# Patient Record
Sex: MALE | Race: WHITE | NOT HISPANIC OR LATINO | Employment: OTHER | URBAN - NONMETROPOLITAN AREA
[De-identification: names, ages, dates, MRNs, and addresses within clinical notes are randomized per-mention and may not be internally consistent; named-entity substitution may affect disease eponyms.]

---

## 2017-01-04 ENCOUNTER — RESULTS ENCOUNTER (OUTPATIENT)
Dept: INTERNAL MEDICINE | Facility: CLINIC | Age: 62
End: 2017-01-04

## 2017-01-04 DIAGNOSIS — C61 MALIGNANT NEOPLASM OF PROSTATE (HCC): ICD-10-CM

## 2017-01-13 DIAGNOSIS — M25.50 CHRONIC JOINT PAIN: ICD-10-CM

## 2017-01-13 DIAGNOSIS — G89.29 CHRONIC JOINT PAIN: ICD-10-CM

## 2017-01-13 DIAGNOSIS — F51.01 PRIMARY INSOMNIA: ICD-10-CM

## 2017-01-13 RX ORDER — MELOXICAM 15 MG/1
TABLET ORAL
Qty: 30 TABLET | Refills: 1 | Status: SHIPPED | OUTPATIENT
Start: 2017-01-13 | End: 2017-01-17 | Stop reason: SDUPTHER

## 2017-01-13 RX ORDER — ASPIRIN 81 MG
TABLET, DELAYED RELEASE (ENTERIC COATED) ORAL
Qty: 30 TABLET | Refills: 5 | Status: SHIPPED | OUTPATIENT
Start: 2017-01-13 | End: 2017-01-17 | Stop reason: SDUPTHER

## 2017-01-13 RX ORDER — AMITRIPTYLINE HYDROCHLORIDE 25 MG/1
TABLET, FILM COATED ORAL
Qty: 60 TABLET | Refills: 1 | Status: SHIPPED | OUTPATIENT
Start: 2017-01-13 | End: 2017-02-22 | Stop reason: SDUPTHER

## 2017-01-16 ENCOUNTER — TELEPHONE (OUTPATIENT)
Dept: INTERNAL MEDICINE | Facility: CLINIC | Age: 62
End: 2017-01-16

## 2017-01-16 NOTE — TELEPHONE ENCOUNTER
----- Message from Ladonna Means MD sent at 1/13/2017  8:33 PM EST -----  Contact: Wife  I need the reason for the consult and I can put in. Is it for his ear or something else?  ----- Message -----     From: Samy Gomez MA     Sent: 1/13/2017   5:13 PM       To: Ladonna Means MD        ----- Message -----     From: Sarah Chappell     Sent: 1/13/2017  11:54 AM       To: Samy Gomez MA    Wife states patient lost insurance but has gained it back.  Is requesting a referral to ENT & requesting Dr. Tamara Garcia p) [phone].  This office does require a referral & booking into February so they're hoping to get this referral soon.  Thanks!

## 2017-01-17 ENCOUNTER — CONSULT (OUTPATIENT)
Dept: CARDIOLOGY | Facility: CLINIC | Age: 62
End: 2017-01-17

## 2017-01-17 ENCOUNTER — LAB (OUTPATIENT)
Dept: INTERNAL MEDICINE | Facility: CLINIC | Age: 62
End: 2017-01-17

## 2017-01-17 ENCOUNTER — TELEPHONE (OUTPATIENT)
Dept: INTERNAL MEDICINE | Facility: CLINIC | Age: 62
End: 2017-01-17

## 2017-01-17 VITALS
SYSTOLIC BLOOD PRESSURE: 126 MMHG | HEIGHT: 71 IN | WEIGHT: 147.4 LBS | BODY MASS INDEX: 20.64 KG/M2 | HEART RATE: 85 BPM | RESPIRATION RATE: 16 BRPM | OXYGEN SATURATION: 98 % | DIASTOLIC BLOOD PRESSURE: 76 MMHG

## 2017-01-17 DIAGNOSIS — Z11.59 SCREENING EXAMINATION FOR POLIOMYELITIS: Primary | ICD-10-CM

## 2017-01-17 DIAGNOSIS — I20.0 UNSTABLE ANGINA (HCC): ICD-10-CM

## 2017-01-17 DIAGNOSIS — R42 DIZZINESS: ICD-10-CM

## 2017-01-17 DIAGNOSIS — R06.02 SHORTNESS OF BREATH: ICD-10-CM

## 2017-01-17 DIAGNOSIS — R07.9 CHEST PAIN IN ADULT: Primary | ICD-10-CM

## 2017-01-17 DIAGNOSIS — Z12.5 SCREENING FOR PROSTATE CANCER: Primary | ICD-10-CM

## 2017-01-17 DIAGNOSIS — M25.50 CHRONIC JOINT PAIN: ICD-10-CM

## 2017-01-17 DIAGNOSIS — G89.29 CHRONIC JOINT PAIN: ICD-10-CM

## 2017-01-17 DIAGNOSIS — R00.2 PALPITATIONS: ICD-10-CM

## 2017-01-17 DIAGNOSIS — R20.2 PARESTHESIA OF LOWER EXTREMITY: ICD-10-CM

## 2017-01-17 DIAGNOSIS — R20.0 NUMBNESS OF UPPER EXTREMITY: ICD-10-CM

## 2017-01-17 DIAGNOSIS — G89.29 CHRONIC JOINT PAIN: Primary | ICD-10-CM

## 2017-01-17 DIAGNOSIS — I10 ESSENTIAL HYPERTENSION: ICD-10-CM

## 2017-01-17 DIAGNOSIS — J43.9 PULMONARY EMPHYSEMA, UNSPECIFIED EMPHYSEMA TYPE (HCC): ICD-10-CM

## 2017-01-17 DIAGNOSIS — M25.50 CHRONIC JOINT PAIN: Primary | ICD-10-CM

## 2017-01-17 LAB
ALBUMIN SERPL-MCNC: 4 G/DL (ref 3.2–4.8)
ALBUMIN/GLOB SERPL: 1.2 G/DL (ref 1.5–2.5)
ALP SERPL-CCNC: 80 U/L (ref 25–100)
ALT SERPL W P-5'-P-CCNC: 33 U/L (ref 7–40)
ANION GAP SERPL CALCULATED.3IONS-SCNC: 17 MMOL/L (ref 3–11)
AST SERPL-CCNC: 34 U/L (ref 0–33)
BASOPHILS # BLD AUTO: 0.03 10*3/MM3 (ref 0–0.2)
BASOPHILS NFR BLD AUTO: 0.3 % (ref 0–1)
BILIRUB SERPL-MCNC: 0.3 MG/DL (ref 0.3–1.2)
BUN BLD-MCNC: 33 MG/DL (ref 9–23)
BUN/CREAT SERPL: 33 (ref 7–25)
CALCIUM SPEC-SCNC: 9.7 MG/DL (ref 8.7–10.4)
CHLORIDE SERPL-SCNC: 107 MMOL/L (ref 99–109)
CO2 SERPL-SCNC: 22 MMOL/L (ref 20–31)
CREAT BLD-MCNC: 1 MG/DL (ref 0.6–1.3)
DEPRECATED RDW RBC AUTO: 49.2 FL (ref 37–54)
EOSINOPHIL # BLD AUTO: 0.18 10*3/MM3 (ref 0.1–0.3)
EOSINOPHIL NFR BLD AUTO: 2 % (ref 0–3)
ERYTHROCYTE [DISTWIDTH] IN BLOOD BY AUTOMATED COUNT: 13.9 % (ref 11.3–14.5)
GFR SERPL CREATININE-BSD FRML MDRD: 76 ML/MIN/1.73
GLOBULIN UR ELPH-MCNC: 3.3 GM/DL
GLUCOSE BLD-MCNC: 85 MG/DL (ref 70–100)
HCT VFR BLD AUTO: 47.9 % (ref 38.9–50.9)
HCV AB SER DONR QL: NORMAL
HGB BLD-MCNC: 16.1 G/DL (ref 13.1–17.5)
IMM GRANULOCYTES # BLD: 0.01 10*3/MM3 (ref 0–0.03)
IMM GRANULOCYTES NFR BLD: 0.1 % (ref 0–0.6)
LYMPHOCYTES # BLD AUTO: 2.69 10*3/MM3 (ref 0.6–4.8)
LYMPHOCYTES NFR BLD AUTO: 29.5 % (ref 24–44)
MCH RBC QN AUTO: 32.4 PG (ref 27–31)
MCHC RBC AUTO-ENTMCNC: 33.6 G/DL (ref 32–36)
MCV RBC AUTO: 96.4 FL (ref 80–99)
MONOCYTES # BLD AUTO: 0.81 10*3/MM3 (ref 0–1)
MONOCYTES NFR BLD AUTO: 8.9 % (ref 0–12)
NEUTROPHILS # BLD AUTO: 5.39 10*3/MM3 (ref 1.5–8.3)
NEUTROPHILS NFR BLD AUTO: 59.2 % (ref 41–71)
PLATELET # BLD AUTO: 252 10*3/MM3 (ref 150–450)
PMV BLD AUTO: 11.2 FL (ref 6–12)
POTASSIUM BLD-SCNC: 4.4 MMOL/L (ref 3.5–5.5)
PROT SERPL-MCNC: 7.3 G/DL (ref 5.7–8.2)
PSA SERPL-MCNC: 0.2 NG/ML (ref 0–4)
RBC # BLD AUTO: 4.97 10*6/MM3 (ref 4.2–5.76)
SODIUM BLD-SCNC: 146 MMOL/L (ref 132–146)
WBC NRBC COR # BLD: 9.11 10*3/MM3 (ref 3.5–10.8)

## 2017-01-17 PROCEDURE — 36415 COLL VENOUS BLD VENIPUNCTURE: CPT | Performed by: FAMILY MEDICINE

## 2017-01-17 PROCEDURE — 99205 OFFICE O/P NEW HI 60 MIN: CPT | Performed by: INTERNAL MEDICINE

## 2017-01-17 PROCEDURE — 93000 ELECTROCARDIOGRAM COMPLETE: CPT | Performed by: INTERNAL MEDICINE

## 2017-01-17 PROCEDURE — 84153 ASSAY OF PSA TOTAL: CPT | Performed by: FAMILY MEDICINE

## 2017-01-17 PROCEDURE — 86803 HEPATITIS C AB TEST: CPT | Performed by: FAMILY MEDICINE

## 2017-01-17 PROCEDURE — 80053 COMPREHEN METABOLIC PANEL: CPT | Performed by: FAMILY MEDICINE

## 2017-01-17 PROCEDURE — 85025 COMPLETE CBC W/AUTO DIFF WBC: CPT | Performed by: FAMILY MEDICINE

## 2017-01-17 PROCEDURE — 84305 ASSAY OF SOMATOMEDIN: CPT | Performed by: FAMILY MEDICINE

## 2017-01-17 RX ORDER — ATORVASTATIN CALCIUM 10 MG/1
10 TABLET, FILM COATED ORAL DAILY
Qty: 90 TABLET | Refills: 2 | Status: SHIPPED | OUTPATIENT
Start: 2017-01-17 | End: 2017-03-22 | Stop reason: SDUPTHER

## 2017-01-17 RX ORDER — ASPIRIN 81 MG/1
81 TABLET ORAL DAILY
Qty: 30 TABLET | Refills: 5 | Status: SHIPPED | OUTPATIENT
Start: 2017-01-17 | End: 2018-08-31 | Stop reason: SDUPTHER

## 2017-01-17 RX ORDER — BISOPROLOL FUMARATE 5 MG/1
5 TABLET, FILM COATED ORAL DAILY
Qty: 30 TABLET | Refills: 11 | Status: SHIPPED | OUTPATIENT
Start: 2017-01-17 | End: 2017-08-18 | Stop reason: SDUPTHER

## 2017-01-17 RX ORDER — GABAPENTIN 100 MG/1
100 CAPSULE ORAL 3 TIMES DAILY
Qty: 30 CAPSULE | Refills: 2 | Status: SHIPPED | OUTPATIENT
Start: 2017-01-17 | End: 2017-05-09

## 2017-01-17 RX ORDER — MELOXICAM 15 MG/1
15 TABLET ORAL DAILY
Qty: 30 TABLET | Refills: 1 | Status: SHIPPED | OUTPATIENT
Start: 2017-01-17 | End: 2017-02-22 | Stop reason: SDUPTHER

## 2017-01-17 RX ORDER — ISOSORBIDE MONONITRATE 30 MG/1
30 TABLET, EXTENDED RELEASE ORAL DAILY
Qty: 30 TABLET | Refills: 11 | Status: SHIPPED | OUTPATIENT
Start: 2017-01-17 | End: 2017-04-06

## 2017-01-17 RX ORDER — NITROGLYCERIN 0.4 MG/1
TABLET SUBLINGUAL
Qty: 100 TABLET | Refills: 11 | Status: SHIPPED | OUTPATIENT
Start: 2017-01-17 | End: 2022-02-01

## 2017-01-17 NOTE — TELEPHONE ENCOUNTER
----- Message from Rossi Jessica sent at 1/17/2017  9:36 AM EST -----  Contact: Pt Wife  Patient requesting refill of:    atorvastatin (LIPITOR) 10 MG tablet  ASPIRIN LOW DOSE 81 MG EC tablet  gabapentin (NEURONTIN) 100 MG capsule  meloxicam (MOBIC) 15 MG tablet    ##Krbetzaidar/Bryan [phone]

## 2017-01-17 NOTE — LETTER
January 17, 2017     Ladonna Means MD  [address] [facility] [address] [address]  [facility] [address]    Patient: Elliott Dunn   YOB: 1955   Date of Visit: 1/17/2017       Dear Dr. Miguelangel MD:    Thank you for referring Elliott Dunn to me for evaluation. Below are the relevant portions of my assessment and plan of care.  I had the opportunity to see your patient in cardiology clinic today.  This is a 61-year-old male patient with chest pain features typical and atypical for coronary insufficiency.  He had a normal stress test in May 2016.  He has multiple risk factors for atherosclerosis.  Fortunately he does not meet appropriate use criteria for elective cardiac catheterization.  For this reason I have recommended a coronary CT angiogram.  I have also empirically started him on bisoprolol 5 mg once per day and Imdur 30 mg per day.  He is to continue using his hydrochlorothiazide diuretic for the time being.  If his blood pressure becomes too low I would discontinue the diuretic.  I have also given him a prescription for sublingual nitroglycerin to use on an as-needed basis.  The patient's clinical features and extremely large hands suggest that he may have unrecognized acromegaly.  I would recommend this be further evaluated as well.  I will defer this to your discretion.  Will also recommended a 24-hour Holter monitor to evaluate his palpitations.  He is been counseled regarding the essential need to discontinue cigarette smoking.  Further recommendations will be predicated on the outcome of his outpatient testing.  If you have questions, please do not hesitate to call me. I look forward to following Elliott along with you.         Sincerely,        Raymond Arreola MD        CC: No Recipients  Raymond Arreola MD  1/17/2017  9:28 AM  Signed      Subjective:     Encounter Date:01/17/2017      Patient ID: Elliott Dunn is a 61 y.o. male.    Chief Complaint:  Chest pain  HPI  This is a 61-year-old male  "patient with no prior history of documented coronary disease who presents to cardiology clinic today having experienced chest discomfort since the spring of 2016.  The patient began having chest discomfort in April 2016 primarily with activity.  He describes it as a sense that his chest is going to \"explode\".  The discomfort is diffusely across his central chest and tends to radiate down his left arm and has radiated to his left shoulder and back.  The discomfort is associated with shortness of breath and diaphoresis with malaise but no nausea or vomiting.  The discomfort occurs intermittently and with variable frequency.  In the spring he was having chest discomfort approximately 3-5 days a week.  He is currently having chest discomfort approximately every 2 weeks.  He indicates that initially his chest discomfort was relieved with about 10-15 minutes of rest.  Now he indicates the discomfort can be present for several hours during the day.  The discomfort still occurs with activity and is relieved with rest but has also occurred during periods of rest as well.  He has no orthopnea PND or lower extremity edema.  He also has shortness of breath with activity.  He has dizziness palpitations but no syncope.  The patient has episodes of severe dizziness when his blood pressure is elevated.  He was recently diagnosed as having Ménière's disease in a local emergency room.  He does not appear to have the classic symptom complex of vertigo, tinnitus and hearing loss.  He has a history of hypertension and hypercholesterolemia.  He has a history of COPD and continues to smoke one pack of cigarettes per day.  He has no documented history of myocardial infarction or coronary revascularization.  His family history is strongly positive for premature coronary disease.  He is never had a cardiac catheterization.  He underwent a vasodilator nuclear stress test in May 2016 which was interpreted as showing no evidence of ischemia or " infarction.  His ejection fraction was calculated at 52% by that study.  He has no history of stroke or TIA.  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  He also has pain in his hands bilaterally.  He also has developed tendon and ligament weakening in both feet and ankles which has required the use of orthopedic support devices applied to both ankles.  He is unable to exercise for treadmill stress test purposes based on these abnormalities.  The patient was previously experiencing severe hypertension but his blood pressure has been come under excellent control with hydrochlorothiazide diuretic.  The following portions of the patient's history were reviewed and updated as appropriate: allergies, current medications, past family history, past medical history, past social history, past surgical history and problem  Review of Systems   Constitution: Positive for night sweats. Negative for chills, diaphoresis, fever, weakness, malaise/fatigue, weight gain and weight loss.   HENT: Negative for ear discharge, hearing loss, hoarse voice and nosebleeds.    Eyes: Negative for discharge, double vision, pain and photophobia.   Cardiovascular: Positive for chest pain, dyspnea on exertion, irregular heartbeat and palpitations. Negative for claudication, cyanosis, leg swelling, near-syncope, orthopnea, paroxysmal nocturnal dyspnea and syncope.   Respiratory: Positive for shortness of breath. Negative for cough, hemoptysis, sputum production and wheezing.    Endocrine: Negative for cold intolerance, heat intolerance, polydipsia, polyphagia and polyuria.   Hematologic/Lymphatic: Negative for adenopathy and bleeding problem. Does not bruise/bleed easily.   Skin: Negative for color change, flushing, itching and rash.   Musculoskeletal: Negative for muscle cramps, muscle weakness, myalgias and stiffness.   Gastrointestinal: Negative for abdominal pain, diarrhea, hematemesis, hematochezia, nausea and vomiting.    Genitourinary: Negative for dysuria, frequency and nocturia.   Neurological: Negative for focal weakness, loss of balance, numbness, paresthesias and seizures.   Psychiatric/Behavioral: Negative for altered mental status, hallucinations and suicidal ideas.   Allergic/Immunologic: Negative for HIV exposure, hives and persistent infections.       Current Outpatient Prescriptions:   •  albuterol (PROVENTIL HFA;VENTOLIN HFA) 108 (90 BASE) MCG/ACT inhaler, Inhale.  INHALE 1 TO 2 PUFFS BY MOUTH EVERY 4 TO 6 HOURS AS NEEDED FOR SHORTNESS OF BREATH AND WHEEZING, Disp: , Rfl:   •  amitriptyline (ELAVIL) 25 MG tablet, TAKE ONE TO TWO TABLETS BY MOUTH EVERY NIGHT AT BEDTIME AS NEEDED FOR SLEEP, Disp: 60 tablet, Rfl: 1  •  ASPIRIN LOW DOSE 81 MG EC tablet, TAKE ONE TABLET BY MOUTH DAILY, Disp: 30 tablet, Rfl: 5  •  atorvastatin (LIPITOR) 10 MG tablet, TAKE ONE TABLET BY MOUTH DAILY, Disp: 90 tablet, Rfl: 2  •  gabapentin (NEURONTIN) 100 MG capsule, Take 1 capsule by mouth 3 (three) times a day., Disp: 30 capsule, Rfl: 2  •  hydrochlorothiazide (HYDRODIURIL) 25 MG tablet, Take 0.5 tablets by mouth daily, Disp: , Rfl:   •  meloxicam (MOBIC) 15 MG tablet, TAKE ONE TABLET BY MOUTH DAILY, Disp: 30 tablet, Rfl: 1  •  methocarbamol (ROBAXIN) 750 MG tablet, Take 1 tablet by mouth 3 (three) times a day., Disp: 90 tablet, Rfl: 2  •  ranitidine (ZANTAC) 150 MG tablet, Take 1 tablet by mouth every night., Disp: , Rfl:   •  Triamcinolone Acetonide (NASACORT) 55 MCG/ACT nasal inhaler, 2 sprays into each nostril daily., Disp: , Rfl:   •  vitamin B-12 (CYANOCOBALAMIN) 1000 MCG tablet, TAKE ONE TABLET BY MOUTH DAILY AS DIRECTED, Disp: 30 tablet, Rfl: 10  •  bisoprolol (ZEBeta) 5 MG tablet, Take 1 tablet by mouth Daily., Disp: 30 tablet, Rfl: 11  •  isosorbide mononitrate (IMDUR) 30 MG 24 hr tablet, Take 1 tablet by mouth Daily., Disp: 30 tablet, Rfl: 11  •  nitroglycerin (NITROSTAT) 0.4 MG SL tablet, 1 under the tongue as needed for angina,  "may repeat q5mins for up three doses, Disp: 100 tablet, Rfl: 11     Objective:     Physical Exam   Constitutional: He is oriented to person, place, and time. He appears well-developed and well-nourished.   HENT:   Head: Normocephalic and atraumatic.   Mouth/Throat: Oropharynx is clear and moist.   Eyes: Conjunctivae and EOM are normal. Pupils are equal, round, and reactive to light. No scleral icterus.   Neck: Normal range of motion. Neck supple. No JVD present. No tracheal deviation present. No thyromegaly present.   Cardiovascular: Normal rate, regular rhythm, S1 normal, S2 normal, normal heart sounds, intact distal pulses and normal pulses.  PMI is not displaced.  Exam reveals no gallop and no friction rub.    No murmur heard.  Pulmonary/Chest: Effort normal and breath sounds normal. No respiratory distress. He has no wheezes. He has no rales.   Abdominal: Soft. Bowel sounds are normal. He exhibits no distension and no mass. There is no tenderness. There is no rebound and no guarding.   Musculoskeletal: Normal range of motion. He exhibits no edema or deformity.   Neurological: He is alert and oriented to person, place, and time. He displays normal reflexes. No cranial nerve deficit. Coordination normal.   Skin: Skin is warm and dry. No rash noted. No erythema.   Psychiatric: He has a normal mood and affect. His behavior is normal. Thought content normal.     Blood pressure 126/76, pulse 85, resp. rate 16, height 71\" (180.3 cm), weight 147 lb 6.4 oz (66.9 kg), SpO2 98 %.   Lab Review:       Assessment:         1. Chest pain in adult  His chest discomfort has features both typical and atypical for coronary insufficiency.  He has multiple risk factors for coronary atherosclerosis.  He has previously had a vasodilator nuclear stress test in May 2016 which showed no inducible ischemia.  His symptoms have waxed and waned over the last several months.  - ECG 12 Lead  - CT Angiogram Coronary    2. Shortness of breath  I " suspect this is primarily due to his COPD.  There may be an element of angina equivalents.  This may be a symptom of hypertensive related cardiac disease.  - ECG 12 Lead  - CT Angiogram Coronary    3. Palpitations  Some of his symptoms could be explained by either arrhythmia and/or ectopy.  His baseline 12-lead electrocardiogram shows no QT prolongation or evidence of preexcitation.  The symptoms have gradually worsened.  He is never worn an outpatient heart monitor.  - ECG 12 Lead  - Holter Monitor - 24 Hour    4. Dizziness  The patient has previously been diagnosed with Ménière's disease.  I suspect that his dizziness may be more related to accelerated hypertension.  - ECG 12 Lead  - Holter Monitor - 24 Hour    5. Unstable angina  The patient's blood pressure may be contributing to his chest discomfort.  - CT Angiogram Coronary    6. Essential hypertension  His blood pressure control has improved with diuretic therapy.    7. Pulmonary emphysema, unspecified emphysema type  Unfortunately he continues to smoke    ECG 12 Lead  Date/Time: 1/17/2017 8:49 AM  Performed by: VANDANA LARA  Authorized by: VANDANA LARA                Plan:       The patient has been counseled regarding the need to discontinue cigarette smoking.  At this point coronary imaging is a necessity.  I have recommended a coronary CT angiogram.  He has no allergies to IV contrast.  4 empiric antianginal therapy I have started him on bisoprolol , Imdur and when necessary sublingual nitroglycerin.  I have also recommended an outpatient Holter monitor.  Further recommendations will be predicated on the outcome of his outpatient testing.  The patient has been noticed to have extremely large hands.  I wonder if he has unrecognized acromegaly.

## 2017-01-17 NOTE — PROGRESS NOTES
"    Subjective:     Encounter Date:01/17/2017      Patient ID: Elliott Dunn is a 61 y.o. male.    Chief Complaint:  Chest pain  HPI  This is a 61-year-old male patient with no prior history of documented coronary disease who presents to cardiology clinic today having experienced chest discomfort since the spring of 2016.  The patient began having chest discomfort in April 2016 primarily with activity.  He describes it as a sense that his chest is going to \"explode\".  The discomfort is diffusely across his central chest and tends to radiate down his left arm and has radiated to his left shoulder and back.  The discomfort is associated with shortness of breath and diaphoresis with malaise but no nausea or vomiting.  The discomfort occurs intermittently and with variable frequency.  In the spring he was having chest discomfort approximately 3-5 days a week.  He is currently having chest discomfort approximately every 2 weeks.  He indicates that initially his chest discomfort was relieved with about 10-15 minutes of rest.  Now he indicates the discomfort can be present for several hours during the day.  The discomfort still occurs with activity and is relieved with rest but has also occurred during periods of rest as well.  He has no orthopnea PND or lower extremity edema.  He also has shortness of breath with activity.  He has dizziness palpitations but no syncope.  The patient has episodes of severe dizziness when his blood pressure is elevated.  He was recently diagnosed as having Ménière's disease in a local emergency room.  He does not appear to have the classic symptom complex of vertigo, tinnitus and hearing loss.  He has a history of hypertension and hypercholesterolemia.  He has a history of COPD and continues to smoke one pack of cigarettes per day.  He has no documented history of myocardial infarction or coronary revascularization.  His family history is strongly positive for premature coronary disease.  " He is never had a cardiac catheterization.  He underwent a vasodilator nuclear stress test in May 2016 which was interpreted as showing no evidence of ischemia or infarction.  His ejection fraction was calculated at 52% by that study.  He has no history of stroke or TIA.  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  He also has pain in his hands bilaterally.  He also has developed tendon and ligament weakening in both feet and ankles which has required the use of orthopedic support devices applied to both ankles.  He is unable to exercise for treadmill stress test purposes based on these abnormalities.  The patient was previously experiencing severe hypertension but his blood pressure has been come under excellent control with hydrochlorothiazide diuretic.  The following portions of the patient's history were reviewed and updated as appropriate: allergies, current medications, past family history, past medical history, past social history, past surgical history and problem  Review of Systems   Constitution: Positive for night sweats. Negative for chills, diaphoresis, fever, weakness, malaise/fatigue, weight gain and weight loss.   HENT: Negative for ear discharge, hearing loss, hoarse voice and nosebleeds.    Eyes: Negative for discharge, double vision, pain and photophobia.   Cardiovascular: Positive for chest pain, dyspnea on exertion, irregular heartbeat and palpitations. Negative for claudication, cyanosis, leg swelling, near-syncope, orthopnea, paroxysmal nocturnal dyspnea and syncope.   Respiratory: Positive for shortness of breath. Negative for cough, hemoptysis, sputum production and wheezing.    Endocrine: Negative for cold intolerance, heat intolerance, polydipsia, polyphagia and polyuria.   Hematologic/Lymphatic: Negative for adenopathy and bleeding problem. Does not bruise/bleed easily.   Skin: Negative for color change, flushing, itching and rash.   Musculoskeletal: Negative for muscle  cramps, muscle weakness, myalgias and stiffness.   Gastrointestinal: Negative for abdominal pain, diarrhea, hematemesis, hematochezia, nausea and vomiting.   Genitourinary: Negative for dysuria, frequency and nocturia.   Neurological: Negative for focal weakness, loss of balance, numbness, paresthesias and seizures.   Psychiatric/Behavioral: Negative for altered mental status, hallucinations and suicidal ideas.   Allergic/Immunologic: Negative for HIV exposure, hives and persistent infections.       Current Outpatient Prescriptions:   •  albuterol (PROVENTIL HFA;VENTOLIN HFA) 108 (90 BASE) MCG/ACT inhaler, Inhale.  INHALE 1 TO 2 PUFFS BY MOUTH EVERY 4 TO 6 HOURS AS NEEDED FOR SHORTNESS OF BREATH AND WHEEZING, Disp: , Rfl:   •  amitriptyline (ELAVIL) 25 MG tablet, TAKE ONE TO TWO TABLETS BY MOUTH EVERY NIGHT AT BEDTIME AS NEEDED FOR SLEEP, Disp: 60 tablet, Rfl: 1  •  ASPIRIN LOW DOSE 81 MG EC tablet, TAKE ONE TABLET BY MOUTH DAILY, Disp: 30 tablet, Rfl: 5  •  atorvastatin (LIPITOR) 10 MG tablet, TAKE ONE TABLET BY MOUTH DAILY, Disp: 90 tablet, Rfl: 2  •  gabapentin (NEURONTIN) 100 MG capsule, Take 1 capsule by mouth 3 (three) times a day., Disp: 30 capsule, Rfl: 2  •  hydrochlorothiazide (HYDRODIURIL) 25 MG tablet, Take 0.5 tablets by mouth daily, Disp: , Rfl:   •  meloxicam (MOBIC) 15 MG tablet, TAKE ONE TABLET BY MOUTH DAILY, Disp: 30 tablet, Rfl: 1  •  methocarbamol (ROBAXIN) 750 MG tablet, Take 1 tablet by mouth 3 (three) times a day., Disp: 90 tablet, Rfl: 2  •  ranitidine (ZANTAC) 150 MG tablet, Take 1 tablet by mouth every night., Disp: , Rfl:   •  Triamcinolone Acetonide (NASACORT) 55 MCG/ACT nasal inhaler, 2 sprays into each nostril daily., Disp: , Rfl:   •  vitamin B-12 (CYANOCOBALAMIN) 1000 MCG tablet, TAKE ONE TABLET BY MOUTH DAILY AS DIRECTED, Disp: 30 tablet, Rfl: 10  •  bisoprolol (ZEBeta) 5 MG tablet, Take 1 tablet by mouth Daily., Disp: 30 tablet, Rfl: 11  •  isosorbide mononitrate (IMDUR) 30 MG 24  "hr tablet, Take 1 tablet by mouth Daily., Disp: 30 tablet, Rfl: 11  •  nitroglycerin (NITROSTAT) 0.4 MG SL tablet, 1 under the tongue as needed for angina, may repeat q5mins for up three doses, Disp: 100 tablet, Rfl: 11     Objective:     Physical Exam   Constitutional: He is oriented to person, place, and time. He appears well-developed and well-nourished.   HENT:   Head: Normocephalic and atraumatic.   Mouth/Throat: Oropharynx is clear and moist.   Eyes: Conjunctivae and EOM are normal. Pupils are equal, round, and reactive to light. No scleral icterus.   Neck: Normal range of motion. Neck supple. No JVD present. No tracheal deviation present. No thyromegaly present.   Cardiovascular: Normal rate, regular rhythm, S1 normal, S2 normal, normal heart sounds, intact distal pulses and normal pulses.  PMI is not displaced.  Exam reveals no gallop and no friction rub.    No murmur heard.  Pulmonary/Chest: Effort normal and breath sounds normal. No respiratory distress. He has no wheezes. He has no rales.   Abdominal: Soft. Bowel sounds are normal. He exhibits no distension and no mass. There is no tenderness. There is no rebound and no guarding.   Musculoskeletal: Normal range of motion. He exhibits no edema or deformity.   Neurological: He is alert and oriented to person, place, and time. He displays normal reflexes. No cranial nerve deficit. Coordination normal.   Skin: Skin is warm and dry. No rash noted. No erythema.   Psychiatric: He has a normal mood and affect. His behavior is normal. Thought content normal.     Blood pressure 126/76, pulse 85, resp. rate 16, height 71\" (180.3 cm), weight 147 lb 6.4 oz (66.9 kg), SpO2 98 %.   Lab Review:       Assessment:         1. Chest pain in adult  His chest discomfort has features both typical and atypical for coronary insufficiency.  He has multiple risk factors for coronary atherosclerosis.  He has previously had a vasodilator nuclear stress test in May 2016 which showed " no inducible ischemia.  His symptoms have waxed and waned over the last several months.  - ECG 12 Lead  - CT Angiogram Coronary    2. Shortness of breath  I suspect this is primarily due to his COPD.  There may be an element of angina equivalents.  This may be a symptom of hypertensive related cardiac disease.  - ECG 12 Lead  - CT Angiogram Coronary    3. Palpitations  Some of his symptoms could be explained by either arrhythmia and/or ectopy.  His baseline 12-lead electrocardiogram shows no QT prolongation or evidence of preexcitation.  The symptoms have gradually worsened.  He is never worn an outpatient heart monitor.  - ECG 12 Lead  - Holter Monitor - 24 Hour    4. Dizziness  The patient has previously been diagnosed with Ménière's disease.  I suspect that his dizziness may be more related to accelerated hypertension.  - ECG 12 Lead  - Holter Monitor - 24 Hour    5. Unstable angina  The patient's blood pressure may be contributing to his chest discomfort.  - CT Angiogram Coronary    6. Essential hypertension  His blood pressure control has improved with diuretic therapy.    7. Pulmonary emphysema, unspecified emphysema type  Unfortunately he continues to smoke    ECG 12 Lead  Date/Time: 1/17/2017 8:49 AM  Performed by: VANDANA LARA  Authorized by: VANDANA LARA                Plan:       The patient has been counseled regarding the need to discontinue cigarette smoking.  At this point coronary imaging is a necessity.  I have recommended a coronary CT angiogram.  He has no allergies to IV contrast.  4 empiric antianginal therapy I have started him on bisoprolol , Imdur and when necessary sublingual nitroglycerin.  I have also recommended an outpatient Holter monitor.  Further recommendations will be predicated on the outcome of his outpatient testing.  The patient has been noticed to have extremely large hands.  I wonder if he has unrecognized acromegaly.

## 2017-01-17 NOTE — MR AVS SNAPSHOT
Elliott Dunn   1/17/2017 8:30 AM   Consult    Dept Phone:  [phone]   Encounter #:  [id number]    Provider:  Raymond Arreola MD   Department:  [facility] CARDIOLOGY                Your Full Care Plan              Today's Medication Changes          These changes are accurate as of: 1/17/17  9:14 AM.  If you have any questions, ask your nurse or doctor.               New Medication(s)Ordered:     bisoprolol 5 MG tablet   Commonly known as:  ZEBeta   Take 1 tablet by mouth Daily.   Started by:  Raymond Arreola MD       isosorbide mononitrate 30 MG 24 hr tablet   Commonly known as:  IMDUR   Take 1 tablet by mouth Daily.   Started by:  Raymond Arreola MD       nitroglycerin 0.4 MG SL tablet   Commonly known as:  NITROSTAT   1 under the tongue as needed for angina, may repeat q5mins for up three doses   Started by:  Raymond Arreola MD         Medication(s)that have changed:     ASPIRIN LOW DOSE 81 MG EC tablet   Generic drug:  aspirin   TAKE ONE TABLET BY MOUTH DAILY   What changed:  Another medication with the same name was removed. Continue taking this medication, and follow the directions you see here.   Changed by:  Ladonna Means MD         Stop taking medication(s)listed here:     ibuprofen 800 MG tablet   Commonly known as:  ADVIL,MOTRIN   Stopped by:  Raymond Arreola MD                Where to Get Your Medications      These medications were sent to [facility] [address] [place] [address] - [address] [facility] - [phone]  - [phone] FX  [address] [facility] [address]     Phone:  [phone]     bisoprolol 5 MG tablet    isosorbide mononitrate 30 MG 24 hr tablet    nitroglycerin 0.4 MG SL tablet                  Your Updated Medication List          This list is accurate as of: 1/17/17  9:14 AM.  Always use your most recent med list.                albuterol 108 (90 BASE) MCG/ACT inhaler   Commonly known as:  PROVENTIL HFA;VENTOLIN HFA        amitriptyline 25 MG tablet   Commonly known as:  ELAVIL   TAKE ONE TO TWO TABLETS BY MOUTH EVERY NIGHT AT BEDTIME AS NEEDED FOR SLEEP       ASPIRIN LOW DOSE 81 MG EC tablet   Generic drug:  aspirin   TAKE ONE TABLET BY MOUTH DAILY       atorvastatin 10 MG tablet   Commonly known as:  LIPITOR   TAKE ONE TABLET BY MOUTH DAILY       bisoprolol 5 MG tablet   Commonly known as:  ZEBeta   Take 1 tablet by mouth Daily.       gabapentin 100 MG capsule   Commonly known as:  NEURONTIN   Take 1 capsule by mouth 3 (three) times a day.       hydrochlorothiazide 25 MG tablet   Commonly known as:  HYDRODIURIL       isosorbide mononitrate 30 MG 24 hr tablet   Commonly known as:  IMDUR   Take 1 tablet by mouth Daily.       meloxicam 15 MG tablet   Commonly known as:  MOBIC   TAKE ONE TABLET BY MOUTH DAILY       methocarbamol 750 MG tablet   Commonly known as:  ROBAXIN   Take 1 tablet by mouth 3 (three) times a day.       nitroglycerin 0.4 MG SL tablet   Commonly known as:  NITROSTAT   1 under the tongue as needed for angina, may repeat q5mins for up three doses       raNITIdine 150 MG tablet   Commonly known as:  ZANTAC       Triamcinolone Acetonide 55 MCG/ACT nasal inhaler   Commonly known as:  NASACORT       vitamin B-12 1000 MCG tablet   Commonly known as:  CYANOCOBALAMIN   TAKE ONE TABLET BY MOUTH DAILY AS DIRECTED               We Performed the Following     CT Angiogram Coronary     ECG 12 Lead     Holter Monitor - 24 Hour       You Were Diagnosed With        Codes Comments    Chest pain in adult    -  Primary ICD-10-CM: R07.9  ICD-9-CM: 786.50     Shortness of breath     ICD-10-CM: R06.02  ICD-9-CM: 786.05     Palpitations     ICD-10-CM: R00.2  ICD-9-CM: 785.1     Dizziness     ICD-10-CM: R42  ICD-9-CM: 780.4     Unstable angina     ICD-10-CM: I20.0  ICD-9-CM: 411.1     Essential hypertension     ICD-10-CM: I10  ICD-9-CM: 401.9     Pulmonary emphysema, unspecified emphysema type     ICD-10-CM: J43.9  ICD-9-CM: 492.8        Instructions     None    Patient Instructions History      Upcoming Appointments     Visit Type Date Time Department    CONSULT 2017  8:30 AM [facility] CARD [place]    FOLLOW UP 1 UNIT 2017  9:45 AM [facility] ONC [place]    FOLLOW UP 2017 10:00 AM [facility] [place]      MyChart Signup     [facility] [employer] allows you to send messages to your doctor, view your test results, renew your prescriptions, schedule appointments, and more. To sign up, go to [employer] and click on the Sign Up Now link in the New User? box. Enter your [employer] Activation Code exactly as it appears below along with the last four digits of your Social Security Number and your Date of Birth () to complete the sign-up process. If you do not sign up before the expiration date, you must request a new code.    [employer] Activation Code: [email or web address]  Expires: 2017  9:13 AM    If you have questions, you can email [email or web address] or call [phone] to talk to our [employer] staff. Remember, [employer] is NOT to be used for urgent needs. For medical emergencies, dial 911.               Other Info from Your Visit           Your Appointments     2017  9:45 AM EST   FOLLOW UP with Iban Abdi MD   [facility] HEMATOLOGY  AND ONCOLOGY ([place])    [address]  [facility] [address] [phone]            2017 10:00 AM EST   Follow Up with Raymond Arreola MD   [facility] CARDIOLOGY (--)    [address]  [facility] [address] [phone]           Arrive 15 minutes prior to appointment.              Allergies     Pravastatin  Other (See Comments)    Weakness / fatigue      Reason for Visit     Advice Only     Chest Pain     Shortness of Breath     Palpitations     Dizziness           Vital Signs     Blood Pressure Pulse Respirations Height Weight Oxygen Saturation    126/76 (BP Location: Left arm, Patient Position:  "Standing, Cuff Size: Adult) 85 16 71\" (180.3 cm) 147 lb 6.4 oz (66.9 kg) 98%    Body Mass Index Smoking Status                20.56 kg/m2 Current Every Day Smoker          Problems and Diagnoses Noted     Chest pain in adult    Dizziness    High blood pressure    Palpitations    Emphysema    Shortness of breath    Unstable angina        "

## 2017-01-18 DIAGNOSIS — H93.12 LEFT-SIDED TINNITUS: Primary | ICD-10-CM

## 2017-01-18 DIAGNOSIS — R42 DIZZINESS: ICD-10-CM

## 2017-01-18 LAB — IGF-I SERPL-MCNC: 62 NG/ML (ref 49–188)

## 2017-01-18 NOTE — TELEPHONE ENCOUNTER
LUIS CALLED BACK, IT WILL BE FOR HIS EAR. POSSIBLE MENIERE DISEASE. HE WOULD LIKE TO BE SEEN AT [facility] IN [place], WITH DR. HOPE. [phone]

## 2017-01-19 NOTE — TELEPHONE ENCOUNTER
Wife informed she will get a call with appointment information once arranged, verbalized understanding.

## 2017-01-26 ENCOUNTER — HOSPITAL ENCOUNTER (OUTPATIENT)
Dept: CT IMAGING | Facility: HOSPITAL | Age: 62
Discharge: HOME | End: 2017-01-26
Attending: INTERNAL MEDICINE | Admitting: INTERNAL MEDICINE

## 2017-01-26 VITALS
SYSTOLIC BLOOD PRESSURE: 107 MMHG | DIASTOLIC BLOOD PRESSURE: 69 MMHG | RESPIRATION RATE: 18 BRPM | TEMPERATURE: 97.3 F | WEIGHT: 146.4 LBS | BODY MASS INDEX: 20.5 KG/M2 | HEIGHT: 71 IN | HEART RATE: 60 BPM | OXYGEN SATURATION: 95 %

## 2017-01-26 PROCEDURE — 75572 CT HRT W/3D IMAGE: CPT

## 2017-01-26 PROCEDURE — 0 IOPAMIDOL PER 1 ML: Performed by: INTERNAL MEDICINE

## 2017-01-26 RX ORDER — NITROGLYCERIN 0.4 MG/1
0.4 TABLET SUBLINGUAL ONCE
Status: COMPLETED | OUTPATIENT
Start: 2017-01-26 | End: 2017-01-26

## 2017-01-26 RX ORDER — SODIUM CHLORIDE 9 MG/ML
10 INJECTION, SOLUTION INTRAVENOUS CONTINUOUS
Status: DISCONTINUED | OUTPATIENT
Start: 2017-01-26 | End: 2017-01-26 | Stop reason: HOSPADM

## 2017-01-26 RX ORDER — METOPROLOL TARTRATE 50 MG/1
50 TABLET, FILM COATED ORAL AS NEEDED
Status: DISCONTINUED | OUTPATIENT
Start: 2017-01-26 | End: 2017-01-26 | Stop reason: HOSPADM

## 2017-01-26 RX ORDER — ALPRAZOLAM 0.5 MG/1
0.5 TABLET ORAL
Status: COMPLETED | OUTPATIENT
Start: 2017-01-26 | End: 2017-01-26

## 2017-01-26 RX ADMIN — SODIUM CHLORIDE 10 ML/HR: 9 INJECTION, SOLUTION INTRAVENOUS at 08:36

## 2017-01-26 RX ADMIN — ALPRAZOLAM 0.5 MG: 0.5 TABLET ORAL at 08:35

## 2017-01-26 RX ADMIN — NITROGLYCERIN 0.4 MG: 0.4 TABLET SUBLINGUAL at 09:41

## 2017-01-26 RX ADMIN — METOPROLOL TARTRATE 50 MG: 50 TABLET ORAL at 08:35

## 2017-01-26 RX ADMIN — IOPAMIDOL 65 ML: 755 INJECTION, SOLUTION INTRAVENOUS at 09:45

## 2017-02-09 ENCOUNTER — LAB (OUTPATIENT)
Dept: LAB | Facility: HOSPITAL | Age: 62
End: 2017-02-09

## 2017-02-09 ENCOUNTER — OFFICE VISIT (OUTPATIENT)
Dept: ONCOLOGY | Facility: CLINIC | Age: 62
End: 2017-02-09

## 2017-02-09 VITALS
OXYGEN SATURATION: 97 % | TEMPERATURE: 97.9 F | HEART RATE: 56 BPM | WEIGHT: 147.6 LBS | SYSTOLIC BLOOD PRESSURE: 156 MMHG | RESPIRATION RATE: 18 BRPM | DIASTOLIC BLOOD PRESSURE: 73 MMHG | BODY MASS INDEX: 20.66 KG/M2 | HEIGHT: 71 IN

## 2017-02-09 DIAGNOSIS — C61 MALIGNANT NEOPLASM OF PROSTATE (HCC): Primary | ICD-10-CM

## 2017-02-09 DIAGNOSIS — D47.2 MGUS (MONOCLONAL GAMMOPATHY OF UNKNOWN SIGNIFICANCE): ICD-10-CM

## 2017-02-09 DIAGNOSIS — C43.30 MALIGNANT MELANOMA OF FACE (HCC): ICD-10-CM

## 2017-02-09 LAB
BASOPHILS # BLD AUTO: 0.07 10*3/MM3 (ref 0–0.2)
BASOPHILS NFR BLD AUTO: 0.4 % (ref 0–2.5)
DEPRECATED RDW RBC AUTO: 48 FL (ref 37–54)
EOSINOPHIL # BLD AUTO: 0.02 10*3/MM3 (ref 0–0.7)
EOSINOPHIL NFR BLD AUTO: 0.1 % (ref 0–7)
ERYTHROCYTE [DISTWIDTH] IN BLOOD BY AUTOMATED COUNT: 13.4 % (ref 11.5–14.5)
HCT VFR BLD AUTO: 49 % (ref 42–52)
HGB BLD-MCNC: 16.7 G/DL (ref 14–18)
IMM GRANULOCYTES # BLD: 0.16 10*3/MM3 (ref 0–0.06)
IMM GRANULOCYTES NFR BLD: 1 % (ref 0–0.6)
LYMPHOCYTES # BLD AUTO: 1.81 10*3/MM3 (ref 0.6–3.4)
LYMPHOCYTES NFR BLD AUTO: 11.2 % (ref 10–50)
MCH RBC QN AUTO: 32.6 PG (ref 27–31)
MCHC RBC AUTO-ENTMCNC: 34.1 G/DL (ref 30–37)
MCV RBC AUTO: 95.7 FL (ref 80–94)
MONOCYTES # BLD AUTO: 0.44 10*3/MM3 (ref 0–0.9)
MONOCYTES NFR BLD AUTO: 2.7 % (ref 0–12)
NEUTROPHILS # BLD AUTO: 13.69 10*3/MM3 (ref 2–6.9)
NEUTROPHILS NFR BLD AUTO: 84.6 % (ref 37–80)
NRBC BLD MANUAL-RTO: 0 /100 WBC (ref 0–0)
PLATELET # BLD AUTO: 327 10*3/MM3 (ref 130–400)
PMV BLD AUTO: 10.4 FL (ref 6–12)
RBC # BLD AUTO: 5.12 10*6/MM3 (ref 4.7–6.1)
WBC NRBC COR # BLD: 16.19 10*3/MM3 (ref 4.8–10.8)

## 2017-02-09 PROCEDURE — 99215 OFFICE O/P EST HI 40 MIN: CPT | Performed by: INTERNAL MEDICINE

## 2017-02-09 PROCEDURE — 36415 COLL VENOUS BLD VENIPUNCTURE: CPT

## 2017-02-09 PROCEDURE — 85025 COMPLETE CBC W/AUTO DIFF WBC: CPT | Performed by: INTERNAL MEDICINE

## 2017-02-09 RX ORDER — PREDNISONE 2.5 MG
2.5 TABLET ORAL DAILY
COMMUNITY
End: 2017-04-06

## 2017-02-09 NOTE — PROGRESS NOTES
CHIEF COMPLAINT: Recent melanoma    Problem List:  1.)MGUS:PET was negative. Bone survey showed degenerative joint disease and an old healed eighth rib fracture. CBC is normal. 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 His calcium was normal at 9.5 with a creatinine of 0.9 and hemoglobin of 16 and no lytic bone disease.  HISTORY OF PRESENT ILLNESS:  The patient is a 61 y.o. male, here for follow up on management of MGUS.  Recently had excision of reportedly a melanoma from his left temple.  I do not have reports from that.  Due for wide excision.  Has also been getting chest pains and having a cardiac workup as well which is underway.  Pains are not constant, sometimes radiate down the arm and upper neck, is more like a dull ache and does occur with exercise at times.  Is seeing cardiology.      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   • Back pain 6/17/2016   • Cancer      prostate   • Cervical spine disease 6/17/2016   • Derangement of anterior horn of medial meniscus    • Disorder of lumbar spine 6/17/2016   • Disorder of thoracic spine 6/17/2016   • Diverticulitis of colon    • Esophageal reflux    • Glaucoma    • Heart murmur    • High cholesterol    • Lateral meniscus derangement    • Left otitis media with spontaneous rupture of eardrum    • Locking of left knee    • MGUS (monoclonal gammopathy of unknown significance)      likely diagnosis   • Neuropathic arthritis    • Perforation of left tympanic membrane    • Skin cancer      skin cancer   • Spina bifida occulta 6/17/2016     Past Surgical History   Procedure Laterality Date   • Knee surgery Left    • Prostate surgery  2004   • Prostatectomy  06/30/2014   • Tympanoplasty w/ mastoidectomy     • Prostatectomy       Prostatectomy Radical   • Skin cancer excision  2017     both sides of  "body/squamous cell melanoma       Allergies   Allergen Reactions   • Pravastatin Other (See Comments)     Weakness / fatigue       Family History and Social History reviewed and changed as necessary      REVIEW OF SYSTEM:   Review of Systems   Constitutional: Negative for appetite change, chills, diaphoresis, fatigue, fever and unexpected weight change.   HENT:   Negative for mouth sores, sore throat and trouble swallowing.    Eyes: Negative for icterus.   Respiratory: Negative for cough, hemoptysis and shortness of breath.    Cardiovascular: Negative for chest pain, leg swelling and palpitations.   Gastrointestinal: Negative for abdominal distention, abdominal pain, blood in stool, constipation, diarrhea, nausea and vomiting.   Endocrine: Negative for hot flashes.   Genitourinary: Negative for bladder incontinence, difficulty urinating, dysuria, frequency and hematuria.    Musculoskeletal: Negative for gait problem, neck pain and neck stiffness.   Skin: Negative for rash.   Neurological: Negative for dizziness, gait problem, headaches, light-headedness and numbness.   Hematological: Negative for adenopathy. Does not bruise/bleed easily.   Psychiatric/Behavioral: Negative for depression. The patient is not nervous/anxious.    All other systems reviewed and are negative.       PHYSICAL EXAM    Vitals:    02/09/17 0848   BP: 156/73   Pulse: 56   Resp: 18   Temp: 97.9 °F (36.6 °C)   TempSrc: Temporal Artery    SpO2: 97%   Weight: 147 lb 9.6 oz (67 kg)   Height: 71\" (180.3 cm)     Constitutional: Appears well-developed and well-nourished. No distress.   ECOG: (0) Fully active, able to carry on all predisease performance without restriction  HENT:   Head: Normocephalic.   Mouth/Throat: Oropharynx is clear and moist.   Eyes: Conjunctivae are normal. Pupils are equal, round, and reactive to light. No scleral icterus.   Neck: Neck supple. No JVD present. No thyromegaly present.   Cardiovascular: Normal rate, regular rhythm " and normal heart sounds.    Pulmonary/Chest: Breath sounds normal. No respiratory distress.   Abdominal: Soft. Exhibits no distension and no mass. There is no hepatosplenomegaly. There is no tenderness. There is no rebound and no guarding.   Musculoskeletal:Exhibits no edema, tenderness or deformity.   Neurological: Alert and oriented to person, place, and time. Exhibits normal muscle tone.   Skin: No ecchymosis, no petechiae and no rash noted. Not diaphoretic. No cyanosis. Nails show no clubbing.  multiple keratoses.  Well-healed left temporal scar   Psychiatric: Normal mood and affect.   Vitals reviewed.      Billing Encounter on 01/17/2017   Component Date Value Ref Range Status   • Glucose 01/17/2017 85  70 - 100 mg/dL Final   • BUN 01/17/2017 33* 9 - 23 mg/dL Final   • Creatinine 01/17/2017 1.00  0.60 - 1.30 mg/dL Final   • Sodium 01/17/2017 146  132 - 146 mmol/L Final   • Potassium 01/17/2017 4.4  3.5 - 5.5 mmol/L Final   • Chloride 01/17/2017 107  99 - 109 mmol/L Final   • CO2 01/17/2017 22.0  20.0 - 31.0 mmol/L Final   • Calcium 01/17/2017 9.7  8.7 - 10.4 mg/dL Final   • Total Protein 01/17/2017 7.3  5.7 - 8.2 g/dL Final   • Albumin 01/17/2017 4.00  3.20 - 4.80 g/dL Final   • ALT (SGPT) 01/17/2017 33  7 - 40 U/L Final   • AST (SGOT) 01/17/2017 34* 0 - 33 U/L Final   • Alkaline Phosphatase 01/17/2017 80  25 - 100 U/L Final   • Total Bilirubin 01/17/2017 0.3  0.3 - 1.2 mg/dL Final   • eGFR Non African Amer 01/17/2017 76  >60 mL/min/1.73 Final   • Globulin 01/17/2017 3.3  gm/dL Final   • A/G Ratio 01/17/2017 1.2* 1.5 - 2.5 g/dL Final   • BUN/Creatinine Ratio 01/17/2017 33.0* 7.0 - 25.0 Final   • Anion Gap 01/17/2017 17.0* 3.0 - 11.0 mmol/L Final   • PSA 01/17/2017 0.200  0.000 - 4.000 ng/mL Final   • Hepatitis C Ab 01/17/2017 Non-Reactive  Non-Reactive Final   • WBC 01/17/2017 9.11  3.50 - 10.80 10*3/mm3 Final   • RBC 01/17/2017 4.97  4.20 - 5.76 10*6/mm3 Final   • Hemoglobin 01/17/2017 16.1  13.1 - 17.5 g/dL  Final   • Hematocrit 01/17/2017 47.9  38.9 - 50.9 % Final   • MCV 01/17/2017 96.4  80.0 - 99.0 fL Final   • MCH 01/17/2017 32.4* 27.0 - 31.0 pg Final   • MCHC 01/17/2017 33.6  32.0 - 36.0 g/dL Final   • RDW 01/17/2017 13.9  11.3 - 14.5 % Final   • RDW-SD 01/17/2017 49.2  37.0 - 54.0 fl Final   • MPV 01/17/2017 11.2  6.0 - 12.0 fL Final   • Platelets 01/17/2017 252  150 - 450 10*3/mm3 Final   • Neutrophil % 01/17/2017 59.2  41.0 - 71.0 % Final   • Lymphocyte % 01/17/2017 29.5  24.0 - 44.0 % Final   • Monocyte % 01/17/2017 8.9  0.0 - 12.0 % Final   • Eosinophil % 01/17/2017 2.0  0.0 - 3.0 % Final   • Basophil % 01/17/2017 0.3  0.0 - 1.0 % Final   • Immature Grans % 01/17/2017 0.1  0.0 - 0.6 % Final   • Neutrophils, Absolute 01/17/2017 5.39  1.50 - 8.30 10*3/mm3 Final   • Lymphocytes, Absolute 01/17/2017 2.69  0.60 - 4.80 10*3/mm3 Final   • Monocytes, Absolute 01/17/2017 0.81  0.00 - 1.00 10*3/mm3 Final   • Eosinophils, Absolute 01/17/2017 0.18  0.10 - 0.30 10*3/mm3 Final   • Basophils, Absolute 01/17/2017 0.03  0.00 - 0.20 10*3/mm3 Final   • Immature Grans, Absolute 01/17/2017 0.01  0.00 - 0.03 10*3/mm3 Final   Lab on 01/17/2017   Component Date Value Ref Range Status   • Insulin-Like Growth Factor-1 01/17/2017 62  49 - 188 ng/mL Final       Assessment/Plan     1.  Melanoma  2. History of prostate cancer  3. MGUS    Discussion: His MGUS is probably the least of his issues at this point.  He is due for repeat myeloma panel and we reordered that again the day as he did not get it done back in December as previously ordered due to all of his other medical care.  Both from the myeloma/MGUS standpoint as well as the melanoma staging standpoint we will get a PET scan.  I'll see him back in a few weeks to go over this as well as the results of his wide excision to see if any adjuvant therapy is indicated.  His PSA last month was 0.7.  I do not have any records from dermatology.  I asked the patient to bring his records from  dermatology including the pathology report as well as subsequent records from his surgeon who is going to do the wide excision.  Including the pathology from that excision.       Errors in dictation may reflect use of voice recognition software and not all errors in transcription may have been detected prior to signing.    Iban Abdi MD    02/09/2017

## 2017-02-17 ENCOUNTER — OFFICE VISIT (OUTPATIENT)
Dept: CARDIOLOGY | Facility: CLINIC | Age: 62
End: 2017-02-17

## 2017-02-17 VITALS
OXYGEN SATURATION: 99 % | RESPIRATION RATE: 16 BRPM | HEIGHT: 71 IN | DIASTOLIC BLOOD PRESSURE: 80 MMHG | BODY MASS INDEX: 20.3 KG/M2 | HEART RATE: 52 BPM | SYSTOLIC BLOOD PRESSURE: 120 MMHG | WEIGHT: 145 LBS

## 2017-02-17 DIAGNOSIS — Q24.5 ANOMALOUS CORONARY ARTERY ORIGIN: ICD-10-CM

## 2017-02-17 DIAGNOSIS — R00.2 PALPITATIONS: Primary | ICD-10-CM

## 2017-02-17 DIAGNOSIS — J44.9 CHRONIC OBSTRUCTIVE PULMONARY DISEASE, UNSPECIFIED COPD TYPE (HCC): ICD-10-CM

## 2017-02-17 PROCEDURE — 84166 PROTEIN E-PHORESIS/URINE/CSF: CPT | Performed by: INTERNAL MEDICINE

## 2017-02-17 PROCEDURE — 84156 ASSAY OF PROTEIN URINE: CPT | Performed by: INTERNAL MEDICINE

## 2017-02-17 PROCEDURE — 99213 OFFICE O/P EST LOW 20 MIN: CPT | Performed by: INTERNAL MEDICINE

## 2017-02-17 PROCEDURE — 81050 URINALYSIS VOLUME MEASURE: CPT | Performed by: INTERNAL MEDICINE

## 2017-02-17 PROCEDURE — 86335 IMMUNFIX E-PHORSIS/URINE/CSF: CPT | Performed by: INTERNAL MEDICINE

## 2017-02-17 NOTE — PROGRESS NOTES
Subjective:     Encounter Date:02/17/2017      Patient ID: Elliott Dunn is a 61 y.o. male.    Chief Complaint: Palpitations and shortness of breath  HPI  This is a 61-year-old male patient who follows up for shortness of breath and palpitations.  The patient had a 24-hour Holter monitor which showed no evidence of arrhythmia.  He feels as though his palpitations have improved significantly without specific intervention.  He reports having an occasional sense that his heart has skipped a beat.  The patient was demonstrated to have rare ectopy on the heart monitor.  The most likely explanation to his palpitations are PACs\PVCs.  There is no evidence of significant underlying arrhythmia.  The patient had previously undergone a vasodilator nuclear stress test which showed no evidence of ischemia or infarction.  His ejection fraction was normal.  The patient underwent an echocardiogram which showed normal ejection fraction with no regional wall motion abnormalities.  There were no significant valvular abnormalities.  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  However the left anterior descending artery was observed to not be compressed between the aorta and main pulmonary artery.  The patient's coronary calcium score was low at 32.  Coronary angiograms were of excellent quality and showed no evidence of significant atherosclerotic plaque accumulation or stenosis.  He continues to have shortness of breath with rest but mostly with exertion.  Unfortunately he continues to smoke.  His blood pressure control has improved and he reports compliance with his medications.  There is no orthopnea PND or lower extremity edema.  He has no dizziness or syncope.  The remainder of his past medical history, family history and social history remains unchanged compared to his last visit.  The  following portions of the patient's history were reviewed and updated as appropriate: allergies, current medications, past family history, past medical history, past social history, past surgical history and problem  Review of Systems   Constitution: Negative for chills, diaphoresis, fever, weakness, malaise/fatigue, night sweats, weight gain and weight loss.   HENT: Negative for ear discharge, hearing loss, hoarse voice and nosebleeds.    Eyes: Negative for discharge, double vision, pain and photophobia.   Cardiovascular: Positive for dyspnea on exertion and palpitations. Negative for chest pain, claudication, cyanosis, irregular heartbeat, leg swelling, near-syncope, orthopnea, paroxysmal nocturnal dyspnea and syncope.   Respiratory: Positive for shortness of breath. Negative for cough, hemoptysis, sputum production and wheezing.    Endocrine: Negative for cold intolerance, heat intolerance, polydipsia, polyphagia and polyuria.   Hematologic/Lymphatic: Negative for adenopathy and bleeding problem. Does not bruise/bleed easily.   Skin: Negative for color change, flushing, itching and rash.   Musculoskeletal: Negative for muscle cramps, muscle weakness, myalgias and stiffness.   Gastrointestinal: Negative for abdominal pain, diarrhea, hematemesis, hematochezia, nausea and vomiting.   Genitourinary: Negative for dysuria, frequency and nocturia.   Neurological: Negative for focal weakness, loss of balance, numbness, paresthesias and seizures.   Psychiatric/Behavioral: Negative for altered mental status, hallucinations and suicidal ideas.   Allergic/Immunologic: Negative for HIV exposure, hives and persistent infections.       Current Outpatient Prescriptions:   •  amitriptyline (ELAVIL) 25 MG tablet, TAKE ONE TO TWO TABLETS BY MOUTH EVERY NIGHT AT BEDTIME AS NEEDED FOR SLEEP, Disp: 60 tablet, Rfl: 1  •  aspirin (ASPIRIN LOW DOSE) 81 MG EC tablet, Take 1 tablet by mouth Daily., Disp: 30 tablet, Rfl: 5  •  atorvastatin  (LIPITOR) 10 MG tablet, Take 1 tablet by mouth Daily., Disp: 90 tablet, Rfl: 2  •  bisoprolol (ZEBeta) 5 MG tablet, Take 1 tablet by mouth Daily., Disp: 30 tablet, Rfl: 11  •  gabapentin (NEURONTIN) 100 MG capsule, Take 1 capsule by mouth 3 (Three) Times a Day., Disp: 30 capsule, Rfl: 2  •  hydrochlorothiazide (HYDRODIURIL) 25 MG tablet, Take 0.5 tablets by mouth daily, Disp: , Rfl:   •  isosorbide mononitrate (IMDUR) 30 MG 24 hr tablet, Take 1 tablet by mouth Daily., Disp: 30 tablet, Rfl: 11  •  meloxicam (MOBIC) 15 MG tablet, Take 1 tablet by mouth Daily., Disp: 30 tablet, Rfl: 1  •  methocarbamol (ROBAXIN) 750 MG tablet, Take 1 tablet by mouth 3 (three) times a day., Disp: 90 tablet, Rfl: 2  •  nitroglycerin (NITROSTAT) 0.4 MG SL tablet, 1 under the tongue as needed for angina, may repeat q5mins for up three doses, Disp: 100 tablet, Rfl: 11  •  predniSONE (DELTASONE) 2.5 MG tablet, Take 2.5 mg by mouth Daily., Disp: , Rfl:   •  ranitidine (ZANTAC) 150 MG tablet, Take 1 tablet by mouth every night., Disp: , Rfl:   •  Triamcinolone Acetonide (NASACORT) 55 MCG/ACT nasal inhaler, 2 sprays into each nostril daily., Disp: , Rfl:   •  vitamin B-12 (CYANOCOBALAMIN) 1000 MCG tablet, TAKE ONE TABLET BY MOUTH DAILY AS DIRECTED, Disp: 30 tablet, Rfl: 10     Objective:     Physical Exam   Constitutional: He is oriented to person, place, and time. He appears well-developed and well-nourished.   HENT:   Head: Normocephalic and atraumatic.   Eyes: Conjunctivae are normal. No scleral icterus.   Cardiovascular: Normal rate, regular rhythm, normal heart sounds and intact distal pulses.  Exam reveals no gallop and no friction rub.    No murmur heard.  Pulmonary/Chest: Effort normal and breath sounds normal. No respiratory distress.   Abdominal: Soft. Bowel sounds are normal. There is no tenderness.   Musculoskeletal: He exhibits no edema.   Neurological: He is alert and oriented to person, place, and time.   Skin: Skin is warm and  "dry.   Psychiatric: He has a normal mood and affect. His behavior is normal.     Blood pressure 120/80, pulse 52, resp. rate 16, height 71\" (180.3 cm), weight 145 lb (65.8 kg), SpO2 99 %.   Lab Review:       Assessment:         1. Palpitations  History palpitations are now shown to be due to PACs and PVCs.    2. Anomalous coronary artery origin  Coronary CT angiogram demonstrates a take off of the left anterior descending from the right coronary cusp.  The left anterior descending is not compressed between the aorta and main pulmonary artery.  In addition there is no obstructive plaque identified in any of the coronary arteries.  The quality of the study was excellent.  His coronary calcium score remains low at 32.  Previous vasodilator nuclear stress testing has shown no evidence of ischemia or infarction.  Based on the results of these 2 studies we would conclude that his shortness of breath is not an angina equivalent but is almost certainly related to his underlying COPD with ongoing tobacco abuse.    3. Chronic obstructive pulmonary disease, unspecified COPD type  His symptoms are stable overall.  Unfortunately he continues to smoke.  Procedures     Plan:       No changes in his medication profile have been made at today's visit.  He has been counseled again regarding the essential need to discontinue cigarette smoking.  He has been reassured regarding the benign nature of PACs and PVCs.  He has been counseled that nicotine and caffeine restriction would improve this dramatically.  At this point no additional pharmacology is necessary for ectopy suppression.  No additional cardiovascular testing is indicated.         "

## 2017-02-22 DIAGNOSIS — G89.29 CHRONIC JOINT PAIN: ICD-10-CM

## 2017-02-22 DIAGNOSIS — M25.50 CHRONIC JOINT PAIN: ICD-10-CM

## 2017-02-22 DIAGNOSIS — F51.01 PRIMARY INSOMNIA: ICD-10-CM

## 2017-02-22 RX ORDER — MELOXICAM 15 MG/1
15 TABLET ORAL DAILY
Qty: 30 TABLET | Refills: 1 | Status: SHIPPED | OUTPATIENT
Start: 2017-02-22 | End: 2017-04-19 | Stop reason: SDUPTHER

## 2017-02-22 RX ORDER — AMITRIPTYLINE HYDROCHLORIDE 25 MG/1
TABLET, FILM COATED ORAL
Qty: 60 TABLET | Refills: 1 | Status: SHIPPED | OUTPATIENT
Start: 2017-02-22 | End: 2017-05-09 | Stop reason: SDUPTHER

## 2017-03-06 ENCOUNTER — HOSPITAL ENCOUNTER (OUTPATIENT)
Dept: PET IMAGING | Facility: HOSPITAL | Age: 62
Discharge: HOME | End: 2017-03-06
Attending: INTERNAL MEDICINE

## 2017-03-06 ENCOUNTER — HOSPITAL ENCOUNTER (OUTPATIENT)
Dept: PET IMAGING | Facility: HOSPITAL | Age: 62
Discharge: HOME | End: 2017-03-06
Attending: INTERNAL MEDICINE | Admitting: INTERNAL MEDICINE

## 2017-03-06 DIAGNOSIS — D47.2 MGUS (MONOCLONAL GAMMOPATHY OF UNKNOWN SIGNIFICANCE): ICD-10-CM

## 2017-03-06 DIAGNOSIS — C61 MALIGNANT NEOPLASM OF PROSTATE (HCC): ICD-10-CM

## 2017-03-06 DIAGNOSIS — C43.30 MALIGNANT MELANOMA OF FACE (HCC): ICD-10-CM

## 2017-03-06 LAB — GLUCOSE BLDC GLUCOMTR-MCNC: 110 MG/DL (ref 70–130)

## 2017-03-06 PROCEDURE — 0 FLUDEOXYGLUCOSE F18 SOLUTION: Performed by: INTERNAL MEDICINE

## 2017-03-06 PROCEDURE — A9552 F18 FDG: HCPCS | Performed by: INTERNAL MEDICINE

## 2017-03-06 PROCEDURE — 82962 GLUCOSE BLOOD TEST: CPT

## 2017-03-06 PROCEDURE — 78816 PET IMAGE W/CT FULL BODY: CPT

## 2017-03-06 RX ADMIN — FLUDEOXYGLUCOSE F18 1 DOSE: 300 INJECTION INTRAVENOUS at 10:55

## 2017-03-09 ENCOUNTER — OFFICE VISIT (OUTPATIENT)
Dept: ONCOLOGY | Facility: CLINIC | Age: 62
End: 2017-03-09

## 2017-03-09 VITALS
WEIGHT: 147 LBS | DIASTOLIC BLOOD PRESSURE: 69 MMHG | SYSTOLIC BLOOD PRESSURE: 118 MMHG | BODY MASS INDEX: 20.5 KG/M2 | HEART RATE: 58 BPM | RESPIRATION RATE: 15 BRPM | TEMPERATURE: 97.7 F

## 2017-03-09 DIAGNOSIS — C43.30 MALIGNANT MELANOMA OF FACE (HCC): ICD-10-CM

## 2017-03-09 DIAGNOSIS — C61 MALIGNANT NEOPLASM OF PROSTATE (HCC): Primary | ICD-10-CM

## 2017-03-09 DIAGNOSIS — D47.2 IGG MONOCLONAL PROTEIN DISORDER: ICD-10-CM

## 2017-03-09 DIAGNOSIS — D47.2 MGUS (MONOCLONAL GAMMOPATHY OF UNKNOWN SIGNIFICANCE): ICD-10-CM

## 2017-03-09 PROCEDURE — 99214 OFFICE O/P EST MOD 30 MIN: CPT | Performed by: NURSE PRACTITIONER

## 2017-03-09 NOTE — PROGRESS NOTES
CHIEF COMPLAINT: MGUS    Problem List:  1.)MGUS:PET was negative. Bone survey showed degenerative joint disease and an old healed eighth rib fracture. CBC is normal. 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 His calcium was normal at 9.5 with a creatinine of 0.9 and hemoglobin of 16 and no lytic bone disease.      HISTORY OF PRESENT ILLNESS:  The patient is a 61 y.o. male, here for follow up on management of MGUS.  Recently had excision of reportedly a melanoma from his left temple.  However, after the patient tried to get notes from the dermatologist and amy found that he actually had squamous cell carcinoma .  I do not have reports from that.  Due for wide excision on March 29th.   Cardiac workup was found to be benign in nature of PACs and PVCs.  He will continue to follow up with cardiology.  He has no complaints today and has been doing well.    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   • Back pain 6/17/2016   • Cancer      prostate   • Cervical spine disease 6/17/2016   • Derangement of anterior horn of medial meniscus    • Disorder of lumbar spine 6/17/2016   • Disorder of thoracic spine 6/17/2016   • Diverticulitis of colon    • Esophageal reflux    • Glaucoma    • Heart murmur    • High cholesterol    • Lateral meniscus derangement    • Left otitis media with spontaneous rupture of eardrum    • Locking of left knee    • MGUS (monoclonal gammopathy of unknown significance)      likely diagnosis   • Neuropathic arthritis    • Perforation of left tympanic membrane    • Skin cancer      skin cancer   • Spina bifida occulta 6/17/2016     Past Surgical History   Procedure Laterality Date   • Knee surgery Left    • Prostate surgery  2004   • Prostatectomy  06/30/2014   • Tympanoplasty w/ mastoidectomy     • Prostatectomy       Prostatectomy  Radical   • Skin cancer excision  2017     both sides of body/squamous cell melanoma       Allergies   Allergen Reactions   • Pravastatin Other (See Comments)     Weakness / fatigue       Family History and Social History reviewed and changed as necessary      REVIEW OF SYSTEM:   Review of Systems   Constitutional: Negative for appetite change, chills, diaphoresis, fatigue, fever and unexpected weight change.   HENT:   Negative for mouth sores, sore throat and trouble swallowing.    Eyes: Negative for icterus.   Respiratory: Negative for cough, hemoptysis and shortness of breath.    Cardiovascular: Negative for chest pain, leg swelling and palpitations.   Gastrointestinal: Negative for abdominal distention, abdominal pain, blood in stool, constipation, diarrhea, nausea and vomiting.   Endocrine: Negative for hot flashes.   Genitourinary: Negative for bladder incontinence, difficulty urinating, dysuria, frequency and hematuria.    Musculoskeletal: Negative for gait problem, neck pain and neck stiffness.   Skin: Negative for rash.   Neurological: Negative for dizziness, gait problem, headaches, light-headedness and numbness.   Hematological: Negative for adenopathy. Does not bruise/bleed easily.   Psychiatric/Behavioral: Negative for depression. The patient is not nervous/anxious.    All other systems reviewed and are negative.       PHYSICAL EXAM    Vitals:    03/09/17 1009   BP: 118/69   Pulse: 58   Resp: 15   Temp: 97.7 °F (36.5 °C)   TempSrc: Temporal Artery    Weight: 147 lb (66.7 kg)     Constitutional: Appears well-developed and well-nourished. No distress.   ECOG: (0) Fully active, able to carry on all predisease performance without restriction  HENT:   Head: Normocephalic.   Mouth/Throat: Oropharynx is clear and moist.   Eyes: Conjunctivae are normal. Pupils are equal, round, and reactive to light. No scleral icterus.   Neck: Neck supple. No JVD present. No thyromegaly present.   Cardiovascular: Normal rate,  regular rhythm and normal heart sounds.    Pulmonary/Chest: Breath sounds normal. No respiratory distress.   Abdominal: Soft. Exhibits no distension and no mass. There is no hepatosplenomegaly. There is no tenderness. There is no rebound and no guarding.   Musculoskeletal:Exhibits no edema, tenderness or deformity.   Neurological: Alert and oriented to person, place, and time. Exhibits normal muscle tone.   Skin: No ecchymosis, no petechiae and no rash noted. Not diaphoretic. No cyanosis. Nails show no clubbing.  multiple keratoses.  Well-healed left temporal scar   Psychiatric: Normal mood and affect.   Vitals reviewed.      Hospital Outpatient Visit on 03/06/2017   Component Date Value Ref Range Status   • Glucose 03/06/2017 110  70 - 130 mg/dL Final   Lab on 02/09/2017   Component Date Value Ref Range Status   • WBC 02/09/2017 16.19* 4.80 - 10.80 10*3/mm3 Final   • RBC 02/09/2017 5.12  4.70 - 6.10 10*6/mm3 Final   • Hemoglobin 02/09/2017 16.7  14.0 - 18.0 g/dL Final   • Hematocrit 02/09/2017 49.0  42.0 - 52.0 % Final   • MCV 02/09/2017 95.7* 80.0 - 94.0 fL Final   • MCH 02/09/2017 32.6* 27.0 - 31.0 pg Final   • MCHC 02/09/2017 34.1  30.0 - 37.0 g/dL Final   • RDW 02/09/2017 13.4  11.5 - 14.5 % Final   • RDW-SD 02/09/2017 48.0  37.0 - 54.0 fl Final   • MPV 02/09/2017 10.4  6.0 - 12.0 fL Final   • Platelets 02/09/2017 327  130 - 400 10*3/mm3 Final   • Neutrophil % 02/09/2017 84.6* 37.0 - 80.0 % Final   • Lymphocyte % 02/09/2017 11.2  10.0 - 50.0 % Final   • Monocyte % 02/09/2017 2.7  0.0 - 12.0 % Final   • Eosinophil % 02/09/2017 0.1  0.0 - 7.0 % Final   • Basophil % 02/09/2017 0.4  0.0 - 2.5 % Final   • Immature Grans % 02/09/2017 1.0* 0.0 - 0.6 % Final   • Neutrophils, Absolute 02/09/2017 13.69* 2.00 - 6.90 10*3/mm3 Final   • Lymphocytes, Absolute 02/09/2017 1.81  0.60 - 3.40 10*3/mm3 Final   • Monocytes, Absolute 02/09/2017 0.44  0.00 - 0.90 10*3/mm3 Final   • Eosinophils, Absolute 02/09/2017 0.02  0.00 - 0.70  10*3/mm3 Final   • Basophils, Absolute 02/09/2017 0.07  0.00 - 0.20 10*3/mm3 Final   • Immature Grans, Absolute 02/09/2017 0.16* 0.00 - 0.06 10*3/mm3 Final   • nRBC 02/09/2017 0.0  0.0 - 0.0 /100 WBC Final       Assessment/Plan     1.  MGUS  2. History of prostate cancer  3. Squamous cell carcinoma of the face    Discussion:  1. Monoclonal gammopathy of undetermined significance (MGUS): He is doing well and he has no hypercalcemia, renal dysfunction, anemia, or lytic bone disease.  We reviewed his labs in detail and I reviewed his PET scan myself. We will watch him with repeat myeloma studies prior to return in 6 months.    2. Squamous cell carcinoma of face: He will follow up with Dr. Izaguirre on March 29th for a wide excision.   I do not have any records from dermatology.  I asked the patient to bring his records from dermatology including the pathology report as well as subsequent records from his surgeon who is going to do the wide excision.  Including the pathology from that excision.      3. His PSA in January was 0.2.  We will recheck this in 6 months along with his Myeloma panel.  I do not have any records from dermatology.  I asked the patient to bring his records from dermatology including the pathology report as well as subsequent records from his surgeon who is going to do the wide excision.  Including the pathology from that excision.       Errors in dictation may reflect use of voice recognition software and not all errors in transcription may have been detected prior to signing.    Adrianna Esteban, APRN    02/09/2017

## 2017-03-22 RX ORDER — ATORVASTATIN CALCIUM 10 MG/1
10 TABLET, FILM COATED ORAL DAILY
Qty: 90 TABLET | Refills: 2 | Status: SHIPPED | OUTPATIENT
Start: 2017-03-22 | End: 2017-05-01 | Stop reason: SDUPTHER

## 2017-03-28 ENCOUNTER — OFFICE VISIT (OUTPATIENT)
Dept: INTERNAL MEDICINE | Facility: CLINIC | Age: 62
End: 2017-03-28

## 2017-03-28 VITALS
DIASTOLIC BLOOD PRESSURE: 72 MMHG | TEMPERATURE: 98 F | SYSTOLIC BLOOD PRESSURE: 128 MMHG | WEIGHT: 148.2 LBS | HEIGHT: 71 IN | BODY MASS INDEX: 20.75 KG/M2 | HEART RATE: 58 BPM | OXYGEN SATURATION: 98 %

## 2017-03-28 DIAGNOSIS — R13.14 PHARYNGOESOPHAGEAL DYSPHAGIA: Primary | ICD-10-CM

## 2017-03-28 PROCEDURE — 99213 OFFICE O/P EST LOW 20 MIN: CPT | Performed by: FAMILY MEDICINE

## 2017-03-28 RX ORDER — OMEPRAZOLE 40 MG/1
40 CAPSULE, DELAYED RELEASE ORAL DAILY
Qty: 30 CAPSULE | Refills: 2 | Status: SHIPPED | OUTPATIENT
Start: 2017-03-28 | End: 2017-06-24 | Stop reason: SDUPTHER

## 2017-03-28 RX ORDER — PREDNISONE 10 MG/1
TABLET ORAL
COMMUNITY
Start: 2017-02-28 | End: 2017-10-24

## 2017-03-30 NOTE — PROGRESS NOTES
Anand Dunn is a 61 y.o. male.     History of Present Illness   Patient comes in due to a progressive dysphasia.  Patient reports that over about 6 months time he has had more difficulty swallowing.  This started initially with solids and now has progressed to solids and liquids.  He feels like intake is getting stuck in his chest.  Feels it is time for endoscopy.  Comforted that it likely isn't cancer due to recent PET scan by hematology. Has had more heartburn. On steroids from rheumatology.    The following portions of the patient's history were reviewed and updated as appropriate: allergies, current medications, past family history, past medical history, past social history, past surgical history and problem list.    Review of Systems   Constitutional: Negative for unexpected weight change.   Gastrointestinal: Negative for blood in stool.   Musculoskeletal: Positive for arthralgias and myalgias.       Objective   Physical Exam   Constitutional: He is oriented to person, place, and time. Vital signs are normal. He appears well-developed and well-nourished. He is active. He does not appear ill.   HENT:   Head: Normocephalic and atraumatic.   Right Ear: Hearing normal.   Left Ear: Hearing normal.   Nose: Nose normal.   Eyes: EOM and lids are normal. Pupils are equal, round, and reactive to light.   Cardiovascular: Normal rate, regular rhythm and normal heart sounds.    Pulmonary/Chest: Effort normal and breath sounds normal.   Neurological: He is alert and oriented to person, place, and time. No cranial nerve deficit. Gait normal.   Skin: Skin is warm. He is not diaphoretic. No cyanosis. Nails show no clubbing.   Psychiatric: He has a normal mood and affect. His speech is normal and behavior is normal. Judgment and thought content normal.   Nursing note and vitals reviewed.    FINDINGS: Normal variant activity identified within the oropharynx and  muscles of phonation. Normal variant activity  identified in the region  of the vocal cords. There is normal variant activity seen within the  chest. No evidence of hypermetabolic activity identified within the  chest to suggest evidence of metastatic disease. Calcification seen  within the hilar lymph nodes and subcarinal lymph nodes suggesting old  healed granulomatous disease. There is atelectatic changes identified  posteriorly within the lung fields with emphysematous changes present.  No evidence of hypermetabolic activity to suggest evidence of metastatic  disease within the chest. Within the abdomen and pelvis there is normal  variant activity identified within the GI and  tract. There is no  evidence of hypermetabolic activity identified to suggest evidence of  metastatic disease. The extremities are also unremarkable in appearance.      IMPRESSION:  Stable negative PET CT scan.      D: 03/06/2017  E: 03/06/2017    Assessment/Plan   Elliott was seen today for gi problem.    Diagnoses and all orders for this visit:    Pharyngoesophageal dysphagia  -     omeprazole (priLOSEC) 40 MG capsule; Take 1 capsule by mouth Daily.  -     Ambulatory referral for Screening EGD

## 2017-04-03 ENCOUNTER — OFFICE VISIT (OUTPATIENT)
Dept: SURGERY | Facility: CLINIC | Age: 62
End: 2017-04-03

## 2017-04-03 VITALS
RESPIRATION RATE: 18 BRPM | HEART RATE: 68 BPM | SYSTOLIC BLOOD PRESSURE: 140 MMHG | BODY MASS INDEX: 20.44 KG/M2 | WEIGHT: 146 LBS | HEIGHT: 71 IN | DIASTOLIC BLOOD PRESSURE: 82 MMHG | TEMPERATURE: 99.4 F

## 2017-04-03 DIAGNOSIS — R13.11 ORAL PHASE DYSPHAGIA: Primary | ICD-10-CM

## 2017-04-03 DIAGNOSIS — K21.00 GASTROESOPHAGEAL REFLUX DISEASE WITH ESOPHAGITIS: ICD-10-CM

## 2017-04-03 PROCEDURE — 99204 OFFICE O/P NEW MOD 45 MIN: CPT | Performed by: SURGERY

## 2017-04-03 NOTE — PROGRESS NOTES
"Reason for Consultation:Epigastric pain, GERD    Chief complaint:   Chief Complaint   Patient presents with   • Difficulty Swallowing       Subjective .     History of present illness:  I saw the patient in the office  today as a consultation for evaluation and treatment of dysphagia with bouts of \"food getting stuck in mid chest,\" it has been ongoing for @ one year, it has been getting much worse @ one month.  Patient has severe reflux symptoms, was on Zantac in the past but stopped taking it when it wasn't working.  He has since been placed on omeprazole starting 1 week ago.  No previous upper endoscopy, had a negative colonoscopy one year ago.  Patient denies weight loss or anemia.  Minimal lower abdominal pain.  Reflux symptoms made worse with caffeine, spicy foods and big meals.    Review of Systems    History:  Past Medical History:   Diagnosis Date   • Arrhythmia    • Arthritis    • Arthritis of lumbar spine 7/29/2016   • Back pain 6/17/2016   • Cancer     prostate   • Cervical spine disease 6/17/2016   • Derangement of anterior horn of medial meniscus    • Disorder of lumbar spine 6/17/2016   • Disorder of thoracic spine 6/17/2016   • Diverticulitis of colon    • Esophageal reflux    • Glaucoma    • Heart murmur    • High cholesterol    • Lateral meniscus derangement    • Left otitis media with spontaneous rupture of eardrum    • Locking of left knee    • MGUS (monoclonal gammopathy of unknown significance)     likely diagnosis   • Neuropathic arthritis    • Perforation of left tympanic membrane    • Skin cancer     skin cancer   • Skin cancer of face     squamous cell   • Spina bifida occulta 6/17/2016       Past Surgical History:   Procedure Laterality Date   • KNEE SURGERY Left    • PROSTATE SURGERY  2004   • PROSTATECTOMY  06/30/2014   • PROSTATECTOMY      Prostatectomy Radical   • SKIN CANCER EXCISION  2017    both sides of body/squamous cell melanoma   • TYMPANOPLASTY W/ MASTOIDECTOMY         Family " History   Problem Relation Age of Onset   • Diabetes Mother    • Hypertension Mother    • Obesity Mother    • Stroke Mother 65   • Cancer Father    • Cancer Sister    • Diabetes Brother    • Cancer Brother    • Heart attack Brother        Social History   Substance Use Topics   • Smoking status: Current Every Day Smoker     Packs/day: 1.00     Years: 51.00     Types: Cigarettes   • Smokeless tobacco: Never Used   • Alcohol use No       Allergies:  Allergies   Allergen Reactions   • Pravastatin Other (See Comments)     Weakness / fatigue  Weakness / fatigue       Medications:    Current Outpatient Prescriptions:   •  amitriptyline (ELAVIL) 25 MG tablet, TAKE 1-2 AT BEDTIME AS NEEDED FOR SLEEP, Disp: 60 tablet, Rfl: 1  •  aspirin (ASPIRIN LOW DOSE) 81 MG EC tablet, Take 1 tablet by mouth Daily., Disp: 30 tablet, Rfl: 5  •  atorvastatin (LIPITOR) 10 MG tablet, Take 1 tablet by mouth Daily., Disp: 90 tablet, Rfl: 2  •  bisoprolol (ZEBeta) 5 MG tablet, Take 1 tablet by mouth Daily., Disp: 30 tablet, Rfl: 11  •  gabapentin (NEURONTIN) 100 MG capsule, Take 1 capsule by mouth 3 (Three) Times a Day., Disp: 30 capsule, Rfl: 2  •  hydrochlorothiazide (HYDRODIURIL) 25 MG tablet, Take 0.5 tablets by mouth daily, Disp: , Rfl:   •  isosorbide mononitrate (IMDUR) 30 MG 24 hr tablet, Take 1 tablet by mouth Daily., Disp: 30 tablet, Rfl: 11  •  meloxicam (MOBIC) 15 MG tablet, Take 1 tablet by mouth Daily., Disp: 30 tablet, Rfl: 1  •  methocarbamol (ROBAXIN) 750 MG tablet, Take 1 tablet by mouth 3 (three) times a day., Disp: 90 tablet, Rfl: 2  •  nitroglycerin (NITROSTAT) 0.4 MG SL tablet, 1 under the tongue as needed for angina, may repeat q5mins for up three doses, Disp: 100 tablet, Rfl: 11  •  omeprazole (priLOSEC) 40 MG capsule, Take 1 capsule by mouth Daily., Disp: 30 capsule, Rfl: 2  •  predniSONE (DELTASONE) 10 MG tablet, , Disp: , Rfl:   •  predniSONE (DELTASONE) 2.5 MG tablet, Take 2.5 mg by mouth Daily., Disp: , Rfl:   •   Triamcinolone Acetonide (NASACORT) 55 MCG/ACT nasal inhaler, 2 sprays into each nostril daily., Disp: , Rfl:   •  vitamin B-12 (CYANOCOBALAMIN) 1000 MCG tablet, TAKE ONE TABLET BY MOUTH DAILY AS DIRECTED, Disp: 30 tablet, Rfl: 10    Objective     Vital Signs:   Temp:  [99.4 °F (37.4 °C)] 99.4 °F (37.4 °C)  Heart Rate:  [68] 68  Resp:  [18] 18  BP: (140)/(82) 140/82    Physical Exam:   General Appearance:    Alert, cooperative, in no acute distress   Head:    Normocephalic, without obvious abnormality, atraumatic   Eyes:            Lids and lashes normal, conjunctivae and sclerae normal, no   icterus, no pallor, corneas clear, PERRLA   Ears:    Ears appear intact with no abnormalities noted   Throat:   No oral lesions, no thrush, oral mucosa moist   Neck:   No adenopathy, supple, trachea midline, no thyromegaly, no   carotid bruit, no JVD   Lungs:     Clear to auscultation,respirations regular, even and                  unlabored    Heart:    Regular rhythm and normal rate, normal S1 and S2, no            murmur, no gallop, no rub, no click   Chest Wall:    No abnormalities observed   Abdomen:     Normal bowel sounds, no masses, no organomegaly, soft        non-tender, non-distended, no guarding, there is evidence of epigastric  tenderness   Extremities:   Moves all extremities well, no edema, no cyanosis, no             redness   Pulses:   Pulses palpable and equal bilaterally   Skin:   No bleeding, bruising or rash   Lymph nodes:   No palpable adenopathy   Neurologic:   Cranial nerves 2 - 12 grossly intact, sensation intact     Results Review:   I reviewed the patient's new clinical results.    Assessment/Plan     1. Oral phase dysphagia    2. Gastroesophageal reflux disease with esophagitis        I did have a detailed and extensive discussion with the patient in the office and they understand that they need to undergo upper endoscopy. Full risks and benefits of operative versus nonoperative intervention were  discussed with the patient and these include bleeding and esophageal injury. The patient understands, agrees, and wishes to proceed with the surgical treatment plan as mentioned above. The patient had no questions for me at the end of the discussion.       I discussed the patients findings and my recommendations with patient.     Alexander Ritchie MD  04/03/17

## 2017-04-10 ENCOUNTER — ANESTHESIA EVENT (OUTPATIENT)
Dept: GASTROENTEROLOGY | Facility: HOSPITAL | Age: 62
End: 2017-04-10

## 2017-04-10 ENCOUNTER — HOSPITAL ENCOUNTER (OUTPATIENT)
Facility: HOSPITAL | Age: 62
Setting detail: HOSPITAL OUTPATIENT SURGERY
Discharge: HOME | End: 2017-04-10
Attending: SURGERY | Admitting: SURGERY

## 2017-04-10 ENCOUNTER — ANESTHESIA (OUTPATIENT)
Dept: GASTROENTEROLOGY | Facility: HOSPITAL | Age: 62
End: 2017-04-10

## 2017-04-10 VITALS
HEART RATE: 51 BPM | BODY MASS INDEX: 20.58 KG/M2 | DIASTOLIC BLOOD PRESSURE: 61 MMHG | SYSTOLIC BLOOD PRESSURE: 113 MMHG | HEIGHT: 71 IN | RESPIRATION RATE: 16 BRPM | OXYGEN SATURATION: 97 % | WEIGHT: 147 LBS | TEMPERATURE: 98 F

## 2017-04-10 DIAGNOSIS — R13.11 ORAL PHASE DYSPHAGIA: ICD-10-CM

## 2017-04-10 DIAGNOSIS — K21.00 GASTROESOPHAGEAL REFLUX DISEASE WITH ESOPHAGITIS: ICD-10-CM

## 2017-04-10 PROCEDURE — 25010000002 PROPOFOL 10 MG/ML EMULSION

## 2017-04-10 RX ORDER — PROPOFOL 10 MG/ML
INJECTION, EMULSION INTRAVENOUS AS NEEDED
Status: DISCONTINUED | OUTPATIENT
Start: 2017-04-10 | End: 2017-04-10 | Stop reason: SURG

## 2017-04-10 RX ORDER — ONDANSETRON 2 MG/ML
4 INJECTION INTRAMUSCULAR; INTRAVENOUS ONCE AS NEEDED
Status: DISCONTINUED | OUTPATIENT
Start: 2017-04-10 | End: 2017-04-10 | Stop reason: HOSPADM

## 2017-04-10 RX ORDER — SODIUM CHLORIDE, SODIUM LACTATE, POTASSIUM CHLORIDE, CALCIUM CHLORIDE 600; 310; 30; 20 MG/100ML; MG/100ML; MG/100ML; MG/100ML
1000 INJECTION, SOLUTION INTRAVENOUS CONTINUOUS PRN
Status: DISCONTINUED | OUTPATIENT
Start: 2017-04-10 | End: 2017-04-10 | Stop reason: HOSPADM

## 2017-04-10 RX ADMIN — PROPOFOL 40 MG: 10 INJECTION, EMULSION INTRAVENOUS at 12:37

## 2017-04-10 RX ADMIN — PROPOFOL 100 MG: 10 INJECTION, EMULSION INTRAVENOUS at 12:34

## 2017-04-10 RX ADMIN — LIDOCAINE HYDROCHLORIDE 40 MG: 20 INJECTION, SOLUTION INTRAVENOUS at 12:31

## 2017-04-10 RX ADMIN — SODIUM CHLORIDE, POTASSIUM CHLORIDE, SODIUM LACTATE AND CALCIUM CHLORIDE 1000 ML: 600; 310; 30; 20 INJECTION, SOLUTION INTRAVENOUS at 10:00

## 2017-04-10 NOTE — ANESTHESIA PREPROCEDURE EVALUATION
Anesthesia Evaluation     Patient summary reviewed and Nursing notes reviewed   NPO Status: > 8 hours   Airway   Mallampati: I  TM distance: >3 FB  Neck ROM: full  no difficulty expected  Dental    (+) edentulous    Pulmonary - normal exam    breath sounds clear to auscultation  (+) a smoker (used tobacco today) Current, COPD, shortness of breath,   Cardiovascular - normal exam    Rhythm: regular  Rate: normal    (+) dysrhythmias Tachycardia,   (-) hypertension, CAD      Neuro/Psych  (+) dizziness/light headedness, numbness,    GI/Hepatic/Renal/Endo    (+)  GERD,     Musculoskeletal     (+) back pain,   Abdominal    Substance History      OB/GYN          Other   (+) arthritis                                 Anesthesia Plan    ASA 3     MAC     intravenous induction   Anesthetic plan and risks discussed with patient.

## 2017-04-10 NOTE — PLAN OF CARE
Problem: GI Endoscopy (Adult)  Goal: Signs and Symptoms of Listed Potential Problems Will be Absent or Manageable (GI Endoscopy)  Outcome: Ongoing (interventions implemented as appropriate)    04/10/17 1011   GI Endoscopy   Problems Assessed (GI Endoscopy) all   Problems Present (GI Endoscopy) none

## 2017-04-10 NOTE — DISCHARGE INSTRUCTIONS
Chlorhexidine Wipes and instruction given to patient. Patient verbalized understanding  to all teaching and instruction.

## 2017-04-10 NOTE — ANESTHESIA POSTPROCEDURE EVALUATION
Patient: Elliott Dunn    Procedure Summary     Date Anesthesia Start Anesthesia Stop Room / Location    04/10/17 1231 1239 [facility] ENDOSCOPY 3 / [facility] ENDOSCOPY       Procedure Diagnosis Surgeon Provider    ESOPHAGOGASTRODUODENOSCOPY WITH BIOPSY (N/A Esophagus) Oral phase dysphagia; Gastroesophageal reflux disease with esophagitis  (Oral phase dysphagia [R13.11]; Gastroesophageal reflux disease with esophagitis [K21.0]) MD Donn Gloria CRNA          Anesthesia Type: MAC  Last vitals  BP      Temp      Pulse     Resp      SpO2        Post Anesthesia Care and Evaluation    Patient location during evaluation: PACU  Patient participation: complete - patient participated  Level of consciousness: awake and alert  Pain score: 0  Pain management: satisfactory to patient  Airway patency: patent  Anesthetic complications: No anesthetic complications  PONV Status: none  Cardiovascular status: acceptable and hemodynamically stable  Respiratory status: acceptable  Hydration status: acceptable

## 2017-04-17 ENCOUNTER — OFFICE VISIT (OUTPATIENT)
Dept: SURGERY | Facility: CLINIC | Age: 62
End: 2017-04-17

## 2017-04-17 VITALS — HEART RATE: 72 BPM | TEMPERATURE: 97.8 F

## 2017-04-17 DIAGNOSIS — K21.00 GASTROESOPHAGEAL REFLUX DISEASE WITH ESOPHAGITIS: Primary | ICD-10-CM

## 2017-04-17 DIAGNOSIS — R13.11 ORAL PHASE DYSPHAGIA: ICD-10-CM

## 2017-04-17 PROCEDURE — 99213 OFFICE O/P EST LOW 20 MIN: CPT | Performed by: SURGERY

## 2017-04-17 RX ORDER — ISOSORBIDE MONONITRATE 30 MG/1
TABLET, EXTENDED RELEASE ORAL
COMMUNITY
Start: 2017-04-13 | End: 2017-08-18 | Stop reason: SDUPTHER

## 2017-04-17 NOTE — PROGRESS NOTES
Primary Care Provider: Ladonna Means MD    Reason for Consultation: Follow-up EGD    Chief Complaint:   Chief Complaint   Patient presents with   • Follow-up     egd with biopsy.       History of present illness:  I saw the patient in the office today as a followup from their recent EGD with biopsy, the pathology report did show moderate chronic gastritis and focal active gastritis, please see attached document for complete pathology report..  They state that they have done well and are now taken omeprazole daily..    Review of Systems   Constitutional: Negative for chills, fever and unexpected weight change.   HENT: Negative for trouble swallowing and voice change.    Eyes: Negative for visual disturbance.   Respiratory: Negative for apnea, cough, chest tightness, shortness of breath and wheezing.    Cardiovascular: Negative for chest pain, palpitations and leg swelling.   Gastrointestinal: Negative for abdominal distention, abdominal pain, anal bleeding, blood in stool, constipation, diarrhea, nausea, rectal pain and vomiting.   Endocrine: Negative for cold intolerance and heat intolerance.   Genitourinary: Negative for difficulty urinating, dysuria, flank pain, scrotal swelling and testicular pain.   Musculoskeletal: Negative for back pain, gait problem and joint swelling.   Skin: Negative for color change, rash and wound.   Neurological: Negative for dizziness, syncope, speech difficulty, weakness, numbness and headaches.   Hematological: Negative for adenopathy. Does not bruise/bleed easily.   Psychiatric/Behavioral: Negative for confusion. The patient is not nervous/anxious.        Vital Signs:   Temp:  [97.8 °F (36.6 °C)] 97.8 °F (36.6 °C)  Heart Rate:  [72] 72    Allergies:  Allergies   Allergen Reactions   • Pravastatin Other (See Comments)     Weakness / fatigue  Weakness / fatigue       Medications:    Current Outpatient Prescriptions:   •  amitriptyline (ELAVIL) 25 MG tablet, TAKE 1-2 AT BEDTIME  AS NEEDED FOR SLEEP, Disp: 60 tablet, Rfl: 1  •  aspirin (ASPIRIN LOW DOSE) 81 MG EC tablet, Take 1 tablet by mouth Daily., Disp: 30 tablet, Rfl: 5  •  atorvastatin (LIPITOR) 10 MG tablet, Take 1 tablet by mouth Daily., Disp: 90 tablet, Rfl: 2  •  bisoprolol (ZEBeta) 5 MG tablet, Take 1 tablet by mouth Daily., Disp: 30 tablet, Rfl: 11  •  gabapentin (NEURONTIN) 100 MG capsule, Take 1 capsule by mouth 3 (Three) Times a Day., Disp: 30 capsule, Rfl: 2  •  isosorbide mononitrate (IMDUR) 30 MG 24 hr tablet, , Disp: , Rfl:   •  meloxicam (MOBIC) 15 MG tablet, Take 1 tablet by mouth Daily., Disp: 30 tablet, Rfl: 1  •  methocarbamol (ROBAXIN) 750 MG tablet, Take 1 tablet by mouth 3 (three) times a day., Disp: 90 tablet, Rfl: 2  •  nitroglycerin (NITROSTAT) 0.4 MG SL tablet, 1 under the tongue as needed for angina, may repeat q5mins for up three doses, Disp: 100 tablet, Rfl: 11  •  omeprazole (priLOSEC) 40 MG capsule, Take 1 capsule by mouth Daily., Disp: 30 capsule, Rfl: 2  •  predniSONE (DELTASONE) 10 MG tablet, , Disp: , Rfl:   •  Triamcinolone Acetonide (NASACORT) 55 MCG/ACT nasal inhaler, 2 sprays into each nostril daily., Disp: , Rfl:   •  vitamin B-12 (CYANOCOBALAMIN) 1000 MCG tablet, TAKE ONE TABLET BY MOUTH DAILY AS DIRECTED, Disp: 30 tablet, Rfl: 10    Physical Exam:   General Appearance:    Alert, cooperative, in no acute distress   Abdomen:     no masses, no organomegaly, soft non-tender, non-distended, no guarding, wounds are well healed, no evidence of recurrent hernia   Chest:      Clear toausculation     Assessment / Plan:    1. Gastroesophageal reflux disease with esophagitis    2. Oral phase dysphagia        I did discuss the situation with the patient today in the office and they have done well from their recent EGD with biopsy. I have told the patient continue omeprazole daily.  He will need repeat EGD in 1 year since he has severe esophagitis.  Also told patient avoid caffeine, nicotine and  alcohol..    Alexander Ritchie MD  04/17/17

## 2017-04-18 LAB
LAB AP CASE REPORT: NORMAL
Lab: NORMAL
PATH REPORT.FINAL DX SPEC: NORMAL

## 2017-04-19 DIAGNOSIS — G89.29 CHRONIC JOINT PAIN: ICD-10-CM

## 2017-04-19 DIAGNOSIS — M25.50 CHRONIC JOINT PAIN: ICD-10-CM

## 2017-04-19 RX ORDER — MELOXICAM 15 MG/1
15 TABLET ORAL DAILY
Qty: 30 TABLET | Refills: 5 | Status: SHIPPED | OUTPATIENT
Start: 2017-04-19 | End: 2018-01-24 | Stop reason: SDUPTHER

## 2017-05-01 ENCOUNTER — TELEPHONE (OUTPATIENT)
Dept: INTERNAL MEDICINE | Facility: CLINIC | Age: 62
End: 2017-05-01

## 2017-05-01 RX ORDER — ATORVASTATIN CALCIUM 10 MG/1
10 TABLET, FILM COATED ORAL DAILY
Qty: 90 TABLET | Refills: 2 | Status: SHIPPED | OUTPATIENT
Start: 2017-05-01 | End: 2019-07-08 | Stop reason: SDUPTHER

## 2017-05-09 ENCOUNTER — OFFICE VISIT (OUTPATIENT)
Dept: INTERNAL MEDICINE | Facility: CLINIC | Age: 62
End: 2017-05-09

## 2017-05-09 VITALS
DIASTOLIC BLOOD PRESSURE: 72 MMHG | HEART RATE: 53 BPM | BODY MASS INDEX: 21 KG/M2 | TEMPERATURE: 98.3 F | HEIGHT: 71 IN | SYSTOLIC BLOOD PRESSURE: 118 MMHG | OXYGEN SATURATION: 95 % | WEIGHT: 150 LBS

## 2017-05-09 DIAGNOSIS — G89.29 CHRONIC JOINT PAIN: Primary | ICD-10-CM

## 2017-05-09 DIAGNOSIS — G89.4 CHRONIC PAIN SYNDROME: ICD-10-CM

## 2017-05-09 DIAGNOSIS — F51.01 PRIMARY INSOMNIA: ICD-10-CM

## 2017-05-09 DIAGNOSIS — M25.50 CHRONIC JOINT PAIN: Primary | ICD-10-CM

## 2017-05-09 PROCEDURE — 99214 OFFICE O/P EST MOD 30 MIN: CPT | Performed by: FAMILY MEDICINE

## 2017-05-09 RX ORDER — AMITRIPTYLINE HYDROCHLORIDE 25 MG/1
TABLET, FILM COATED ORAL
Qty: 60 TABLET | Refills: 5 | Status: SHIPPED | OUTPATIENT
Start: 2017-05-09 | End: 2019-02-11 | Stop reason: SDUPTHER

## 2017-05-09 RX ORDER — DULOXETIN HYDROCHLORIDE 20 MG/1
20 CAPSULE, DELAYED RELEASE ORAL DAILY
Qty: 30 CAPSULE | Refills: 1 | Status: SHIPPED | OUTPATIENT
Start: 2017-05-09 | End: 2017-06-02 | Stop reason: SDUPTHER

## 2017-05-09 RX ORDER — GABAPENTIN 300 MG/1
CAPSULE ORAL
COMMUNITY
Start: 2017-04-24 | End: 2017-07-24 | Stop reason: SDUPTHER

## 2017-05-23 ENCOUNTER — OFFICE VISIT (OUTPATIENT)
Dept: INTERNAL MEDICINE | Facility: CLINIC | Age: 62
End: 2017-05-23

## 2017-05-23 VITALS
OXYGEN SATURATION: 97 % | HEIGHT: 71 IN | HEART RATE: 58 BPM | SYSTOLIC BLOOD PRESSURE: 114 MMHG | BODY MASS INDEX: 20.19 KG/M2 | WEIGHT: 144.2 LBS | DIASTOLIC BLOOD PRESSURE: 64 MMHG | TEMPERATURE: 98.1 F

## 2017-05-23 DIAGNOSIS — G89.4 CHRONIC PAIN SYNDROME: Primary | ICD-10-CM

## 2017-05-23 PROCEDURE — 99213 OFFICE O/P EST LOW 20 MIN: CPT | Performed by: FAMILY MEDICINE

## 2017-06-02 DIAGNOSIS — G89.4 CHRONIC PAIN SYNDROME: ICD-10-CM

## 2017-06-02 RX ORDER — DULOXETIN HYDROCHLORIDE 30 MG/1
30 CAPSULE, DELAYED RELEASE ORAL DAILY
Qty: 30 CAPSULE | Refills: 3 | Status: SHIPPED | OUTPATIENT
Start: 2017-06-02 | End: 2017-07-24

## 2017-06-24 DIAGNOSIS — R13.14 PHARYNGOESOPHAGEAL DYSPHAGIA: ICD-10-CM

## 2017-06-27 RX ORDER — OMEPRAZOLE 40 MG/1
CAPSULE, DELAYED RELEASE ORAL
Qty: 30 CAPSULE | Refills: 1 | Status: SHIPPED | OUTPATIENT
Start: 2017-06-27 | End: 2017-08-11 | Stop reason: SDUPTHER

## 2017-07-24 ENCOUNTER — OFFICE VISIT (OUTPATIENT)
Dept: INTERNAL MEDICINE | Facility: CLINIC | Age: 62
End: 2017-07-24

## 2017-07-24 ENCOUNTER — HOSPITAL ENCOUNTER (OUTPATIENT)
Dept: GENERAL RADIOLOGY | Facility: HOSPITAL | Age: 62
Discharge: HOME | End: 2017-07-24
Attending: FAMILY MEDICINE | Admitting: FAMILY MEDICINE

## 2017-07-24 VITALS
OXYGEN SATURATION: 97 % | BODY MASS INDEX: 19.88 KG/M2 | DIASTOLIC BLOOD PRESSURE: 68 MMHG | WEIGHT: 142 LBS | TEMPERATURE: 98.1 F | SYSTOLIC BLOOD PRESSURE: 109 MMHG | HEART RATE: 52 BPM | HEIGHT: 71 IN

## 2017-07-24 DIAGNOSIS — M54.6 CHRONIC MIDLINE THORACIC BACK PAIN: ICD-10-CM

## 2017-07-24 DIAGNOSIS — G89.29 CHRONIC JOINT PAIN: ICD-10-CM

## 2017-07-24 DIAGNOSIS — G89.29 CHRONIC MIDLINE THORACIC BACK PAIN: Primary | ICD-10-CM

## 2017-07-24 DIAGNOSIS — M47.812 SPONDYLOSIS OF CERVICAL REGION WITHOUT MYELOPATHY OR RADICULOPATHY: ICD-10-CM

## 2017-07-24 DIAGNOSIS — M54.6 CHRONIC MIDLINE THORACIC BACK PAIN: Primary | ICD-10-CM

## 2017-07-24 DIAGNOSIS — M25.50 CHRONIC JOINT PAIN: ICD-10-CM

## 2017-07-24 DIAGNOSIS — G89.29 CHRONIC MIDLINE THORACIC BACK PAIN: ICD-10-CM

## 2017-07-24 PROCEDURE — 99213 OFFICE O/P EST LOW 20 MIN: CPT | Performed by: FAMILY MEDICINE

## 2017-07-24 PROCEDURE — 72072 X-RAY EXAM THORAC SPINE 3VWS: CPT

## 2017-07-24 RX ORDER — GABAPENTIN 300 MG/1
300 CAPSULE ORAL 3 TIMES DAILY PRN
Qty: 90 CAPSULE | Refills: 2 | Status: SHIPPED | OUTPATIENT
Start: 2017-07-24 | End: 2017-10-24 | Stop reason: SDUPTHER

## 2017-07-24 RX ORDER — DULOXETIN HYDROCHLORIDE 60 MG/1
60 CAPSULE, DELAYED RELEASE ORAL DAILY
Qty: 30 CAPSULE | Refills: 3 | Status: SHIPPED | OUTPATIENT
Start: 2017-07-24 | End: 2017-10-24 | Stop reason: SDUPTHER

## 2017-07-24 NOTE — PROGRESS NOTES
Subjective    Elliott Dunn is a 62 y.o. male here for:  Chief Complaint   Patient presents with   • Pain     2MO F/U; PATIENT STATES THAT HE IS HAVING SOME UPPER BACK PAIN BETWEEN HIS SHOULDER BLADES.     History of Present Illness   Had the pain last visit but did not mention it. Present 2-3 months, sharp pain. It's betwene shoulder blades. It's not worse with a big breath. He has it every day. He cannot identify worsening or relieving factors. He had back pain last year, this is different. No numbness or weakness to arms. He's taking Mobic for pain. He is taking gabapentin on occasion, he thinks he has about half a bottle left at this time.    Cymbalta 30 mg daily has helped with his pain. He rests better and wife notes he's been happier. He would like to try the higher dose to see if it helps any more with chronic pain.    The following portions of the patient's history were reviewed and updated as appropriate: allergies, current medications, past family history, past medical history, past social history, past surgical history and problem list.    Review of Systems   Constitutional: Negative for activity change.   Respiratory: Negative for shortness of breath.    Musculoskeletal: Positive for arthralgias.   Neurological: Negative for weakness and numbness.   Psychiatric/Behavioral: Negative for dysphoric mood.       Vitals:    07/24/17 1005   BP: 109/68   Pulse: 52   Temp: 98.1 °F (36.7 °C)   SpO2: 97%       Objective   Physical Exam   Constitutional: He is oriented to person, place, and time. Vital signs are normal. He appears well-developed and well-nourished. He is active. He does not appear ill.   HENT:   Head: Normocephalic and atraumatic.   Right Ear: Hearing normal.   Left Ear: Hearing normal.   Nose: Nose normal.   Mouth/Throat: Abnormal dentition.   Eyes: EOM and lids are normal. Pupils are equal, round, and reactive to light.   Cardiovascular: Normal rate, regular rhythm and normal heart sounds.     Pulmonary/Chest: Effort normal.   Musculoskeletal:        Cervical back: He exhibits tenderness (paraspinal muscle pain worse than bony pain per patient), bony tenderness, pain and spasm. He exhibits no deformity.   Neurological: He is alert and oriented to person, place, and time. No cranial nerve deficit. Gait normal.   Skin: Skin is warm. He is not diaphoretic. No cyanosis. Nails show no clubbing.   Psychiatric: He has a normal mood and affect. His speech is normal and behavior is normal. Judgment and thought content normal.   Baseline behavior, quiet.   Nursing note and vitals reviewed.      Results for orders placed during the hospital encounter of 04/13/16   XR spine thoracic 3 vw    Narrative EXAMINATION- AX-SPINE THORACIC 3 VIEWS - 04/13/2016     INDICATION- Mid back pain.     COMPARISON- None.     FINDINGS- AP, lateral, and swimmers views of the thoracic spine reveal  no evidence of acute fracture or dislocation. Slight curvature of the  upper thoracic spine on standing imaging with convexity to the right.  Pedicles are intact. Facets are well aligned. No fracture or  dislocation. Old healed granulomatous disease is seen within the chest.  Question of sclerosis identified within the pedicle on the right at the  T9 level. Continued followup can be performed if clinically indicated.  Findings may also suggest calcified nodes within the mediastinum.     IMPRESSION- No acute fracture or malalignment.     DICTATED-     04/13/2016  EDITED-          04/13/2016             Reading Radiologist- CARON GUZMAN       Releasing Radiologist- CARON GUZMAN       Released Date Time- 04/14/16 5596       - .l.   ------------------------------------------------------------------------------           Assessment/Plan   Elliott was seen today for pain.    Diagnoses and all orders for this visit:    Chronic midline thoracic back pain  -     Cancel: XR spine thoracic 2 vw; Future    Chronic joint  pain  -     DULoxetine (CYMBALTA) 60 MG capsule; Take 1 capsule by mouth Daily.    Spondylosis of cervical region without myelopathy or radiculopathy  -     gabapentin (NEURONTIN) 300 MG capsule; Take 1 capsule by mouth 3 (Three) Times a Day As Needed (pain).               Ladonna Means MD

## 2017-08-11 DIAGNOSIS — R13.14 PHARYNGOESOPHAGEAL DYSPHAGIA: ICD-10-CM

## 2017-08-11 RX ORDER — OMEPRAZOLE 40 MG/1
CAPSULE, DELAYED RELEASE ORAL
Qty: 9 CAPSULE | Refills: 0 | Status: SHIPPED | OUTPATIENT
Start: 2017-08-11 | End: 2017-10-24 | Stop reason: SDUPTHER

## 2017-08-18 ENCOUNTER — OFFICE VISIT (OUTPATIENT)
Dept: CARDIOLOGY | Facility: CLINIC | Age: 62
End: 2017-08-18

## 2017-08-18 VITALS
DIASTOLIC BLOOD PRESSURE: 60 MMHG | BODY MASS INDEX: 19.96 KG/M2 | OXYGEN SATURATION: 98 % | HEART RATE: 60 BPM | HEIGHT: 71 IN | SYSTOLIC BLOOD PRESSURE: 106 MMHG | RESPIRATION RATE: 16 BRPM | WEIGHT: 142.6 LBS

## 2017-08-18 DIAGNOSIS — Q24.5 ANOMALOUS CORONARY ARTERY ORIGIN: Primary | ICD-10-CM

## 2017-08-18 PROCEDURE — 99213 OFFICE O/P EST LOW 20 MIN: CPT | Performed by: INTERNAL MEDICINE

## 2017-08-18 RX ORDER — ISOSORBIDE MONONITRATE 30 MG/1
30 TABLET, EXTENDED RELEASE ORAL DAILY
Qty: 90 TABLET | Refills: 4 | Status: SHIPPED | OUTPATIENT
Start: 2017-08-18 | End: 2018-09-07 | Stop reason: SDUPTHER

## 2017-08-18 RX ORDER — BISOPROLOL FUMARATE 5 MG/1
5 TABLET, FILM COATED ORAL DAILY
Qty: 90 TABLET | Refills: 4 | Status: SHIPPED | OUTPATIENT
Start: 2017-08-18 | End: 2018-09-07 | Stop reason: SDUPTHER

## 2017-08-18 NOTE — PROGRESS NOTES
Subjective:     Encounter Date:08/18/2017      Patient ID: Elliott Dunn is a 62 y.o. male.    Chief Complaint:Coronary artery disease  HPI  This is a 62-year-old male patient who presents for routine follow-up.  The patient indicates he was recently started on Cymbalta and remote all of his symptoms have resolved.  Specifically he has no chest discomfort at rest or with activity.  He has no shortness of breath at rest or with activity.  There is no exertional chest arm neck jaw shoulder or back discomfort.  He has no orthopnea PND or lower extremity edema.  There is no dizziness palpitations or syncope.  He continues to smoke up to one pack of cigarettes per day.  The remainder of his past medical history, family history and social history remains unchanged compared to his last visit.  The following portions of the patient's history were reviewed and updated as appropriate: allergies, current medications, past family history, past medical history, past social history, past surgical history and problem  Review of Systems   Constitution: Negative for chills, decreased appetite, diaphoresis, fever, weakness, malaise/fatigue, night sweats, weight gain and weight loss.   HENT: Negative for ear discharge, hearing loss, hoarse voice and nosebleeds.    Eyes: Negative for discharge, double vision, pain and photophobia.   Cardiovascular: Negative for chest pain, claudication, cyanosis, dyspnea on exertion, irregular heartbeat, leg swelling, near-syncope, orthopnea, palpitations, paroxysmal nocturnal dyspnea and syncope.   Respiratory: Negative for cough, hemoptysis, shortness of breath, sputum production and wheezing.    Endocrine: Negative for cold intolerance, heat intolerance, polydipsia, polyphagia and polyuria.   Hematologic/Lymphatic: Negative for adenopathy and bleeding problem. Does not bruise/bleed easily.   Skin: Negative for color change, flushing, itching and rash.   Musculoskeletal: Negative for muscle  cramps, muscle weakness, myalgias and stiffness.   Gastrointestinal: Negative for abdominal pain, diarrhea, hematemesis, hematochezia, nausea and vomiting.   Genitourinary: Negative for dysuria, frequency and nocturia.   Neurological: Negative for focal weakness, loss of balance, numbness, paresthesias and seizures.   Psychiatric/Behavioral: Negative for altered mental status, hallucinations and suicidal ideas.   Allergic/Immunologic: Negative for HIV exposure, hives and persistent infections.       Current Outpatient Prescriptions:   •  amitriptyline (ELAVIL) 25 MG tablet, TAKE 1-2 AT BEDTIME AS NEEDED FOR SLEEP, Disp: 60 tablet, Rfl: 5  •  aspirin (ASPIRIN LOW DOSE) 81 MG EC tablet, Take 1 tablet by mouth Daily., Disp: 30 tablet, Rfl: 5  •  atorvastatin (LIPITOR) 10 MG tablet, Take 1 tablet by mouth Daily., Disp: 90 tablet, Rfl: 2  •  bisoprolol (ZEBeta) 5 MG tablet, Take 1 tablet by mouth Daily., Disp: 90 tablet, Rfl: 4  •  DULoxetine (CYMBALTA) 60 MG capsule, Take 1 capsule by mouth Daily., Disp: 30 capsule, Rfl: 3  •  gabapentin (NEURONTIN) 300 MG capsule, Take 1 capsule by mouth 3 (Three) Times a Day As Needed (pain)., Disp: 90 capsule, Rfl: 2  •  isosorbide mononitrate (IMDUR) 30 MG 24 hr tablet, Take 1 tablet by mouth Daily., Disp: 90 tablet, Rfl: 4  •  meloxicam (MOBIC) 15 MG tablet, Take 1 tablet by mouth Daily., Disp: 30 tablet, Rfl: 5  •  methocarbamol (ROBAXIN) 750 MG tablet, Take 1 tablet by mouth 3 (three) times a day., Disp: 90 tablet, Rfl: 2  •  nitroglycerin (NITROSTAT) 0.4 MG SL tablet, 1 under the tongue as needed for angina, may repeat q5mins for up three doses, Disp: 100 tablet, Rfl: 11  •  omeprazole (priLOSEC) 40 MG capsule, TAKE ONE CAPSULE BY MOUTH DAILY, Disp: 9 capsule, Rfl: 0  •  predniSONE (DELTASONE) 10 MG tablet, , Disp: , Rfl:   •  Triamcinolone Acetonide (NASACORT) 55 MCG/ACT nasal inhaler, 2 sprays into each nostril daily., Disp: , Rfl:   •  vitamin B-12 (CYANOCOBALAMIN) 1000 MCG  "tablet, TAKE ONE TABLET BY MOUTH DAILY AS DIRECTED, Disp: 30 tablet, Rfl: 10     Objective:     Physical Exam   Constitutional: He is oriented to person, place, and time. He appears well-developed and well-nourished.   HENT:   Head: Normocephalic and atraumatic.   Eyes: Conjunctivae are normal. No scleral icterus.   Cardiovascular: Normal rate, regular rhythm, normal heart sounds and intact distal pulses.  Exam reveals no gallop and no friction rub.    No murmur heard.  Pulmonary/Chest: Effort normal and breath sounds normal. No respiratory distress.   Abdominal: Soft. Bowel sounds are normal. There is no tenderness.   Musculoskeletal: He exhibits no edema.   Neurological: He is alert and oriented to person, place, and time.   Skin: Skin is warm and dry.   Psychiatric: He has a normal mood and affect. His behavior is normal.     Blood pressure 106/60, pulse 60, resp. rate 16, height 71\" (180.3 cm), weight 142 lb 9.6 oz (64.7 kg), SpO2 98 %.   Lab Review:       Assessment:         1. Anomalous coronary artery origin  Stable and angina free  Procedures     Plan:       No changes in his medication profile have been made at today's visit.  No additional cardiovascular testing is indicated.  The patient has been counseled regarding the essential need to discontinue cigarette smoking.         "

## 2017-08-21 ENCOUNTER — LAB (OUTPATIENT)
Dept: LAB | Facility: HOSPITAL | Age: 62
End: 2017-08-21

## 2017-08-21 DIAGNOSIS — D47.2 MGUS (MONOCLONAL GAMMOPATHY OF UNKNOWN SIGNIFICANCE): ICD-10-CM

## 2017-08-21 DIAGNOSIS — C61 MALIGNANT NEOPLASM OF PROSTATE (HCC): ICD-10-CM

## 2017-08-21 LAB
ALBUMIN SERPL-MCNC: 4.1 G/DL (ref 3.5–5)
ALBUMIN/GLOB SERPL: 1.2 G/DL (ref 1–2)
ALP SERPL-CCNC: 76 U/L (ref 38–126)
ALT SERPL W P-5'-P-CCNC: 52 U/L (ref 13–69)
ANION GAP SERPL CALCULATED.3IONS-SCNC: 12.1 MMOL/L
AST SERPL-CCNC: 35 U/L (ref 15–46)
BASOPHILS # BLD AUTO: 0.04 10*3/MM3 (ref 0–0.2)
BASOPHILS NFR BLD AUTO: 0.4 % (ref 0–2.5)
BILIRUB SERPL-MCNC: 0.5 MG/DL (ref 0.2–1.3)
BUN BLD-MCNC: 41 MG/DL (ref 7–20)
BUN/CREAT SERPL: 31.5 (ref 6.3–21.9)
CALCIUM SPEC-SCNC: 9.2 MG/DL (ref 8.4–10.2)
CHLORIDE SERPL-SCNC: 103 MMOL/L (ref 98–107)
CO2 SERPL-SCNC: 26 MMOL/L (ref 26–30)
CREAT BLD-MCNC: 1.3 MG/DL (ref 0.6–1.3)
CRP SERPL-MCNC: <0.5 MG/DL (ref 0–1)
DEPRECATED RDW RBC AUTO: 49.1 FL (ref 37–54)
EOSINOPHIL # BLD AUTO: 0.19 10*3/MM3 (ref 0–0.7)
EOSINOPHIL NFR BLD AUTO: 2 % (ref 0–7)
ERYTHROCYTE [DISTWIDTH] IN BLOOD BY AUTOMATED COUNT: 13.8 % (ref 11.5–14.5)
ERYTHROCYTE [SEDIMENTATION RATE] IN BLOOD: 2 MM/HR (ref 0–15)
GFR SERPL CREATININE-BSD FRML MDRD: 56 ML/MIN/1.73
GLOBULIN UR ELPH-MCNC: 3.5 GM/DL
GLUCOSE BLD-MCNC: 92 MG/DL (ref 74–98)
HCT VFR BLD AUTO: 47.1 % (ref 42–52)
HGB BLD-MCNC: 15.6 G/DL (ref 14–18)
IMM GRANULOCYTES # BLD: 0.02 10*3/MM3 (ref 0–0.06)
IMM GRANULOCYTES NFR BLD: 0.2 % (ref 0–0.6)
LYMPHOCYTES # BLD AUTO: 2.98 10*3/MM3 (ref 0.6–3.4)
LYMPHOCYTES NFR BLD AUTO: 30.8 % (ref 10–50)
MCH RBC QN AUTO: 31.7 PG (ref 27–31)
MCHC RBC AUTO-ENTMCNC: 33.1 G/DL (ref 30–37)
MCV RBC AUTO: 95.7 FL (ref 80–94)
MONOCYTES # BLD AUTO: 0.75 10*3/MM3 (ref 0–0.9)
MONOCYTES NFR BLD AUTO: 7.7 % (ref 0–12)
NEUTROPHILS # BLD AUTO: 5.7 10*3/MM3 (ref 2–6.9)
NEUTROPHILS NFR BLD AUTO: 58.9 % (ref 37–80)
NRBC BLD MANUAL-RTO: 0 /100 WBC (ref 0–0)
PLATELET # BLD AUTO: 209 10*3/MM3 (ref 130–400)
PMV BLD AUTO: 11.5 FL (ref 6–12)
POTASSIUM BLD-SCNC: 5.1 MMOL/L (ref 3.5–5.1)
PROT SERPL-MCNC: 7.6 G/DL (ref 6.3–8.2)
PSA SERPL-MCNC: 0.23 NG/ML (ref 0.06–4)
RBC # BLD AUTO: 4.92 10*6/MM3 (ref 4.7–6.1)
SODIUM BLD-SCNC: 136 MMOL/L (ref 137–145)
WBC NRBC COR # BLD: 9.68 10*3/MM3 (ref 4.8–10.8)

## 2017-08-21 PROCEDURE — 86334 IMMUNOFIX E-PHORESIS SERUM: CPT | Performed by: INTERNAL MEDICINE

## 2017-08-21 PROCEDURE — 84153 ASSAY OF PSA TOTAL: CPT | Performed by: FAMILY MEDICINE

## 2017-08-21 PROCEDURE — 82232 ASSAY OF BETA-2 PROTEIN: CPT | Performed by: INTERNAL MEDICINE

## 2017-08-21 PROCEDURE — 85025 COMPLETE CBC W/AUTO DIFF WBC: CPT | Performed by: NURSE PRACTITIONER

## 2017-08-21 PROCEDURE — 85651 RBC SED RATE NONAUTOMATED: CPT | Performed by: INTERNAL MEDICINE

## 2017-08-21 PROCEDURE — 84443 ASSAY THYROID STIM HORMONE: CPT | Performed by: FAMILY MEDICINE

## 2017-08-21 PROCEDURE — 84165 PROTEIN E-PHORESIS SERUM: CPT | Performed by: INTERNAL MEDICINE

## 2017-08-21 PROCEDURE — 36415 COLL VENOUS BLD VENIPUNCTURE: CPT

## 2017-08-21 PROCEDURE — 82330 ASSAY OF CALCIUM: CPT | Performed by: INTERNAL MEDICINE

## 2017-08-21 PROCEDURE — 80053 COMPREHEN METABOLIC PANEL: CPT | Performed by: INTERNAL MEDICINE

## 2017-08-21 PROCEDURE — 86140 C-REACTIVE PROTEIN: CPT | Performed by: INTERNAL MEDICINE

## 2017-08-21 PROCEDURE — 84155 ASSAY OF PROTEIN SERUM: CPT | Performed by: INTERNAL MEDICINE

## 2017-08-21 PROCEDURE — 83883 ASSAY NEPHELOMETRY NOT SPEC: CPT | Performed by: INTERNAL MEDICINE

## 2017-08-22 LAB
ALBUMIN SERPL-MCNC: 3.9 G/DL (ref 2.9–4.4)
ALBUMIN SERPL-MCNC: 4 G/DL (ref 2.9–4.4)
ALBUMIN/GLOB SERPL: 1.2 {RATIO} (ref 0.7–1.7)
ALBUMIN/GLOB SERPL: 1.3 {RATIO} (ref 0.7–1.7)
ALPHA1 GLOB FLD ELPH-MCNC: 0.1 G/DL (ref 0–0.4)
ALPHA1 GLOB FLD ELPH-MCNC: 0.1 G/DL (ref 0–0.4)
ALPHA2 GLOB SERPL ELPH-MCNC: 0.7 G/DL (ref 0.4–1)
ALPHA2 GLOB SERPL ELPH-MCNC: 0.7 G/DL (ref 0.4–1)
B-GLOBULIN SERPL ELPH-MCNC: 0.7 G/DL (ref 0.7–1.3)
B-GLOBULIN SERPL ELPH-MCNC: 0.7 G/DL (ref 0.7–1.3)
B2 MICROGLOB SERPL-MCNC: 2.2 MG/L (ref 0.6–2.4)
CA-I SERPL ISE-MCNC: 5.2 MG/DL (ref 4.5–5.6)
GAMMA GLOB SERPL ELPH-MCNC: 1.6 G/DL (ref 0.4–1.8)
GAMMA GLOB SERPL ELPH-MCNC: 1.7 G/DL (ref 0.4–1.8)
GLOBULIN SER CALC-MCNC: 3.1 G/DL (ref 2.2–3.9)
GLOBULIN SER CALC-MCNC: 3.3 G/DL (ref 2.2–3.9)
IGA SERPL-MCNC: 122 MG/DL (ref 61–437)
IGG SERPL-MCNC: 1720 MG/DL (ref 700–1600)
IGM SERPL-MCNC: 39 MG/DL (ref 20–172)
INTERPRETATION SERPL IEP-IMP: ABNORMAL
KAPPA LC SERPL-MCNC: 50.9 MG/L (ref 3.3–19.4)
KAPPA LC/LAMBDA SER: 4.1 {RATIO} (ref 0.26–1.65)
LAMBDA LC FREE SERPL-MCNC: 12.4 MG/L (ref 5.7–26.3)
Lab: ABNORMAL
Lab: ABNORMAL
M-SPIKE: 1.3 G/DL
M-SPIKE: 1.3 G/DL
PROT SERPL-MCNC: 7.1 G/DL (ref 6–8.5)
PROT SERPL-MCNC: 7.2 G/DL (ref 6–8.5)

## 2017-08-29 PROCEDURE — 84166 PROTEIN E-PHORESIS/URINE/CSF: CPT | Performed by: NURSE PRACTITIONER

## 2017-08-29 PROCEDURE — 84156 ASSAY OF PROTEIN URINE: CPT | Performed by: NURSE PRACTITIONER

## 2017-08-29 PROCEDURE — 81050 URINALYSIS VOLUME MEASURE: CPT | Performed by: NURSE PRACTITIONER

## 2017-08-29 PROCEDURE — 86335 IMMUNFIX E-PHORSIS/URINE/CSF: CPT | Performed by: NURSE PRACTITIONER

## 2017-09-07 ENCOUNTER — OFFICE VISIT (OUTPATIENT)
Dept: ONCOLOGY | Facility: CLINIC | Age: 62
End: 2017-09-07

## 2017-09-07 VITALS
HEART RATE: 64 BPM | BODY MASS INDEX: 19.8 KG/M2 | DIASTOLIC BLOOD PRESSURE: 75 MMHG | SYSTOLIC BLOOD PRESSURE: 129 MMHG | RESPIRATION RATE: 18 BRPM | WEIGHT: 142 LBS | TEMPERATURE: 97.6 F

## 2017-09-07 DIAGNOSIS — D47.2 MGUS (MONOCLONAL GAMMOPATHY OF UNKNOWN SIGNIFICANCE): Primary | ICD-10-CM

## 2017-09-07 PROCEDURE — 99213 OFFICE O/P EST LOW 20 MIN: CPT | Performed by: NURSE PRACTITIONER

## 2017-09-07 NOTE — PROGRESS NOTES
CHIEF COMPLAINT: Monoclonal gammopathy of unknown significance    Problem List:  Oncology/Hematology History    1.  MGUS: Had been having mid back pains and was seen by neurology, was found to have 1200 mg of immunoglobulin G monoclonal protein in the serum with a normal IgA and IgM level this was in July 2016.  Workup August 2016 included a bone survey that was negative other than for degenerative joint disease with left eighth rib healed fracture that was seen on prior bone scan.  Normal creatinine, ionized calcium of 1.34, normal total protein to albumin ratio.  Normal sedimentation rate and C-reactive protein, serum monoclonal protein present at 1100 mg/dL of immunoglobulin G kappa with normal IgA and M levels.  Urine monoclonal protein was too scant to quantify.  Kappa to lambda ratio of 4.58 with elevation of At 42.85.  CBC was unremarkable with normal white count and platelet count and hemoglobin of 17 baseline.  Baseline PET/CT 9/1/2016 was negative.   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  This genetic alteration is found in approximately 15-18 percent of patients with plasma cell myeloma by FISH analysis and is associated with a favorable prognosis in the absence of poor prognostic markers.   b.)  3/6/2017 follow-up PET/CT was negative.    2.  History of prostate cancer: Status post prostatectomy 2014, under the care of Dr. Varela who he sees annually at this point.    3.  Squamous cell cancer of the skin, followed by Dr. Izaguirre.            MGUS (monoclonal gammopathy of unknown significance)    7/1/2016 Initial Diagnosis     MGUS (monoclonal gammopathy of unknown significance)         8/21/2017 -  Other Event     Myeloma panel: Urine immunoelectrophoresis monoclonal protein too small to quantify, serum immunoelectrophoresis M-spike stable at 1.3g/dl, ionized calcium 5.2, kappa to lambda  ratio 4.10, beta-2 microglobulin 2.2, C-reactive protein less than 0.50, creatinine 1.3, sedimentation rate to.  CBC WBC 9600, hemoglobin 15.6, hematocrit 47.1%, platelet count 209,000.              HISTORY OF PRESENT ILLNESS:  The patient is a 62 y.o. male, here for follow up on management of Monoclonal gammopathy of unknown significance.  The patient states that he has been doing well since we saw him last with no new complaints.  Continues to follow with rheumatology for his osteoarthritis.  Has no new or concerning bony aches or pains.  Appetite is normal, his weight is maintaining.  Change in his bowel or bladder habits.  In 10 years to follow along closely with Dr. Izaguirre and has had several squamous cell skin lesions removed.  Also saw Dr. Varela recently for his annual visit in regards to his history of prostate cancer and has no evidence of recurrence.      Past Medical History:   Diagnosis Date   • Acquired absence of all teeth    • Allergic    • Anomalous coronary artery origin    • Arrhythmia    • Arthritis    • Arthritis of lumbar spine 7/29/2016   • Back pain 6/17/2016   • Cancer     prostate   • Cataract    • Cervical spine disease 6/17/2016   • CHF (congestive heart failure)    • Chronic obstructive pulmonary disease    • Colon polyps    • Deafness in left ear    • Derangement of anterior horn of medial meniscus    • Difficulty swallowing    • Disorder of lumbar spine 6/17/2016   • Disorder of thoracic spine 6/17/2016   • Diverticulitis of colon    • Erectile dysfunction    • Esophageal reflux    • Essential hypertension    • Glaucoma    • Heart murmur    • High cholesterol    • Inflammatory polyarthropathy     Per Dr. Haider. Negative NEO, normal ESR, RF, anti-ccp and did not improve with prednisone per notes.   • Inguinal hernia    • Lateral meniscus derangement    • Left otitis media with spontaneous rupture of eardrum    • Locking of left knee    • Low back pain    • MGUS (monoclonal gammopathy  of unknown significance)     likely diagnosis   • Neuromuscular disorder    • Neuropathic arthritis    • Perforation of left tympanic membrane    • Pulmonary emphysema    • Scoliosis    • Skin cancer     skin cancer   • Skin cancer of face     squamous cell   • Spina bifida occulta 6/17/2016   • Visual impairment    • Wears glasses      Past Surgical History:   Procedure Laterality Date   • COLONOSCOPY     • ENDOSCOPY N/A 4/10/2017    Procedure: ESOPHAGOGASTRODUODENOSCOPY WITH BIOPSY;  Surgeon: Alexander Ritchie MD;  Location: [facility] ENDOSCOPY;  Service:    • EYE SURGERY     • HERNIA REPAIR     • INGUINAL HERNIA REPAIR Right    • INGUINAL HERNIA REPAIR     • KNEE SURGERY Left    • LYMPH NODE BIOPSY     • PROSTATE SURGERY  2004   • PROSTATECTOMY  06/30/2014   • PROSTATECTOMY      Prostatectomy Radical   • SKIN CANCER EXCISION  2017    both sides of body/squamous cell melanoma   • TYMPANOPLASTY W/ MASTOIDECTOMY     • UMBILICAL HERNIA REPAIR         Allergies   Allergen Reactions   • Pravastatin Other (See Comments)     Weakness / fatigue  Weakness / fatigue       Family History and Social History reviewed and changed as necessary      REVIEW OF SYSTEM:   Review of Systems   Constitutional: Negative for appetite change, chills, diaphoresis, fatigue, fever and unexpected weight change.   HENT:   Negative for mouth sores, sore throat and trouble swallowing.    Eyes: Negative for icterus.   Respiratory: Negative for cough, hemoptysis and shortness of breath.    Cardiovascular: Negative for chest pain, leg swelling and palpitations.   Gastrointestinal: Negative for abdominal distention, abdominal pain, blood in stool, constipation, diarrhea, nausea and vomiting.   Endocrine: Negative for hot flashes.   Genitourinary: Negative for bladder incontinence, difficulty urinating, dysuria, frequency and hematuria.    Musculoskeletal: Negative for gait problem, neck pain and neck stiffness.   Skin: Negative for rash.   Neurological:  Negative for dizziness, gait problem, headaches, light-headedness and numbness.   Hematological: Negative for adenopathy. Does not bruise/bleed easily.   Psychiatric/Behavioral: Negative for depression. The patient is not nervous/anxious.    All other systems reviewed and are negative.       PHYSICAL EXAM    Vitals:    09/07/17 1050   BP: 129/75   Pulse: 64   Resp: 18   Temp: 97.6 °F (36.4 °C)   TempSrc: Temporal Artery    Weight: 142 lb (64.4 kg)     Constitutional: Appears well-developed and well-nourished. No distress.   ECOG: (0) Fully active, able to carry on all predisease performance without restriction  HENT:   Head: Normocephalic.   Mouth/Throat: Oropharynx is clear and moist.   Eyes: Conjunctivae are normal. Pupils are equal, round, and reactive to light. No scleral icterus.   Neck: Neck supple. No JVD present. No thyromegaly present.   Cardiovascular: Normal rate, regular rhythm and normal heart sounds.    Pulmonary/Chest: Breath sounds normal. No respiratory distress.   Abdominal: Soft. Exhibits no distension and no mass. There is no hepatosplenomegaly. There is no tenderness. There is no rebound and no guarding.   Musculoskeletal:Exhibits no edema, tenderness or deformity.   Neurological: Alert and oriented to person, place, and time. Exhibits normal muscle tone.   Skin: No ecchymosis, no petechiae and no rash noted. Not diaphoretic. No cyanosis. Nails show no clubbing.   Psychiatric: Normal mood and affect.   Vitals reviewed.  Diagnostic data: All previous office notes and current laboratory data and outside physician notes available in Epic were reviewed at time of visit.    Lab on 08/21/2017   Component Date Value Ref Range Status   • PSA 08/21/2017 0.229  0.060 - 4.000 ng/mL Final   • Glucose 08/21/2017 92  74 - 98 mg/dL Final   • BUN 08/21/2017 41* 7 - 20 mg/dL Final   • Creatinine 08/21/2017 1.30  0.60 - 1.30 mg/dL Final   • Sodium 08/21/2017 136* 137 - 145 mmol/L Final   • Potassium 08/21/2017  5.1  3.5 - 5.1 mmol/L Final   • Chloride 08/21/2017 103  98 - 107 mmol/L Final   • CO2 08/21/2017 26.0  26.0 - 30.0 mmol/L Final   • Calcium 08/21/2017 9.2  8.4 - 10.2 mg/dL Final   • Total Protein 08/21/2017 7.6  6.3 - 8.2 g/dL Final   • Albumin 08/21/2017 4.10  3.50 - 5.00 g/dL Final   • ALT (SGPT) 08/21/2017 52  13 - 69 U/L Final   • AST (SGOT) 08/21/2017 35  15 - 46 U/L Final   • Alkaline Phosphatase 08/21/2017 76  38 - 126 U/L Final   • Total Bilirubin 08/21/2017 0.5  0.2 - 1.3 mg/dL Final   • eGFR Non African Amer 08/21/2017 56* >60 mL/min/1.73 Final   • Globulin 08/21/2017 3.5  gm/dL Final   • A/G Ratio 08/21/2017 1.2  1.0 - 2.0 g/dL Final   • BUN/Creatinine Ratio 08/21/2017 31.5* 6.3 - 21.9 Final   • Anion Gap 08/21/2017 12.1  mmol/L Final   • Beta-2 08/21/2017 2.2  0.6 - 2.4 mg/L Final   • Ionized Calcium 08/21/2017 5.2  4.5 - 5.6 mg/dL Final   • C-Reactive Protein 08/21/2017 <0.50  0.00 - 1.00 mg/dL Final   • IgG 08/21/2017 1720* 700 - 1600 mg/dL Final   • IgA 08/21/2017 122  61 - 437 mg/dL Final   • IgM 08/21/2017 39  20 - 172 mg/dL Final   • Total Protein 08/21/2017 7.1  6.0 - 8.5 g/dL Final   • Albumin 08/21/2017 4.0  2.9 - 4.4 g/dL Final   • Alpha-1-Globulin 08/21/2017 0.1  0.0 - 0.4 g/dL Final   • Alpha-2-Globulin 08/21/2017 0.7  0.4 - 1.0 g/dL Final   • Beta Globulin 08/21/2017 0.7  0.7 - 1.3 g/dL Final   • Gamma Globulin 08/21/2017 1.6  0.4 - 1.8 g/dL Final   • M-Larry 08/21/2017 1.3* Not Observed g/dL Final   • Globulin 08/21/2017 3.1  2.2 - 3.9 g/dL Final   • A/G Ratio 08/21/2017 1.3  0.7 - 1.7 Final   • Immunofixation Reflex, Serum 08/21/2017 Comment   Final    Immunofixation shows IgG monoclonal protein with kappa light chain  specificity.   • Please note 08/21/2017 Comment   Final    Protein electrophoresis scan will follow via computer, mail, or   delivery.   • Free Light Chain, Kappa 08/21/2017 50.9* 3.3 - 19.4 mg/L Final   • Free Lambda Light Chains 08/21/2017 12.4  5.7 - 26.3 mg/L  Final   • Kappa/Lambda Ratio 08/21/2017 4.10* 0.26 - 1.65 Final   • Total Protein 08/21/2017 7.2  6.0 - 8.5 g/dL Final   • Albumin 08/21/2017 3.9  2.9 - 4.4 g/dL Final   • Alpha-1-Globulin 08/21/2017 0.1  0.0 - 0.4 g/dL Final   • Alpha-2-Globulin 08/21/2017 0.7  0.4 - 1.0 g/dL Final   • Beta Globulin 08/21/2017 0.7  0.7 - 1.3 g/dL Final   • Gamma Globulin 08/21/2017 1.7  0.4 - 1.8 g/dL Final   • M-Larry 08/21/2017 1.3* Not Observed g/dL Final   • Globulin 08/21/2017 3.3  2.2 - 3.9 g/dL Final   • A/G Ratio 08/21/2017 1.2  0.7 - 1.7 Final   • Please note 08/21/2017 Comment   Final    Protein electrophoresis scan will follow via computer, mail, or   delivery.   • Sed Rate 08/21/2017 2  0 - 15 mm/hr Final   • WBC 08/21/2017 9.68  4.80 - 10.80 10*3/mm3 Final   • RBC 08/21/2017 4.92  4.70 - 6.10 10*6/mm3 Final   • Hemoglobin 08/21/2017 15.6  14.0 - 18.0 g/dL Final   • Hematocrit 08/21/2017 47.1  42.0 - 52.0 % Final   • MCV 08/21/2017 95.7* 80.0 - 94.0 fL Final   • MCH 08/21/2017 31.7* 27.0 - 31.0 pg Final   • MCHC 08/21/2017 33.1  30.0 - 37.0 g/dL Final   • RDW 08/21/2017 13.8  11.5 - 14.5 % Final   • RDW-SD 08/21/2017 49.1  37.0 - 54.0 fl Final   • MPV 08/21/2017 11.5  6.0 - 12.0 fL Final   • Platelets 08/21/2017 209  130 - 400 10*3/mm3 Final   • Neutrophil % 08/21/2017 58.9  37.0 - 80.0 % Final   • Lymphocyte % 08/21/2017 30.8  10.0 - 50.0 % Final   • Monocyte % 08/21/2017 7.7  0.0 - 12.0 % Final   • Eosinophil % 08/21/2017 2.0  0.0 - 7.0 % Final   • Basophil % 08/21/2017 0.4  0.0 - 2.5 % Final   • Immature Grans % 08/21/2017 0.2  0.0 - 0.6 % Final   • Neutrophils, Absolute 08/21/2017 5.70  2.00 - 6.90 10*3/mm3 Final   • Lymphocytes, Absolute 08/21/2017 2.98  0.60 - 3.40 10*3/mm3 Final   • Monocytes, Absolute 08/21/2017 0.75  0.00 - 0.90 10*3/mm3 Final   • Eosinophils, Absolute 08/21/2017 0.19  0.00 - 0.70 10*3/mm3 Final   • Basophils, Absolute 08/21/2017 0.04  0.00 - 0.20 10*3/mm3 Final   • Immature Grans,  Absolute 08/21/2017 0.02  0.00 - 0.06 10*3/mm3 Final   • nRBC 08/21/2017 0.0  0.0 - 0.0 /100 WBC Final       Assessment/Plan     1.  Monoclonal gammopathy of unknown significance: The patient is doing well, all labs are stable no evidence of this progressing.  We will continue to follow along and see him back in 6 months with repeat urine and serum testing along with bone survey.    2.  History of prostate cancer: Continues to follow along with Dr. Varela who he saw recently and states that everything was fine he has no evidence of disease.  Last PSA on our records from 8/21/2017 was 0.2-9.  I will not plan on repeating this and will leave that to Dr. Varela.      Argenitna Kingston, APRN    09/07/2017

## 2017-09-09 DIAGNOSIS — R13.14 PHARYNGOESOPHAGEAL DYSPHAGIA: ICD-10-CM

## 2017-09-11 RX ORDER — LANOLIN ALCOHOL/MO/W.PET/CERES
CREAM (GRAM) TOPICAL
Qty: 30 TABLET | Refills: 9 | Status: SHIPPED | OUTPATIENT
Start: 2017-09-11 | End: 2018-07-16 | Stop reason: SDUPTHER

## 2017-09-11 RX ORDER — METHOCARBAMOL 750 MG/1
TABLET, FILM COATED ORAL
Qty: 90 TABLET | Refills: 1 | Status: SHIPPED | OUTPATIENT
Start: 2017-09-11 | End: 2017-11-11 | Stop reason: SDUPTHER

## 2017-09-11 RX ORDER — OMEPRAZOLE 40 MG/1
CAPSULE, DELAYED RELEASE ORAL
Qty: 30 CAPSULE | Refills: 0 | Status: SHIPPED | OUTPATIENT
Start: 2017-09-11 | End: 2017-10-09 | Stop reason: SDUPTHER

## 2017-10-09 DIAGNOSIS — R13.14 PHARYNGOESOPHAGEAL DYSPHAGIA: ICD-10-CM

## 2017-10-11 RX ORDER — OMEPRAZOLE 40 MG/1
CAPSULE, DELAYED RELEASE ORAL
Qty: 30 CAPSULE | Refills: 5 | Status: SHIPPED | OUTPATIENT
Start: 2017-10-11 | End: 2018-04-10 | Stop reason: SDUPTHER

## 2017-10-24 ENCOUNTER — OFFICE VISIT (OUTPATIENT)
Dept: INTERNAL MEDICINE | Facility: CLINIC | Age: 62
End: 2017-10-24

## 2017-10-24 VITALS
OXYGEN SATURATION: 96 % | HEIGHT: 71 IN | TEMPERATURE: 98.3 F | BODY MASS INDEX: 19.88 KG/M2 | DIASTOLIC BLOOD PRESSURE: 72 MMHG | WEIGHT: 142 LBS | SYSTOLIC BLOOD PRESSURE: 122 MMHG | RESPIRATION RATE: 12 BRPM | HEART RATE: 60 BPM

## 2017-10-24 DIAGNOSIS — Z72.0 TOBACCO ABUSE: ICD-10-CM

## 2017-10-24 DIAGNOSIS — E78.5 HYPERLIPIDEMIA, UNSPECIFIED HYPERLIPIDEMIA TYPE: ICD-10-CM

## 2017-10-24 DIAGNOSIS — G89.29 CHRONIC JOINT PAIN: Primary | ICD-10-CM

## 2017-10-24 DIAGNOSIS — Z51.81 ENCOUNTER FOR MONITORING LONG-TERM PROTON PUMP INHIBITOR THERAPY: ICD-10-CM

## 2017-10-24 DIAGNOSIS — Z23 NEED FOR VACCINATION: ICD-10-CM

## 2017-10-24 DIAGNOSIS — E53.8 COBALAMIN DEFICIENCY: ICD-10-CM

## 2017-10-24 DIAGNOSIS — M25.50 CHRONIC JOINT PAIN: Primary | ICD-10-CM

## 2017-10-24 DIAGNOSIS — Z12.2 ENCOUNTER FOR SCREENING FOR LUNG CANCER: ICD-10-CM

## 2017-10-24 DIAGNOSIS — G89.4 CHRONIC PAIN SYNDROME: ICD-10-CM

## 2017-10-24 DIAGNOSIS — I10 ESSENTIAL HYPERTENSION: Chronic | ICD-10-CM

## 2017-10-24 DIAGNOSIS — M47.812 SPONDYLOSIS OF CERVICAL REGION WITHOUT MYELOPATHY OR RADICULOPATHY: ICD-10-CM

## 2017-10-24 DIAGNOSIS — Z79.899 ENCOUNTER FOR MONITORING LONG-TERM PROTON PUMP INHIBITOR THERAPY: ICD-10-CM

## 2017-10-24 PROBLEM — R07.9 CHEST PAIN IN ADULT: Status: RESOLVED | Noted: 2017-01-17 | Resolved: 2017-10-24

## 2017-10-24 PROBLEM — R42 DIZZINESS: Status: RESOLVED | Noted: 2017-01-17 | Resolved: 2017-10-24

## 2017-10-24 PROBLEM — R00.2 PALPITATIONS: Status: RESOLVED | Noted: 2017-01-17 | Resolved: 2017-10-24

## 2017-10-24 LAB
ALBUMIN SERPL-MCNC: 4.1 G/DL (ref 3.5–5)
ALBUMIN/GLOB SERPL: 1.2 G/DL (ref 1–2)
ALP SERPL-CCNC: 70 U/L (ref 38–126)
ALT SERPL-CCNC: 52 U/L (ref 13–69)
AST SERPL-CCNC: 41 U/L (ref 15–46)
BILIRUB SERPL-MCNC: 0.4 MG/DL (ref 0.2–1.3)
BUN SERPL-MCNC: 38 MG/DL (ref 7–20)
BUN/CREAT SERPL: 38 (ref 6.3–21.9)
CALCIUM SERPL-MCNC: 9.7 MG/DL (ref 8.4–10.2)
CHLORIDE SERPL-SCNC: 105 MMOL/L (ref 98–107)
CHOLEST SERPL-MCNC: 136 MG/DL (ref 0–199)
CO2 SERPL-SCNC: 27 MMOL/L (ref 26–30)
CREAT SERPL-MCNC: 1 MG/DL (ref 0.6–1.3)
FOLATE SERPL-MCNC: 12.1 NG/ML
GFR SERPLBLD CREATININE-BSD FMLA CKD-EPI: 76 ML/MIN/1.73
GFR SERPLBLD CREATININE-BSD FMLA CKD-EPI: 92 ML/MIN/1.73
GLOBULIN SER CALC-MCNC: 3.3 GM/DL
GLUCOSE SERPL-MCNC: 88 MG/DL (ref 74–98)
HDLC SERPL-MCNC: 46 MG/DL (ref 40–60)
LDLC SERPL CALC-MCNC: 78 MG/DL (ref 0–99)
MAGNESIUM SERPL-MCNC: 2 MG/DL (ref 1.6–2.3)
POTASSIUM SERPL-SCNC: 4.6 MMOL/L (ref 3.5–5.1)
PROT SERPL-MCNC: 7.4 G/DL (ref 6.3–8.2)
SODIUM SERPL-SCNC: 141 MMOL/L (ref 137–145)
TRIGL SERPL-MCNC: 59 MG/DL
VIT B12 SERPL-MCNC: 910 PG/ML (ref 239–931)
VLDLC SERPL CALC-MCNC: 11.8 MG/DL

## 2017-10-24 PROCEDURE — 90686 IIV4 VACC NO PRSV 0.5 ML IM: CPT | Performed by: FAMILY MEDICINE

## 2017-10-24 PROCEDURE — 90471 IMMUNIZATION ADMIN: CPT | Performed by: FAMILY MEDICINE

## 2017-10-24 PROCEDURE — 99214 OFFICE O/P EST MOD 30 MIN: CPT | Performed by: FAMILY MEDICINE

## 2017-10-24 RX ORDER — DULOXETIN HYDROCHLORIDE 60 MG/1
60 CAPSULE, DELAYED RELEASE ORAL DAILY
Qty: 30 CAPSULE | Refills: 3 | Status: SHIPPED | OUTPATIENT
Start: 2017-10-24 | End: 2018-08-31 | Stop reason: SDUPTHER

## 2017-10-24 RX ORDER — GABAPENTIN 300 MG/1
300 CAPSULE ORAL 3 TIMES DAILY PRN
Qty: 90 CAPSULE | Refills: 2 | Status: SHIPPED | OUTPATIENT
Start: 2017-10-24 | End: 2018-01-24 | Stop reason: SDUPTHER

## 2017-10-24 NOTE — PROGRESS NOTES
Subjective    Elliott Dunn is a 62 y.o. male here for:  Chief Complaint   Patient presents with   • Hyperlipidemia   • Hypertension   • Pain     Hypertension   This is a chronic problem. The current episode started more than 1 year ago. The problem is unchanged. The problem is controlled. Associated symptoms include malaise/fatigue. Pertinent negatives include no chest pain. Agents associated with hypertension include NSAIDs. Risk factors for coronary artery disease include stress, smoking/tobacco exposure, male gender and family history. Past treatments include beta blockers and direct vasodilators. The current treatment provides significant improvement. There are no compliance problems.  There is no history of CAD/MI, CVA or a thyroid problem.   Hyperlipidemia   This is a chronic problem. The current episode started more than 1 year ago. The problem is controlled. He has no history of diabetes, hypothyroidism, liver disease or obesity. Factors aggravating his hyperlipidemia include smoking. Associated symptoms include myalgias. Pertinent negatives include no chest pain. Current antihyperlipidemic treatment includes statins. The current treatment provides significant improvement of lipids. There are no compliance problems.  Risk factors for coronary artery disease include dyslipidemia, family history, hypertension, male sex and stress.   Pain   This is a chronic problem. The current episode started more than 1 year ago. The problem occurs daily. The problem has been waxing and waning. Associated symptoms include arthralgias, coughing, fatigue, joint swelling and myalgias. Pertinent negatives include no chest pain. The symptoms are aggravated by exertion (movement, increased activity levels). Treatments tried: Cymbalta, prednisone. Gabapentin.NSAIDs. The treatment provided moderate relief.        The following portions of the patient's history were reviewed and updated as appropriate: allergies, current  medications, past family history, past medical history, past social history, past surgical history and problem list.    Review of Systems   Constitutional: Positive for fatigue and malaise/fatigue.   Respiratory: Positive for cough.    Cardiovascular: Negative for chest pain.   Musculoskeletal: Positive for arthralgias, joint swelling and myalgias.       Vitals:    10/24/17 1002   BP: 122/72   Pulse: 60   Resp: 12   Temp: 98.3 °F (36.8 °C)   SpO2: 96%       Objective   Physical Exam   Constitutional: He is oriented to person, place, and time. Vital signs are normal. He appears well-developed and well-nourished. He is active. He does not appear ill.   HENT:   Head: Normocephalic and atraumatic.   Right Ear: Hearing normal.   Left Ear: Hearing normal.   Nose: Nose normal.   Mouth/Throat: Mucous membranes are not dry. Abnormal dentition.   Eyes: EOM and lids are normal. Pupils are equal, round, and reactive to light.   Neck: Phonation normal. Neck supple.   Cardiovascular: Normal rate, regular rhythm and normal heart sounds.    Pulmonary/Chest: Effort normal.   Musculoskeletal:        Right hand: He exhibits swelling. He exhibits no deformity.        Left hand: He exhibits swelling. He exhibits no deformity.   Swelling in joints of hand/fingers   Neurological: He is alert and oriented to person, place, and time. He displays no tremor. No cranial nerve deficit. Gait normal.   Skin: Skin is warm. He is not diaphoretic. No cyanosis. Nails show no clubbing.   Weathered appearance   Psychiatric: He has a normal mood and affect. His speech is normal and behavior is normal. Judgment and thought content normal.   Baseline behavior, quiet.   Nursing note and vitals reviewed.      Assessment/Plan   Elliott was seen today for hyperlipidemia, hypertension and pain.    Diagnoses and all orders for this visit:    Chronic joint pain  -     DULoxetine (CYMBALTA) 60 MG capsule; Take 1 capsule by mouth Daily.    Chronic pain syndrome  -      DULoxetine (CYMBALTA) 60 MG capsule; Take 1 capsule by mouth Daily.  -     gabapentin (NEURONTIN) 300 MG capsule; Take 1 capsule by mouth 3 (Three) Times a Day As Needed (pain).    Spondylosis of cervical region without myelopathy or radiculopathy  -     gabapentin (NEURONTIN) 300 MG capsule; Take 1 capsule by mouth 3 (Three) Times a Day As Needed (pain).    Hyperlipidemia, unspecified hyperlipidemia type  -     Lipid Panel  -     Comprehensive Metabolic Panel    Cobalamin deficiency  -     Vitamin B12 & Folate    Essential hypertension  Comments:  Controlled, continue long acting nitrate and beta blocker.    Tobacco abuse  -     CT chest low dose wo; Future    Encounter for screening for lung cancer  -     CT chest low dose wo; Future    Need for vaccination  -     Flu Vaccine Quad PF 3YR+ (FLUARIX [id number])    Encounter for monitoring long-term proton pump inhibitor therapy  -     Magnesium  -     Vitamin B12 & Folate    BALDEV reviewed. Return to clinic 3 months.   Risks and benefits of lung cancer screening discussed. Shared decision making employed in making decision to proceed with lung cancer screening. Patient is aware further testing may be needed and that CT scans occur yearly. Another risk is radiation exposure, cumulative over time, though low levels.           Ladonna Means MD

## 2017-10-25 DIAGNOSIS — Z87.891 PERSONAL HISTORY OF TOBACCO USE, PRESENTING HAZARDS TO HEALTH: Primary | ICD-10-CM

## 2017-11-01 ENCOUNTER — HOSPITAL ENCOUNTER (OUTPATIENT)
Dept: CT IMAGING | Facility: HOSPITAL | Age: 62
Discharge: HOME | End: 2017-11-01
Attending: FAMILY MEDICINE | Admitting: FAMILY MEDICINE

## 2017-11-01 DIAGNOSIS — Z87.891 PERSONAL HISTORY OF TOBACCO USE, PRESENTING HAZARDS TO HEALTH: ICD-10-CM

## 2017-11-01 PROCEDURE — G0297 LDCT FOR LUNG CA SCREEN: HCPCS

## 2017-11-08 ENCOUNTER — OFFICE VISIT (OUTPATIENT)
Dept: INTERNAL MEDICINE | Facility: CLINIC | Age: 62
End: 2017-11-08

## 2017-11-08 VITALS
TEMPERATURE: 98.5 F | WEIGHT: 142 LBS | HEIGHT: 71 IN | DIASTOLIC BLOOD PRESSURE: 68 MMHG | HEART RATE: 58 BPM | OXYGEN SATURATION: 96 % | SYSTOLIC BLOOD PRESSURE: 112 MMHG | BODY MASS INDEX: 19.88 KG/M2 | RESPIRATION RATE: 12 BRPM

## 2017-11-08 DIAGNOSIS — Z72.0 TOBACCO ABUSE: ICD-10-CM

## 2017-11-08 DIAGNOSIS — J43.1 PANLOBULAR EMPHYSEMA (HCC): Primary | ICD-10-CM

## 2017-11-08 PROCEDURE — 99213 OFFICE O/P EST LOW 20 MIN: CPT | Performed by: FAMILY MEDICINE

## 2017-11-09 PROBLEM — Z72.0 TOBACCO ABUSE: Status: ACTIVE | Noted: 2017-11-09

## 2017-11-09 PROBLEM — R06.02 SHORTNESS OF BREATH: Status: RESOLVED | Noted: 2017-01-17 | Resolved: 2017-11-09

## 2017-11-09 RX ORDER — ALBUTEROL SULFATE 90 UG/1
2 AEROSOL, METERED RESPIRATORY (INHALATION) EVERY 6 HOURS PRN
Qty: 1 INHALER | Refills: 3 | Status: SHIPPED | OUTPATIENT
Start: 2017-11-09 | End: 2021-02-02 | Stop reason: SDUPTHER

## 2017-11-09 NOTE — PROGRESS NOTES
Subjective    Elliott Dunn is a 62 y.o. male here for:  Chief Complaint   Patient presents with   • Follow-up     Follow up to discuss labs   • COPD     History of Present Illness   Patient is here today to follow-up on his CT lung cancer screening.  Screening was negative for worrisome nodules but had many abnormalities.  Patient is a long term smoker who has tried to quit in the past and failed.  At this time he is not ready to try again.    The following portions of the patient's history were reviewed and updated as appropriate: allergies, current medications, past family history, past medical history, past social history, past surgical history and problem list.    Review of Systems   Constitutional: Positive for fatigue.   Respiratory: Positive for shortness of breath.    Musculoskeletal: Positive for arthralgias.       Vitals:    11/08/17 1045   BP: 112/68   Pulse: 58   Resp: 12   Temp: 98.5 °F (36.9 °C)   SpO2: 96%       Objective   Physical Exam   Constitutional: He is oriented to person, place, and time. Vital signs are normal. He appears well-developed and well-nourished.  Non-toxic appearance. He does not appear ill. No distress.   Appears stated age. Well groomed. Thin.    HENT:   Head: Normocephalic and atraumatic.   Eyes: EOM are normal. No scleral icterus.   Pulmonary/Chest: Effort normal.   Neurological: He is alert and oriented to person, place, and time.   Skin: Skin is warm. He is not diaphoretic.   Psychiatric: He has a normal mood and affect. His speech is normal and behavior is normal. Cognition and memory are normal.   Quiet, baseline behavior. He is attentive.   Nursing note and vitals reviewed.    Results for orders placed during the hospital encounter of 11/01/17   CT chest low dose wo    Narrative EXAMINATION: CT CHEST, LOW DOSE WO CONTRAST-11/01/2017:      INDICATION: Screen cancer, history tobacco abuse; Z87.891-Personal  history of nicotine dependence.         TECHNIQUE:  Low dose  noncontrast screening technique was used.     The radiation dose reduction device was turned on for each scan per the  ALARA (As Low as Reasonably Achievable) protocol.     COMPARISON: NONE.     FINDINGS:   1. There is extreme centrilobular and bullous emphysema with subpleural  cystic bleb formation diffusely. This is most severe in the upper lung  zones and lung apices but is noted globally.  2. There is a background pattern of obstructive lung disease.  3. Ill defined asymmetric nodularity is seen in the lung apices, more  prominent and multifocal on the right than left. Pleural based nodules  are also identified.  4. There is linear nodular opacity at the right lung base which is  likely postinflammatory and tense to the pleura. There is mild pleural  tenting at the left base.  5.  There is no evidence of central adenopathy or mass in the chest.  Axillae and thoracic inlet are unremarkable.       Impression 1.  Extreme centrilobular and bullous emphysema with obstructive lung  disease.  2.  Bronchiectasis at the bases.  3.  Granulomatous scarring diffusely.  4.  Ill defined nodular linear opacities at the lung apices, more  numerous and multifocal on the right.  5.  Although these are likely post granulomatous or postinflammatory  changes with asymmetry, because of the lack of a comparison and because  of the patient's history and the asymmetry of the densities at the  apices, a followup will be requested in 6 months.  6.  No other highly suspicious or dominant abnormality is identified.  Postinflammatory changes are seen at the lung bases.  7.  Lung RADS Category 3. Asymmetric abnormal pattern likely benign,  however, followup recommended in 6 months to document interval  morphologic stability with close interval surveillance.     D:  11/01/2017  E:  11/01/2017     This report was finalized on 11/1/2017 4:37 PM by Dr. Brown Shepherd MD.              Assessment/Plan   Elliott was seen today for follow-up and  copd.    Diagnoses and all orders for this visit:    Panlobular emphysema  -     albuterol (PROVENTIL HFA;VENTOLIN HFA) 108 (90 Base) MCG/ACT inhaler; Inhale 2 puffs Every 6 (Six) Hours As Needed for Wheezing or Shortness of Air.    Tobacco abuse      ·   · Reviewed imaging of CT scan with patient and wife and discussed findings, severe COPD identified with many bulla.  He needs a follow-up scan in 6 months.  · I stressed to him the importance of smoking cessation.  Lungs will not heal that he can stop further damage.  He voiced understanding.           Ladonna Means MD

## 2017-11-13 RX ORDER — METHOCARBAMOL 750 MG/1
TABLET, FILM COATED ORAL
Qty: 90 TABLET | Refills: 0 | Status: SHIPPED | OUTPATIENT
Start: 2017-11-13 | End: 2018-01-24 | Stop reason: SDUPTHER

## 2017-12-11 RX ORDER — METHOCARBAMOL 750 MG/1
TABLET, FILM COATED ORAL
Qty: 90 TABLET | Refills: 0 | Status: SHIPPED | OUTPATIENT
Start: 2017-12-11 | End: 2018-01-08 | Stop reason: SDUPTHER

## 2018-01-02 RX ORDER — VARENICLINE TARTRATE 0.5 MG/1
0.5 TABLET, FILM COATED ORAL 2 TIMES DAILY
Qty: 60 TABLET | Refills: 0 | Status: SHIPPED | OUTPATIENT
Start: 2018-01-02 | End: 2018-01-24

## 2018-01-08 DIAGNOSIS — F51.01 PRIMARY INSOMNIA: ICD-10-CM

## 2018-01-08 RX ORDER — AMITRIPTYLINE HYDROCHLORIDE 25 MG/1
TABLET, FILM COATED ORAL
Qty: 60 TABLET | Refills: 4 | Status: SHIPPED | OUTPATIENT
Start: 2018-01-08 | End: 2018-04-24 | Stop reason: SDUPTHER

## 2018-01-08 RX ORDER — ASPIRIN 81 MG
TABLET, DELAYED RELEASE (ENTERIC COATED) ORAL
Qty: 30 TABLET | Refills: 4 | Status: SHIPPED | OUTPATIENT
Start: 2018-01-08 | End: 2018-07-10 | Stop reason: SDUPTHER

## 2018-01-08 RX ORDER — METHOCARBAMOL 750 MG/1
TABLET, FILM COATED ORAL
Qty: 90 TABLET | Refills: 4 | Status: SHIPPED | OUTPATIENT
Start: 2018-01-08 | End: 2018-01-24 | Stop reason: SDUPTHER

## 2018-01-24 ENCOUNTER — OFFICE VISIT (OUTPATIENT)
Dept: INTERNAL MEDICINE | Facility: CLINIC | Age: 63
End: 2018-01-24

## 2018-01-24 VITALS
DIASTOLIC BLOOD PRESSURE: 72 MMHG | SYSTOLIC BLOOD PRESSURE: 112 MMHG | OXYGEN SATURATION: 97 % | RESPIRATION RATE: 12 BRPM | WEIGHT: 144 LBS | TEMPERATURE: 97.7 F | HEART RATE: 60 BPM | BODY MASS INDEX: 20.16 KG/M2 | HEIGHT: 71 IN

## 2018-01-24 DIAGNOSIS — J43.1 PANLOBULAR EMPHYSEMA (HCC): Primary | Chronic | ICD-10-CM

## 2018-01-24 DIAGNOSIS — M47.812 SPONDYLOSIS OF CERVICAL REGION WITHOUT MYELOPATHY OR RADICULOPATHY: ICD-10-CM

## 2018-01-24 DIAGNOSIS — M25.50 CHRONIC JOINT PAIN: Chronic | ICD-10-CM

## 2018-01-24 DIAGNOSIS — Z72.0 TOBACCO ABUSE: ICD-10-CM

## 2018-01-24 DIAGNOSIS — Z87.891 FORMER SMOKER: ICD-10-CM

## 2018-01-24 DIAGNOSIS — I10 ESSENTIAL HYPERTENSION: Chronic | ICD-10-CM

## 2018-01-24 DIAGNOSIS — G89.4 CHRONIC PAIN SYNDROME: ICD-10-CM

## 2018-01-24 DIAGNOSIS — G89.29 CHRONIC JOINT PAIN: Chronic | ICD-10-CM

## 2018-01-24 PROCEDURE — 99214 OFFICE O/P EST MOD 30 MIN: CPT | Performed by: FAMILY MEDICINE

## 2018-01-24 RX ORDER — METHOCARBAMOL 750 MG/1
750 TABLET, FILM COATED ORAL 3 TIMES DAILY
Qty: 90 TABLET | Refills: 4 | Status: SHIPPED | OUTPATIENT
Start: 2018-01-24 | End: 2018-12-07 | Stop reason: SDUPTHER

## 2018-01-24 RX ORDER — GABAPENTIN 300 MG/1
300 CAPSULE ORAL 3 TIMES DAILY PRN
Qty: 90 CAPSULE | Refills: 2 | Status: SHIPPED | OUTPATIENT
Start: 2018-01-24 | End: 2018-04-24 | Stop reason: SDUPTHER

## 2018-01-24 RX ORDER — VARENICLINE TARTRATE 1 MG/1
1 TABLET, FILM COATED ORAL 2 TIMES DAILY
Qty: 60 TABLET | Refills: 2 | Status: SHIPPED | OUTPATIENT
Start: 2018-01-24 | End: 2018-04-24

## 2018-01-24 RX ORDER — MELOXICAM 15 MG/1
15 TABLET ORAL DAILY
Qty: 30 TABLET | Refills: 5 | Status: SHIPPED | OUTPATIENT
Start: 2018-01-24 | End: 2018-07-16 | Stop reason: SDUPTHER

## 2018-01-26 PROBLEM — I10 ESSENTIAL HYPERTENSION: Chronic | Status: ACTIVE | Noted: 2017-01-17

## 2018-01-26 PROBLEM — G89.4 CHRONIC PAIN SYNDROME: Chronic | Status: ACTIVE | Noted: 2017-05-09

## 2018-01-26 PROBLEM — J44.9 CHRONIC OBSTRUCTIVE PULMONARY DISEASE: Chronic | Status: ACTIVE | Noted: 2017-02-17

## 2018-01-26 NOTE — PROGRESS NOTES
Subjective    Elliott Dunn is a 62 y.o. male here for:  Chief Complaint   Patient presents with   • COPD     COPD   This is a chronic problem. The current episode started more than 1 year ago. The problem occurs daily. The problem has been unchanged. Associated symptoms include arthralgias and myalgias. Pertinent negatives include no chest pain. The symptoms are aggravated by smoking. Treatments tried: inhalers. The treatment provided significant relief.   Hypertension   This is a chronic problem. The current episode started more than 1 year ago. The problem is unchanged. The problem is controlled. Associated symptoms include shortness of breath. Pertinent negatives include no chest pain. Risk factors for coronary artery disease include male gender, stress, sedentary lifestyle and smoking/tobacco exposure. Past treatments include beta blockers. Compliance problems include exercise and diet.  Hypertensive end-organ damage includes CAD/MI. There is no history of CVA.   Pain   This is a chronic problem. The current episode started more than 1 year ago. The problem occurs daily. The problem has been waxing and waning. Associated symptoms include arthralgias and myalgias. Pertinent negatives include no chest pain. He has tried NSAIDs, rest and acetaminophen (Elavil, Cymbalta, Neurontin. Rheumatology visits.) for the symptoms. The treatment provided significant relief.   Nicotine Dependence   Presents for follow-up (Has quit using Chantix.) visit. His urge triggers include company of smokers. The symptoms have been resolved. Compliance with prior treatments has been good.            The following portions of the patient's history were reviewed and updated as appropriate: allergies, current medications, past family history, past medical history, past social history, past surgical history and problem list.    Review of Systems   Constitutional: Positive for activity change and appetite change.   Respiratory: Positive for  shortness of breath.    Cardiovascular: Negative for chest pain.   Musculoskeletal: Positive for arthralgias and myalgias.   Psychiatric/Behavioral: Negative for dysphoric mood and suicidal ideas.        Vivid dreams       Vitals:    01/24/18 1052   BP: 112/72   Pulse: 60   Resp: 12   Temp: 97.7 °F (36.5 °C)   SpO2: 97%         Objective   Physical Exam   Constitutional: He is oriented to person, place, and time. Vital signs are normal. He appears well-developed and well-nourished.  Non-toxic appearance. He does not appear ill. No distress.   Appears stated age. Well groomed. Thin.    HENT:   Head: Normocephalic and atraumatic.   Mouth/Throat: Mucous membranes are not dry. Abnormal dentition.   Eyes: EOM are normal. No scleral icterus.   Cardiovascular: Normal rate and regular rhythm.  Exam reveals distant heart sounds.    No murmur heard.  Pulmonary/Chest: Effort normal. No accessory muscle usage. He has no wheezes. He has no rhonchi. He has no rales.   Neurological: He is alert and oriented to person, place, and time. He displays no tremor. No cranial nerve deficit. Gait abnormal.   Skin: Skin is warm. He is not diaphoretic.   Psychiatric: He has a normal mood and affect. His speech is normal and behavior is normal. Judgment and thought content normal. Cognition and memory are normal.   More talkative today and smiling. He is attentive.   Nursing note and vitals reviewed.          Assessment/Plan       Elliott was seen today for copd.    Diagnoses and all orders for this visit:    Panlobular emphysema  Comments:  Tobacco cessation, continue inhalers.   Orders:  -     varenicline (CHANTIX CONTINUING MONTH MARTHA) 1 MG tablet; Take 1 tablet by mouth 2 (Two) Times a Day.    Former smoker  -     varenicline (CHANTIX CONTINUING MONTH MARTHA) 1 MG tablet; Take 1 tablet by mouth 2 (Two) Times a Day.    Chronic joint pain  Comments:  Continue Meloxicam with food as needed.  Orders:  -     meloxicam (MOBIC) 15 MG tablet; Take 1  tablet by mouth Daily.    Spondylosis of cervical region without myelopathy or radiculopathy  Comments:  Continue gabapentin, neurology follow up  Orders:  -     gabapentin (NEURONTIN) 300 MG capsule; Take 1 capsule by mouth 3 (Three) Times a Day As Needed (pain).    Chronic pain syndrome  Comments:  Continue Elavil, cymbalta, neuronin.   Orders:  -     methocarbamol (ROBAXIN) 750 MG tablet; Take 1 tablet by mouth 3 (Three) Times a Day.  -     gabapentin (NEURONTIN) 300 MG capsule; Take 1 capsule by mouth 3 (Three) Times a Day As Needed (pain).    Essential hypertension  Comments:  Controlled. Continue bisoprolol, Imdur.    Tobacco abuse        · I'm very proud of Mr. Dunn for quitting smoking. Continue Chantix up to total six months  · Follow up with specialists as scheduled.  · Patient's BMI is within normal parameters. No follow-up required.  ·     Return in about 3 months (around 4/24/2018) for Recheck.    Ladonna Means MD    Please note that portions of this note may have been completed with a voice recognition program. Efforts were made to edit dictation, but occasionally words are mistranscribed.

## 2018-03-12 ENCOUNTER — HOSPITAL ENCOUNTER (OUTPATIENT)
Dept: GENERAL RADIOLOGY | Facility: HOSPITAL | Age: 63
Discharge: HOME | End: 2018-03-12
Admitting: NURSE PRACTITIONER

## 2018-03-12 ENCOUNTER — LAB (OUTPATIENT)
Dept: LAB | Facility: HOSPITAL | Age: 63
End: 2018-03-12

## 2018-03-12 DIAGNOSIS — D47.2 MGUS (MONOCLONAL GAMMOPATHY OF UNKNOWN SIGNIFICANCE): ICD-10-CM

## 2018-03-12 LAB
ALBUMIN SERPL-MCNC: 4.3 G/DL (ref 3.5–5)
ALBUMIN/GLOB SERPL: 1.1 G/DL (ref 1–2)
ALP SERPL-CCNC: 91 U/L (ref 38–126)
ALT SERPL W P-5'-P-CCNC: 47 U/L (ref 13–69)
ANION GAP SERPL CALCULATED.3IONS-SCNC: 16.4 MMOL/L
AST SERPL-CCNC: 37 U/L (ref 15–46)
BASOPHILS # BLD AUTO: 0.05 10*3/MM3 (ref 0–0.2)
BASOPHILS NFR BLD AUTO: 0.6 % (ref 0–2.5)
BILIRUB SERPL-MCNC: 0.7 MG/DL (ref 0.2–1.3)
BUN BLD-MCNC: 39 MG/DL (ref 7–20)
BUN/CREAT SERPL: 35.5 (ref 6.3–21.9)
CALCIUM SPEC-SCNC: 9.2 MG/DL (ref 8.4–10.2)
CHLORIDE SERPL-SCNC: 103 MMOL/L (ref 98–107)
CO2 SERPL-SCNC: 25 MMOL/L (ref 26–30)
CREAT BLD-MCNC: 1.1 MG/DL (ref 0.6–1.3)
CRP SERPL-MCNC: 1.6 MG/DL (ref 0–1)
DEPRECATED RDW RBC AUTO: 47 FL (ref 37–54)
EOSINOPHIL # BLD AUTO: 0.61 10*3/MM3 (ref 0–0.7)
EOSINOPHIL NFR BLD AUTO: 6.7 % (ref 0–7)
ERYTHROCYTE [DISTWIDTH] IN BLOOD BY AUTOMATED COUNT: 13.5 % (ref 11.5–14.5)
ERYTHROCYTE [SEDIMENTATION RATE] IN BLOOD: 5 MM/HR (ref 0–15)
GFR SERPL CREATININE-BSD FRML MDRD: 68 ML/MIN/1.73
GLOBULIN UR ELPH-MCNC: 3.8 GM/DL
GLUCOSE BLD-MCNC: 95 MG/DL (ref 74–98)
HCT VFR BLD AUTO: 47.5 % (ref 42–52)
HGB BLD-MCNC: 15.9 G/DL (ref 14–18)
IMM GRANULOCYTES # BLD: 0.03 10*3/MM3 (ref 0–0.06)
IMM GRANULOCYTES NFR BLD: 0.3 % (ref 0–0.6)
LYMPHOCYTES # BLD AUTO: 3.15 10*3/MM3 (ref 0.6–3.4)
LYMPHOCYTES NFR BLD AUTO: 34.7 % (ref 10–50)
MCH RBC QN AUTO: 31.6 PG (ref 27–31)
MCHC RBC AUTO-ENTMCNC: 33.5 G/DL (ref 30–37)
MCV RBC AUTO: 94.4 FL (ref 80–94)
MONOCYTES # BLD AUTO: 1.1 10*3/MM3 (ref 0–0.9)
MONOCYTES NFR BLD AUTO: 12.1 % (ref 0–12)
NEUTROPHILS # BLD AUTO: 4.13 10*3/MM3 (ref 2–6.9)
NEUTROPHILS NFR BLD AUTO: 45.6 % (ref 37–80)
NRBC BLD MANUAL-RTO: 0 /100 WBC (ref 0–0)
PLATELET # BLD AUTO: 226 10*3/MM3 (ref 130–400)
PMV BLD AUTO: 10 FL (ref 6–12)
POTASSIUM BLD-SCNC: 4.4 MMOL/L (ref 3.5–5.1)
PROT SERPL-MCNC: 8.1 G/DL (ref 6.3–8.2)
RBC # BLD AUTO: 5.03 10*6/MM3 (ref 4.7–6.1)
SODIUM BLD-SCNC: 140 MMOL/L (ref 137–145)
WBC NRBC COR # BLD: 9.07 10*3/MM3 (ref 4.8–10.8)

## 2018-03-12 PROCEDURE — 85651 RBC SED RATE NONAUTOMATED: CPT

## 2018-03-12 PROCEDURE — 82784 ASSAY IGA/IGD/IGG/IGM EACH: CPT

## 2018-03-12 PROCEDURE — 77075 RADEX OSSEOUS SURVEY COMPL: CPT

## 2018-03-12 PROCEDURE — 80053 COMPREHEN METABOLIC PANEL: CPT

## 2018-03-12 PROCEDURE — 86140 C-REACTIVE PROTEIN: CPT

## 2018-03-12 PROCEDURE — 82330 ASSAY OF CALCIUM: CPT

## 2018-03-12 PROCEDURE — 85025 COMPLETE CBC W/AUTO DIFF WBC: CPT

## 2018-03-12 PROCEDURE — 82232 ASSAY OF BETA-2 PROTEIN: CPT

## 2018-03-12 PROCEDURE — 86334 IMMUNOFIX E-PHORESIS SERUM: CPT

## 2018-03-12 PROCEDURE — 83883 ASSAY NEPHELOMETRY NOT SPEC: CPT

## 2018-03-12 PROCEDURE — 36415 COLL VENOUS BLD VENIPUNCTURE: CPT

## 2018-03-12 PROCEDURE — 84165 PROTEIN E-PHORESIS SERUM: CPT

## 2018-03-13 LAB
ALBUMIN SERPL-MCNC: 3.8 G/DL (ref 2.9–4.4)
ALBUMIN/GLOB SERPL: 1 {RATIO} (ref 0.7–1.7)
ALPHA1 GLOB FLD ELPH-MCNC: 0.2 G/DL (ref 0–0.4)
ALPHA2 GLOB SERPL ELPH-MCNC: 0.9 G/DL (ref 0.4–1)
B-GLOBULIN SERPL ELPH-MCNC: 0.9 G/DL (ref 0.7–1.3)
CA-I SERPL ISE-MCNC: 5.1 MG/DL (ref 4.5–5.6)
GAMMA GLOB SERPL ELPH-MCNC: 1.9 G/DL (ref 0.4–1.8)
GLOBULIN SER CALC-MCNC: 3.9 G/DL (ref 2.2–3.9)
IGA SERPL-MCNC: 115 MG/DL (ref 61–437)
IGG SERPL-MCNC: 1942 MG/DL (ref 700–1600)
IGM SERPL-MCNC: 33 MG/DL (ref 20–172)
INTERPRETATION SERPL IEP-IMP: ABNORMAL
KAPPA LC SERPL-MCNC: 49.8 MG/L (ref 3.3–19.4)
KAPPA LC/LAMBDA SER: 4.08 {RATIO} (ref 0.26–1.65)
LAMBDA LC FREE SERPL-MCNC: 12.2 MG/L (ref 5.7–26.3)
Lab: ABNORMAL
M-SPIKE: 1.7 G/DL
PROT SERPL-MCNC: 7.7 G/DL (ref 6–8.5)

## 2018-03-14 ENCOUNTER — LAB (OUTPATIENT)
Dept: LAB | Facility: HOSPITAL | Age: 63
End: 2018-03-14

## 2018-03-14 DIAGNOSIS — D47.2 MGUS (MONOCLONAL GAMMOPATHY OF UNKNOWN SIGNIFICANCE): ICD-10-CM

## 2018-03-14 LAB — B2 MICROGLOB SERPL-MCNC: 2.5 MG/L (ref 0.6–2.4)

## 2018-03-14 PROCEDURE — 84156 ASSAY OF PROTEIN URINE: CPT

## 2018-03-14 PROCEDURE — 86335 IMMUNFIX E-PHORSIS/URINE/CSF: CPT

## 2018-03-14 PROCEDURE — 84166 PROTEIN E-PHORESIS/URINE/CSF: CPT

## 2018-03-14 PROCEDURE — 81050 URINALYSIS VOLUME MEASURE: CPT

## 2018-03-15 DIAGNOSIS — D47.2 MGUS (MONOCLONAL GAMMOPATHY OF UNKNOWN SIGNIFICANCE): Primary | ICD-10-CM

## 2018-03-15 LAB
ALBUMIN 24H MFR UR ELPH: 18.1 %
ALPHA1 GLOB 24H MFR UR ELPH: 3 %
ALPHA2 GLOB 24H MFR UR ELPH: 12.8 %
B-GLOBULIN MFR UR ELPH: 23.6 %
GAMMA GLOB 24H MFR UR ELPH: 42.5 %
HIV 1 & 2 AB SER-IMP: NORMAL
INTERPRETATION UR IFE-IMP: NORMAL
M PROTEIN 24H MFR UR ELPH: NORMAL %
PROT 24H UR-MRATE: 122 MG/24 HR (ref 30–150)
PROT UR-MCNC: 5.2 MG/DL

## 2018-03-22 ENCOUNTER — OFFICE VISIT (OUTPATIENT)
Dept: ONCOLOGY | Facility: CLINIC | Age: 63
End: 2018-03-22

## 2018-03-22 VITALS
BODY MASS INDEX: 21.42 KG/M2 | HEART RATE: 65 BPM | RESPIRATION RATE: 13 BRPM | TEMPERATURE: 97.8 F | SYSTOLIC BLOOD PRESSURE: 127 MMHG | DIASTOLIC BLOOD PRESSURE: 72 MMHG | WEIGHT: 153 LBS | HEIGHT: 71 IN

## 2018-03-22 DIAGNOSIS — D47.2 MGUS (MONOCLONAL GAMMOPATHY OF UNKNOWN SIGNIFICANCE): Primary | ICD-10-CM
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
## 2018-03-22 PROCEDURE — 99214 OFFICE O/P EST MOD 30 MIN: CPT | Performed by: NURSE PRACTITIONER

## 2018-03-22 NOTE — PROGRESS NOTES
CHIEF COMPLAINT:   1.  Monoclonal gammopathy of unknown significance                                         2. Tick bite    Problem List:  Oncology/Hematology History    1.  MGUS: Had been having mid back pains and was seen by neurology, was found to have 1200 mg of immunoglobulin G monoclonal protein in the serum with a normal IgA and IgM level this was in July 2016.  Workup August 2016 included a bone survey that was negative other than for degenerative joint disease with left eighth rib healed fracture that was seen on prior bone scan.  Normal creatinine, ionized calcium of 1.34, normal total protein to albumin ratio.  Normal sedimentation rate and C-reactive protein, serum monoclonal protein present at 1100 mg/dL of immunoglobulin G kappa with normal IgA and M levels.  Urine monoclonal protein was too scant to quantify.  Kappa to lambda ratio of 4.58 with elevation of At 42.85.  CBC was unremarkable with normal white count and platelet count and hemoglobin of 17 baseline.  Baseline PET/CT 9/1/2016 was negative.   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  This genetic alteration is found in approximately 15-18 percent of patients with plasma cell myeloma by FISH analysis and is associated with a favorable prognosis in the absence of poor prognostic markers.   b.)  3/6/2017 follow-up PET/CT was negative.    2.  History of prostate cancer: Status post prostatectomy 2014, under the care of Dr. Varela who he sees annually at this point.    3.  Squamous cell cancer of the skin, followed by Dr. Izaguirre.            MGUS (monoclonal gammopathy of unknown significance)    7/1/2016 Initial Diagnosis     MGUS (monoclonal gammopathy of unknown significance)         8/21/2017 -  Other Event     Myeloma panel: Urine immunoelectrophoresis monoclonal protein too small to quantify, serum immunoelectrophoresis  "M-spike stable at 1.3g/dl, ionized calcium 5.2, kappa to lambda ratio 4.10, beta-2 microglobulin 2.2, C-reactive protein less than 0.50, creatinine 1.3, sedimentation rate to.  CBC WBC 9600, hemoglobin 15.6, hematocrit 47.1%, platelet count 209,000.              HISTORY OF PRESENT ILLNESS:  The patient is a 62 y.o. male, here for follow up on management of Monoclonal gammopathy of unknown significance.  Reports arthritis \"flaring up\", otherwise no has been feeling well.  Continues to follow with rheumatology for his osteoarthritis.  Appetite is normal, his weight is maintaining.  No change in his bowel or bladder habits.  Reports tick bite in the left axilla, removed large adult tick (not engorged) last evening, thinks it was there for less than 72 hours.  Area is red, non tender, no fever or chills.  He has quit smoking since we last saw him which is quite a feat as he had smoked since the age of 9!    Past Medical History:   Diagnosis Date   • Acquired absence of all teeth    • Allergic    • Anomalous coronary artery origin    • Arrhythmia    • Arthritis    • Arthritis of lumbar spine 7/29/2016   • Back pain 6/17/2016   • Cancer     prostate   • Cataract    • Cervical spine disease 6/17/2016   • CHF (congestive heart failure)    • Chronic obstructive pulmonary disease    • Colon polyps    • Deafness in left ear    • Derangement of anterior horn of medial meniscus    • Difficulty swallowing    • Disorder of lumbar spine 6/17/2016   • Disorder of thoracic spine 6/17/2016   • Diverticulitis of colon    • Erectile dysfunction    • Esophageal reflux    • Essential hypertension    • Glaucoma    • Heart murmur    • High cholesterol    • Inflammatory polyarthropathy     Per Dr. Haider. Negative NEO, normal ESR, RF, anti-ccp and did not improve with prednisone per notes.   • Inguinal hernia    • Lateral meniscus derangement    • Left otitis media with spontaneous rupture of eardrum    • Locking of left knee    • Low back " pain    • MGUS (monoclonal gammopathy of unknown significance)     likely diagnosis   • Neuromuscular disorder    • Neuropathic arthritis    • Perforation of left tympanic membrane    • Pulmonary emphysema    • Scoliosis    • Skin cancer     skin cancer   • Skin cancer of face     squamous cell   • Spina bifida occulta 6/17/2016   • Tobacco abuse 11/9/2017   • Visual impairment    • Wears glasses      Past Surgical History:   Procedure Laterality Date   • COLONOSCOPY     • ENDOSCOPY N/A 4/10/2017    Procedure: ESOPHAGOGASTRODUODENOSCOPY WITH BIOPSY;  Surgeon: Alexander Ritchie MD;  Location: [facility] ENDOSCOPY;  Service:    • EYE SURGERY     • HERNIA REPAIR     • INGUINAL HERNIA REPAIR Right    • INGUINAL HERNIA REPAIR     • KNEE SURGERY Left    • LYMPH NODE BIOPSY     • PROSTATE SURGERY  2004   • PROSTATECTOMY  06/30/2014   • PROSTATECTOMY      Prostatectomy Radical   • SKIN CANCER EXCISION  2017    both sides of body/squamous cell melanoma   • TYMPANOPLASTY W/ MASTOIDECTOMY     • UMBILICAL HERNIA REPAIR         Allergies   Allergen Reactions   • Pravastatin Other (See Comments)     Weakness / fatigue  Weakness / fatigue       Family History and Social History reviewed and changed as necessary      REVIEW OF SYSTEM:   Review of Systems   Constitutional: Negative for appetite change, chills, diaphoresis, fatigue, fever and unexpected weight change.   HENT:   Negative for mouth sores, sore throat and trouble swallowing.    Eyes: Negative for icterus.   Respiratory: Negative for cough, hemoptysis and shortness of breath.    Cardiovascular: Negative for chest pain, leg swelling and palpitations.   Gastrointestinal: Negative for abdominal distention, abdominal pain, blood in stool, constipation, diarrhea, nausea and vomiting.   Endocrine: Negative for hot flashes.   Genitourinary: Negative for bladder incontinence, difficulty urinating, dysuria, frequency and hematuria.    Musculoskeletal: Positive for chronic joint pain  "secondary to arthritis.   Skin: Negative for rash. Positive for tick bite left axilla.  Neurological: Negative for dizziness, gait problem, headaches, light-headedness and numbness.   Hematological: Negative for adenopathy. Does not bruise/bleed easily.   Psychiatric/Behavioral: Negative for depression. The patient is not nervous/anxious.    All other systems reviewed and are negative.       PHYSICAL EXAM    Vitals:    03/22/18 1037   BP: 127/72   Pulse: 65   Resp: 13   Temp: 97.8 °F (36.6 °C)   TempSrc: Temporal Artery    Weight: 69.4 kg (153 lb)   Height: 180.3 cm (70.98\")     Constitutional: Appears well-developed and well-nourished. No distress.   ECOG: (0) Fully active, able to carry on all predisease performance without restriction  HENT:   Head: Normocephalic.   Mouth/Throat: Oropharynx is clear and moist.   Eyes: Conjunctivae are normal. Pupils are equal, round, and reactive to light. No scleral icterus.   Neck: Neck supple. No JVD present. No thyromegaly present.   Cardiovascular: Normal rate, regular rhythm and normal heart sounds.    Pulmonary/Chest: Breath sounds normal. No respiratory distress.   Abdominal: Soft. Exhibits no distension and no mass. There is no hepatosplenomegaly. There is no tenderness. There is no rebound and no guarding.   Musculoskeletal:Exhibits no edema, tenderness or deformity.   Neurological: Alert and oriented to person, place, and time. Exhibits normal muscle tone.   Skin: Aprox 3 cm raised erythemic area left axilla, no drainage, non tender.   Psychiatric: Normal mood and affect.   Vitals reviewed.  Diagnostic data: All previous office notes and current laboratory data and outside physician notes available in Epic were reviewed at time of visit.    Lab on 03/14/2018   Component Date Value Ref Range Status   • Total Protein, Urine 03/15/2018 5.2  Not Estab. mg/dL Final   • Protein, 24H Urine 03/15/2018 122  30 - 150 mg/24 hr Final   • Albumin, U 03/15/2018 18.1  % Final   • " Alpha-1-Globulin, U 03/15/2018 3.0  % Final   • Alpha-2-Globulin, U 03/15/2018 12.8  % Final   • Beta Globulin, U 03/15/2018 23.6  % Final   • Gamma Globulin, Urine 03/15/2018 42.5  % Final   • M-Larry, % U 03/15/2018 Comment:  Not Observed % Final   • Immunofixation Result, Urine 03/15/2018 Comment   Final   • Note: 03/15/2018 Comment   Final   Lab on 03/12/2018   Component Date Value Ref Range Status   • Beta-2 03/14/2018 2.5* 0.6 - 2.4 mg/L Final   • Ionized Calcium 03/13/2018 5.1  4.5 - 5.6 mg/dL Final   • Glucose 03/12/2018 95  74 - 98 mg/dL Final   • BUN 03/12/2018 39* 7 - 20 mg/dL Final   • Creatinine 03/12/2018 1.10  0.60 - 1.30 mg/dL Final   • Sodium 03/12/2018 140  137 - 145 mmol/L Final   • Potassium 03/12/2018 4.4  3.5 - 5.1 mmol/L Final   • Chloride 03/12/2018 103  98 - 107 mmol/L Final   • CO2 03/12/2018 25.0* 26.0 - 30.0 mmol/L Final   • Calcium 03/12/2018 9.2  8.4 - 10.2 mg/dL Final   • Total Protein 03/12/2018 8.1  6.3 - 8.2 g/dL Final   • Albumin 03/12/2018 4.30  3.50 - 5.00 g/dL Final   • ALT (SGPT) 03/12/2018 47  13 - 69 U/L Final   • AST (SGOT) 03/12/2018 37  15 - 46 U/L Final   • Alkaline Phosphatase 03/12/2018 91  38 - 126 U/L Final   • Total Bilirubin 03/12/2018 0.7  0.2 - 1.3 mg/dL Final   • eGFR Non African Amer 03/12/2018 68  >60 mL/min/1.73 Final   • Globulin 03/12/2018 3.8  gm/dL Final   • A/G Ratio 03/12/2018 1.1  1.0 - 2.0 g/dL Final   • BUN/Creatinine Ratio 03/12/2018 35.5* 6.3 - 21.9 Final   • Anion Gap 03/12/2018 16.4  mmol/L Final   • C-Reactive Protein 03/12/2018 1.60* 0.00 - 1.00 mg/dL Final   • IgG 03/13/2018 1942* 700 - 1600 mg/dL Final   • IgA 03/13/2018 115  61 - 437 mg/dL Final   • IgM 03/13/2018 33  20 - 172 mg/dL Final   • Total Protein 03/13/2018 7.7  6.0 - 8.5 g/dL Final   • Albumin 03/13/2018 3.8  2.9 - 4.4 g/dL Final   • Alpha-1-Globulin 03/13/2018 0.2  0.0 - 0.4 g/dL Final   • Alpha-2-Globulin 03/13/2018 0.9  0.4 - 1.0 g/dL Final   • Beta Globulin 03/13/2018 0.9  0.7  - 1.3 g/dL Final   • Gamma Globulin 03/13/2018 1.9* 0.4 - 1.8 g/dL Final   • M-Larry 03/13/2018 1.7* Not Observed g/dL Final   • Globulin 03/13/2018 3.9  2.2 - 3.9 g/dL Final   • A/G Ratio 03/13/2018 1.0  0.7 - 1.7 Final   • Immunofixation Reflex, Serum 03/13/2018 Comment   Final   • Please note 03/13/2018 Comment   Final   • Free Light Chain, Kappa 03/13/2018 49.8* 3.3 - 19.4 mg/L Final   • Free Lambda Light Chains 03/13/2018 12.2  5.7 - 26.3 mg/L Final   • Kappa/Lambda Ratio 03/13/2018 4.08* 0.26 - 1.65 Final   • Sed Rate 03/12/2018 5  0 - 15 mm/hr Final   • WBC 03/12/2018 9.07  4.80 - 10.80 10*3/mm3 Final   • RBC 03/12/2018 5.03  4.70 - 6.10 10*6/mm3 Final   • Hemoglobin 03/12/2018 15.9  14.0 - 18.0 g/dL Final   • Hematocrit 03/12/2018 47.5  42.0 - 52.0 % Final   • MCV 03/12/2018 94.4* 80.0 - 94.0 fL Final   • MCH 03/12/2018 31.6* 27.0 - 31.0 pg Final   • MCHC 03/12/2018 33.5  30.0 - 37.0 g/dL Final   • RDW 03/12/2018 13.5  11.5 - 14.5 % Final   • RDW-SD 03/12/2018 47.0  37.0 - 54.0 fl Final   • MPV 03/12/2018 10.0  6.0 - 12.0 fL Final   • Platelets 03/12/2018 226  130 - 400 10*3/mm3 Final   • Neutrophil % 03/12/2018 45.6  37.0 - 80.0 % Final   • Lymphocyte % 03/12/2018 34.7  10.0 - 50.0 % Final   • Monocyte % 03/12/2018 12.1* 0.0 - 12.0 % Final   • Eosinophil % 03/12/2018 6.7  0.0 - 7.0 % Final   • Basophil % 03/12/2018 0.6  0.0 - 2.5 % Final   • Immature Grans % 03/12/2018 0.3  0.0 - 0.6 % Final   • Neutrophils, Absolute 03/12/2018 4.13  2.00 - 6.90 10*3/mm3 Final   • Lymphocytes, Absolute 03/12/2018 3.15  0.60 - 3.40 10*3/mm3 Final   • Monocytes, Absolute 03/12/2018 1.10* 0.00 - 0.90 10*3/mm3 Final   • Eosinophils, Absolute 03/12/2018 0.61  0.00 - 0.70 10*3/mm3 Final   • Basophils, Absolute 03/12/2018 0.05  0.00 - 0.20 10*3/mm3 Final   • Immature Grans, Absolute 03/12/2018 0.03  0.00 - 0.06 10*3/mm3 Final   • nRBC 03/12/2018 0.0  0.0 - 0.0 /100 WBC Final       Assessment/Plan     1.  Monoclonal gammopathy of  "unknown significance: The patient is doing well, all labs are stable no evidence of this progressing.  We will continue to follow along and see him back in 6 months with repeat urine and serum testing without bone survey.  Current bone survey negative.      2.  History of prostate cancer: Continues to follow along with Dr. Varela who he saw recently and states that everything was fine he has no evidence of disease.  States his last PSA with Dr. Varela was \"normal\".      3.  Tick bite:  The patient removed a tick from the left axilla yesterday evening.  The tick was not engorged and was felt to be there less than 72 hours, they did not feel that it was a deer tick.  I will not treat him prophylactically at this time, I have asked the patient if a rash develops around the site or he has any fatigue, fever and flulike symptoms or other concerns that he should get in touch with Dr. Means for further evaluation.    20 minutes of today's 25 minute appointment was spent in counseling and education in reviewing the above with the patient and his wife.  Argentina Kingston, APRN    03/22/2018        "

## 2018-04-10 DIAGNOSIS — R13.14 PHARYNGOESOPHAGEAL DYSPHAGIA: ICD-10-CM

## 2018-04-10 RX ORDER — OMEPRAZOLE 40 MG/1
CAPSULE, DELAYED RELEASE ORAL
Qty: 30 CAPSULE | Refills: 4 | Status: SHIPPED | OUTPATIENT
Start: 2018-04-10 | End: 2018-09-07 | Stop reason: SDUPTHER

## 2018-04-10 RX ORDER — ATORVASTATIN CALCIUM 10 MG/1
TABLET, FILM COATED ORAL
Qty: 30 TABLET | Refills: 1 | Status: SHIPPED | OUTPATIENT
Start: 2018-04-10 | End: 2018-04-24 | Stop reason: SDUPTHER

## 2018-04-12 ENCOUNTER — OFFICE VISIT (OUTPATIENT)
Dept: SURGERY | Facility: CLINIC | Age: 63
End: 2018-04-12

## 2018-04-12 VITALS
DIASTOLIC BLOOD PRESSURE: 80 MMHG | HEART RATE: 66 BPM | HEIGHT: 71 IN | WEIGHT: 152.3 LBS | OXYGEN SATURATION: 97 % | SYSTOLIC BLOOD PRESSURE: 124 MMHG | BODY MASS INDEX: 21.32 KG/M2 | TEMPERATURE: 97.7 F

## 2018-04-12 DIAGNOSIS — K21.00 GASTROESOPHAGEAL REFLUX DISEASE WITH ESOPHAGITIS: Primary | ICD-10-CM

## 2018-04-12 PROCEDURE — 99214 OFFICE O/P EST MOD 30 MIN: CPT | Performed by: SURGERY

## 2018-04-12 NOTE — PROGRESS NOTES
Patient: Elliott Dunn    YOB: 1955    Date: 04/12/2018    Primary Care Provider: Ladonna Means MD    Reason for Consultation:Epigastric pain, GERD    Chief Complaint   Patient presents with   • Heartburn     1 yr follow up EGD       Subjective .     History of present illness:  I saw the patient in the office  today as a consultation for evaluation and treatment of heartburn.  His EGD pathology last year showed moderate chronic gastritis and focal active chronic gastritis with focal acute inflammation and moderate chronic inflammation with reactive epithelial changes.  He has Cristina's esophagitis.  The patient complains of acid reflux, sharp epigastric abdominal pain, sore throat,  and hoarseness.Symptoms are much worse, no improvement with PPI medication.    The following portions of the patient's history were reviewed and updated as appropriate: allergies, current medications, past family history, past medical history, past social history, past surgical history and problem list.      Review of Systems   Constitutional: Negative for chills, fever and unexpected weight change.   HENT: Positive for sore throat and voice change. Negative for trouble swallowing.    Eyes: Negative for visual disturbance.   Respiratory: Negative for apnea, cough, chest tightness, shortness of breath and wheezing.    Cardiovascular: Negative for chest pain, palpitations and leg swelling.   Gastrointestinal: Positive for abdominal pain. Negative for abdominal distention, anal bleeding, blood in stool, constipation, diarrhea, nausea, rectal pain and vomiting.   Endocrine: Negative for cold intolerance and heat intolerance.   Genitourinary: Negative for difficulty urinating, dysuria, flank pain, scrotal swelling and testicular pain.   Musculoskeletal: Negative for back pain, gait problem and joint swelling.   Skin: Negative for color change, rash and wound.   Neurological: Negative for dizziness, syncope, speech  difficulty, weakness, numbness and headaches.   Hematological: Negative for adenopathy. Does not bruise/bleed easily.   Psychiatric/Behavioral: Negative for confusion. The patient is not nervous/anxious.        History:  Past Medical History:   Diagnosis Date   • Acquired absence of all teeth    • Allergic    • Anomalous coronary artery origin    • Arrhythmia    • Arthritis    • Arthritis of lumbar spine 7/29/2016   • Back pain 6/17/2016   • Cancer     prostate   • Cataract    • Cervical spine disease 6/17/2016   • CHF (congestive heart failure)    • Chronic obstructive pulmonary disease    • Colon polyps    • Deafness in left ear    • Derangement of anterior horn of medial meniscus    • Difficulty swallowing    • Disorder of lumbar spine 6/17/2016   • Disorder of thoracic spine 6/17/2016   • Diverticulitis of colon    • Erectile dysfunction    • Esophageal reflux    • Essential hypertension    • Glaucoma    • Heart murmur    • High cholesterol    • Inflammatory polyarthropathy     Per Dr. Haider. Negative NEO, normal ESR, RF, anti-ccp and did not improve with prednisone per notes.   • Inguinal hernia    • Lateral meniscus derangement    • Left otitis media with spontaneous rupture of eardrum    • Locking of left knee    • Low back pain    • MGUS (monoclonal gammopathy of unknown significance)     likely diagnosis   • Neuromuscular disorder    • Neuropathic arthritis    • Perforation of left tympanic membrane    • Pulmonary emphysema    • Scoliosis    • Skin cancer     skin cancer   • Skin cancer of face     squamous cell   • Spina bifida occulta 6/17/2016   • Tobacco abuse 11/9/2017   • Visual impairment    • Wears glasses        Past Surgical History:   Procedure Laterality Date   • COLONOSCOPY     • ENDOSCOPY N/A 4/10/2017    Procedure: ESOPHAGOGASTRODUODENOSCOPY WITH BIOPSY;  Surgeon: Alexander Ritchie MD;  Location: [facility] ENDOSCOPY;  Service:    • EYE SURGERY     • HERNIA REPAIR     • INGUINAL HERNIA REPAIR Right     • INGUINAL HERNIA REPAIR     • KNEE SURGERY Left    • LYMPH NODE BIOPSY     • PROSTATE SURGERY  2004   • PROSTATECTOMY  06/30/2014   • PROSTATECTOMY      Prostatectomy Radical   • SKIN CANCER EXCISION  2017    both sides of body/squamous cell melanoma   • TYMPANOPLASTY W/ MASTOIDECTOMY     • UMBILICAL HERNIA REPAIR         Family History   Problem Relation Age of Onset   • Diabetes Mother    • Hypertension Mother    • Obesity Mother    • Stroke Mother 65   • Arthritis Mother    • Hyperlipidemia Mother    • Vision loss Mother    • Cancer Father    • Cancer Sister    • Diabetes Brother    • Cancer Brother    • Heart attack Brother    • Alcohol abuse Brother    • Drug abuse Brother    • Hearing loss Brother    • Learning disabilities Brother        Social History   Substance Use Topics   • Smoking status: Former Smoker     Packs/day: 1.00     Years: 51.00     Types: Cigarettes     Start date: 1/1/1963     Quit date: 1/11/2018   • Smokeless tobacco: Never Used   • Alcohol use No       Allergies:  Allergies   Allergen Reactions   • Pravastatin Other (See Comments)     Weakness / fatigue  Weakness / fatigue       Medications:    Current Outpatient Prescriptions:   •  albuterol (PROVENTIL HFA;VENTOLIN HFA) 108 (90 Base) MCG/ACT inhaler, Inhale 2 puffs Every 6 (Six) Hours As Needed for Wheezing or Shortness of Air., Disp: 1 inhaler, Rfl: 3  •  amitriptyline (ELAVIL) 25 MG tablet, TAKE 1-2 AT BEDTIME AS NEEDED FOR SLEEP, Disp: 60 tablet, Rfl: 5  •  amitriptyline (ELAVIL) 25 MG tablet, TAKE ONE TO TWO TABLETS BY MOUTH EVERY NIGHT AT BEDTIME AS NEEDED FOR SLEEP, Disp: 60 tablet, Rfl: 4  •  aspirin (ASPIRIN LOW DOSE) 81 MG EC tablet, Take 1 tablet by mouth Daily., Disp: 30 tablet, Rfl: 5  •  ASPIRIN LOW DOSE 81 MG EC tablet, TAKE ONE TABLET BY MOUTH DAILY, Disp: 30 tablet, Rfl: 4  •  atorvastatin (LIPITOR) 10 MG tablet, Take 1 tablet by mouth Daily., Disp: 90 tablet, Rfl: 2  •  atorvastatin (LIPITOR) 10 MG tablet, TAKE ONE  TABLET BY MOUTH DAILY, Disp: 30 tablet, Rfl: 1  •  bisoprolol (ZEBeta) 5 MG tablet, Take 1 tablet by mouth Daily., Disp: 90 tablet, Rfl: 4  •  DULoxetine (CYMBALTA) 60 MG capsule, Take 1 capsule by mouth Daily., Disp: 30 capsule, Rfl: 3  •  gabapentin (NEURONTIN) 300 MG capsule, Take 1 capsule by mouth 3 (Three) Times a Day As Needed (pain)., Disp: 90 capsule, Rfl: 2  •  isosorbide mononitrate (IMDUR) 30 MG 24 hr tablet, Take 1 tablet by mouth Daily., Disp: 90 tablet, Rfl: 4  •  meloxicam (MOBIC) 15 MG tablet, Take 1 tablet by mouth Daily., Disp: 30 tablet, Rfl: 5  •  methocarbamol (ROBAXIN) 750 MG tablet, Take 1 tablet by mouth 3 (Three) Times a Day., Disp: 90 tablet, Rfl: 4  •  nitroglycerin (NITROSTAT) 0.4 MG SL tablet, 1 under the tongue as needed for angina, may repeat q5mins for up three doses, Disp: 100 tablet, Rfl: 11  •  omeprazole (priLOSEC) 40 MG capsule, TAKE ONE CAPSULE BY MOUTH DAILY, Disp: 30 capsule, Rfl: 4  •  Triamcinolone Acetonide (NASACORT) 55 MCG/ACT nasal inhaler, 2 sprays into each nostril daily., Disp: , Rfl:   •  varenicline (CHANTIX CONTINUING MONTH MARTHA) 1 MG tablet, Take 1 tablet by mouth 2 (Two) Times a Day., Disp: 60 tablet, Rfl: 2  •  vitamin B-12 (CYANOCOBALAMIN) 1000 MCG tablet, TAKE ONE TABLET BY MOUTH DAILY AS DIRECTED, Disp: 30 tablet, Rfl: 9    Objective     Vital Signs:        Physical Exam:   General Appearance:    Alert, cooperative, in no acute distress   Head:    Normocephalic, without obvious abnormality, atraumatic   Eyes:            Lids and lashes normal, conjunctivae and sclerae normal, no   icterus, no pallor, corneas clear, PERRLA   Ears:    Ears appear intact with no abnormalities noted   Throat:   No oral lesions, no thrush, oral mucosa moist   Neck:   No adenopathy, supple, trachea midline, no thyromegaly, no   carotid bruit, no JVD   Lungs:     Clear to auscultation,respirations regular, even and                  unlabored    Heart:    Regular rhythm and normal  rate, normal S1 and S2, no            murmur, no gallop, no rub, no click   Chest Wall:    No abnormalities observed   Abdomen:     Normal bowel sounds, no masses, no organomegaly, soft        non-tender, non-distended, no guarding, there is evidence of epigastric  tenderness   Extremities:   Moves all extremities well, no edema, no cyanosis, no             redness   Pulses:   Pulses palpable and equal bilaterally   Skin:   No bleeding, bruising or rash   Lymph nodes:   No palpable adenopathy   Neurologic:   Cranial nerves 2 - 12 grossly intact, sensation intact     Results Review:   I reviewed the patient's new clinical results.    Review of Systems was reviewed and confirmed as accurate today.    Assessment/Plan     1. Gastroesophageal reflux disease with esophagitis        I did have a detailed and extensive discussion with the patient in the office and they understand that they need to undergo upper endoscopy. Full risks and benefits of operative versus nonoperative intervention were discussed with the patient and these include bleeding and esophageal injury. The patient understands, agrees, and wishes to proceed with the surgical treatment plan as mentioned above. The patient had no questions for me at the end of the discussion.  Patient may need a lap Nissen fundoplication if symptoms not controlled and esophagitis still severe.     I discussed the patients findings and my recommendations with patient.     Electronically signed by Alexander Ritchie MD  04/13/18 9:56 AM    Scribed for Alexander Ritchie MD by Michelle Milligan. 4/13/2018  10:54 AM

## 2018-04-19 ENCOUNTER — OUTSIDE FACILITY SERVICE (OUTPATIENT)
Dept: SURGERY | Facility: CLINIC | Age: 63
End: 2018-04-19

## 2018-04-19 PROCEDURE — 43239 EGD BIOPSY SINGLE/MULTIPLE: CPT | Performed by: SURGERY

## 2018-04-24 ENCOUNTER — OFFICE VISIT (OUTPATIENT)
Dept: INTERNAL MEDICINE | Facility: CLINIC | Age: 63
End: 2018-04-24

## 2018-04-24 VITALS
DIASTOLIC BLOOD PRESSURE: 74 MMHG | RESPIRATION RATE: 12 BRPM | HEIGHT: 71 IN | HEART RATE: 68 BPM | OXYGEN SATURATION: 97 % | WEIGHT: 151 LBS | TEMPERATURE: 97.7 F | BODY MASS INDEX: 21.14 KG/M2 | SYSTOLIC BLOOD PRESSURE: 118 MMHG

## 2018-04-24 DIAGNOSIS — G89.4 CHRONIC PAIN SYNDROME: ICD-10-CM

## 2018-04-24 DIAGNOSIS — Z87.891 FORMER SMOKER: ICD-10-CM

## 2018-04-24 DIAGNOSIS — K31.84 GASTROPARESIS: ICD-10-CM

## 2018-04-24 DIAGNOSIS — M47.812 SPONDYLOSIS OF CERVICAL REGION WITHOUT MYELOPATHY OR RADICULOPATHY: Primary | ICD-10-CM

## 2018-04-24 DIAGNOSIS — M06.4 INFLAMMATORY POLYARTHROPATHY (HCC): ICD-10-CM

## 2018-04-24 PROBLEM — Z72.0 TOBACCO ABUSE: Status: RESOLVED | Noted: 2017-11-09 | Resolved: 2018-04-24

## 2018-04-24 PROCEDURE — 99214 OFFICE O/P EST MOD 30 MIN: CPT | Performed by: FAMILY MEDICINE

## 2018-04-24 RX ORDER — GABAPENTIN 300 MG/1
300 CAPSULE ORAL 3 TIMES DAILY PRN
Qty: 90 CAPSULE | Refills: 2 | Status: SHIPPED | OUTPATIENT
Start: 2018-04-24 | End: 2018-10-26 | Stop reason: SDUPTHER

## 2018-04-24 RX ORDER — PREDNISONE 10 MG/1
10 TABLET ORAL DAILY PRN
COMMUNITY
End: 2023-02-06

## 2018-04-24 RX ORDER — HYDROXYCHLOROQUINE SULFATE 200 MG/1
TABLET, FILM COATED ORAL DAILY
COMMUNITY
End: 2018-08-31

## 2018-04-24 NOTE — PROGRESS NOTES
"Subjective    Elliott Dunn is a 62 y.o. male here for:  Chief Complaint   Patient presents with   • Follow-up     recent egd   • Joint Pain     History of Present Illness     Patient remains smoke free, after 51 years of smoking cigarettes! Chantix worked well for him.    Has seen Dr. Haider in rheumatology regarding polyarthralgias and has started on plaquenil. He's scheduled for his annual eye exam in June. Gabapentin continues to help some with his pain.    Patient has poor appetite and stays full. He has some bloating. He underwent EGD recently and wife notes there was still food in his stomach. Last food intake had been prior to 7 pm the night before.     The following portions of the patient's history were reviewed and updated as appropriate: allergies, current medications, past family history, past medical history, past social history, past surgical history and problem list.    Review of Systems   Constitutional: Positive for appetite change.   HENT:        Improved sense of smell   Gastrointestinal: Positive for abdominal distention and indigestion.   Musculoskeletal: Positive for arthralgias.       Vitals:    04/24/18 1043   BP: 118/74   Pulse: 68   Resp: 12   Temp: 97.7 °F (36.5 °C)   SpO2: 97%   Weight: 68.5 kg (151 lb)   Height: 180.3 cm (70.98\")         Objective   Physical Exam   Constitutional: He is oriented to person, place, and time. Vital signs are normal. He appears well-developed and well-nourished. He is active.  Non-toxic appearance. He does not appear ill. No distress. He is not overweight.  HENT:   Head: Normocephalic and atraumatic.   Right Ear: Hearing normal.   Left Ear: Hearing normal.   Mouth/Throat: Mucous membranes are not dry. Abnormal dentition (edentulate).   Eyes: EOM are normal. No scleral icterus.   Neck: Phonation normal. Neck supple.   Cardiovascular: Normal rate, regular rhythm and normal heart sounds.  Exam reveals no gallop and no friction rub.    No murmur " heard.  Pulmonary/Chest: Effort normal and breath sounds normal.   Neurological: He is alert and oriented to person, place, and time. He displays no tremor. No cranial nerve deficit. Gait (antalgic a bit) abnormal.   Skin: Skin is warm. He is not diaphoretic.   Psychiatric: He has a normal mood and affect. His speech is normal and behavior is normal. Judgment and thought content normal. Cognition and memory are normal.   Quiet, reserved gentleman, baseline. Smiles more today, though.   Nursing note and vitals reviewed.        Assessment/Plan     Problem List Items Addressed This Visit        Musculoskeletal and Integument    Spondylosis of cervical region without myelopathy or radiculopathy - Primary    Current Assessment & Plan     Stable. Continue gabapentin and amitriptyline.          Relevant Medications    gabapentin (NEURONTIN) 300 MG capsule    Inflammatory polyarthropathy    Overview     · Per Dr. Haider. Negative NEO, normal ESR, RF, anti-ccp and did not improve with prednisone per notes.  · Plaquenil initiated 4/23/18 (eye exams annually in June)         Current Assessment & Plan     Follow up with Dr. Haider and encouraged eye exam as scheduled.            Other    Chronic pain syndrome (Chronic)    Current Assessment & Plan     Stable, continue gabapentin, amitriptyline, duloxetine, Meloxicam.         Relevant Medications    gabapentin (NEURONTIN) 300 MG capsule    Former smoker    Overview     Quit 2018 after 51 years of smoking, aided by Chantix.         Current Assessment & Plan     Continues to be smoke free, I am proud of his success.           Other Visit Diagnoses     Gastroparesis        I reviewed the EGD report, must follow up with Dr. Ritchie as scheduled. Retained gastric contents among other findings. May need reglan. No history of diabetes.          ·     Return in about 6 months (around 10/24/2018).    Ladonna Means MD    Please note that portions of this note may have been completed  with a voice recognition program. Efforts were made to edit dictation, but occasionally words are mistranscribed.

## 2018-04-26 ENCOUNTER — OFFICE VISIT (OUTPATIENT)
Dept: SURGERY | Facility: CLINIC | Age: 63
End: 2018-04-26

## 2018-04-26 VITALS
WEIGHT: 150.4 LBS | OXYGEN SATURATION: 98 % | DIASTOLIC BLOOD PRESSURE: 72 MMHG | BODY MASS INDEX: 21.06 KG/M2 | TEMPERATURE: 97.7 F | HEART RATE: 68 BPM | SYSTOLIC BLOOD PRESSURE: 112 MMHG | HEIGHT: 71 IN

## 2018-04-26 DIAGNOSIS — K31.84 GASTROPARESIS: ICD-10-CM

## 2018-04-26 DIAGNOSIS — K21.00 GASTROESOPHAGEAL REFLUX DISEASE WITH ESOPHAGITIS: Primary | ICD-10-CM

## 2018-04-26 PROCEDURE — 99213 OFFICE O/P EST LOW 20 MIN: CPT | Performed by: SURGERY

## 2018-04-26 NOTE — PROGRESS NOTES
Patient: Elliott Dunn    YOB: 1955    Date: 04/26/2018    Primary Care Provider: Ladonna Means MD    Reason for Consultation: Follow-up EGD    Chief Complaint   Patient presents with   • Follow-up     follow up egd       History of present illness:  I saw the patient in the office today as a followup from their recent EGD with biopsy, the pathology report did show mild to moderate chronic inactive gastritis, intestinal metaplasia identified.  They state that they have lower abdomen pain.Patient also had retained gastric contents. Apparently does not chew food very well, not compliant with wearing dentures.    The following portions of the patient's history were reviewed and updated as appropriate: allergies, current medications, past family history, past medical history, past social history, past surgical history and problem list.      Review of Systems   Constitutional: Negative for chills, fever and unexpected weight change.   HENT: Negative for trouble swallowing and voice change.    Eyes: Negative for visual disturbance.   Respiratory: Negative for apnea, cough, chest tightness, shortness of breath and wheezing.    Cardiovascular: Negative for chest pain, palpitations and leg swelling.   Gastrointestinal: Positive for abdominal pain. Negative for abdominal distention, anal bleeding, blood in stool, constipation, diarrhea, nausea, rectal pain and vomiting.   Endocrine: Negative for cold intolerance and heat intolerance.   Genitourinary: Negative for difficulty urinating, dysuria, flank pain, scrotal swelling and testicular pain.   Musculoskeletal: Negative for back pain, gait problem and joint swelling.   Skin: Negative for color change, rash and wound.   Neurological: Negative for dizziness, syncope, speech difficulty, weakness, numbness and headaches.   Hematological: Negative for adenopathy. Does not bruise/bleed easily.   Psychiatric/Behavioral: Negative for confusion. The patient is  not nervous/anxious.        Vital Signs:   Temp:  [97.7 °F (36.5 °C)] 97.7 °F (36.5 °C)  Heart Rate:  [68] 68  BP: (112)/(72) 112/72    Allergies:  Allergies   Allergen Reactions   • Pravastatin Other (See Comments)     Weakness / fatigue  Weakness / fatigue       Medications:    Current Outpatient Prescriptions:   •  albuterol (PROVENTIL HFA;VENTOLIN HFA) 108 (90 Base) MCG/ACT inhaler, Inhale 2 puffs Every 6 (Six) Hours As Needed for Wheezing or Shortness of Air., Disp: 1 inhaler, Rfl: 3  •  amitriptyline (ELAVIL) 25 MG tablet, TAKE 1-2 AT BEDTIME AS NEEDED FOR SLEEP, Disp: 60 tablet, Rfl: 5  •  aspirin (ASPIRIN LOW DOSE) 81 MG EC tablet, Take 1 tablet by mouth Daily., Disp: 30 tablet, Rfl: 5  •  ASPIRIN LOW DOSE 81 MG EC tablet, TAKE ONE TABLET BY MOUTH DAILY, Disp: 30 tablet, Rfl: 4  •  atorvastatin (LIPITOR) 10 MG tablet, Take 1 tablet by mouth Daily., Disp: 90 tablet, Rfl: 2  •  bisoprolol (ZEBeta) 5 MG tablet, Take 1 tablet by mouth Daily., Disp: 90 tablet, Rfl: 4  •  DULoxetine (CYMBALTA) 60 MG capsule, Take 1 capsule by mouth Daily., Disp: 30 capsule, Rfl: 3  •  gabapentin (NEURONTIN) 300 MG capsule, Take 1 capsule by mouth 3 (Three) Times a Day As Needed (pain)., Disp: 90 capsule, Rfl: 2  •  hydroxychloroquine (PLAQUENIL) 200 MG tablet, Take  by mouth Daily., Disp: , Rfl:   •  isosorbide mononitrate (IMDUR) 30 MG 24 hr tablet, Take 1 tablet by mouth Daily., Disp: 90 tablet, Rfl: 4  •  meloxicam (MOBIC) 15 MG tablet, Take 1 tablet by mouth Daily., Disp: 30 tablet, Rfl: 5  •  methocarbamol (ROBAXIN) 750 MG tablet, Take 1 tablet by mouth 3 (Three) Times a Day., Disp: 90 tablet, Rfl: 4  •  nitroglycerin (NITROSTAT) 0.4 MG SL tablet, 1 under the tongue as needed for angina, may repeat q5mins for up three doses, Disp: 100 tablet, Rfl: 11  •  omeprazole (priLOSEC) 40 MG capsule, TAKE ONE CAPSULE BY MOUTH DAILY, Disp: 30 capsule, Rfl: 4  •  predniSONE (DELTASONE) 10 MG tablet, Take 10 mg by mouth Daily. As needed,  Disp: , Rfl:   •  Triamcinolone Acetonide (NASACORT) 55 MCG/ACT nasal inhaler, 2 sprays into each nostril daily., Disp: , Rfl:   •  vitamin B-12 (CYANOCOBALAMIN) 1000 MCG tablet, TAKE ONE TABLET BY MOUTH DAILY AS DIRECTED, Disp: 30 tablet, Rfl: 9    Physical Exam:   General Appearance:    Alert, cooperative, in no acute distress   Abdomen:     no masses, no organomegaly, soft non-tender, non-distended, no guarding, wounds are well healed, no evidence of recurrent hernia   Chest:      Clear toausculation            Cor:  Regular rate and rhythm      Results Review:   I reviewed the patient's new clinical results.    Assessment / Plan:    1. Gastroesophageal reflux disease with esophagitis    2. Gastroparesis        I did discuss the situation with the patient today in the office and they have done well from their recent EGD with biopsy. I have told the patient He will need to wear dentures and avoid hard foods and meats. Also avoid high fiber diet. Follow-up if having problems with nausea vomiting or failure to thrive. Repeat colonoscopy in 4 years.    Electronically signed by Alexander Ritchie MD  04/26/18  Scribed for Alexander Ritchie MD by Gloria Ashley. 4/26/2018  9:39 AM

## 2018-06-09 DIAGNOSIS — F51.01 PRIMARY INSOMNIA: ICD-10-CM

## 2018-06-09 DIAGNOSIS — M25.50 CHRONIC JOINT PAIN: ICD-10-CM

## 2018-06-09 DIAGNOSIS — G89.29 CHRONIC JOINT PAIN: ICD-10-CM

## 2018-06-11 RX ORDER — DULOXETIN HYDROCHLORIDE 60 MG/1
CAPSULE, DELAYED RELEASE ORAL
Qty: 30 CAPSULE | Refills: 2 | Status: SHIPPED | OUTPATIENT
Start: 2018-06-11 | End: 2018-09-07 | Stop reason: SDUPTHER

## 2018-06-11 RX ORDER — AMITRIPTYLINE HYDROCHLORIDE 25 MG/1
TABLET, FILM COATED ORAL
Qty: 60 TABLET | Refills: 3 | Status: SHIPPED | OUTPATIENT
Start: 2018-06-11 | End: 2018-08-31 | Stop reason: SDUPTHER

## 2018-06-11 RX ORDER — ATORVASTATIN CALCIUM 10 MG/1
TABLET, FILM COATED ORAL
Qty: 30 TABLET | Refills: 0 | Status: SHIPPED | OUTPATIENT
Start: 2018-06-11 | End: 2018-07-16 | Stop reason: SDUPTHER

## 2018-07-10 RX ORDER — ASPIRIN 81 MG/1
TABLET, COATED ORAL
Qty: 30 TABLET | Refills: 3 | Status: SHIPPED | OUTPATIENT
Start: 2018-07-10 | End: 2020-01-13

## 2018-07-16 DIAGNOSIS — M25.50 CHRONIC JOINT PAIN: Chronic | ICD-10-CM

## 2018-07-16 DIAGNOSIS — G89.29 CHRONIC JOINT PAIN: Chronic | ICD-10-CM

## 2018-07-16 RX ORDER — LANOLIN ALCOHOL/MO/W.PET/CERES
CREAM (GRAM) TOPICAL
Qty: 30 TABLET | Refills: 8 | Status: SHIPPED | OUTPATIENT
Start: 2018-07-16

## 2018-07-16 RX ORDER — MELOXICAM 15 MG/1
TABLET ORAL
Qty: 30 TABLET | Refills: 4 | Status: SHIPPED | OUTPATIENT
Start: 2018-07-16 | End: 2018-12-07 | Stop reason: SDUPTHER

## 2018-07-16 RX ORDER — ATORVASTATIN CALCIUM 10 MG/1
TABLET, FILM COATED ORAL
Qty: 30 TABLET | Refills: 5 | Status: SHIPPED | OUTPATIENT
Start: 2018-07-16 | End: 2018-08-31 | Stop reason: SDUPTHER

## 2018-08-31 ENCOUNTER — OFFICE VISIT (OUTPATIENT)
Dept: CARDIOLOGY | Facility: CLINIC | Age: 63
End: 2018-08-31

## 2018-08-31 ENCOUNTER — LAB (OUTPATIENT)
Dept: LAB | Facility: HOSPITAL | Age: 63
End: 2018-08-31

## 2018-08-31 VITALS
OXYGEN SATURATION: 98 % | RESPIRATION RATE: 18 BRPM | HEART RATE: 57 BPM | HEIGHT: 71 IN | WEIGHT: 146.2 LBS | SYSTOLIC BLOOD PRESSURE: 110 MMHG | DIASTOLIC BLOOD PRESSURE: 62 MMHG | BODY MASS INDEX: 20.47 KG/M2

## 2018-08-31 DIAGNOSIS — D47.2 MGUS (MONOCLONAL GAMMOPATHY OF UNKNOWN SIGNIFICANCE): ICD-10-CM

## 2018-08-31 DIAGNOSIS — Q24.5 ANOMALOUS CORONARY ARTERY ORIGIN: ICD-10-CM

## 2018-08-31 DIAGNOSIS — I10 ESSENTIAL HYPERTENSION: Primary | Chronic | ICD-10-CM

## 2018-08-31 DIAGNOSIS — J43.1 PANLOBULAR EMPHYSEMA (HCC): Chronic | ICD-10-CM

## 2018-08-31 LAB
ALBUMIN SERPL-MCNC: 4.3 G/DL (ref 3.5–5)
ALBUMIN/GLOB SERPL: 1.3 G/DL (ref 1–2)
ALP SERPL-CCNC: 68 U/L (ref 38–126)
ALT SERPL W P-5'-P-CCNC: 41 U/L (ref 13–69)
ANION GAP SERPL CALCULATED.3IONS-SCNC: 11.7 MMOL/L (ref 10–20)
AST SERPL-CCNC: 42 U/L (ref 15–46)
BASOPHILS # BLD AUTO: 0.05 10*3/MM3 (ref 0–0.2)
BASOPHILS NFR BLD AUTO: 0.5 % (ref 0–2.5)
BILIRUB SERPL-MCNC: 0.5 MG/DL (ref 0.2–1.3)
BUN BLD-MCNC: 35 MG/DL (ref 7–20)
BUN/CREAT SERPL: 35 (ref 6.3–21.9)
CALCIUM SPEC-SCNC: 9.5 MG/DL (ref 8.4–10.2)
CHLORIDE SERPL-SCNC: 103 MMOL/L (ref 98–107)
CO2 SERPL-SCNC: 29 MMOL/L (ref 26–30)
CREAT BLD-MCNC: 1 MG/DL (ref 0.6–1.3)
CRP SERPL-MCNC: <0.5 MG/DL (ref 0–1)
DEPRECATED RDW RBC AUTO: 47.7 FL (ref 37–54)
EOSINOPHIL # BLD AUTO: 0.3 10*3/MM3 (ref 0–0.7)
EOSINOPHIL NFR BLD AUTO: 3.2 % (ref 0–7)
ERYTHROCYTE [DISTWIDTH] IN BLOOD BY AUTOMATED COUNT: 13.6 % (ref 11.5–14.5)
ERYTHROCYTE [SEDIMENTATION RATE] IN BLOOD: 5 MM/HR (ref 0–15)
GFR SERPL CREATININE-BSD FRML MDRD: 75 ML/MIN/1.73
GLOBULIN UR ELPH-MCNC: 3.4 GM/DL
GLUCOSE BLD-MCNC: 89 MG/DL (ref 74–98)
HCT VFR BLD AUTO: 44.2 % (ref 42–52)
HGB BLD-MCNC: 14.8 G/DL (ref 14–18)
IMM GRANULOCYTES # BLD: 0.02 10*3/MM3 (ref 0–0.06)
IMM GRANULOCYTES NFR BLD: 0.2 % (ref 0–0.6)
LYMPHOCYTES # BLD AUTO: 3.72 10*3/MM3 (ref 0.6–3.4)
LYMPHOCYTES NFR BLD AUTO: 39.7 % (ref 10–50)
MCH RBC QN AUTO: 31.8 PG (ref 27–31)
MCHC RBC AUTO-ENTMCNC: 33.5 G/DL (ref 30–37)
MCV RBC AUTO: 94.8 FL (ref 80–94)
MONOCYTES # BLD AUTO: 0.61 10*3/MM3 (ref 0–0.9)
MONOCYTES NFR BLD AUTO: 6.5 % (ref 0–12)
NEUTROPHILS # BLD AUTO: 4.67 10*3/MM3 (ref 2–6.9)
NEUTROPHILS NFR BLD AUTO: 49.9 % (ref 37–80)
NRBC BLD MANUAL-RTO: 0 /100 WBC (ref 0–0)
PLATELET # BLD AUTO: 210 10*3/MM3 (ref 130–400)
PMV BLD AUTO: 10.3 FL (ref 6–12)
POTASSIUM BLD-SCNC: 4.7 MMOL/L (ref 3.5–5.1)
PROT SERPL-MCNC: 7.7 G/DL (ref 6.3–8.2)
RBC # BLD AUTO: 4.66 10*6/MM3 (ref 4.7–6.1)
SODIUM BLD-SCNC: 139 MMOL/L (ref 137–145)
WBC NRBC COR # BLD: 9.37 10*3/MM3 (ref 4.8–10.8)

## 2018-08-31 PROCEDURE — 82232 ASSAY OF BETA-2 PROTEIN: CPT

## 2018-08-31 PROCEDURE — 36415 COLL VENOUS BLD VENIPUNCTURE: CPT

## 2018-08-31 PROCEDURE — 85025 COMPLETE CBC W/AUTO DIFF WBC: CPT

## 2018-08-31 PROCEDURE — 84165 PROTEIN E-PHORESIS SERUM: CPT

## 2018-08-31 PROCEDURE — 86140 C-REACTIVE PROTEIN: CPT

## 2018-08-31 PROCEDURE — 82784 ASSAY IGA/IGD/IGG/IGM EACH: CPT

## 2018-08-31 PROCEDURE — 82330 ASSAY OF CALCIUM: CPT

## 2018-08-31 PROCEDURE — 85651 RBC SED RATE NONAUTOMATED: CPT

## 2018-08-31 PROCEDURE — 86334 IMMUNOFIX E-PHORESIS SERUM: CPT

## 2018-08-31 PROCEDURE — 80053 COMPREHEN METABOLIC PANEL: CPT

## 2018-08-31 PROCEDURE — 83883 ASSAY NEPHELOMETRY NOT SPEC: CPT

## 2018-08-31 PROCEDURE — 99213 OFFICE O/P EST LOW 20 MIN: CPT | Performed by: INTERNAL MEDICINE

## 2018-08-31 RX ORDER — FOLIC ACID 1 MG/1
1 TABLET ORAL DAILY
COMMUNITY
End: 2022-07-05 | Stop reason: SDUPTHER

## 2018-09-01 LAB
CA-I SERPL ISE-MCNC: 5.4 MG/DL (ref 4.5–5.6)
KAPPA LC SERPL-MCNC: 57.6 MG/L (ref 3.3–19.4)
KAPPA LC/LAMBDA SER: 5.59 {RATIO} (ref 0.26–1.65)
LAMBDA LC FREE SERPL-MCNC: 10.3 MG/L (ref 5.7–26.3)

## 2018-09-04 LAB
ALBUMIN SERPL-MCNC: 3.8 G/DL (ref 2.9–4.4)
ALBUMIN/GLOB SERPL: 1.2 {RATIO} (ref 0.7–1.7)
ALPHA1 GLOB FLD ELPH-MCNC: 0.1 G/DL (ref 0–0.4)
ALPHA2 GLOB SERPL ELPH-MCNC: 0.6 G/DL (ref 0.4–1)
B-GLOBULIN SERPL ELPH-MCNC: 0.7 G/DL (ref 0.7–1.3)
GAMMA GLOB SERPL ELPH-MCNC: 1.8 G/DL (ref 0.4–1.8)
GLOBULIN SER CALC-MCNC: 3.3 G/DL (ref 2.2–3.9)
IGA SERPL-MCNC: 118 MG/DL (ref 61–437)
IGG SERPL-MCNC: 1948 MG/DL (ref 700–1600)
IGM SERPL-MCNC: 28 MG/DL (ref 20–172)
INTERPRETATION SERPL IEP-IMP: ABNORMAL
Lab: ABNORMAL
M-SPIKE: 1.5 G/DL
PROT SERPL-MCNC: 7.1 G/DL (ref 6–8.5)

## 2018-09-05 LAB — B2 MICROGLOB SERPL-MCNC: 2.1 MG/L (ref 0.6–2.4)

## 2018-09-07 DIAGNOSIS — R13.14 PHARYNGOESOPHAGEAL DYSPHAGIA: ICD-10-CM

## 2018-09-07 DIAGNOSIS — M25.50 CHRONIC JOINT PAIN: ICD-10-CM

## 2018-09-07 DIAGNOSIS — G89.29 CHRONIC JOINT PAIN: ICD-10-CM

## 2018-09-07 RX ORDER — DULOXETIN HYDROCHLORIDE 60 MG/1
CAPSULE, DELAYED RELEASE ORAL
Qty: 30 CAPSULE | Refills: 1 | Status: SHIPPED | OUTPATIENT
Start: 2018-09-07 | End: 2018-11-07 | Stop reason: SDUPTHER

## 2018-09-07 RX ORDER — OMEPRAZOLE 40 MG/1
CAPSULE, DELAYED RELEASE ORAL
Qty: 30 CAPSULE | Refills: 3 | Status: SHIPPED | OUTPATIENT
Start: 2018-09-07 | End: 2018-10-24 | Stop reason: DRUGHIGH

## 2018-09-07 RX ORDER — BISOPROLOL FUMARATE 5 MG/1
TABLET, FILM COATED ORAL
Qty: 30 TABLET | Refills: 3 | Status: SHIPPED | OUTPATIENT
Start: 2018-09-07 | End: 2019-01-06 | Stop reason: SDUPTHER

## 2018-09-07 RX ORDER — ISOSORBIDE MONONITRATE 30 MG/1
TABLET, EXTENDED RELEASE ORAL
Qty: 30 TABLET | Refills: 3 | Status: SHIPPED | OUTPATIENT
Start: 2018-09-07 | End: 2019-01-06 | Stop reason: SDUPTHER

## 2018-09-10 ENCOUNTER — LAB (OUTPATIENT)
Dept: LAB | Facility: HOSPITAL | Age: 63
End: 2018-09-10

## 2018-09-10 DIAGNOSIS — D47.2 MGUS (MONOCLONAL GAMMOPATHY OF UNKNOWN SIGNIFICANCE): ICD-10-CM

## 2018-09-10 PROCEDURE — 81050 URINALYSIS VOLUME MEASURE: CPT

## 2018-09-10 PROCEDURE — 84166 PROTEIN E-PHORESIS/URINE/CSF: CPT

## 2018-09-10 PROCEDURE — 84156 ASSAY OF PROTEIN URINE: CPT

## 2018-09-10 PROCEDURE — 86335 IMMUNFIX E-PHORSIS/URINE/CSF: CPT

## 2018-09-12 LAB
ALBUMIN 24H MFR UR ELPH: 26.1 %
ALPHA1 GLOB 24H MFR UR ELPH: 13.7 %
ALPHA2 GLOB 24H MFR UR ELPH: 22.5 %
B-GLOBULIN MFR UR ELPH: 16.9 %
GAMMA GLOB 24H MFR UR ELPH: 20.8 %
HIV 1 & 2 AB SER-IMP: NORMAL
INTERPRETATION UR IFE-IMP: NORMAL
M PROTEIN 24H MFR UR ELPH: NORMAL %
PROT 24H UR-MRATE: 105 MG/24 HR (ref 30–150)
PROT UR-MCNC: 5.2 MG/DL

## 2018-09-20 ENCOUNTER — OFFICE VISIT (OUTPATIENT)
Dept: ONCOLOGY | Facility: CLINIC | Age: 63
End: 2018-09-20

## 2018-09-20 VITALS
HEART RATE: 60 BPM | TEMPERATURE: 97.6 F | BODY MASS INDEX: 20.44 KG/M2 | RESPIRATION RATE: 13 BRPM | HEIGHT: 71 IN | DIASTOLIC BLOOD PRESSURE: 69 MMHG | WEIGHT: 146 LBS | SYSTOLIC BLOOD PRESSURE: 107 MMHG

## 2018-09-20 DIAGNOSIS — D47.2 MGUS (MONOCLONAL GAMMOPATHY OF UNKNOWN SIGNIFICANCE): Primary | ICD-10-CM

## 2018-09-20 PROCEDURE — 99213 OFFICE O/P EST LOW 20 MIN: CPT | Performed by: INTERNAL MEDICINE

## 2018-09-20 NOTE — PROGRESS NOTES
CHIEF COMPLAINT:   1.  Monoclonal gammopathy of unknown significance                                         2. Tick bite    Problem List:  Oncology/Hematology History    1.  MGUS: Had been having mid back pains and was seen by neurology, was found to have 1200 mg of immunoglobulin G monoclonal protein in the serum with a normal IgA and IgM level this was in July 2016.  Workup August 2016 included a bone survey that was negative other than for degenerative joint disease with left eighth rib healed fracture that was seen on prior bone scan.  Normal creatinine, ionized calcium of 1.34, normal total protein to albumin ratio.  Normal sedimentation rate and C-reactive protein, serum monoclonal protein present at 1100 mg/dL of immunoglobulin G kappa with normal IgA and M levels.  Urine monoclonal protein was too scant to quantify.  Kappa to lambda ratio of 4.58 with elevation of At 42.85.  CBC was unremarkable with normal white count and platelet count and hemoglobin of 17 baseline.  Baseline PET/CT 9/1/2016 was negative.   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  This genetic alteration is found in approximately 15-18 percent of patients with plasma cell myeloma by FISH analysis and is associated with a favorable prognosis in the absence of poor prognostic markers.   b.)  3/6/2017 follow-up PET/CT was negative.    2.  History of prostate cancer: Status post prostatectomy 2014, under the care of Dr. Varela who he sees annually at this point.    3.  Squamous cell cancer of the skin, followed by Dr. Izaguirre.              MGUS (monoclonal gammopathy of unknown significance)    7/1/2016 Initial Diagnosis     MGUS (monoclonal gammopathy of unknown significance)         8/21/2017 -  Other Event     Myeloma panel: Urine immunoelectrophoresis monoclonal protein too small to quantify, serum immunoelectrophoresis  M-spike stable at 1.3g/dl, ionized calcium 5.2, kappa to lambda ratio 4.10, beta-2 microglobulin 2.2, C-reactive protein less than 0.50, creatinine 1.3, sedimentation rate to.  CBC WBC 9600, hemoglobin 15.6, hematocrit 47.1%, platelet count 209,000.           3/12/2018 -  Other Event     Myeloma panel: Urine immunoelectrophoresis with 122 mg/24 hour protein, monoclonal kappa free light chains 21.2%, monoclonal IgG kappa 7.5%.  Sedimentation rate 5, immunoglobulin free light chains free kappa light chains 49.8, normal lambda light chains 12.2, kappa/lambda ratio 4.08.  Serum immunoelectrophoresis M spike 1.7g/dL, C-reactive protein 1.60, CMP unremarkable with creatinine 1.10, serum calcium 9.2, ionized calcium 5.1, beta-2 microglobulin 2.5, CBC normal with a WBC of 9.07, hemoglobin 15.9, hematocrit 47.5%, platelets 226,000.            HISTORY OF PRESENT ILLNESS:  The patient is a 63 y.o. male, here for follow up on management of Monoclonal gammopathy of unknown significance.  Doing reasonably well.  No major events since his last visit.  No infections..      Past Medical History:   Diagnosis Date   • Acquired absence of all teeth    • Allergic    • Anomalous coronary artery origin    • Arrhythmia    • Arthritis    • Arthritis of lumbar spine (CMS/[facility]) 7/29/2016   • Back pain 6/17/2016   • Cancer (CMS/[facility])     prostate   • Cataract    • Cervical spine disease 6/17/2016   • CHF (congestive heart failure) (CMS/[facility])    • Chronic obstructive pulmonary disease (CMS/[facility])    • Colon polyps    • Deafness in left ear    • Derangement of anterior horn of medial meniscus    • Difficulty swallowing    • Disorder of lumbar spine 6/17/2016   • Disorder of thoracic spine 6/17/2016   • Diverticulitis of colon    • Erectile dysfunction    • Esophageal reflux    • Essential hypertension    • Glaucoma    • Heart murmur    • High cholesterol    • Inflammatory polyarthropathy (CMS/[facility])     Per Dr. Haider. Negative NEO, normal ESR, RF,  anti-ccp and did not improve with prednisone per notes.   • Inguinal hernia    • Lateral meniscus derangement    • Left otitis media with spontaneous rupture of eardrum    • Locking of left knee    • Low back pain    • MGUS (monoclonal gammopathy of unknown significance)     likely diagnosis   • Neuromuscular disorder (CMS/HCC)    • Neuropathic arthritis    • Perforation of left tympanic membrane    • Pulmonary emphysema (CMS/HCC)    • Scoliosis    • Skin cancer     skin cancer   • Skin cancer of face     squamous cell   • Spina bifida occulta 6/17/2016   • Tobacco abuse 11/9/2017   • Visual impairment    • Wears glasses      Past Surgical History:   Procedure Laterality Date   • COLONOSCOPY     • ENDOSCOPY N/A 4/10/2017    Procedure: ESOPHAGOGASTRODUODENOSCOPY WITH BIOPSY;  Surgeon: Alexander Ritchie MD;  Location: [facility] ENDOSCOPY;  Service:    • EYE SURGERY     • HERNIA REPAIR     • INGUINAL HERNIA REPAIR Right    • INGUINAL HERNIA REPAIR     • KNEE SURGERY Left    • LYMPH NODE BIOPSY     • PROSTATE SURGERY  2004   • PROSTATECTOMY  06/30/2014   • PROSTATECTOMY      Prostatectomy Radical   • SKIN CANCER EXCISION  2017    both sides of body/squamous cell melanoma   • TYMPANOPLASTY W/ MASTOIDECTOMY     • UMBILICAL HERNIA REPAIR         Allergies   Allergen Reactions   • Plaquenil [Hydroxychloroquine Sulfate] Other (See Comments)     Black eyes   • Cyclobenzaprine Other (See Comments)     Wide awake   • Pravastatin Other (See Comments)     Weakness / fatigue  Weakness / fatigue       Family History and Social History reviewed and changed as necessary      REVIEW OF SYSTEM:   Review of Systems   Constitutional: Negative for appetite change, chills, diaphoresis, fatigue, fever and unexpected weight change.   HENT:   Negative for mouth sores, sore throat and trouble swallowing.    Eyes: Negative for icterus.   Respiratory: Negative for cough, hemoptysis and shortness of breath.    Cardiovascular: Negative for chest pain,  "leg swelling and palpitations.   Gastrointestinal: Negative for abdominal distention, abdominal pain, blood in stool, constipation, diarrhea, nausea and vomiting.   Endocrine: Negative for hot flashes.   Genitourinary: Negative for bladder incontinence, difficulty urinating, dysuria, frequency and hematuria.    Musculoskeletal: Positive for chronic joint pain secondary to arthritis.   Skin: Negative for rash. Positive for tick bite left axilla.  Neurological: Negative for dizziness, gait problem, headaches, light-headedness and numbness.   Hematological: Negative for adenopathy. Does not bruise/bleed easily.   Psychiatric/Behavioral: Negative for depression. The patient is not nervous/anxious.    All other systems reviewed and are negative.       PHYSICAL EXAM    Vitals:    09/20/18 1050   BP: 107/69   Pulse: 60   Resp: 13   Temp: 97.6 °F (36.4 °C)   TempSrc: Temporal Artery    Weight: 66.2 kg (146 lb)   Height: 180.3 cm (70.98\")     Constitutional: Appears well-developed and well-nourished. No distress.   ECOG: (0) Fully active, able to carry on all predisease performance without restriction  HENT:   Head: Normocephalic.   Mouth/Throat: Oropharynx is clear and moist.   Eyes: Conjunctivae are normal. Pupils are equal, round, and reactive to light. No scleral icterus.   Neck: Neck supple. No JVD present. No thyromegaly present.   Cardiovascular: Normal rate, regular rhythm and normal heart sounds.    Pulmonary/Chest: Breath sounds normal. No respiratory distress.   Abdominal: Soft. Exhibits no distension and no mass. There is no hepatosplenomegaly. There is no tenderness. There is no rebound and no guarding.   Musculoskeletal:Exhibits no edema, tenderness or deformity.   Neurological: Alert and oriented to person, place, and time. Exhibits normal muscle tone.   Skin: Aprox 3 cm raised erythemic area left axilla, no drainage, non tender.   Psychiatric: Normal mood and affect.   Vitals reviewed.  Diagnostic data: All " previous office notes and current laboratory data and outside physician notes available in Epic were reviewed at time of visit.    Lab on 09/10/2018   Component Date Value Ref Range Status   • Total Protein, Urine 09/10/2018 5.2  Not Estab. mg/dL Final   • Protein, 24H Urine 09/10/2018 105  30 - 150 mg/24 hr Final   • Albumin, U 09/10/2018 26.1  % Final   • Alpha-1-Globulin, U 09/10/2018 13.7  % Final   • Alpha-2-Globulin, U 09/10/2018 22.5  % Final   • Beta Globulin, U 09/10/2018 16.9  % Final   • Gamma Globulin, Urine 09/10/2018 20.8  % Final   • M-Larry, % U 09/10/2018 Comment:  Not Observed % Final    Monoclonal kappa free light chains = 9.3%  Due to the small quantity of monoclonal IgG kappa, unable to  quantitate the M-spike.   • Immunofixation Result, Urine 09/10/2018 Comment   Final    Bence John Protein positive; kappa type.  Immunofixation shows IgG monoclonal protein with kappa light chain  specificity.   • Note: 09/10/2018 Comment   Final    Protein electrophoresis scan will follow via computer, mail, or   delivery.   Lab on 08/31/2018   Component Date Value Ref Range Status   • Beta-2 08/31/2018 2.1  0.6 - 2.4 mg/L Final    S[employer]ens Immulite 2000 Immunochemiluminometric assay (ICMA)   • Ionized Calcium 08/31/2018 5.4  4.5 - 5.6 mg/dL Final   • Glucose 08/31/2018 89  74 - 98 mg/dL Final   • BUN 08/31/2018 35* 7 - 20 mg/dL Final   • Creatinine 08/31/2018 1.00  0.60 - 1.30 mg/dL Final   • Sodium 08/31/2018 139  137 - 145 mmol/L Final   • Potassium 08/31/2018 4.7  3.5 - 5.1 mmol/L Final   • Chloride 08/31/2018 103  98 - 107 mmol/L Final   • CO2 08/31/2018 29.0  26.0 - 30.0 mmol/L Final   • Calcium 08/31/2018 9.5  8.4 - 10.2 mg/dL Final   • Total Protein 08/31/2018 7.7  6.3 - 8.2 g/dL Final   • Albumin 08/31/2018 4.30  3.50 - 5.00 g/dL Final   • ALT (SGPT) 08/31/2018 41  13 - 69 U/L Final   • AST (SGOT) 08/31/2018 42  15 - 46 U/L Final   • Alkaline Phosphatase 08/31/2018 68  38 - 126 U/L Final   •  Total Bilirubin 08/31/2018 0.5  0.2 - 1.3 mg/dL Final   • eGFR Non African Amer 08/31/2018 75  >60 mL/min/1.73 Final   • Globulin 08/31/2018 3.4  gm/dL Final   • A/G Ratio 08/31/2018 1.3  1.0 - 2.0 g/dL Final   • BUN/Creatinine Ratio 08/31/2018 35.0* 6.3 - 21.9 Final   • Anion Gap 08/31/2018 11.7  10.0 - 20.0 mmol/L Final   • C-Reactive Protein 08/31/2018 <0.50  0.00 - 1.00 mg/dL Final   • IgG 08/31/2018 1948* 700 - 1600 mg/dL Final   • IgA 08/31/2018 118  61 - 437 mg/dL Final   • IgM 08/31/2018 28  20 - 172 mg/dL Final   • Total Protein 08/31/2018 7.1  6.0 - 8.5 g/dL Final   • Albumin 08/31/2018 3.8  2.9 - 4.4 g/dL Final   • Alpha-1-Globulin 08/31/2018 0.1  0.0 - 0.4 g/dL Final   • Alpha-2-Globulin 08/31/2018 0.6  0.4 - 1.0 g/dL Final   • Beta Globulin 08/31/2018 0.7  0.7 - 1.3 g/dL Final   • Gamma Globulin 08/31/2018 1.8  0.4 - 1.8 g/dL Final   • M-Larry 08/31/2018 1.5* Not Observed g/dL Final   • Globulin 08/31/2018 3.3  2.2 - 3.9 g/dL Final   • A/G Ratio 08/31/2018 1.2  0.7 - 1.7 Final   • Immunofixation Reflex, Serum 08/31/2018 Comment   Final    Immunofixation shows IgG monoclonal protein with kappa light chain  specificity.   • Please note 08/31/2018 Comment   Final    Protein electrophoresis scan will follow via computer, mail, or   delivery.   • Free Light Chain, Kappa 08/31/2018 57.6* 3.3 - 19.4 mg/L Final   • Free Lambda Light Chains 08/31/2018 10.3  5.7 - 26.3 mg/L Final   • Kappa/Lambda Ratio 08/31/2018 5.59* 0.26 - 1.65 Final   • Sed Rate 08/31/2018 5  0 - 15 mm/hr Final   • WBC 08/31/2018 9.37  4.80 - 10.80 10*3/mm3 Final   • RBC 08/31/2018 4.66* 4.70 - 6.10 10*6/mm3 Final   • Hemoglobin 08/31/2018 14.8  14.0 - 18.0 g/dL Final   • Hematocrit 08/31/2018 44.2  42.0 - 52.0 % Final   • MCV 08/31/2018 94.8* 80.0 - 94.0 fL Final   • MCH 08/31/2018 31.8* 27.0 - 31.0 pg Final   • MCHC 08/31/2018 33.5  30.0 - 37.0 g/dL Final   • RDW 08/31/2018 13.6  11.5 - 14.5 % Final   • RDW-SD 08/31/2018 47.7  37.0 -  54.0 fl Final   • MPV 08/31/2018 10.3  6.0 - 12.0 fL Final   • Platelets 08/31/2018 210  130 - 400 10*3/mm3 Final   • Neutrophil % 08/31/2018 49.9  37.0 - 80.0 % Final   • Lymphocyte % 08/31/2018 39.7  10.0 - 50.0 % Final   • Monocyte % 08/31/2018 6.5  0.0 - 12.0 % Final   • Eosinophil % 08/31/2018 3.2  0.0 - 7.0 % Final   • Basophil % 08/31/2018 0.5  0.0 - 2.5 % Final   • Immature Grans % 08/31/2018 0.2  0.0 - 0.6 % Final   • Neutrophils, Absolute 08/31/2018 4.67  2.00 - 6.90 10*3/mm3 Final   • Lymphocytes, Absolute 08/31/2018 3.72* 0.60 - 3.40 10*3/mm3 Final   • Monocytes, Absolute 08/31/2018 0.61  0.00 - 0.90 10*3/mm3 Final   • Eosinophils, Absolute 08/31/2018 0.30  0.00 - 0.70 10*3/mm3 Final   • Basophils, Absolute 08/31/2018 0.05  0.00 - 0.20 10*3/mm3 Final   • Immature Grans, Absolute 08/31/2018 0.02  0.00 - 0.06 10*3/mm3 Final   • nRBC 08/31/2018 0.0  0.0 - 0.0 /100 WBC Final     Lab Results   Component Value Date    MSPIKE 1.5 (H) 08/31/2018    MSPIKE 1.7 (H) 03/12/2018    MSPIKE 1.3 (H) 08/21/2017     Lab Results   Component Value Date    FREEKAPPAL 57.6 (H) 08/31/2018    FREEKAPPAL 49.8 (H) 03/12/2018    FREEKAPPAL 50.9 (H) 08/21/2017     Lab Results   Component Value Date    IGLFLC 10.3 08/31/2018    IGLFLC 12.2 03/12/2018    IGLFLC 12.4 08/21/2017           Assessment/Plan     1.  Monoclonal gammopathy of unknown significance: Stable M spike.  No progressive disease.  Repeat MARKO in 6 months.    2.  History of prostate cancer: Follows with Dr. Varela     He can follow up with Ms. Kingston, our nurse practitioner from this point.    I spent 15 minutes on the patient's plan and care with more than 50% spent on counseling.      Nicholas Miller MD    03/22/2018

## 2018-10-08 DIAGNOSIS — F51.01 PRIMARY INSOMNIA: ICD-10-CM

## 2018-10-08 RX ORDER — AMITRIPTYLINE HYDROCHLORIDE 25 MG/1
TABLET, FILM COATED ORAL
Qty: 60 TABLET | Refills: 2 | Status: SHIPPED | OUTPATIENT
Start: 2018-10-08 | End: 2018-10-24 | Stop reason: SDUPTHER

## 2018-10-24 ENCOUNTER — TELEPHONE (OUTPATIENT)
Dept: INTERNAL MEDICINE | Facility: CLINIC | Age: 63
End: 2018-10-24

## 2018-10-24 ENCOUNTER — OFFICE VISIT (OUTPATIENT)
Dept: INTERNAL MEDICINE | Facility: CLINIC | Age: 63
End: 2018-10-24

## 2018-10-24 VITALS
DIASTOLIC BLOOD PRESSURE: 70 MMHG | WEIGHT: 143.5 LBS | HEART RATE: 56 BPM | BODY MASS INDEX: 20.09 KG/M2 | TEMPERATURE: 98.6 F | HEIGHT: 71 IN | OXYGEN SATURATION: 98 % | SYSTOLIC BLOOD PRESSURE: 110 MMHG

## 2018-10-24 DIAGNOSIS — Z23 NEED FOR INFLUENZA VACCINATION: ICD-10-CM

## 2018-10-24 DIAGNOSIS — M06.4 INFLAMMATORY POLYARTHROPATHY (HCC): Primary | ICD-10-CM

## 2018-10-24 PROCEDURE — 90674 CCIIV4 VAC NO PRSV 0.5 ML IM: CPT | Performed by: FAMILY MEDICINE

## 2018-10-24 PROCEDURE — 99213 OFFICE O/P EST LOW 20 MIN: CPT | Performed by: FAMILY MEDICINE

## 2018-10-24 PROCEDURE — 90471 IMMUNIZATION ADMIN: CPT | Performed by: FAMILY MEDICINE

## 2018-10-24 RX ORDER — OMEPRAZOLE 20 MG/1
20 TABLET, DELAYED RELEASE ORAL DAILY
Qty: 30 TABLET | Refills: 3 | Status: SHIPPED | OUTPATIENT
Start: 2018-10-24 | End: 2019-07-08 | Stop reason: SDUPTHER

## 2018-10-24 RX ORDER — MEDROXYPROGESTERONE ACETATE 150 MG/ML
INJECTION, SUSPENSION INTRAMUSCULAR
COMMUNITY
Start: 2018-10-15 | End: 2019-02-11

## 2018-10-25 NOTE — PROGRESS NOTES
"Subjective    Elliott Dunn is a 63 y.o. male here for:  Chief Complaint   Patient presents with   • Hypertension     6 month follow up for HTN, HLD, and Panlobular Emphysema. Would like to discuss Omeprazole.   • Hyperlipidemia   • Emphysema     History of Present Illness     Patient has been prescribed Enbrel by Dr. Haider. He and wife have reservations regarding this medicine as they've read the possible side effects. Mr. Dunn has already been treated for skin and prostate cancer and is being followed by hematology for MGUS. A second opinion is requested. Mr. Dunn continues to have disabling joint pain and swelling on a daily basis and it is not relieved with NSAIDs, gabapentin, elavil, muscle relaxers. He is also taking Cymbalta, yet daily activities still limited. Wife reports short trial of methotrexate which failed, but they feel the medicine was more of a hoop to jump through to get to Enbrel.    The following portions of the patient's history were reviewed and updated as appropriate: allergies, current medications, past family history, past medical history, past social history, past surgical history and problem list.    Review of Systems   Constitutional: Positive for activity change and fatigue.   Musculoskeletal: Positive for arthralgias, joint swelling and neck pain.       Vitals:    10/24/18 1014   BP: 110/70   Pulse: 56   Temp: 98.6 °F (37 °C)   SpO2: 98%   Weight: 65.1 kg (143 lb 8 oz)   Height: 180.3 cm (70.98\")         Objective   Physical Exam   Constitutional: He is oriented to person, place, and time. Vital signs are normal. He appears well-developed and well-nourished. He is active.  Non-toxic appearance. He has a sickly appearance. He does not appear ill. No distress. He is not overweight.  HENT:   Head: Normocephalic and atraumatic.   Eyes: EOM are normal.   Neck: Neck supple.   Pulmonary/Chest: Effort normal.   Musculoskeletal:        Right hand: He exhibits deformity and swelling.      "   Left hand: He exhibits deformity and swelling.   Neurological: He is alert and oriented to person, place, and time. No cranial nerve deficit. Gait abnormal.   Skin: Skin is warm. He is not diaphoretic.   Psychiatric: He has a normal mood and affect. His behavior is normal.   Nursing note and vitals reviewed.        Assessment/Plan     Problem List Items Addressed This Visit        Musculoskeletal and Integument    Inflammatory polyarthropathy (CMS/HCC) - Primary    Overview     · Per Dr. Haider. Negative NEO, normal ESR, RF, anti-ccp and did not improve with prednisone per notes.  · Medicine failures include Plaquenil, methotrexate         Relevant Orders    Ambulatory Referral to Rheumatology      Other Visit Diagnoses     Need for influenza vaccination        Relevant Orders    Flucelvax Quad=>4Years (9344-1085) (Completed)          · He'll hold off on Enbrel until he can discuss with second opinion. Continue other current medicines.    Return in about 6 months (around 4/24/2019) for Follow up on current issues.    Ladonna Means MD

## 2018-10-26 DIAGNOSIS — M47.812 SPONDYLOSIS OF CERVICAL REGION WITHOUT MYELOPATHY OR RADICULOPATHY: ICD-10-CM

## 2018-10-26 DIAGNOSIS — G89.4 CHRONIC PAIN SYNDROME: ICD-10-CM

## 2018-10-30 ENCOUNTER — HOSPITAL ENCOUNTER (EMERGENCY)
Facility: HOSPITAL | Age: 63
Discharge: HOME | End: 2018-10-30
Attending: EMERGENCY MEDICINE | Admitting: EMERGENCY MEDICINE

## 2018-10-30 ENCOUNTER — APPOINTMENT (OUTPATIENT)
Dept: GENERAL RADIOLOGY | Facility: HOSPITAL | Age: 63
End: 2018-10-30

## 2018-10-30 VITALS
WEIGHT: 145.4 LBS | RESPIRATION RATE: 15 BRPM | HEART RATE: 87 BPM | TEMPERATURE: 98.8 F | BODY MASS INDEX: 20.35 KG/M2 | OXYGEN SATURATION: 95 % | SYSTOLIC BLOOD PRESSURE: 99 MMHG | HEIGHT: 71 IN | DIASTOLIC BLOOD PRESSURE: 63 MMHG

## 2018-10-30 DIAGNOSIS — J44.1 COPD WITH ACUTE EXACERBATION (HCC): Primary | ICD-10-CM

## 2018-10-30 LAB
ALBUMIN SERPL-MCNC: 4.1 G/DL (ref 3.5–5)
ALBUMIN/GLOB SERPL: 1.1 G/DL (ref 1–2)
ALP SERPL-CCNC: 90 U/L (ref 38–126)
ALT SERPL W P-5'-P-CCNC: 38 U/L (ref 13–69)
ANION GAP SERPL CALCULATED.3IONS-SCNC: 12.7 MMOL/L (ref 10–20)
AST SERPL-CCNC: 36 U/L (ref 15–46)
BASOPHILS # BLD AUTO: 0.06 10*3/MM3 (ref 0–0.2)
BASOPHILS NFR BLD AUTO: 0.5 % (ref 0–2.5)
BILIRUB SERPL-MCNC: 0.7 MG/DL (ref 0.2–1.3)
BUN BLD-MCNC: 30 MG/DL (ref 7–20)
BUN/CREAT SERPL: 25 (ref 6.3–21.9)
CALCIUM SPEC-SCNC: 9.2 MG/DL (ref 8.4–10.2)
CHLORIDE SERPL-SCNC: 97 MMOL/L (ref 98–107)
CO2 SERPL-SCNC: 25 MMOL/L (ref 26–30)
CREAT BLD-MCNC: 1.2 MG/DL (ref 0.6–1.3)
D-LACTATE SERPL-SCNC: 1 MMOL/L (ref 0.5–2)
DEPRECATED RDW RBC AUTO: 48.9 FL (ref 37–54)
EOSINOPHIL # BLD AUTO: 0.17 10*3/MM3 (ref 0–0.7)
EOSINOPHIL NFR BLD AUTO: 1.3 % (ref 0–7)
ERYTHROCYTE [DISTWIDTH] IN BLOOD BY AUTOMATED COUNT: 13.9 % (ref 11.5–14.5)
FLUAV AG NPH QL: NEGATIVE
FLUBV AG NPH QL IA: NEGATIVE
GFR SERPL CREATININE-BSD FRML MDRD: 61 ML/MIN/1.73
GLOBULIN UR ELPH-MCNC: 3.8 GM/DL
GLUCOSE BLD-MCNC: 89 MG/DL (ref 74–98)
HCT VFR BLD AUTO: 41.4 % (ref 42–52)
HGB BLD-MCNC: 14.5 G/DL (ref 14–18)
IMM GRANULOCYTES # BLD: 0.05 10*3/MM3 (ref 0–0.06)
IMM GRANULOCYTES NFR BLD: 0.4 % (ref 0–0.6)
LYMPHOCYTES # BLD AUTO: 2.13 10*3/MM3 (ref 0.6–3.4)
LYMPHOCYTES NFR BLD AUTO: 16.4 % (ref 10–50)
MCH RBC QN AUTO: 33.5 PG (ref 27–31)
MCHC RBC AUTO-ENTMCNC: 35 G/DL (ref 30–37)
MCV RBC AUTO: 95.6 FL (ref 80–94)
MONOCYTES # BLD AUTO: 1.45 10*3/MM3 (ref 0–0.9)
MONOCYTES NFR BLD AUTO: 11.2 % (ref 0–12)
NEUTROPHILS # BLD AUTO: 9.11 10*3/MM3 (ref 2–6.9)
NEUTROPHILS NFR BLD AUTO: 70.2 % (ref 37–80)
NRBC BLD MANUAL-RTO: 0 /100 WBC (ref 0–0)
PLATELET # BLD AUTO: 198 10*3/MM3 (ref 130–400)
PMV BLD AUTO: 10.1 FL (ref 6–12)
POTASSIUM BLD-SCNC: 4.7 MMOL/L (ref 3.5–5.1)
PROCALCITONIN SERPL-MCNC: 0.21 NG/ML
PROT SERPL-MCNC: 7.9 G/DL (ref 6.3–8.2)
RBC # BLD AUTO: 4.33 10*6/MM3 (ref 4.7–6.1)
SODIUM BLD-SCNC: 130 MMOL/L (ref 137–145)
TROPONIN I SERPL-MCNC: <0.012 NG/ML (ref 0–0.03)
WBC NRBC COR # BLD: 12.97 10*3/MM3 (ref 4.8–10.8)

## 2018-10-30 PROCEDURE — 83605 ASSAY OF LACTIC ACID: CPT | Performed by: NURSE PRACTITIONER

## 2018-10-30 PROCEDURE — 96375 TX/PRO/DX INJ NEW DRUG ADDON: CPT

## 2018-10-30 PROCEDURE — 85025 COMPLETE CBC W/AUTO DIFF WBC: CPT | Performed by: NURSE PRACTITIONER

## 2018-10-30 PROCEDURE — 93005 ELECTROCARDIOGRAM TRACING: CPT | Performed by: NURSE PRACTITIONER

## 2018-10-30 PROCEDURE — 80053 COMPREHEN METABOLIC PANEL: CPT | Performed by: NURSE PRACTITIONER

## 2018-10-30 PROCEDURE — 84484 ASSAY OF TROPONIN QUANT: CPT | Performed by: NURSE PRACTITIONER

## 2018-10-30 PROCEDURE — 96374 THER/PROPH/DIAG INJ IV PUSH: CPT

## 2018-10-30 PROCEDURE — 94799 UNLISTED PULMONARY SVC/PX: CPT

## 2018-10-30 PROCEDURE — 25010000002 ONDANSETRON PER 1 MG: Performed by: NURSE PRACTITIONER

## 2018-10-30 PROCEDURE — 25010000002 METHYLPREDNISOLONE PER 125 MG: Performed by: NURSE PRACTITIONER

## 2018-10-30 PROCEDURE — 71046 X-RAY EXAM CHEST 2 VIEWS: CPT

## 2018-10-30 PROCEDURE — 87804 INFLUENZA ASSAY W/OPTIC: CPT | Performed by: NURSE PRACTITIONER

## 2018-10-30 PROCEDURE — 99284 EMERGENCY DEPT VISIT MOD MDM: CPT

## 2018-10-30 PROCEDURE — 25010000002 KETOROLAC TROMETHAMINE PER 15 MG: Performed by: NURSE PRACTITIONER

## 2018-10-30 PROCEDURE — 84145 PROCALCITONIN (PCT): CPT | Performed by: NURSE PRACTITIONER

## 2018-10-30 PROCEDURE — 94640 AIRWAY INHALATION TREATMENT: CPT

## 2018-10-30 RX ORDER — DOXYCYCLINE 100 MG/1
100 CAPSULE ORAL 2 TIMES DAILY
Qty: 20 CAPSULE | Refills: 0 | Status: SHIPPED | OUTPATIENT
Start: 2018-10-30 | End: 2019-02-11

## 2018-10-30 RX ORDER — PREDNISONE 20 MG/1
20 TABLET ORAL 2 TIMES DAILY
Qty: 10 TABLET | Refills: 0 | Status: SHIPPED | OUTPATIENT
Start: 2018-10-30 | End: 2019-07-08 | Stop reason: SDUPTHER

## 2018-10-30 RX ORDER — KETOROLAC TROMETHAMINE 30 MG/ML
30 INJECTION, SOLUTION INTRAMUSCULAR; INTRAVENOUS ONCE
Status: COMPLETED | OUTPATIENT
Start: 2018-10-30 | End: 2018-10-30

## 2018-10-30 RX ORDER — ONDANSETRON 2 MG/ML
4 INJECTION INTRAMUSCULAR; INTRAVENOUS ONCE
Status: COMPLETED | OUTPATIENT
Start: 2018-10-30 | End: 2018-10-30

## 2018-10-30 RX ORDER — SODIUM CHLORIDE 0.9 % (FLUSH) 0.9 %
10 SYRINGE (ML) INJECTION AS NEEDED
Status: DISCONTINUED | OUTPATIENT
Start: 2018-10-30 | End: 2018-10-30 | Stop reason: HOSPADM

## 2018-10-30 RX ORDER — METHYLPREDNISOLONE SODIUM SUCCINATE 125 MG/2ML
125 INJECTION, POWDER, LYOPHILIZED, FOR SOLUTION INTRAMUSCULAR; INTRAVENOUS ONCE
Status: COMPLETED | OUTPATIENT
Start: 2018-10-30 | End: 2018-10-30

## 2018-10-30 RX ORDER — IPRATROPIUM BROMIDE AND ALBUTEROL SULFATE 2.5; .5 MG/3ML; MG/3ML
3 SOLUTION RESPIRATORY (INHALATION) ONCE
Status: COMPLETED | OUTPATIENT
Start: 2018-10-30 | End: 2018-10-30

## 2018-10-30 RX ADMIN — IPRATROPIUM BROMIDE AND ALBUTEROL SULFATE 3 ML: .5; 3 SOLUTION RESPIRATORY (INHALATION) at 18:49

## 2018-10-30 RX ADMIN — METHYLPREDNISOLONE SODIUM SUCCINATE 125 MG: 125 INJECTION, POWDER, FOR SOLUTION INTRAMUSCULAR; INTRAVENOUS at 19:00

## 2018-10-30 RX ADMIN — SODIUM CHLORIDE 1000 ML: 9 INJECTION, SOLUTION INTRAVENOUS at 18:56

## 2018-10-30 RX ADMIN — KETOROLAC TROMETHAMINE 30 MG: 30 INJECTION, SOLUTION INTRAMUSCULAR at 18:57

## 2018-10-30 RX ADMIN — ONDANSETRON 4 MG: 2 INJECTION INTRAMUSCULAR; INTRAVENOUS at 18:59

## 2018-10-31 RX ORDER — GABAPENTIN 300 MG/1
CAPSULE ORAL
Qty: 90 CAPSULE | Refills: 2 | Status: SHIPPED | OUTPATIENT
Start: 2018-10-31 | End: 2019-05-13 | Stop reason: SDUPTHER

## 2018-11-06 ENCOUNTER — OFFICE VISIT (OUTPATIENT)
Dept: INTERNAL MEDICINE | Facility: CLINIC | Age: 63
End: 2018-11-06

## 2018-11-06 VITALS
OXYGEN SATURATION: 96 % | RESPIRATION RATE: 16 BRPM | HEIGHT: 71 IN | WEIGHT: 144.2 LBS | TEMPERATURE: 98.2 F | DIASTOLIC BLOOD PRESSURE: 80 MMHG | HEART RATE: 78 BPM | SYSTOLIC BLOOD PRESSURE: 130 MMHG | BODY MASS INDEX: 20.19 KG/M2

## 2018-11-06 DIAGNOSIS — J01.01 ACUTE RECURRENT MAXILLARY SINUSITIS: Primary | ICD-10-CM

## 2018-11-06 DIAGNOSIS — E87.1 HYPONATREMIA: ICD-10-CM

## 2018-11-06 DIAGNOSIS — D72.829 LEUKOCYTOSIS, UNSPECIFIED TYPE: ICD-10-CM

## 2018-11-06 PROCEDURE — 99213 OFFICE O/P EST LOW 20 MIN: CPT | Performed by: FAMILY MEDICINE

## 2018-11-06 RX ORDER — AMOXICILLIN AND CLAVULANATE POTASSIUM 875; 125 MG/1; MG/1
1 TABLET, FILM COATED ORAL EVERY 12 HOURS SCHEDULED
Qty: 20 TABLET | Refills: 0 | Status: SHIPPED | OUTPATIENT
Start: 2018-11-06 | End: 2018-11-07 | Stop reason: ALTCHOICE

## 2018-11-06 RX ORDER — GUAIFENESIN DEXTROMETHORPHAN HYDROBROMIDE ORAL SOLUTION 10; 100 MG/5ML; MG/5ML
5 SOLUTION ORAL EVERY 12 HOURS
Qty: 180 ML | Refills: 1 | Status: SHIPPED | OUTPATIENT
Start: 2018-11-06 | End: 2019-07-08

## 2018-11-06 NOTE — PROGRESS NOTES
Elliott Dunn is a 63 y.o. male.    Chief Complaint   Patient presents with   • Bronchitis     was seen at the ER last tuesday and ER said that he was having a COPD flare with bronchitis    • Cough     severe cough x1 week        HPI   Patient went to the ER last week and was diagnosed with COPD exacerbation.  He reports productive cough with green sputum. He reports increased SOA and wheezing.  He does admits to nasal congestion and rhinorrhea.   Of note, patient did have an elevated WBC and low sodium in the ER.    The following portions of the patient's history were reviewed and updated as appropriate: allergies, current medications, past family history, past medical history, past social history, past surgical history and problem list.     Allergies   Allergen Reactions   • Plaquenil [Hydroxychloroquine Sulfate] Other (See Comments)     Black eyes   • Cyclobenzaprine Other (See Comments)     Wide awake   • Pravastatin Other (See Comments)     Weakness / fatigue  Weakness / fatigue         Current Outpatient Prescriptions:   •  albuterol (PROVENTIL HFA;VENTOLIN HFA) 108 (90 Base) MCG/ACT inhaler, Inhale 2 puffs Every 6 (Six) Hours As Needed for Wheezing or Shortness of Air., Disp: 1 inhaler, Rfl: 3  •  amitriptyline (ELAVIL) 25 MG tablet, TAKE 1-2 AT BEDTIME AS NEEDED FOR SLEEP, Disp: 60 tablet, Rfl: 5  •  ASPIRIN ADULT LOW DOSE 81 MG EC tablet, TAKE ONE TABLET BY MOUTH DAILY, Disp: 30 tablet, Rfl: 3  •  atorvastatin (LIPITOR) 10 MG tablet, Take 1 tablet by mouth Daily., Disp: 90 tablet, Rfl: 2  •  bisoprolol (ZEBeta) 5 MG tablet, TAKE ONE TABLET BY MOUTH DAILY, Disp: 30 tablet, Rfl: 3  •  doxycycline (MONODOX) 100 MG capsule, Take 1 capsule by mouth 2 (Two) Times a Day., Disp: 20 capsule, Rfl: 0  •  DULoxetine (CYMBALTA) 60 MG capsule, TAKE ONE CAPSULE BY MOUTH DAILY, Disp: 30 capsule, Rfl: 1  •  folic acid (FOLVITE) 1 MG tablet, Take 1 mg by mouth Daily., Disp: , Rfl:   •  gabapentin (NEURONTIN) 300 MG capsule,  TAKE ONE CAPSULE BY MOUTH THREE TIMES A DAY AS NEEDED FOR PAIN, Disp: 90 capsule, Rfl: 2  •  isosorbide mononitrate (IMDUR) 30 MG 24 hr tablet, TAKE ONE TABLET BY MOUTH DAILY, Disp: 30 tablet, Rfl: 3  •  meloxicam (MOBIC) 15 MG tablet, TAKE ONE TABLET BY MOUTH DAILY, Disp: 30 tablet, Rfl: 4  •  methocarbamol (ROBAXIN) 750 MG tablet, Take 1 tablet by mouth 3 (Three) Times a Day., Disp: 90 tablet, Rfl: 4  •  methotrexate 2.5 MG tablet, 6 tablets by mouth on Mondays, Disp: 24 tablet, Rfl: 0  •  nitroglycerin (NITROSTAT) 0.4 MG SL tablet, 1 under the tongue as needed for angina, may repeat q5mins for up three doses, Disp: 100 tablet, Rfl: 11  •  omeprazole OTC (PriLOSEC OTC) 20 MG EC tablet, Take 1 tablet by mouth Daily., Disp: 30 tablet, Rfl: 3  •  predniSONE (DELTASONE) 10 MG tablet, Take 10 mg by mouth Daily. As needed, Disp: , Rfl:   •  predniSONE (DELTASONE) 20 MG tablet, Take 1 tablet by mouth 2 (Two) Times a Day., Disp: 10 tablet, Rfl: 0  •  Triamcinolone Acetonide (NASACORT) 55 MCG/ACT nasal inhaler, 2 sprays into each nostril daily., Disp: , Rfl:   •  vitamin B-12 (CYANOCOBALAMIN) 1000 MCG tablet, TAKE ONE TABLET BY MOUTH DAILY, Disp: 30 tablet, Rfl: 8  •  amoxicillin-clavulanate (AUGMENTIN) 875-125 MG per tablet, Take 1 tablet by mouth Every 12 (Twelve) Hours., Disp: 20 tablet, Rfl: 0  •  dextromethorphan-guaifenesin (ROBITUSSIN TO GO CGH/CHEST DM)  MG/5ML liquid, Take 5 mL by mouth Every 12 (Twelve) Hours., Disp: 180 mL, Rfl: 1  •  ENBREL SURECLICK 50 MG/ML solution auto-injector, , Disp: , Rfl:     ROS    Review of Systems   Constitutional: Positive for chills and fatigue. Negative for fever.   HENT: Positive for congestion and rhinorrhea. Negative for sore throat.    Respiratory: Positive for cough, shortness of breath and wheezing.    Cardiovascular: Negative for chest pain and leg swelling.   Gastrointestinal: Negative for abdominal pain, constipation, diarrhea, nausea and vomiting.  "  Musculoskeletal: Positive for arthralgias. Negative for back pain.   Neurological: Positive for weakness. Negative for numbness and headache.       Vitals:    11/06/18 1607   BP: 130/80   Pulse: 78   Resp: 16   Temp: 98.2 °F (36.8 °C)   TempSrc: Temporal Artery    SpO2: 96%   Weight: 65.4 kg (144 lb 3.2 oz)   Height: 180.3 cm (70.98\")     Body mass index is 20.12 kg/m².    Physical Exam     Physical Exam   Constitutional: He is oriented to person, place, and time. He appears well-developed and well-nourished. No distress.   HENT:   Head: Normocephalic and atraumatic.   Right Ear: Tympanic membrane and external ear normal.   Left Ear: Tympanic membrane and external ear normal.   Nose: Right sinus exhibits maxillary sinus tenderness. Left sinus exhibits maxillary sinus tenderness.   Mouth/Throat: Posterior oropharyngeal erythema (PND) present.   Eyes: Conjunctivae and EOM are normal.   Neck: Normal range of motion. Neck supple.   Cardiovascular: Normal rate and regular rhythm.    No murmur heard.  Pulmonary/Chest: Effort normal and breath sounds normal. No respiratory distress. He has no wheezes.   Abdominal: Soft. Bowel sounds are normal. He exhibits no distension. There is no tenderness.   Lymphadenopathy:     He has no cervical adenopathy.   Neurological: He is alert and oriented to person, place, and time. No cranial nerve deficit.   Skin: Skin is warm and dry.   Psychiatric: He has a normal mood and affect. His behavior is normal.       Assessment/Plan    Problem List Items Addressed This Visit     Acute recurrent maxillary sinusitis - Primary     Encouraged use of nasal spray.  Stop doxycycline.  Start augmentin.  May take robitussin as needed.             Other Visit Diagnoses     Hyponatremia        Relevant Orders    Comprehensive Metabolic Panel    Leukocytosis, unspecified type        Relevant Orders    CBC & Differential          New Medications Ordered This Visit   Medications   • " amoxicillin-clavulanate (AUGMENTIN) 875-125 MG per tablet     Sig: Take 1 tablet by mouth Every 12 (Twelve) Hours.     Dispense:  20 tablet     Refill:  0   • dextromethorphan-guaifenesin (ROBITUSSIN TO GO C[facility]/CHEST DM)  MG/5ML liquid     Sig: Take 5 mL by mouth Every 12 (Twelve) Hours.     Dispense:  180 mL     Refill:  1   • methotrexate 2.5 MG tablet     Si tablets by mouth on      Dispense:  24 tablet     Refill:  0       No orders of the defined types were placed in this encounter.      Return if symptoms worsen or fail to improve.    Emmanuelle Savage, DO

## 2018-11-06 NOTE — ASSESSMENT & PLAN NOTE
Encouraged use of nasal spray.  Stop doxycycline.  Start augmentin.  May take robitussin as needed.

## 2018-11-07 DIAGNOSIS — M25.50 CHRONIC JOINT PAIN: ICD-10-CM

## 2018-11-07 DIAGNOSIS — G89.29 CHRONIC JOINT PAIN: ICD-10-CM

## 2018-11-07 RX ORDER — CEFDINIR 300 MG/1
300 CAPSULE ORAL 2 TIMES DAILY
Qty: 14 CAPSULE | Refills: 0 | Status: SHIPPED | OUTPATIENT
Start: 2018-11-07 | End: 2018-11-14

## 2018-11-07 RX ORDER — DULOXETIN HYDROCHLORIDE 60 MG/1
CAPSULE, DELAYED RELEASE ORAL
Qty: 30 CAPSULE | Refills: 0 | Status: SHIPPED | OUTPATIENT
Start: 2018-11-07 | End: 2018-12-07 | Stop reason: SDUPTHER

## 2018-11-07 NOTE — TELEPHONE ENCOUNTER
Pt was seen by Dr. Savage yesterday, was given RX for Augmentin. Bart in [place] has called and left VM stating that since pt is on methotrexate should we change to something different or is it okay to take the augmentin. Please advise?

## 2018-11-09 ENCOUNTER — TELEPHONE (OUTPATIENT)
Dept: INTERNAL MEDICINE | Facility: CLINIC | Age: 63
End: 2018-11-09

## 2018-11-09 NOTE — TELEPHONE ENCOUNTER
Patient wife called with questions regarding the antibiotics the patient was recently prescribed.She is not sure if the pharmacy gave him the correct medication. Please advise.

## 2018-11-16 NOTE — TELEPHONE ENCOUNTER
Patient's wife called stating that she had missed call from nurse earlier and has requested another call back.

## 2018-11-20 ENCOUNTER — APPOINTMENT (OUTPATIENT)
Dept: LAB | Facility: HOSPITAL | Age: 63
End: 2018-11-20

## 2018-11-20 LAB
ALBUMIN SERPL-MCNC: 3.9 G/DL (ref 3.5–5)
ALBUMIN/GLOB SERPL: 1.1 G/DL (ref 1–2)
ALP SERPL-CCNC: 77 U/L (ref 38–126)
ALT SERPL W P-5'-P-CCNC: 39 U/L (ref 13–69)
ANION GAP SERPL CALCULATED.3IONS-SCNC: 8.3 MMOL/L (ref 10–20)
AST SERPL-CCNC: 38 U/L (ref 15–46)
BASOPHILS # BLD AUTO: 0.04 10*3/MM3 (ref 0–0.2)
BASOPHILS NFR BLD AUTO: 0.5 % (ref 0–2.5)
BILIRUB SERPL-MCNC: 0.3 MG/DL (ref 0.2–1.3)
BUN BLD-MCNC: 37 MG/DL (ref 7–20)
BUN/CREAT SERPL: 33.6 (ref 6.3–21.9)
CALCIUM SPEC-SCNC: 9.3 MG/DL (ref 8.4–10.2)
CHLORIDE SERPL-SCNC: 104 MMOL/L (ref 98–107)
CO2 SERPL-SCNC: 28 MMOL/L (ref 26–30)
CREAT BLD-MCNC: 1.1 MG/DL (ref 0.6–1.3)
DEPRECATED RDW RBC AUTO: 54 FL (ref 37–54)
EOSINOPHIL # BLD AUTO: 0.37 10*3/MM3 (ref 0–0.7)
EOSINOPHIL NFR BLD AUTO: 4.2 % (ref 0–7)
ERYTHROCYTE [DISTWIDTH] IN BLOOD BY AUTOMATED COUNT: 14.7 % (ref 11.5–14.5)
GFR SERPL CREATININE-BSD FRML MDRD: 68 ML/MIN/1.73
GLOBULIN UR ELPH-MCNC: 3.5 GM/DL
GLUCOSE BLD-MCNC: 86 MG/DL (ref 74–98)
HCT VFR BLD AUTO: 41.1 % (ref 42–52)
HGB BLD-MCNC: 13.5 G/DL (ref 14–18)
IMM GRANULOCYTES # BLD: 0.02 10*3/MM3 (ref 0–0.06)
IMM GRANULOCYTES NFR BLD: 0.2 % (ref 0–0.6)
LYMPHOCYTES # BLD AUTO: 2.93 10*3/MM3 (ref 0.6–3.4)
LYMPHOCYTES NFR BLD AUTO: 33.3 % (ref 10–50)
MCH RBC QN AUTO: 32.9 PG (ref 27–31)
MCHC RBC AUTO-ENTMCNC: 32.8 G/DL (ref 30–37)
MCV RBC AUTO: 100.2 FL (ref 80–94)
MONOCYTES # BLD AUTO: 0.94 10*3/MM3 (ref 0–0.9)
MONOCYTES NFR BLD AUTO: 10.7 % (ref 0–12)
NEUTROPHILS # BLD AUTO: 4.51 10*3/MM3 (ref 2–6.9)
NEUTROPHILS NFR BLD AUTO: 51.1 % (ref 37–80)
NRBC BLD MANUAL-RTO: 0 /100 WBC (ref 0–0)
PLATELET # BLD AUTO: 232 10*3/MM3 (ref 130–400)
PMV BLD AUTO: 9.6 FL (ref 6–12)
POTASSIUM BLD-SCNC: 4.3 MMOL/L (ref 3.5–5.1)
PROT SERPL-MCNC: 7.4 G/DL (ref 6.3–8.2)
RBC # BLD AUTO: 4.1 10*6/MM3 (ref 4.7–6.1)
SODIUM BLD-SCNC: 136 MMOL/L (ref 137–145)
WBC NRBC COR # BLD: 8.81 10*3/MM3 (ref 4.8–10.8)

## 2018-11-20 PROCEDURE — 80053 COMPREHEN METABOLIC PANEL: CPT | Performed by: FAMILY MEDICINE

## 2018-11-20 PROCEDURE — 85025 COMPLETE CBC W/AUTO DIFF WBC: CPT | Performed by: FAMILY MEDICINE

## 2018-11-20 PROCEDURE — 36415 COLL VENOUS BLD VENIPUNCTURE: CPT | Performed by: FAMILY MEDICINE

## 2018-12-07 DIAGNOSIS — G89.29 CHRONIC JOINT PAIN: Chronic | ICD-10-CM

## 2018-12-07 DIAGNOSIS — M25.50 CHRONIC JOINT PAIN: ICD-10-CM

## 2018-12-07 DIAGNOSIS — G89.4 CHRONIC PAIN SYNDROME: ICD-10-CM

## 2018-12-07 DIAGNOSIS — G89.29 CHRONIC JOINT PAIN: ICD-10-CM

## 2018-12-07 DIAGNOSIS — M25.50 CHRONIC JOINT PAIN: Chronic | ICD-10-CM

## 2018-12-07 RX ORDER — METHOCARBAMOL 750 MG/1
TABLET, FILM COATED ORAL
Qty: 90 TABLET | Refills: 3 | Status: SHIPPED | OUTPATIENT
Start: 2018-12-07 | End: 2019-04-07 | Stop reason: SDUPTHER

## 2018-12-07 RX ORDER — MELOXICAM 15 MG/1
TABLET ORAL
Qty: 30 TABLET | Refills: 3 | Status: SHIPPED | OUTPATIENT
Start: 2018-12-07 | End: 2019-02-11

## 2018-12-07 RX ORDER — DULOXETIN HYDROCHLORIDE 60 MG/1
CAPSULE, DELAYED RELEASE ORAL
Qty: 30 CAPSULE | Refills: 0 | Status: SHIPPED | OUTPATIENT
Start: 2018-12-07 | End: 2019-01-06 | Stop reason: SDUPTHER

## 2019-01-06 DIAGNOSIS — M25.50 CHRONIC JOINT PAIN: ICD-10-CM

## 2019-01-06 DIAGNOSIS — G89.29 CHRONIC JOINT PAIN: ICD-10-CM

## 2019-01-06 DIAGNOSIS — F51.01 PRIMARY INSOMNIA: ICD-10-CM

## 2019-01-07 RX ORDER — ATORVASTATIN CALCIUM 10 MG/1
TABLET, FILM COATED ORAL
Qty: 30 TABLET | Refills: 5 | Status: SHIPPED | OUTPATIENT
Start: 2019-01-07 | End: 2019-02-11

## 2019-01-07 RX ORDER — ISOSORBIDE MONONITRATE 30 MG/1
TABLET, EXTENDED RELEASE ORAL
Qty: 30 TABLET | Refills: 2 | Status: SHIPPED | OUTPATIENT
Start: 2019-01-07 | End: 2019-04-07 | Stop reason: SDUPTHER

## 2019-01-07 RX ORDER — BISOPROLOL FUMARATE 5 MG/1
TABLET, FILM COATED ORAL
Qty: 30 TABLET | Refills: 2 | Status: SHIPPED | OUTPATIENT
Start: 2019-01-07 | End: 2019-04-07 | Stop reason: SDUPTHER

## 2019-01-07 RX ORDER — DULOXETIN HYDROCHLORIDE 60 MG/1
CAPSULE, DELAYED RELEASE ORAL
Qty: 30 CAPSULE | Refills: 5 | Status: SHIPPED | OUTPATIENT
Start: 2019-01-07 | End: 2019-07-06 | Stop reason: SDUPTHER

## 2019-01-07 RX ORDER — AMITRIPTYLINE HYDROCHLORIDE 25 MG/1
TABLET, FILM COATED ORAL
Qty: 60 TABLET | Refills: 5 | Status: SHIPPED | OUTPATIENT
Start: 2019-01-07 | End: 2019-02-11

## 2019-02-11 ENCOUNTER — HOSPITAL ENCOUNTER (OUTPATIENT)
Dept: GENERAL RADIOLOGY | Facility: HOSPITAL | Age: 64
Discharge: HOME | End: 2019-02-11
Admitting: FAMILY MEDICINE

## 2019-02-11 ENCOUNTER — OFFICE VISIT (OUTPATIENT)
Dept: INTERNAL MEDICINE | Facility: CLINIC | Age: 64
End: 2019-02-11

## 2019-02-11 VITALS
TEMPERATURE: 97.3 F | WEIGHT: 146.13 LBS | HEART RATE: 62 BPM | SYSTOLIC BLOOD PRESSURE: 120 MMHG | BODY MASS INDEX: 20.46 KG/M2 | RESPIRATION RATE: 16 BRPM | DIASTOLIC BLOOD PRESSURE: 72 MMHG | OXYGEN SATURATION: 98 % | HEIGHT: 71 IN

## 2019-02-11 DIAGNOSIS — Z87.891 HISTORY OF NICOTINE DEPENDENCE: ICD-10-CM

## 2019-02-11 DIAGNOSIS — G89.4 CHRONIC PAIN SYNDROME: ICD-10-CM

## 2019-02-11 DIAGNOSIS — M06.4 INFLAMMATORY POLYARTHROPATHY (HCC): ICD-10-CM

## 2019-02-11 DIAGNOSIS — Z12.2 ENCOUNTER FOR SCREENING FOR LUNG CANCER: ICD-10-CM

## 2019-02-11 DIAGNOSIS — F51.01 PRIMARY INSOMNIA: ICD-10-CM

## 2019-02-11 DIAGNOSIS — M25.511 ACUTE PAIN OF RIGHT SHOULDER: Primary | ICD-10-CM

## 2019-02-11 PROCEDURE — 73030 X-RAY EXAM OF SHOULDER: CPT

## 2019-02-11 PROCEDURE — 99214 OFFICE O/P EST MOD 30 MIN: CPT | Performed by: FAMILY MEDICINE

## 2019-02-11 RX ORDER — AMITRIPTYLINE HYDROCHLORIDE 25 MG/1
TABLET, FILM COATED ORAL
Qty: 270 TABLET | Refills: 4 | Status: SHIPPED | OUTPATIENT
Start: 2019-02-11 | End: 2020-03-02

## 2019-02-11 RX ORDER — CELECOXIB 100 MG/1
100 CAPSULE ORAL 2 TIMES DAILY PRN
Qty: 180 CAPSULE | Refills: 4 | Status: SHIPPED | OUTPATIENT
Start: 2019-02-11 | End: 2020-01-13 | Stop reason: SDUPTHER

## 2019-02-11 NOTE — PROGRESS NOTES
"Subjective    Elliott Dunn is a 63 y.o. male here for:    Chief Complaint   Patient presents with   • Fibromyalgia     6 mo f/u   • Shoulder Pain     left shoulder x1 month       History of Present Illness   Shoulder pain x 1 month on left. No known injuries. Pain was gradual. Pain goes down torward elbow. Hurts more at night.  He is followed by rheumatology for inflammatory arthritis, needs refill on his methotrexate.  He is up to 8 tablets a week now.  He has follow-up scheduled with rheumatology.  He is also on leflunomide, he is not convinced the medicines are helping.  Taking amitriptyline at night he takes 2 at a time most nights but it does not seem to be helping either.  He would like to try something in the place of Mobic because it does not seem to be helping with his pain.    He is due for his repeat CT scan for lung cancer screening.  Patient remains tobacco free.  He has smoked since approximately age 9 and says he never got over a pack a day.    The following portions of the patient's history were reviewed and updated as appropriate: allergies, current medications, past family history, past medical history, past social history, past surgical history and problem list.    Health Maintenance   Topic Date Due   • ANNUAL PHYSICAL  06/08/1958   • ZOSTER VACCINE (2 of 2) 05/23/2017   • LIPID PANEL  10/24/2018   • LUNG CANCER SCREENING  11/01/2018   • TDAP/TD VACCINES (2 - Td) 02/01/2025   • COLONOSCOPY  04/20/2028   • HEPATITIS C SCREENING  Completed   • INFLUENZA VACCINE  Completed       Review of Systems   Respiratory: Positive for cough.    Musculoskeletal: Positive for arthralgias, joint swelling and myalgias.   Psychiatric/Behavioral: Positive for sleep disturbance.       Vitals:    02/11/19 1402   BP: 120/72   Pulse: 62   Resp: 16   Temp: 97.3 °F (36.3 °C)   TempSrc: Temporal   SpO2: 98%   Weight: 66.3 kg (146 lb 2 oz)   Height: 180.3 cm (70.98\")         Objective   Physical Exam   Constitutional: He " is oriented to person, place, and time. Vital signs are normal. He appears well-developed and well-nourished. He is active.  Non-toxic appearance. He has a sickly appearance. He does not appear ill. No distress. He is not overweight.  HENT:   Head: Normocephalic and atraumatic.   Eyes: EOM are normal.   Neck: Neck supple.   Cardiovascular: Normal rate and regular rhythm.   Pulmonary/Chest: Effort normal and breath sounds normal.   Musculoskeletal:        Left shoulder: He exhibits decreased range of motion, tenderness and pain.        Right hand: He exhibits deformity and swelling.        Left hand: He exhibits deformity and swelling.   Neurological: He is alert and oriented to person, place, and time. No cranial nerve deficit. Gait abnormal.   Skin: Skin is warm. He is not diaphoretic.   Psychiatric: He has a normal mood and affect. His behavior is normal.   Nursing note and vitals reviewed.        Assessment/Plan     Problem List Items Addressed This Visit        Nervous and Auditory    Chronic pain syndrome (Chronic)    Relevant Medications    amitriptyline (ELAVIL) 25 MG tablet       Musculoskeletal and Integument    Inflammatory polyarthropathy (CMS/HCC)    Overview     · Per Dr. Haider. Negative NEO, normal ESR, RF, anti-ccp and did not improve with prednisone per notes.  · Medicine failures include Plaquenil, methotrexate         Relevant Medications    LEFLUNOMIDE PO    celecoxib (CELEBREX) 100 MG capsule    amitriptyline (ELAVIL) 25 MG tablet    methotrexate 2.5 MG tablet    Other Relevant Orders    XR Shoulder 2+ View Left       Other    Primary insomnia    Relevant Medications    amitriptyline (ELAVIL) 25 MG tablet      Other Visit Diagnoses     Acute pain of right shoulder    -  Primary    Relevant Medications    celecoxib (CELEBREX) 100 MG capsule    Other Relevant Orders    XR Shoulder 2+ View Left    Encounter for screening for lung cancer        Relevant Orders    CT chest low dose wo    History of  nicotine dependence        Relevant Orders    CT chest low dose wo          · Discussed need for probable MRI of the shoulder due to likely rotator cuff pathology.  · Follow-up with rheumatology  · Risks and benefits of lung cancer screening discussed. Shared decision making employed in making decision to proceed with lung cancer screening. Patient is aware further testing may be needed and that CT scans occur yearly. Another risk is radiation exposure, cumulative over time, though low levels.  At this time the patient has no signs of lung cancer.  ·     Return in about 6 months (around 8/11/2019) for Follow up on current issues.    Ladonna Means MD

## 2019-02-12 DIAGNOSIS — M06.4 INFLAMMATORY POLYARTHROPATHY (HCC): ICD-10-CM

## 2019-02-12 DIAGNOSIS — M25.512 CHRONIC LEFT SHOULDER PAIN: Primary | ICD-10-CM

## 2019-02-12 DIAGNOSIS — G89.29 CHRONIC LEFT SHOULDER PAIN: Primary | ICD-10-CM

## 2019-02-21 ENCOUNTER — HOSPITAL ENCOUNTER (OUTPATIENT)
Dept: CT IMAGING | Facility: HOSPITAL | Age: 64
Discharge: HOME | End: 2019-02-21
Admitting: FAMILY MEDICINE

## 2019-02-21 ENCOUNTER — HOSPITAL ENCOUNTER (OUTPATIENT)
Dept: MRI IMAGING | Facility: HOSPITAL | Age: 64
Discharge: HOME | End: 2019-02-21

## 2019-02-21 DIAGNOSIS — M06.4 INFLAMMATORY POLYARTHROPATHY (HCC): ICD-10-CM

## 2019-02-21 DIAGNOSIS — M25.512 CHRONIC LEFT SHOULDER PAIN: ICD-10-CM

## 2019-02-21 DIAGNOSIS — Z12.2 ENCOUNTER FOR SCREENING FOR LUNG CANCER: ICD-10-CM

## 2019-02-21 DIAGNOSIS — Z87.891 HISTORY OF NICOTINE DEPENDENCE: ICD-10-CM

## 2019-02-21 DIAGNOSIS — G89.29 CHRONIC LEFT SHOULDER PAIN: ICD-10-CM

## 2019-02-21 PROCEDURE — 73221 MRI JOINT UPR EXTREM W/O DYE: CPT

## 2019-02-21 PROCEDURE — G0297 LDCT FOR LUNG CA SCREEN: HCPCS

## 2019-02-25 DIAGNOSIS — IMO0001 TEAR OF LEFT SUPRASPINATUS TENDON, SUBSEQUENT ENCOUNTER: Primary | ICD-10-CM

## 2019-02-28 ENCOUNTER — OFFICE VISIT (OUTPATIENT)
Dept: ORTHOPEDIC SURGERY | Facility: CLINIC | Age: 64
End: 2019-02-28

## 2019-02-28 VITALS — BODY MASS INDEX: 19.88 KG/M2 | RESPIRATION RATE: 18 BRPM | WEIGHT: 142 LBS | HEIGHT: 71 IN

## 2019-02-28 DIAGNOSIS — M75.122 COMPLETE TEAR OF LEFT ROTATOR CUFF: ICD-10-CM

## 2019-02-28 DIAGNOSIS — IMO0002 BURSITIS/TENDONITIS, SHOULDER: Primary | ICD-10-CM

## 2019-02-28 PROCEDURE — 99204 OFFICE O/P NEW MOD 45 MIN: CPT | Performed by: ORTHOPAEDIC SURGERY

## 2019-02-28 PROCEDURE — 20610 DRAIN/INJ JOINT/BURSA W/O US: CPT | Performed by: ORTHOPAEDIC SURGERY

## 2019-02-28 RX ORDER — LIDOCAINE HYDROCHLORIDE 10 MG/ML
2 INJECTION, SOLUTION INFILTRATION; PERINEURAL
Status: COMPLETED | OUTPATIENT
Start: 2019-02-28 | End: 2019-02-28

## 2019-02-28 RX ORDER — TRIAMCINOLONE ACETONIDE 40 MG/ML
40 INJECTION, SUSPENSION INTRA-ARTICULAR; INTRAMUSCULAR
Status: COMPLETED | OUTPATIENT
Start: 2019-02-28 | End: 2019-02-28

## 2019-02-28 RX ADMIN — LIDOCAINE HYDROCHLORIDE 2 ML: 10 INJECTION, SOLUTION INFILTRATION; PERINEURAL at 15:08

## 2019-02-28 RX ADMIN — TRIAMCINOLONE ACETONIDE 40 MG: 40 INJECTION, SUSPENSION INTRA-ARTICULAR; INTRAMUSCULAR at 15:08

## 2019-02-28 NOTE — PROGRESS NOTES
Subjective   Patient ID: Elliott Dunn is a 63 y.o. male  Pain of the Left Shoulder (Patient is here today for left shoulder pain, he states there was no injury to the shoulder that he just woke up in pain. This all started about 6 weeks ago. His pain level is 7/10.)             History of Present Illness  Ambidextrous left hand dominant male with left anterior shoulder pain over 6 weeks ago woke up with the pain has gotten no improvement had an MRI and x-ray MR was confirmatory for full-thickness small non-retracted supraspinatus rotator cuff tear with impingement he is here today for evaluation.  Denies neck pain paresthesias, does have some weakness and difficulty with use of his left arm has not done physical therapy and has no history of prior injections.  He is treated by Dr. Haider with methotrexate and several other medications for his arthritis.      Review of Systems   Constitutional: Negative for fever.   HENT: Negative for voice change.    Eyes: Negative for visual disturbance.   Respiratory: Negative for shortness of breath.    Cardiovascular: Negative for chest pain.   Gastrointestinal: Negative for abdominal distention and abdominal pain.   Genitourinary: Negative for dysuria.   Musculoskeletal: Positive for arthralgias. Negative for gait problem and joint swelling.   Skin: Negative for rash.   Neurological: Negative for speech difficulty.   Hematological: Does not bruise/bleed easily.   Psychiatric/Behavioral: Negative for confusion.       Past Medical History:   Diagnosis Date   • Acquired absence of all teeth    • Allergic    • Anomalous coronary artery origin    • Arrhythmia    • Arthritis    • Arthritis of lumbar spine 7/29/2016   • Back pain 6/17/2016   • Cancer (CMS/[facility])     prostate   • Cataract    • Cervical spine disease 6/17/2016   • CHF (congestive heart failure) (CMS/[facility])    • Chronic obstructive pulmonary disease (CMS/[facility])    • Colon polyps    • Deafness in left ear    • Derangement of  anterior horn of medial meniscus    • Difficulty swallowing    • Disorder of lumbar spine 6/17/2016   • Disorder of thoracic spine 6/17/2016   • Diverticulitis of colon    • Erectile dysfunction    • Esophageal reflux    • Essential hypertension    • Glaucoma    • Heart murmur    • High cholesterol    • Inflammatory polyarthropathy (CMS/HCC)     Per Dr. Haider. Negative NEO, normal ESR, RF, anti-ccp and did not improve with prednisone per notes.   • Inguinal hernia    • Lateral meniscus derangement    • Left otitis media with spontaneous rupture of eardrum    • Locking of left knee    • Low back pain    • MGUS (monoclonal gammopathy of unknown significance)     likely diagnosis   • Neuromuscular disorder (CMS/HCC)    • Neuropathic arthritis    • Perforation of left tympanic membrane    • Pulmonary emphysema (CMS/HCC)    • Scoliosis    • Skin cancer     skin cancer   • Skin cancer of face     squamous cell   • Spina bifida occulta 6/17/2016   • Tobacco abuse 11/9/2017   • Visual impairment    • Wears glasses         Past Surgical History:   Procedure Laterality Date   • COLONOSCOPY     • ENDOSCOPY N/A 4/10/2017    Procedure: ESOPHAGOGASTRODUODENOSCOPY WITH BIOPSY;  Surgeon: Alexander Ritchie MD;  Location: [facility] ENDOSCOPY;  Service:    • EYE SURGERY     • HERNIA REPAIR     • INGUINAL HERNIA REPAIR Right    • INGUINAL HERNIA REPAIR     • KNEE SURGERY Left    • LYMPH NODE BIOPSY     • PROSTATE SURGERY  2004   • PROSTATECTOMY  06/30/2014   • PROSTATECTOMY      Prostatectomy Radical   • SKIN CANCER EXCISION  2017    both sides of body/squamous cell melanoma   • TYMPANOPLASTY W/ MASTOIDECTOMY     • UMBILICAL HERNIA REPAIR         Family History   Problem Relation Age of Onset   • Diabetes Mother    • Hypertension Mother    • Obesity Mother    • Stroke Mother 65   • Arthritis Mother    • Hyperlipidemia Mother    • Vision loss Mother    • Cancer Father    • Cancer Sister    • Diabetes Brother    • Cancer Brother    • Heart  attack Brother    • Alcohol abuse Brother    • Drug abuse Brother    • Hearing loss Brother    • Learning disabilities Brother        Social History     Socioeconomic History   • Marital status:      Spouse name: Not on file   • Number of children: Not on file   • Years of education: Not on file   • Highest education level: Not on file   Social Needs   • Financial resource strain: Not on file   • Food insecurity - worry: Not on file   • Food insecurity - inability: Not on file   • Transportation needs - medical: Not on file   • Transportation needs - non-medical: Not on file   Occupational History   • Not on file   Tobacco Use   • Smoking status: Former Smoker     Packs/day: 1.00     Years: 51.00     Pack years: 51.00     Types: Cigarettes     Start date: 1963     Last attempt to quit: 2018     Years since quittin.1   • Smokeless tobacco: Never Used   Substance and Sexual Activity   • Alcohol use: No   • Drug use: No   • Sexual activity: Yes     Partners: Female     Birth control/protection: None   Other Topics Concern   • Not on file   Social History Narrative   • Not on file       I have reviewed all of the above social hx, family hx, surgical hx, medications, allergies & ROS and confirm that it is accurate.    Allergies   Allergen Reactions   • Plaquenil [Hydroxychloroquine Sulfate] Other (See Comments)     Black eyes   • Cyclobenzaprine Other (See Comments)     Wide awake   • Pravastatin Other (See Comments)     Weakness / fatigue  Weakness / fatigue         Current Outpatient Medications:   •  albuterol (PROVENTIL HFA;VENTOLIN HFA) 108 (90 Base) MCG/ACT inhaler, Inhale 2 puffs Every 6 (Six) Hours As Needed for Wheezing or Shortness of Air., Disp: 1 inhaler, Rfl: 3  •  amitriptyline (ELAVIL) 25 MG tablet, TAKE 1-3 AT BEDTIME AS NEEDED FOR SLEEP, Disp: 270 tablet, Rfl: 4  •  ASPIRIN ADULT LOW DOSE 81 MG EC tablet, TAKE ONE TABLET BY MOUTH DAILY, Disp: 30 tablet, Rfl: 3  •  atorvastatin  "(LIPITOR) 10 MG tablet, Take 1 tablet by mouth Daily., Disp: 90 tablet, Rfl: 2  •  bisoprolol (ZEBeta) 5 MG tablet, TAKE ONE TABLET BY MOUTH DAILY, Disp: 30 tablet, Rfl: 2  •  celecoxib (CELEBREX) 100 MG capsule, Take 1 capsule by mouth 2 (Two) Times a Day As Needed for Moderate Pain ., Disp: 180 capsule, Rfl: 4  •  dextromethorphan-guaifenesin (ROBITUSSIN TO GO CGH/CHEST DM)  MG/5ML liquid, Take 5 mL by mouth Every 12 (Twelve) Hours., Disp: 180 mL, Rfl: 1  •  DULoxetine (CYMBALTA) 60 MG capsule, TAKE ONE CAPSULE BY MOUTH DAILY, Disp: 30 capsule, Rfl: 5  •  folic acid (FOLVITE) 1 MG tablet, Take 1 mg by mouth Daily., Disp: , Rfl:   •  gabapentin (NEURONTIN) 300 MG capsule, TAKE ONE CAPSULE BY MOUTH THREE TIMES A DAY AS NEEDED FOR PAIN, Disp: 90 capsule, Rfl: 2  •  isosorbide mononitrate (IMDUR) 30 MG 24 hr tablet, TAKE ONE TABLET BY MOUTH DAILY, Disp: 30 tablet, Rfl: 2  •  LEFLUNOMIDE PO, Take  by mouth., Disp: , Rfl:   •  methocarbamol (ROBAXIN) 750 MG tablet, TAKE ONE TABLET BY MOUTH THREE TIMES A DAY, Disp: 90 tablet, Rfl: 3  •  methotrexate 2.5 MG tablet, Take 8 tablets by mouth 1 (One) Time Per Week., Disp: 96 tablet, Rfl: 0  •  nitroglycerin (NITROSTAT) 0.4 MG SL tablet, 1 under the tongue as needed for angina, may repeat q5mins for up three doses, Disp: 100 tablet, Rfl: 11  •  omeprazole OTC (PriLOSEC OTC) 20 MG EC tablet, Take 1 tablet by mouth Daily., Disp: 30 tablet, Rfl: 3  •  predniSONE (DELTASONE) 10 MG tablet, Take 10 mg by mouth Daily. As needed, Disp: , Rfl:   •  predniSONE (DELTASONE) 20 MG tablet, Take 1 tablet by mouth 2 (Two) Times a Day., Disp: 10 tablet, Rfl: 0  •  Triamcinolone Acetonide (NASACORT) 55 MCG/ACT nasal inhaler, 2 sprays into each nostril daily., Disp: , Rfl:   •  vitamin B-12 (CYANOCOBALAMIN) 1000 MCG tablet, TAKE ONE TABLET BY MOUTH DAILY, Disp: 30 tablet, Rfl: 8    Objective   Resp 18   Ht 180.3 cm (70.98\")   Wt 64.4 kg (142 lb)   BMI 19.82 kg/m²    Physical " Exam  Constitutional: Patient is oriented to person, place, and time. Patient appears well-developed and well-nourished.   HENT:Head: Normocephalic and atraumatic.   Eyes: EOM are normal. Pupils are equal, round, and reactive to light.   Neck: Normal range of motion. Neck supple.   Cardiovascular: Normal rate.    Pulmonary/Chest: Effort normal and breath sounds normal.   Abdominal: Soft.   Neurological: Patient is alert and oriented to person, place, and time.   Skin: Skin is warm and dry.   Psychiatric: Patient has a normal mood and affect.   Nursing note and vitals reviewed.       [unfilled]   Left shoulder: Slight tenderness over the proximal biceps tendon but no apparent biceps tendon rupture, minimal tenderness anterolateral acromial area, no supraspinous atrophy, no tenderness AC joint, active range of motion: Forward flexion 80 abduction 70 extension 30 strength limited by pain in all groups.    Assessment/Plan   Review of Radiographic Studies:    MRI is positive for small non-retracted supraspinatus tear with corresponding acromial impingement      Large Joint Arthrocentesis: L subacromial bursa  Date/Time: 2/28/2019 3:08 PM  Consent given by: patient  Site marked: site marked  Timeout: Immediately prior to procedure a time out was called to verify the correct patient, procedure, equipment, support staff and site/side marked as required   Supporting Documentation  Indications: pain   Procedure Details  Location: shoulder - L subacromial bursa  Preparation: Patient was prepped and draped in the usual sterile fashion  Needle size: 22 G  Medications administered: 40 mg triamcinolone acetonide 40 MG/ML; 2 mL lidocaine 1 %  Patient tolerance: patient tolerated the procedure well with no immediate complications           Elliott was seen today for pain.    Diagnoses and all orders for this visit:    Bursitis/tendonitis, shoulder    Complete tear of left rotator cuff       Physical therapy referral  given      Recommendations/Plan:   Work/Activity Status: No use of involved extremity    Patient agreeable to call or return sooner for any concerns.       Reviewed MRI findings with him and his family member explaining the nature of the condition treatment options pros and cons of surgical treatment, they both would desire nonsurgical treatment at this time understanding limitations and expectations regarding the nature of the rotator cuff tear      Impression:  Left hand dominant male with history of long-standing arthritis with left small non-retracted supraspinatus rotator cuff tear secondary to impingement  Plan:  Refer to therapy recheck in 4-6 weeks if not improving discussed surgical treatment at that time

## 2019-03-08 RX ORDER — NICOTINE POLACRILEX 4 MG/1
GUM, CHEWING ORAL
Qty: 30 EACH | Refills: 5 | Status: SHIPPED | OUTPATIENT
Start: 2019-03-08 | End: 2020-01-20

## 2019-03-11 ENCOUNTER — LAB (OUTPATIENT)
Dept: LAB | Facility: HOSPITAL | Age: 64
End: 2019-03-11

## 2019-03-11 DIAGNOSIS — D47.2 MGUS (MONOCLONAL GAMMOPATHY OF UNKNOWN SIGNIFICANCE): ICD-10-CM

## 2019-03-11 LAB
ALBUMIN SERPL-MCNC: 4 G/DL (ref 3.5–5)
ALBUMIN/GLOB SERPL: 1 G/DL (ref 1–2)
ALP SERPL-CCNC: 93 U/L (ref 38–126)
ALT SERPL W P-5'-P-CCNC: 51 U/L (ref 13–69)
ANION GAP SERPL CALCULATED.3IONS-SCNC: 7.2 MMOL/L (ref 10–20)
AST SERPL-CCNC: 39 U/L (ref 15–46)
BASOPHILS # BLD AUTO: 0.06 10*3/MM3 (ref 0–0.2)
BASOPHILS NFR BLD AUTO: 0.6 % (ref 0–2.5)
BILIRUB SERPL-MCNC: 0.4 MG/DL (ref 0.2–1.3)
BUN BLD-MCNC: 27 MG/DL (ref 7–20)
BUN/CREAT SERPL: 30 (ref 6.3–21.9)
CALCIUM SPEC-SCNC: 9.6 MG/DL (ref 8.4–10.2)
CHLORIDE SERPL-SCNC: 103 MMOL/L (ref 98–107)
CO2 SERPL-SCNC: 30 MMOL/L (ref 26–30)
CREAT BLD-MCNC: 0.9 MG/DL (ref 0.6–1.3)
DEPRECATED RDW RBC AUTO: 48.4 FL (ref 37–54)
EOSINOPHIL # BLD AUTO: 0.19 10*3/MM3 (ref 0–0.7)
EOSINOPHIL NFR BLD AUTO: 1.8 % (ref 0–7)
ERYTHROCYTE [DISTWIDTH] IN BLOOD BY AUTOMATED COUNT: 13.6 % (ref 11.5–14.5)
GFR SERPL CREATININE-BSD FRML MDRD: 85 ML/MIN/1.73
GLOBULIN UR ELPH-MCNC: 3.9 GM/DL
GLUCOSE BLD-MCNC: 77 MG/DL (ref 74–98)
HCT VFR BLD AUTO: 43.4 % (ref 42–52)
HGB BLD-MCNC: 14.5 G/DL (ref 14–18)
IMM GRANULOCYTES # BLD AUTO: 0.04 10*3/MM3 (ref 0–0.06)
IMM GRANULOCYTES NFR BLD AUTO: 0.4 % (ref 0–0.6)
LYMPHOCYTES # BLD AUTO: 1.95 10*3/MM3 (ref 0.6–3.4)
LYMPHOCYTES NFR BLD AUTO: 18.4 % (ref 10–50)
MCH RBC QN AUTO: 32.8 PG (ref 27–31)
MCHC RBC AUTO-ENTMCNC: 33.4 G/DL (ref 30–37)
MCV RBC AUTO: 98.2 FL (ref 80–94)
MONOCYTES # BLD AUTO: 1.3 10*3/MM3 (ref 0–0.9)
MONOCYTES NFR BLD AUTO: 12.2 % (ref 0–12)
NEUTROPHILS # BLD AUTO: 7.08 10*3/MM3 (ref 2–6.9)
NEUTROPHILS NFR BLD AUTO: 66.6 % (ref 37–80)
NRBC BLD AUTO-RTO: 0 /100 WBC (ref 0–0)
PLATELET # BLD AUTO: 245 10*3/MM3 (ref 130–400)
PMV BLD AUTO: 9.5 FL (ref 6–12)
POTASSIUM BLD-SCNC: 4.2 MMOL/L (ref 3.5–5.1)
PROT SERPL-MCNC: 7.9 G/DL (ref 6.3–8.2)
RBC # BLD AUTO: 4.42 10*6/MM3 (ref 4.7–6.1)
SODIUM BLD-SCNC: 136 MMOL/L (ref 137–145)
WBC NRBC COR # BLD: 10.62 10*3/MM3 (ref 4.8–10.8)

## 2019-03-11 PROCEDURE — 86334 IMMUNOFIX E-PHORESIS SERUM: CPT

## 2019-03-11 PROCEDURE — 83883 ASSAY NEPHELOMETRY NOT SPEC: CPT

## 2019-03-11 PROCEDURE — 80053 COMPREHEN METABOLIC PANEL: CPT

## 2019-03-11 PROCEDURE — 85025 COMPLETE CBC W/AUTO DIFF WBC: CPT

## 2019-03-11 PROCEDURE — 84165 PROTEIN E-PHORESIS SERUM: CPT

## 2019-03-11 PROCEDURE — 82784 ASSAY IGA/IGD/IGG/IGM EACH: CPT

## 2019-03-11 PROCEDURE — 36415 COLL VENOUS BLD VENIPUNCTURE: CPT

## 2019-03-12 LAB
ALBUMIN SERPL-MCNC: 3.6 G/DL (ref 2.9–4.4)
ALBUMIN/GLOB SERPL: 1 {RATIO} (ref 0.7–1.7)
ALPHA1 GLOB FLD ELPH-MCNC: 0.2 G/DL (ref 0–0.4)
ALPHA2 GLOB SERPL ELPH-MCNC: 0.9 G/DL (ref 0.4–1)
B-GLOBULIN SERPL ELPH-MCNC: 0.9 G/DL (ref 0.7–1.3)
GAMMA GLOB SERPL ELPH-MCNC: 1.9 G/DL (ref 0.4–1.8)
GLOBULIN SER CALC-MCNC: 3.9 G/DL (ref 2.2–3.9)
IGA SERPL-MCNC: 106 MG/DL (ref 61–437)
IGG SERPL-MCNC: 1977 MG/DL (ref 700–1600)
IGM SERPL-MCNC: 29 MG/DL (ref 20–172)
INTERPRETATION SERPL IEP-IMP: ABNORMAL
KAPPA LC SERPL-MCNC: 49 MG/L (ref 3.3–19.4)
KAPPA LC/LAMBDA SER: 6.2 {RATIO} (ref 0.26–1.65)
LAMBDA LC FREE SERPL-MCNC: 7.9 MG/L (ref 5.7–26.3)
Lab: ABNORMAL
M-SPIKE: 1.7 G/DL
PROT SERPL-MCNC: 7.5 G/DL (ref 6–8.5)

## 2019-03-28 ENCOUNTER — OFFICE VISIT (OUTPATIENT)
Dept: ORTHOPEDIC SURGERY | Facility: CLINIC | Age: 64
End: 2019-03-28

## 2019-03-28 VITALS — BODY MASS INDEX: 19.6 KG/M2 | RESPIRATION RATE: 18 BRPM | WEIGHT: 140 LBS | HEIGHT: 71 IN

## 2019-03-28 DIAGNOSIS — M75.122 COMPLETE TEAR OF LEFT ROTATOR CUFF: Primary | ICD-10-CM

## 2019-03-28 DIAGNOSIS — IMO0002 BURSITIS/TENDONITIS, SHOULDER: ICD-10-CM

## 2019-03-28 PROCEDURE — 99213 OFFICE O/P EST LOW 20 MIN: CPT | Performed by: ORTHOPAEDIC SURGERY

## 2019-03-28 NOTE — PROGRESS NOTES
Subjective   Patient ID: Elliott Dunn is a 63 y.o. male  Follow-up of the Left Shoulder (Patient is here today for a follow up on his left shoulder pain. He had an injection on 02/28/19 and states his shoulder feels a lot better, therapy says he is done unless we think he needs more.)             History of Present Illness  He currently has no pain in his shoulder full functional range of motion is back doing all his guarding activities including rototilling and preparing for spring planting.  No neck pain paresthesias or pain laying on his side at night.      Review of Systems   Constitutional: Negative for fever.   HENT: Negative for voice change.    Eyes: Negative for visual disturbance.   Respiratory: Negative for shortness of breath.    Cardiovascular: Negative for chest pain.   Gastrointestinal: Negative for abdominal distention and abdominal pain.   Genitourinary: Negative for dysuria.   Musculoskeletal: Positive for arthralgias. Negative for gait problem and joint swelling.   Skin: Negative for rash.   Neurological: Negative for speech difficulty.   Hematological: Does not bruise/bleed easily.   Psychiatric/Behavioral: Negative for confusion.       Past Medical History:   Diagnosis Date   • Acquired absence of all teeth    • Allergic    • Anomalous coronary artery origin    • Arrhythmia    • Arthritis    • Arthritis of lumbar spine 7/29/2016   • Back pain 6/17/2016   • Cancer (CMS/HCC)     prostate   • Cataract    • Cervical spine disease 6/17/2016   • CHF (congestive heart failure) (CMS/HCC)    • Chronic obstructive pulmonary disease (CMS/HCC)    • Colon polyps    • Deafness in left ear    • Derangement of anterior horn of medial meniscus    • Difficulty swallowing    • Disorder of lumbar spine 6/17/2016   • Disorder of thoracic spine 6/17/2016   • Diverticulitis of colon    • Erectile dysfunction    • Esophageal reflux    • Essential hypertension    • Glaucoma    • Heart murmur    • High cholesterol    •  Inflammatory polyarthropathy (CMS/HCC)     Per Dr. Haider. Negative NEO, normal ESR, RF, anti-ccp and did not improve with prednisone per notes.   • Inguinal hernia    • Lateral meniscus derangement    • Left otitis media with spontaneous rupture of eardrum    • Locking of left knee    • Low back pain    • MGUS (monoclonal gammopathy of unknown significance)     likely diagnosis   • Neuromuscular disorder (CMS/HCC)    • Neuropathic arthritis    • Perforation of left tympanic membrane    • Pulmonary emphysema (CMS/HCC)    • Scoliosis    • Skin cancer     skin cancer   • Skin cancer of face     squamous cell   • Spina bifida occulta 6/17/2016   • Tobacco abuse 11/9/2017   • Visual impairment    • Wears glasses         Past Surgical History:   Procedure Laterality Date   • COLONOSCOPY     • ENDOSCOPY N/A 4/10/2017    Procedure: ESOPHAGOGASTRODUODENOSCOPY WITH BIOPSY;  Surgeon: Alexander Ritchie MD;  Location: [facility] ENDOSCOPY;  Service:    • EYE SURGERY     • HERNIA REPAIR     • INGUINAL HERNIA REPAIR Right    • INGUINAL HERNIA REPAIR     • KNEE SURGERY Left    • LYMPH NODE BIOPSY     • PROSTATE SURGERY  2004   • PROSTATECTOMY  06/30/2014   • PROSTATECTOMY      Prostatectomy Radical   • SKIN CANCER EXCISION  2017    both sides of body/squamous cell melanoma   • TYMPANOPLASTY W/ MASTOIDECTOMY     • UMBILICAL HERNIA REPAIR         Family History   Problem Relation Age of Onset   • Diabetes Mother    • Hypertension Mother    • Obesity Mother    • Stroke Mother 65   • Arthritis Mother    • Hyperlipidemia Mother    • Vision loss Mother    • Cancer Father    • Cancer Sister    • Diabetes Brother    • Cancer Brother    • Heart attack Brother    • Alcohol abuse Brother    • Drug abuse Brother    • Hearing loss Brother    • Learning disabilities Brother        Social History     Socioeconomic History   • Marital status:      Spouse name: Not on file   • Number of children: Not on file   • Years of education: Not on file   •  Highest education level: Not on file   Tobacco Use   • Smoking status: Former Smoker     Packs/day: 1.00     Years: 51.00     Pack years: 51.00     Types: Cigarettes     Start date: 1963     Last attempt to quit: 2018     Years since quittin.2   • Smokeless tobacco: Never Used   Substance and Sexual Activity   • Alcohol use: No   • Drug use: No   • Sexual activity: Yes     Partners: Female     Birth control/protection: None       I have reviewed all of the above social hx, family hx, surgical hx, medications, allergies & ROS and confirm that it is accurate.    Allergies   Allergen Reactions   • Plaquenil [Hydroxychloroquine Sulfate] Other (See Comments)     Black eyes   • Cyclobenzaprine Other (See Comments)     Wide awake   • Pravastatin Other (See Comments)     Weakness / fatigue  Weakness / fatigue  Weakness / fatigue         Current Outpatient Medications:   •  albuterol (PROVENTIL HFA;VENTOLIN HFA) 108 (90 Base) MCG/ACT inhaler, Inhale 2 puffs Every 6 (Six) Hours As Needed for Wheezing or Shortness of Air., Disp: 1 inhaler, Rfl: 3  •  amitriptyline (ELAVIL) 25 MG tablet, TAKE 1-3 AT BEDTIME AS NEEDED FOR SLEEP, Disp: 270 tablet, Rfl: 4  •  ASPIRIN ADULT LOW DOSE 81 MG EC tablet, TAKE ONE TABLET BY MOUTH DAILY, Disp: 30 tablet, Rfl: 3  •  atorvastatin (LIPITOR) 10 MG tablet, Take 1 tablet by mouth Daily., Disp: 90 tablet, Rfl: 2  •  bisoprolol (ZEBeta) 5 MG tablet, TAKE ONE TABLET BY MOUTH DAILY, Disp: 30 tablet, Rfl: 2  •  celecoxib (CELEBREX) 100 MG capsule, Take 1 capsule by mouth 2 (Two) Times a Day As Needed for Moderate Pain ., Disp: 180 capsule, Rfl: 4  •  dextromethorphan-guaifenesin (ROBITUSSIN TO GO CGH/CHEST DM)  MG/5ML liquid, Take 5 mL by mouth Every 12 (Twelve) Hours., Disp: 180 mL, Rfl: 1  •  DULoxetine (CYMBALTA) 60 MG capsule, TAKE ONE CAPSULE BY MOUTH DAILY, Disp: 30 capsule, Rfl: 5  •  folic acid (FOLVITE) 1 MG tablet, Take 1 mg by mouth Daily., Disp: , Rfl:   •  gabapentin  "(NEURONTIN) 300 MG capsule, TAKE ONE CAPSULE BY MOUTH THREE TIMES A DAY AS NEEDED FOR PAIN, Disp: 90 capsule, Rfl: 2  •  isosorbide mononitrate (IMDUR) 30 MG 24 hr tablet, TAKE ONE TABLET BY MOUTH DAILY, Disp: 30 tablet, Rfl: 2  •  LEFLUNOMIDE PO, Take  by mouth., Disp: , Rfl:   •  methocarbamol (ROBAXIN) 750 MG tablet, TAKE ONE TABLET BY MOUTH THREE TIMES A DAY, Disp: 90 tablet, Rfl: 3  •  methotrexate 2.5 MG tablet, Take 8 tablets by mouth 1 (One) Time Per Week., Disp: 96 tablet, Rfl: 0  •  nitroglycerin (NITROSTAT) 0.4 MG SL tablet, 1 under the tongue as needed for angina, may repeat q5mins for up three doses, Disp: 100 tablet, Rfl: 11  •  Omeprazole 20 MG tablet delayed-release, TAKE ONE TABLET BY MOUTH DAILY, Disp: 30 each, Rfl: 5  •  omeprazole OTC (PriLOSEC OTC) 20 MG EC tablet, Take 1 tablet by mouth Daily., Disp: 30 tablet, Rfl: 3  •  predniSONE (DELTASONE) 10 MG tablet, Take 10 mg by mouth Daily. As needed, Disp: , Rfl:   •  predniSONE (DELTASONE) 20 MG tablet, Take 1 tablet by mouth 2 (Two) Times a Day., Disp: 10 tablet, Rfl: 0  •  Triamcinolone Acetonide (NASACORT) 55 MCG/ACT nasal inhaler, 2 sprays into each nostril daily., Disp: , Rfl:   •  vitamin B-12 (CYANOCOBALAMIN) 1000 MCG tablet, TAKE ONE TABLET BY MOUTH DAILY, Disp: 30 tablet, Rfl: 8    Objective   Resp 18   Ht 180.3 cm (70.98\")   Wt 63.5 kg (140 lb)   BMI 19.54 kg/m²    Physical Exam  Constitutional: Patient is oriented to person, place, and time. Patient appears well-developed and well-nourished.   HENT:Head: Normocephalic and atraumatic.   Eyes: EOM are normal. Pupils are equal, round, and reactive to light.   Neck: Normal range of motion. Neck supple.   Cardiovascular: Normal rate.    Pulmonary/Chest: Effort normal and breath sounds normal.   Abdominal: Soft.   Neurological: Patient is alert and oriented to person, place, and time.   Skin: Skin is warm and dry.   Psychiatric: Patient has a normal mood and affect.   Nursing note and " vitals reviewed.       [unfilled]   Left shoulder: Full active range of motion one half grade weakness with supraspinatus testing but no pain no tenderness at the anterolateral acromial or proximal biceps regions, biceps tendon function appears intact, improved With thoracic movement.    Assessment/Plan   Review of Radiographic Studies:    No new imaging done today.      Procedures     Elliott was seen today for follow-up.    Diagnoses and all orders for this visit:    Complete tear of left rotator cuff    Bursitis/tendonitis, shoulder       Orthopedic activities reviewed and patient expressed appreciation and Risk, benefits, and merits of treatment alternatives reviewed with the patient and questions answered      Recommendations/Plan:   Work/Activity Status: May perform usual activities as tolerated    Patient agreeable to call or return sooner for any concerns.         Reviewed with the patient and his wife the indications for surgical repair risk complications and treatment alternatives were reviewed.  Given his painless full functional range of motion did not recommend surgical treatment at this time.    Impression:  Full-thickness small minimally retracted left nondominant shoulder cuff supraspinatus tear with no pain full active range and minimal weakness    Plan: Progression of home strengthening observation recheck with me if pain worsens to reconsider cuff repair options at that time

## 2019-04-07 DIAGNOSIS — M25.50 CHRONIC JOINT PAIN: Chronic | ICD-10-CM

## 2019-04-07 DIAGNOSIS — G89.29 CHRONIC JOINT PAIN: Chronic | ICD-10-CM

## 2019-04-07 DIAGNOSIS — G89.4 CHRONIC PAIN SYNDROME: ICD-10-CM

## 2019-04-08 RX ORDER — MELOXICAM 15 MG/1
TABLET ORAL
Qty: 30 TABLET | Refills: 3 | Status: SHIPPED | OUTPATIENT
Start: 2019-04-08 | End: 2019-07-08

## 2019-04-08 RX ORDER — METHOCARBAMOL 750 MG/1
TABLET, FILM COATED ORAL
Qty: 90 TABLET | Refills: 2 | Status: SHIPPED | OUTPATIENT
Start: 2019-04-08 | End: 2019-07-06 | Stop reason: SDUPTHER

## 2019-04-08 RX ORDER — ISOSORBIDE MONONITRATE 30 MG/1
TABLET, EXTENDED RELEASE ORAL
Qty: 30 TABLET | Refills: 3 | Status: SHIPPED | OUTPATIENT
Start: 2019-04-08 | End: 2019-08-05 | Stop reason: SDUPTHER

## 2019-04-08 RX ORDER — BISOPROLOL FUMARATE 5 MG/1
TABLET, FILM COATED ORAL
Qty: 30 TABLET | Refills: 3 | Status: SHIPPED | OUTPATIENT
Start: 2019-04-08 | End: 2019-08-05 | Stop reason: SDUPTHER

## 2019-04-10 ENCOUNTER — TELEPHONE (OUTPATIENT)
Dept: INTERNAL MEDICINE | Facility: CLINIC | Age: 64
End: 2019-04-10

## 2019-04-10 NOTE — TELEPHONE ENCOUNTER
Pharmacy called stating there was a rx for meloxicam for patient and it had a drug interaction with his methotrexate. They wanted to make sure it was okay. Please advise

## 2019-04-16 PROBLEM — H40.1130 PRIMARY OPEN ANGLE GLAUCOMA (POAG) OF BOTH EYES: Status: ACTIVE | Noted: 2019-04-16

## 2019-04-25 ENCOUNTER — OFFICE VISIT (OUTPATIENT)
Dept: ONCOLOGY | Facility: CLINIC | Age: 64
End: 2019-04-25

## 2019-04-25 VITALS
WEIGHT: 140 LBS | SYSTOLIC BLOOD PRESSURE: 108 MMHG | TEMPERATURE: 97.7 F | BODY MASS INDEX: 19.6 KG/M2 | RESPIRATION RATE: 14 BRPM | HEART RATE: 73 BPM | DIASTOLIC BLOOD PRESSURE: 63 MMHG | HEIGHT: 71 IN

## 2019-04-25 DIAGNOSIS — D47.2 MGUS (MONOCLONAL GAMMOPATHY OF UNKNOWN SIGNIFICANCE): Primary | ICD-10-CM

## 2019-04-25 PROCEDURE — 99213 OFFICE O/P EST LOW 20 MIN: CPT | Performed by: NURSE PRACTITIONER

## 2019-04-25 NOTE — PROGRESS NOTES
CHIEF COMPLAINT:   1.  Monoclonal gammopathy of unknown significance                                           Problem List:  Oncology/Hematology History    1.  MGUS: Had been having mid back pains and was seen by neurology, was found to have 1200 mg of immunoglobulin G monoclonal protein in the serum with a normal IgA and IgM level this was in July 2016.  Workup August 2016 included a bone survey that was negative other than for degenerative joint disease with left eighth rib healed fracture that was seen on prior bone scan.  Normal creatinine, ionized calcium of 1.34, normal total protein to albumin ratio.  Normal sedimentation rate and C-reactive protein, serum monoclonal protein present at 1100 mg/dL of immunoglobulin G kappa with normal IgA and M levels.  Urine monoclonal protein was too scant to quantify.  Kappa to lambda ratio of 4.58 with elevation of At 42.85.  CBC was unremarkable with normal white count and platelet count and hemoglobin of 17 baseline.  Baseline PET/CT 9/1/2016 was negative.   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  This genetic alteration is found in approximately 15-18 percent of patients with plasma cell myeloma by FISH analysis and is associated with a favorable prognosis in the absence of poor prognostic markers.   b.)  3/6/2017 follow-up PET/CT was negative.    2.  History of prostate cancer: Status post prostatectomy 2014, under the care of Dr. Varela who he sees annually at this point.    3.  Squamous cell cancer of the skin, followed by Dr. Izaguirre.              MGUS (monoclonal gammopathy of unknown significance)    7/1/2016 Initial Diagnosis     MGUS (monoclonal gammopathy of unknown significance)         8/21/2017 -  Other Event     Myeloma panel: Urine immunoelectrophoresis monoclonal protein too small to quantify, serum immunoelectrophoresis M-spike stable  at 1.3g/dl, ionized calcium 5.2, kappa to lambda ratio 4.10, beta-2 microglobulin 2.2, C-reactive protein less than 0.50, creatinine 1.3, sedimentation rate to.  CBC WBC 9600, hemoglobin 15.6, hematocrit 47.1%, platelet count 209,000.           3/12/2018 -  Other Event     Myeloma panel: Urine immunoelectrophoresis with 122 mg/24 hour protein, monoclonal kappa free light chains 21.2%, monoclonal IgG kappa 7.5%.  Sedimentation rate 5, immunoglobulin free light chains free kappa light chains 49.8, normal lambda light chains 12.2, kappa/lambda ratio 4.08.  Serum immunoelectrophoresis M spike 1.7g/dL, C-reactive protein 1.60, CMP unremarkable with creatinine 1.10, serum calcium 9.2, ionized calcium 5.1, beta-2 microglobulin 2.5, CBC normal with a WBC of 9.07, hemoglobin 15.9, hematocrit 47.5%, platelets 226,000.            HISTORY OF PRESENT ILLNESS:  The patient is a 63 y.o. male, here for follow up on management of Monoclonal gammopathy of unknown significance.  Doing reasonably well.  No major events since his last visit.  Denies infections.  He does have a rotator cuff tear they are monitoring and treating with physical therapy.      Past Medical History:   Diagnosis Date   • Acquired absence of all teeth    • Allergic    • Anomalous coronary artery origin    • Arrhythmia    • Arthritis    • Arthritis of lumbar spine 7/29/2016   • Back pain 6/17/2016   • Cancer (CMS/[facility])     prostate   • Cataract    • Cervical spine disease 6/17/2016   • CHF (congestive heart failure) (CMS/[facility])    • Chronic obstructive pulmonary disease (CMS/[facility])    • Colon polyps    • Deafness in left ear    • Derangement of anterior horn of medial meniscus    • Difficulty swallowing    • Disorder of lumbar spine 6/17/2016   • Disorder of thoracic spine 6/17/2016   • Diverticulitis of colon    • Erectile dysfunction    • Esophageal reflux    • Essential hypertension    • Glaucoma    • Heart murmur    • High cholesterol    • Inflammatory  polyarthropathy (CMS/HCC)     Per Dr. Haider. Negative NEO, normal ESR, RF, anti-ccp and did not improve with prednisone per notes.   • Inguinal hernia    • Lateral meniscus derangement    • Left otitis media with spontaneous rupture of eardrum    • Locking of left knee    • Low back pain    • MGUS (monoclonal gammopathy of unknown significance)     likely diagnosis   • Neuromuscular disorder (CMS/HCC)    • Neuropathic arthritis    • Perforation of left tympanic membrane    • Pulmonary emphysema (CMS/HCC)    • Scoliosis    • Skin cancer     skin cancer   • Skin cancer of face     squamous cell   • Spina bifida occulta 6/17/2016   • Tobacco abuse 11/9/2017   • Visual impairment    • Wears glasses      Past Surgical History:   Procedure Laterality Date   • COLONOSCOPY     • ENDOSCOPY N/A 4/10/2017    Procedure: ESOPHAGOGASTRODUODENOSCOPY WITH BIOPSY;  Surgeon: Alexander Ritchie MD;  Location: [facility] ENDOSCOPY;  Service:    • EYE SURGERY     • HERNIA REPAIR     • INGUINAL HERNIA REPAIR Right    • INGUINAL HERNIA REPAIR     • KNEE SURGERY Left    • LYMPH NODE BIOPSY     • PROSTATE SURGERY  2004   • PROSTATECTOMY  06/30/2014   • PROSTATECTOMY      Prostatectomy Radical   • SKIN CANCER EXCISION  2017    both sides of body/squamous cell melanoma   • TYMPANOPLASTY W/ MASTOIDECTOMY     • UMBILICAL HERNIA REPAIR         Allergies   Allergen Reactions   • Plaquenil [Hydroxychloroquine Sulfate] Other (See Comments)     Black eyes   • Cyclobenzaprine Other (See Comments)     Wide awake   • Pravastatin Other (See Comments)     Weakness / fatigue  Weakness / fatigue  Weakness / fatigue       Family History and Social History reviewed and changed as necessary      REVIEW OF SYSTEM:   Review of Systems   Constitutional: Negative for appetite change, chills, diaphoresis, fatigue, fever and unexpected weight change.   HENT:   Negative for mouth sores, sore throat and trouble swallowing.    Eyes: Negative for icterus.   Respiratory:  "Negative for cough, hemoptysis and shortness of breath.    Cardiovascular: Negative for chest pain, leg swelling and palpitations.   Gastrointestinal: Negative for abdominal distention, abdominal pain, blood in stool, constipation, diarrhea, nausea and vomiting.   Endocrine: Negative for hot flashes.   Genitourinary: Negative for bladder incontinence, difficulty urinating, dysuria, frequency and hematuria.    Musculoskeletal: Positive for chronic joint pain secondary to arthritis.   Skin: Negative for rash. Positive for tick bite left axilla.  Neurological: Negative for dizziness, gait problem, headaches, light-headedness and numbness.   Hematological: Negative for adenopathy. Does not bruise/bleed easily.   Psychiatric/Behavioral: Negative for depression. The patient is not nervous/anxious.    All other systems reviewed and are negative.       PHYSICAL EXAM    Vitals:    04/25/19 1410   BP: 108/63   Pulse: 73   Resp: 14   Temp: 97.7 °F (36.5 °C)   TempSrc: Temporal   Weight: 63.5 kg (140 lb)   Height: 180.3 cm (70.98\")     Constitutional: Appears well-developed and well-nourished. No distress.   ECOG: (0) Fully active, able to carry on all predisease performance without restriction  HENT:   Head: Normocephalic.   Mouth/Throat: Oropharynx is clear and moist.   Eyes: Conjunctivae are normal. Pupils are equal, round, and reactive to light. No scleral icterus.   Neck: Neck supple. No JVD present. No thyromegaly present.   Cardiovascular: Normal rate, regular rhythm and normal heart sounds.    Pulmonary/Chest: Breath sounds normal. No respiratory distress.   Abdominal: Soft. Exhibits no distension and no mass. There is no hepatosplenomegaly. There is no tenderness. There is no rebound and no guarding.   Musculoskeletal:Exhibits no edema, tenderness or deformity.   Neurological: Alert and oriented to person, place, and time. Exhibits normal muscle tone.   Skin: Aprox 3 cm raised erythemic area left axilla, no drainage, " non tender.   Psychiatric: Normal mood and affect.   Vitals reviewed.  Diagnostic data: All previous office notes and current laboratory data and outside physician notes available in Epic were reviewed at time of visit.    No visits with results within 6 Week(s) from this visit.   Latest known visit with results is:   Lab on 03/11/2019   Component Date Value Ref Range Status   • IgG 03/11/2019 1977* 700 - 1600 mg/dL Final   • IgA 03/11/2019 106  61 - 437 mg/dL Final   • IgM 03/11/2019 29  20 - 172 mg/dL Final   • Total Protein 03/11/2019 7.5  6.0 - 8.5 g/dL Final   • Albumin 03/11/2019 3.6  2.9 - 4.4 g/dL Final   • Alpha-1-Globulin 03/11/2019 0.2  0.0 - 0.4 g/dL Final   • Alpha-2-Globulin 03/11/2019 0.9  0.4 - 1.0 g/dL Final   • Beta Globulin 03/11/2019 0.9  0.7 - 1.3 g/dL Final   • Gamma Globulin 03/11/2019 1.9* 0.4 - 1.8 g/dL Final   • M-Larry 03/11/2019 1.7* Not Observed g/dL Final   • Globulin 03/11/2019 3.9  2.2 - 3.9 g/dL Final   • A/G Ratio 03/11/2019 1.0  0.7 - 1.7 Final   • Immunofixation Reflex, Serum 03/11/2019 Comment   Final    Immunofixation shows IgG monoclonal protein with kappa light chain  specificity.   • Please note 03/11/2019 Comment   Final    Protein electrophoresis scan will follow via computer, mail, or   delivery.   • Free Light Chain, Kappa 03/11/2019 49.0* 3.3 - 19.4 mg/L Final   • Free Lambda Light Chains 03/11/2019 7.9  5.7 - 26.3 mg/L Final   • Kappa/Lambda Ratio 03/11/2019 6.20* 0.26 - 1.65 Final   • Glucose 03/11/2019 77  74 - 98 mg/dL Final   • BUN 03/11/2019 27* 7 - 20 mg/dL Final   • Creatinine 03/11/2019 0.90  0.60 - 1.30 mg/dL Final   • Sodium 03/11/2019 136* 137 - 145 mmol/L Final   • Potassium 03/11/2019 4.2  3.5 - 5.1 mmol/L Final   • Chloride 03/11/2019 103  98 - 107 mmol/L Final   • CO2 03/11/2019 30.0  26.0 - 30.0 mmol/L Final   • Calcium 03/11/2019 9.6  8.4 - 10.2 mg/dL Final   • Total Protein 03/11/2019 7.9  6.3 - 8.2 g/dL Final   • Albumin 03/11/2019 4.00  3.50 -  5.00 g/dL Final   • ALT (SGPT) 03/11/2019 51  13 - 69 U/L Final   • AST (SGOT) 03/11/2019 39  15 - 46 U/L Final   • Alkaline Phosphatase 03/11/2019 93  38 - 126 U/L Final   • Total Bilirubin 03/11/2019 0.4  0.2 - 1.3 mg/dL Final   • eGFR Non African Amer 03/11/2019 85  >60 mL/min/1.73 Final   • Globulin 03/11/2019 3.9  gm/dL Final   • A/G Ratio 03/11/2019 1.0  1.0 - 2.0 g/dL Final   • BUN/Creatinine Ratio 03/11/2019 30.0* 6.3 - 21.9 Final   • Anion Gap 03/11/2019 7.2* 10.0 - 20.0 mmol/L Final   • WBC 03/11/2019 10.62  4.80 - 10.80 10*3/mm3 Final   • RBC 03/11/2019 4.42* 4.70 - 6.10 10*6/mm3 Final   • Hemoglobin 03/11/2019 14.5  14.0 - 18.0 g/dL Final   • Hematocrit 03/11/2019 43.4  42.0 - 52.0 % Final   • MCV 03/11/2019 98.2* 80.0 - 94.0 fL Final   • MCH 03/11/2019 32.8* 27.0 - 31.0 pg Final   • MCHC 03/11/2019 33.4  30.0 - 37.0 g/dL Final   • RDW 03/11/2019 13.6  11.5 - 14.5 % Final   • RDW-SD 03/11/2019 48.4  37.0 - 54.0 fl Final   • MPV 03/11/2019 9.5  6.0 - 12.0 fL Final   • Platelets 03/11/2019 245  130 - 400 10*3/mm3 Final   • Neutrophil % 03/11/2019 66.6  37.0 - 80.0 % Final   • Lymphocyte % 03/11/2019 18.4  10.0 - 50.0 % Final   • Monocyte % 03/11/2019 12.2* 0.0 - 12.0 % Final   • Eosinophil % 03/11/2019 1.8  0.0 - 7.0 % Final   • Basophil % 03/11/2019 0.6  0.0 - 2.5 % Final   • Immature Grans % 03/11/2019 0.4  0.0 - 0.6 % Final   • Neutrophils, Absolute 03/11/2019 7.08* 2.00 - 6.90 10*3/mm3 Final   • Lymphocytes, Absolute 03/11/2019 1.95  0.60 - 3.40 10*3/mm3 Final   • Monocytes, Absolute 03/11/2019 1.30* 0.00 - 0.90 10*3/mm3 Final   • Eosinophils, Absolute 03/11/2019 0.19  0.00 - 0.70 10*3/mm3 Final   • Basophils, Absolute 03/11/2019 0.06  0.00 - 0.20 10*3/mm3 Final   • Immature Grans, Absolute 03/11/2019 0.04  0.00 - 0.06 10*3/mm3 Final   • nRBC 03/11/2019 0.0  0.0 - 0.0 /100 WBC Final     Lab Results   Component Value Date    MSPIKE 1.7 (H) 03/11/2019    MSPIKE 1.5 (H) 08/31/2018    MSPIKE 1.7 (H)  03/12/2018     Lab Results   Component Value Date    FREEKAPPAL 49.0 (H) 03/11/2019    FREEKAPPAL 57.6 (H) 08/31/2018    FREEKAPPAL 49.8 (H) 03/12/2018     Lab Results   Component Value Date    IGLFLC 7.9 03/11/2019    IGLFLC 10.3 08/31/2018    IGLFLC 12.2 03/12/2018           Assessment/Plan     1.  Monoclonal gammopathy of unknown significance: he has been doing fairly well. He has a stable M spike.  I discussed with the patient and his wife he has no progressive disease.  We will continue to follow along and see him back in 6 months.     2.  History of prostate cancer: He will continue to follow up with Dr. Varela for management of prostate cancer.    He will follow up in 6 months.     I spent a total of  15 minutes in direct patient care, greater than 10    minutes (greater than 50%) were spent in coordination of care, and counseling the patient regarding management of monoclonal gammopathy of unknown significance.  I answered any questions patient had with medication and plan.       Adrianna Esteban, APRN    04/25/2019

## 2019-05-13 DIAGNOSIS — G89.4 CHRONIC PAIN SYNDROME: ICD-10-CM

## 2019-05-13 DIAGNOSIS — M47.812 SPONDYLOSIS OF CERVICAL REGION WITHOUT MYELOPATHY OR RADICULOPATHY: ICD-10-CM

## 2019-05-13 RX ORDER — GABAPENTIN 300 MG/1
300 CAPSULE ORAL 3 TIMES DAILY PRN
Qty: 90 CAPSULE | Refills: 2 | Status: SHIPPED | OUTPATIENT
Start: 2019-05-13 | End: 2019-09-16 | Stop reason: SDUPTHER

## 2019-07-06 DIAGNOSIS — M25.50 CHRONIC JOINT PAIN: ICD-10-CM

## 2019-07-06 DIAGNOSIS — G89.4 CHRONIC PAIN SYNDROME: ICD-10-CM

## 2019-07-06 DIAGNOSIS — G89.29 CHRONIC JOINT PAIN: ICD-10-CM

## 2019-07-08 ENCOUNTER — OFFICE VISIT (OUTPATIENT)
Dept: INTERNAL MEDICINE | Facility: CLINIC | Age: 64
End: 2019-07-08

## 2019-07-08 VITALS
WEIGHT: 137 LBS | HEART RATE: 70 BPM | TEMPERATURE: 97.5 F | OXYGEN SATURATION: 96 % | HEIGHT: 71 IN | RESPIRATION RATE: 16 BRPM | SYSTOLIC BLOOD PRESSURE: 114 MMHG | BODY MASS INDEX: 19.18 KG/M2 | DIASTOLIC BLOOD PRESSURE: 80 MMHG

## 2019-07-08 DIAGNOSIS — F51.01 PRIMARY INSOMNIA: ICD-10-CM

## 2019-07-08 DIAGNOSIS — D47.2 MGUS (MONOCLONAL GAMMOPATHY OF UNKNOWN SIGNIFICANCE): ICD-10-CM

## 2019-07-08 DIAGNOSIS — M06.4 INFLAMMATORY POLYARTHROPATHY (HCC): Primary | ICD-10-CM

## 2019-07-08 DIAGNOSIS — M79.10 MYALGIA: ICD-10-CM

## 2019-07-08 DIAGNOSIS — IMO0001 TEAR OF LEFT SUPRASPINATUS TENDON, SUBSEQUENT ENCOUNTER: ICD-10-CM

## 2019-07-08 DIAGNOSIS — H40.1130 PRIMARY OPEN ANGLE GLAUCOMA OF BOTH EYES, UNSPECIFIED GLAUCOMA STAGE: ICD-10-CM

## 2019-07-08 DIAGNOSIS — G89.29 CHRONIC LEFT SHOULDER PAIN: ICD-10-CM

## 2019-07-08 DIAGNOSIS — E78.5 HYPERLIPIDEMIA, UNSPECIFIED HYPERLIPIDEMIA TYPE: ICD-10-CM

## 2019-07-08 DIAGNOSIS — R60.0 BILATERAL LOWER EXTREMITY EDEMA: ICD-10-CM

## 2019-07-08 DIAGNOSIS — J43.1 PANLOBULAR EMPHYSEMA (HCC): ICD-10-CM

## 2019-07-08 DIAGNOSIS — C61 MALIGNANT NEOPLASM OF PROSTATE (HCC): ICD-10-CM

## 2019-07-08 DIAGNOSIS — E53.8 COBALAMIN DEFICIENCY: ICD-10-CM

## 2019-07-08 DIAGNOSIS — I10 ESSENTIAL HYPERTENSION: Chronic | ICD-10-CM

## 2019-07-08 DIAGNOSIS — M25.512 CHRONIC LEFT SHOULDER PAIN: ICD-10-CM

## 2019-07-08 PROBLEM — H18.519 CORNEAL GUTTATA: Status: ACTIVE | Noted: 2018-06-06

## 2019-07-08 PROBLEM — Z96.1 BILATERAL PSEUDOPHAKIA: Status: ACTIVE | Noted: 2018-06-06

## 2019-07-08 PROBLEM — I70.90 ARTERIOSCLEROTIC VASCULAR DISEASE: Status: ACTIVE | Noted: 2018-06-06

## 2019-07-08 PROBLEM — H43.813 POSTERIOR VITREOUS DETACHMENT OF BOTH EYES: Status: ACTIVE | Noted: 2018-06-06

## 2019-07-08 PROBLEM — H02.33 EXCESS SKIN OF BOTH EYELIDS: Status: ACTIVE | Noted: 2018-06-06

## 2019-07-08 PROBLEM — J01.01 ACUTE RECURRENT MAXILLARY SINUSITIS: Status: RESOLVED | Noted: 2018-11-06 | Resolved: 2019-07-08

## 2019-07-08 PROBLEM — H43.399 VITREOUS FLOATERS: Status: ACTIVE | Noted: 2018-06-06

## 2019-07-08 PROBLEM — H02.36 EXCESS SKIN OF BOTH EYELIDS: Status: ACTIVE | Noted: 2018-06-06

## 2019-07-08 PROCEDURE — 99214 OFFICE O/P EST MOD 30 MIN: CPT | Performed by: FAMILY MEDICINE

## 2019-07-08 RX ORDER — ATORVASTATIN CALCIUM 10 MG/1
TABLET, FILM COATED ORAL
Qty: 30 TABLET | Refills: 5 | Status: SHIPPED | OUTPATIENT
Start: 2019-07-08 | End: 2020-01-02

## 2019-07-08 RX ORDER — METHOCARBAMOL 750 MG/1
TABLET, FILM COATED ORAL
Qty: 90 TABLET | Refills: 1 | Status: SHIPPED | OUTPATIENT
Start: 2019-07-08 | End: 2019-09-03 | Stop reason: SDUPTHER

## 2019-07-08 RX ORDER — DULOXETIN HYDROCHLORIDE 60 MG/1
CAPSULE, DELAYED RELEASE ORAL
Qty: 30 CAPSULE | Refills: 11 | Status: SHIPPED | OUTPATIENT
Start: 2019-07-08 | End: 2020-06-01

## 2019-07-08 NOTE — PROGRESS NOTES
Subjective    Elliott Dunn is a 64 y.o. male here for:    Chief Complaint   Patient presents with   • Insomnia     6 mo f/u    • Pain     6 mo f/u        Inflammatory polyarthritis: chronic, followed by rheumatology. Stable. Continues gabapentin for neuropathic pain.    Mgus: chronic, stable, followed by hematology/oncology.    Glaucoma: recurrent issue, followed by eye doctor.     COPD: panlobular emphysema, stable, chronic. Lung cancer screening ct done 2/2019, repeat from 2018. Bi-rads 3. Patient is a former smoker, quit 2018.     Shoulder pain: recurrent, internal derangement per MRI. Did PT, improved some but still has limited range of motion.    Edema: lower extremities, new, 3 months. Snores, not keen on device for THANIA if needed.    Insomnia: recurrent, improving on elavil.      COPD   This is a chronic problem. The current episode started more than 1 year ago. The problem occurs daily. The problem has been unchanged. Associated symptoms include arthralgias and myalgias. Pertinent negatives include no chest pain. The symptoms are aggravated by smoking. Treatments tried: inhalers. The treatment provided significant relief.   Hypertension   This is a chronic problem. The current episode started more than 1 year ago. The problem is unchanged. The problem is controlled. Associated symptoms include shortness of breath. Pertinent negatives include no chest pain. Risk factors for coronary artery disease include male gender, stress, sedentary lifestyle and smoking/tobacco exposure. Current antihypertension treatment includes beta blockers. Compliance problems include exercise and diet.  Hypertensive end-organ damage includes CAD/MI. There is no history of CVA.   Pain   This is a chronic problem. The current episode started more than 1 year ago. The problem occurs daily. The problem has been waxing and waning. Associated symptoms include arthralgias and myalgias. Pertinent negatives include no chest pain. He has  "tried NSAIDs, rest and acetaminophen (Elavil, Cymbalta, Neurontin. Rheumatology visits.) for the symptoms. The treatment provided significant relief.   Hyperlipidemia   This is a chronic problem. The current episode started more than 1 year ago. He has no history of diabetes, hypothyroidism or obesity. Factors aggravating his hyperlipidemia include fatty foods. Associated symptoms include myalgias and shortness of breath. Pertinent negatives include no chest pain. Current antihyperlipidemic treatment includes statins. The current treatment provides significant improvement of lipids. Risk factors for coronary artery disease include male sex, hypertension and dyslipidemia.          The following portions of the patient's history were reviewed and updated as appropriate: allergies, current medications, past family history, past medical history, past social history, past surgical history and problem list.    Health Maintenance   Topic Date Due   • ANNUAL PHYSICAL  06/08/1958   • ZOSTER VACCINE (2 of 2) 05/23/2017   • LIPID PANEL  10/24/2018   • INFLUENZA VACCINE  08/01/2019   • LUNG CANCER SCREENING  02/21/2020   • TDAP/TD VACCINES (2 - Td) 02/01/2025   • COLONOSCOPY  04/20/2028   • HEPATITIS C SCREENING  Completed       Review of Systems   Respiratory: Positive for shortness of breath.    Cardiovascular: Negative for chest pain.   Musculoskeletal: Positive for arthralgias and myalgias.       Vitals:    07/08/19 1106   BP: 114/80   Pulse: 70   Resp: 16   Temp: 97.5 °F (36.4 °C)   TempSrc: Temporal   SpO2: 96%   Weight: 62.1 kg (137 lb)   Height: 180.3 cm (70.98\")         Objective   Physical Exam   Constitutional: He is oriented to person, place, and time. Vital signs are normal. He appears well-developed and well-nourished. He is active.  Non-toxic appearance. He does not have a sickly appearance. He does not appear ill. No distress.   HENT:   Head: Normocephalic and atraumatic.   Right Ear: Hearing normal.   Left Ear: " Hearing normal.   Nose: Nose normal.   Mouth/Throat: Mucous membranes are not dry.   Eyes: EOM are normal. No scleral icterus.   Neck: Phonation normal. Neck supple.   Cardiovascular: Normal rate, regular rhythm and normal heart sounds.   Pulmonary/Chest: Effort normal and breath sounds normal.   Musculoskeletal:        Left shoulder: He exhibits decreased range of motion.        Right lower leg: He exhibits edema (trace).        Left lower leg: He exhibits edema (trace).   Neurological: He is alert and oriented to person, place, and time. He displays no tremor. No cranial nerve deficit.   Skin: Skin is warm. He is not diaphoretic. No cyanosis. No pallor.   Psychiatric: He has a normal mood and affect. His speech is normal and behavior is normal. Judgment and thought content normal. Cognition and memory are normal.   Nursing note and vitals reviewed.    Images from lung cancer screening CT reviewed with patient. Repeat in 12 months (feb 2020)    Assessment/Plan     Problem List Items Addressed This Visit        Cardiovascular and Mediastinum    Essential hypertension (Chronic)    Relevant Orders    TSH+Free T4    Hyperlipemia    Relevant Orders    Lipid Panel    Comprehensive Metabolic Panel       Respiratory    Panlobular emphysema (CMS/HCC)    Relevant Orders    Ambulatory Referral to Pulmonology       Digestive    Cobalamin deficiency    Relevant Orders    Vitamin B12    Folate       Nervous and Auditory    Myalgia    Relevant Orders    PTH, Intact    Calcium, Ionized    CK       Musculoskeletal and Integument    Inflammatory polyarthropathy (CMS/HCC) - Primary    Overview     · Per Dr. Haider. Negative NEO, normal ESR, RF, anti-ccp and did not improve with prednisone per notes.  · Medicine failures include Plaquenil, methotrexate         Relevant Orders    CBC & Differential    Vitamin D 25 Hydroxy    PTH, Intact    Calcium, Ionized       Immune and Lymphatic    MGUS (monoclonal gammopathy of unknown  significance)       Genitourinary    Malignant neoplasm of prostate (CMS/HCC)       Other    Primary insomnia    Primary open angle glaucoma (POAG) of both eyes      Other Visit Diagnoses     Bilateral lower extremity edema        Relevant Orders    Ambulatory Referral to Pulmonology    CBC & Differential    Tear of left supraspinatus tendon, subsequent encounter        Chronic left shoulder pain              · Continue elavil for insomnia, pain.  · Follow up with rheumatology (arthritis), urology (prostate), hematology (MGUS), eye care provider (glaucoma) as scheduled  · Consider return to Dr. Coyle of ortho if shoulder worsens, encouraged exercises as per PT.     Return in about 6 months (around 1/8/2020) for Annual physical or sooner if needed. or sooner if needed.    Ladonna Means MD

## 2019-07-09 LAB
25(OH)D3+25(OH)D2 SERPL-MCNC: 39.5 NG/ML
ALBUMIN SERPL-MCNC: 4.2 G/DL (ref 3.5–5)
ALBUMIN/GLOB SERPL: 1.1 G/DL (ref 1–2)
ALP SERPL-CCNC: 112 U/L (ref 38–126)
ALT SERPL-CCNC: 93 U/L (ref 13–69)
AST SERPL-CCNC: 76 U/L (ref 15–46)
BASOPHILS # BLD AUTO: 0.05 10*3/MM3 (ref 0–0.2)
BASOPHILS NFR BLD AUTO: 0.8 % (ref 0–1.5)
BILIRUB SERPL-MCNC: 0.5 MG/DL (ref 0.2–1.3)
BUN SERPL-MCNC: 25 MG/DL (ref 7–20)
BUN/CREAT SERPL: 27.8 (ref 6.3–21.9)
CA-I SERPL ISE-MCNC: 5.4 MG/DL (ref 4.5–5.6)
CALCIUM SERPL-MCNC: 9.5 MG/DL (ref 8.4–10.2)
CHLORIDE SERPL-SCNC: 102 MMOL/L (ref 98–107)
CHOLEST SERPL-MCNC: 147 MG/DL (ref 0–199)
CK SERPL-CCNC: 104 U/L (ref 30–170)
CO2 SERPL-SCNC: 30 MMOL/L (ref 26–30)
CREAT SERPL-MCNC: 0.9 MG/DL (ref 0.6–1.3)
EOSINOPHIL # BLD AUTO: 0.21 10*3/MM3 (ref 0–0.4)
EOSINOPHIL NFR BLD AUTO: 3.2 % (ref 0.3–6.2)
ERYTHROCYTE [DISTWIDTH] IN BLOOD BY AUTOMATED COUNT: 13.5 % (ref 12.3–15.4)
FOLATE SERPL-MCNC: 19.4 NG/ML
GLOBULIN SER CALC-MCNC: 4 GM/DL
GLUCOSE SERPL-MCNC: 74 MG/DL (ref 74–98)
HCT VFR BLD AUTO: 44.7 % (ref 37.5–51)
HDLC SERPL-MCNC: 50 MG/DL (ref 40–60)
HGB BLD-MCNC: 15.2 G/DL (ref 13–17.7)
IMM GRANULOCYTES # BLD AUTO: 0.02 10*3/MM3 (ref 0–0.05)
IMM GRANULOCYTES NFR BLD AUTO: 0.3 % (ref 0–0.5)
LDLC SERPL CALC-MCNC: 82 MG/DL (ref 0–99)
LYMPHOCYTES # BLD AUTO: 2.17 10*3/MM3 (ref 0.7–3.1)
LYMPHOCYTES NFR BLD AUTO: 33.5 % (ref 19.6–45.3)
MCH RBC QN AUTO: 32.8 PG (ref 26.6–33)
MCHC RBC AUTO-ENTMCNC: 34 G/DL (ref 31.5–35.7)
MCV RBC AUTO: 96.3 FL (ref 79–97)
MONOCYTES # BLD AUTO: 0.71 10*3/MM3 (ref 0.1–0.9)
MONOCYTES NFR BLD AUTO: 11 % (ref 5–12)
NEUTROPHILS # BLD AUTO: 3.32 10*3/MM3 (ref 1.7–7)
NEUTROPHILS NFR BLD AUTO: 51.2 % (ref 42.7–76)
NRBC BLD AUTO-RTO: 0 /100 WBC (ref 0–0.2)
PLATELET # BLD AUTO: 221 10*3/MM3 (ref 140–450)
POTASSIUM SERPL-SCNC: 4.6 MMOL/L (ref 3.5–5.1)
PROT SERPL-MCNC: 8.2 G/DL (ref 6.3–8.2)
PTH-INTACT SERPL-MCNC: 40 PG/ML (ref 15–65)
RBC # BLD AUTO: 4.64 10*6/MM3 (ref 4.14–5.8)
SODIUM SERPL-SCNC: 139 MMOL/L (ref 137–145)
T4 FREE SERPL-MCNC: 0.93 NG/DL (ref 0.78–2.19)
TRIGL SERPL-MCNC: 75 MG/DL
TSH SERPL DL<=0.005 MIU/L-ACNC: 0.87 MIU/ML (ref 0.47–4.68)
VIT B12 SERPL-MCNC: 796 PG/ML (ref 239–931)
VLDLC SERPL CALC-MCNC: 15 MG/DL
WBC # BLD AUTO: 6.48 10*3/MM3 (ref 3.4–10.8)

## 2019-08-05 RX ORDER — BISOPROLOL FUMARATE 5 MG/1
TABLET, FILM COATED ORAL
Qty: 30 TABLET | Refills: 2 | Status: SHIPPED | OUTPATIENT
Start: 2019-08-05 | End: 2019-11-03 | Stop reason: SDUPTHER

## 2019-08-05 RX ORDER — ISOSORBIDE MONONITRATE 30 MG/1
TABLET, EXTENDED RELEASE ORAL
Qty: 30 TABLET | Refills: 2 | Status: SHIPPED | OUTPATIENT
Start: 2019-08-05 | End: 2019-11-03 | Stop reason: SDUPTHER

## 2019-08-16 ENCOUNTER — APPOINTMENT (OUTPATIENT)
Dept: GENERAL RADIOLOGY | Facility: HOSPITAL | Age: 64
End: 2019-08-16

## 2019-08-16 ENCOUNTER — HOSPITAL ENCOUNTER (EMERGENCY)
Facility: HOSPITAL | Age: 64
Discharge: HOME | End: 2019-08-16
Attending: EMERGENCY MEDICINE | Admitting: EMERGENCY MEDICINE

## 2019-08-16 VITALS
BODY MASS INDEX: 19.46 KG/M2 | WEIGHT: 139 LBS | DIASTOLIC BLOOD PRESSURE: 81 MMHG | HEIGHT: 71 IN | SYSTOLIC BLOOD PRESSURE: 116 MMHG | TEMPERATURE: 99 F | HEART RATE: 91 BPM | OXYGEN SATURATION: 97 % | RESPIRATION RATE: 20 BRPM

## 2019-08-16 DIAGNOSIS — J01.00 ACUTE MAXILLARY SINUSITIS, RECURRENCE NOT SPECIFIED: ICD-10-CM

## 2019-08-16 DIAGNOSIS — J22 LOWER RESPIRATORY TRACT INFECTION: Primary | ICD-10-CM

## 2019-08-16 LAB
ALBUMIN SERPL-MCNC: 3.7 G/DL (ref 3.5–5.2)
ALBUMIN/GLOB SERPL: 1 G/DL
ALP SERPL-CCNC: 173 U/L (ref 39–117)
ALT SERPL W P-5'-P-CCNC: 25 U/L (ref 1–41)
ANION GAP SERPL CALCULATED.3IONS-SCNC: 9.4 MMOL/L (ref 5–15)
AST SERPL-CCNC: 29 U/L (ref 1–40)
BASOPHILS # BLD AUTO: 0.07 10*3/MM3 (ref 0–0.2)
BASOPHILS NFR BLD AUTO: 0.6 % (ref 0–1.5)
BILIRUB SERPL-MCNC: 0.4 MG/DL (ref 0.2–1.2)
BUN BLD-MCNC: 21 MG/DL (ref 8–23)
BUN/CREAT SERPL: 23.1 (ref 7–25)
CALCIUM SPEC-SCNC: 8.8 MG/DL (ref 8.6–10.5)
CHLORIDE SERPL-SCNC: 100 MMOL/L (ref 98–107)
CO2 SERPL-SCNC: 25.6 MMOL/L (ref 22–29)
CREAT BLD-MCNC: 0.91 MG/DL (ref 0.76–1.27)
DEPRECATED RDW RBC AUTO: 48.5 FL (ref 37–54)
EOSINOPHIL # BLD AUTO: 0.35 10*3/MM3 (ref 0–0.4)
EOSINOPHIL NFR BLD AUTO: 2.8 % (ref 0.3–6.2)
ERYTHROCYTE [DISTWIDTH] IN BLOOD BY AUTOMATED COUNT: 13.6 % (ref 12.3–15.4)
GFR SERPL CREATININE-BSD FRML MDRD: 84 ML/MIN/1.73
GLOBULIN UR ELPH-MCNC: 3.7 GM/DL
GLUCOSE BLD-MCNC: 102 MG/DL (ref 65–99)
HCT VFR BLD AUTO: 40.4 % (ref 37.5–51)
HGB BLD-MCNC: 13.6 G/DL (ref 13–17.7)
IMM GRANULOCYTES # BLD AUTO: 0.07 10*3/MM3 (ref 0–0.05)
IMM GRANULOCYTES NFR BLD AUTO: 0.6 % (ref 0–0.5)
LYMPHOCYTES # BLD AUTO: 2.33 10*3/MM3 (ref 0.7–3.1)
LYMPHOCYTES NFR BLD AUTO: 18.7 % (ref 19.6–45.3)
MCH RBC QN AUTO: 32.9 PG (ref 26.6–33)
MCHC RBC AUTO-ENTMCNC: 33.7 G/DL (ref 31.5–35.7)
MCV RBC AUTO: 97.6 FL (ref 79–97)
MONOCYTES # BLD AUTO: 1.64 10*3/MM3 (ref 0.1–0.9)
MONOCYTES NFR BLD AUTO: 13.2 % (ref 5–12)
NEUTROPHILS # BLD AUTO: 7.99 10*3/MM3 (ref 1.7–7)
NEUTROPHILS NFR BLD AUTO: 64.1 % (ref 42.7–76)
NRBC BLD AUTO-RTO: 0 /100 WBC (ref 0–0.2)
PLATELET # BLD AUTO: 253 10*3/MM3 (ref 140–450)
PMV BLD AUTO: 9.3 FL (ref 6–12)
POTASSIUM BLD-SCNC: 3.8 MMOL/L (ref 3.5–5.2)
PROT SERPL-MCNC: 7.4 G/DL (ref 6–8.5)
RBC # BLD AUTO: 4.14 10*6/MM3 (ref 4.14–5.8)
SODIUM BLD-SCNC: 135 MMOL/L (ref 136–145)
WBC NRBC COR # BLD: 12.45 10*3/MM3 (ref 3.4–10.8)

## 2019-08-16 PROCEDURE — 85025 COMPLETE CBC W/AUTO DIFF WBC: CPT | Performed by: PHYSICIAN ASSISTANT

## 2019-08-16 PROCEDURE — 80053 COMPREHEN METABOLIC PANEL: CPT | Performed by: PHYSICIAN ASSISTANT

## 2019-08-16 PROCEDURE — 71046 X-RAY EXAM CHEST 2 VIEWS: CPT

## 2019-08-16 PROCEDURE — 99283 EMERGENCY DEPT VISIT LOW MDM: CPT

## 2019-08-16 RX ORDER — BENZONATATE 100 MG/1
100 CAPSULE ORAL ONCE
Status: COMPLETED | OUTPATIENT
Start: 2019-08-16 | End: 2019-08-16

## 2019-08-16 RX ORDER — AMOXICILLIN AND CLAVULANATE POTASSIUM 875; 125 MG/1; MG/1
1 TABLET, FILM COATED ORAL ONCE
Status: COMPLETED | OUTPATIENT
Start: 2019-08-16 | End: 2019-08-16

## 2019-08-16 RX ORDER — AMOXICILLIN AND CLAVULANATE POTASSIUM 875; 125 MG/1; MG/1
1 TABLET, FILM COATED ORAL EVERY 12 HOURS
Qty: 20 TABLET | Refills: 0 | Status: SHIPPED | OUTPATIENT
Start: 2019-08-16 | End: 2019-08-26

## 2019-08-16 RX ORDER — BENZONATATE 200 MG/1
200 CAPSULE ORAL 3 TIMES DAILY PRN
Qty: 30 CAPSULE | Refills: 0 | Status: SHIPPED | OUTPATIENT
Start: 2019-08-16 | End: 2019-10-16 | Stop reason: SDUPTHER

## 2019-08-16 RX ADMIN — BENZONATATE 100 MG: 100 CAPSULE ORAL at 21:34

## 2019-08-16 RX ADMIN — AMOXICILLIN AND CLAVULANATE POTASSIUM 1 TABLET: 875; 125 TABLET, FILM COATED ORAL at 21:34

## 2019-08-17 NOTE — ED PROVIDER NOTES
Subjective   This patient had a productive cough with yellow/green sputum and right-sided rib pain for the past week worse the past 3 days.  Subjective fever at home.  No hemoptysis.  He is also complaining of pain in the roof of his mouth and his bilateral maxillary sinuses and he also states he felt a couple of enlarged glands in his throat 3 days ago which have since resolved.             Review of Systems   Constitutional: Positive for fever.   HENT: Positive for sinus pressure and sinus pain.    Respiratory: Positive for cough.    Cardiovascular: Negative for chest pain.   All other systems reviewed and are negative.      Past Medical History:   Diagnosis Date   • Acquired absence of all teeth    • Allergic    • Anomalous coronary artery origin    • Arrhythmia    • Arthritis    • Arthritis of lumbar spine 7/29/2016   • Back pain 6/17/2016   • Cancer (CMS/HCC)     prostate   • Cataract    • Cervical spine disease 6/17/2016   • CHF (congestive heart failure) (CMS/HCC)    • Chronic obstructive pulmonary disease (CMS/HCC)    • Colon polyps    • Deafness in left ear    • Derangement of anterior horn of medial meniscus    • Difficulty swallowing    • Disorder of lumbar spine 6/17/2016   • Disorder of thoracic spine 6/17/2016   • Diverticulitis of colon    • Erectile dysfunction    • Esophageal reflux    • Essential hypertension    • Glaucoma    • Heart murmur    • High cholesterol    • Inflammatory polyarthropathy (CMS/LTAC, located within [facility])     Per Dr. Haider. Negative NEO, normal ESR, RF, anti-ccp and did not improve with prednisone per notes.   • Inguinal hernia    • Lateral meniscus derangement    • Left otitis media with spontaneous rupture of eardrum    • Locking of left knee    • Low back pain    • MGUS (monoclonal gammopathy of unknown significance)     likely diagnosis   • Neuromuscular disorder (CMS/[facility], located within [facility])    • Neuropathic arthritis    • Perforation of left tympanic membrane    • Pulmonary emphysema (CMS/HCC)    • Scoliosis     • Skin cancer     skin cancer   • Skin cancer of face     squamous cell   • Spina bifida occulta 2016   • Tobacco abuse 2017   • Visual impairment    • Wears glasses        Allergies   Allergen Reactions   • Plaquenil [Hydroxychloroquine Sulfate] Other (See Comments)     Black eyes   • Cyclobenzaprine Other (See Comments)     Wide awake   • Pravastatin Other (See Comments)     Weakness / fatigue  Weakness / fatigue  Weakness / fatigue       Past Surgical History:   Procedure Laterality Date   • COLONOSCOPY     • ENDOSCOPY N/A 4/10/2017    Procedure: ESOPHAGOGASTRODUODENOSCOPY WITH BIOPSY;  Surgeon: Alexander Ritchie MD;  Location: [facility] ENDOSCOPY;  Service:    • EYE SURGERY     • HERNIA REPAIR     • INGUINAL HERNIA REPAIR Right    • INGUINAL HERNIA REPAIR     • KNEE SURGERY Left    • LYMPH NODE BIOPSY     • PROSTATE SURGERY     • PROSTATECTOMY  2014   • PROSTATECTOMY      Prostatectomy Radical   • SKIN CANCER EXCISION  2017    both sides of body/squamous cell melanoma   • TYMPANOPLASTY W/ MASTOIDECTOMY     • UMBILICAL HERNIA REPAIR         Family History   Problem Relation Age of Onset   • Diabetes Mother    • Hypertension Mother    • Obesity Mother    • Stroke Mother 65   • Arthritis Mother    • Hyperlipidemia Mother    • Vision loss Mother    • Cancer Father    • Cancer Sister    • Diabetes Brother    • Cancer Brother    • Heart attack Brother    • Alcohol abuse Brother    • Drug abuse Brother    • Hearing loss Brother    • Learning disabilities Brother        Social History     Socioeconomic History   • Marital status:      Spouse name: Not on file   • Number of children: Not on file   • Years of education: Not on file   • Highest education level: Not on file   Tobacco Use   • Smoking status: Former Smoker     Packs/day: 1.00     Years: 51.00     Pack years: 51.00     Types: Cigarettes     Start date: 1963     Last attempt to quit: 2018     Years since quittin.5   •  Smokeless tobacco: Never Used   Substance and Sexual Activity   • Alcohol use: No   • Drug use: No   • Sexual activity: Yes     Partners: Female     Birth control/protection: None           Objective   Physical Exam   Constitutional: He is oriented to person, place, and time. He appears well-developed and well-nourished. No distress.   HENT:   Head: Normocephalic and atraumatic.   Right Ear: External ear normal.   Left Ear: External ear normal.   Mouth/Throat: Oropharynx is clear and moist.   Tenderness to the palpation of the bilateral maxillary sinuses.  There is a hole in the left TM which family states is chronic.   Neck: Normal range of motion. Neck supple.   Cardiovascular: Normal rate and regular rhythm.   Pulmonary/Chest: Effort normal and breath sounds normal. No respiratory distress. He has no wheezes. He has no rales.   Musculoskeletal: Normal range of motion.   Lymphadenopathy:     He has no cervical adenopathy.   Neurological: He is alert and oriented to person, place, and time.   Skin: Skin is warm and dry. He is not diaphoretic.   Psychiatric: He has a normal mood and affect. His behavior is normal. Thought content normal.       Procedures           ED Course      9:23 PM  No infiltrate seen on xray, reviewed with Dr. Ross. Vitals WNL. Given maxillary sinus pain and cough with hx of COPD will treat with augmentin and fu with pcp Monday.             MDM      Final diagnoses:   Lower respiratory tract infection   Acute maxillary sinusitis, recurrence not specified            Marco Suggs PA-C  08/16/19 [id number]

## 2019-08-30 ENCOUNTER — OFFICE VISIT (OUTPATIENT)
Dept: CARDIOLOGY | Facility: CLINIC | Age: 64
End: 2019-08-30

## 2019-08-30 VITALS
BODY MASS INDEX: 18.9 KG/M2 | WEIGHT: 135 LBS | HEART RATE: 65 BPM | HEIGHT: 71 IN | OXYGEN SATURATION: 97 % | SYSTOLIC BLOOD PRESSURE: 106 MMHG | DIASTOLIC BLOOD PRESSURE: 68 MMHG

## 2019-08-30 DIAGNOSIS — J43.9 PULMONARY EMPHYSEMA, UNSPECIFIED EMPHYSEMA TYPE (HCC): Chronic | ICD-10-CM

## 2019-08-30 DIAGNOSIS — Z87.891 FORMER SMOKER: ICD-10-CM

## 2019-08-30 DIAGNOSIS — I70.90 ARTERIOSCLEROTIC VASCULAR DISEASE: ICD-10-CM

## 2019-08-30 DIAGNOSIS — I10 ESSENTIAL HYPERTENSION: Chronic | ICD-10-CM

## 2019-08-30 DIAGNOSIS — R06.09 DOE (DYSPNEA ON EXERTION): Primary | ICD-10-CM

## 2019-08-30 DIAGNOSIS — I20.8 ANGINAL EQUIVALENT (HCC): ICD-10-CM

## 2019-08-30 DIAGNOSIS — E78.2 MIXED HYPERLIPIDEMIA: ICD-10-CM

## 2019-08-30 PROBLEM — I20.89 ANGINAL EQUIVALENT: Status: ACTIVE | Noted: 2019-08-30

## 2019-08-30 PROCEDURE — 99214 OFFICE O/P EST MOD 30 MIN: CPT | Performed by: NURSE PRACTITIONER

## 2019-08-30 NOTE — PROGRESS NOTES
"Elliott Dunn is a 64 y.o. male.  MRN #: [id number]    Referring Provider: Ladonna Means MD    Chief Complaint:   Chief Complaint   Patient presents with   • Follow-up   • Hypertension   • Palpitations   • Edema     feet and legs x 6 months with pain at times   • Carotid Artery Disease        History of Present Illness:  64-year-old gentleman presents for his one-year cardiac follow-up.  Patient has prior history of congestive heart failure and CAD involving his native coronary arteries, history of essential hypertension, hypercholesterolemia, history of heart murmur.  Patient also has history of COPD after long-term tobacco usage.    Patient today states that over the past 6 to 8 weeks he has noticed increased swelling of his lower extremities after being up on his feet for several hours.  He and his wife relate that Mr. Dunn has had increased fatigue and sleeping more than typical.  Patient states that he does have shortness of breath, \"I am give out\" after climbing one set of stairs.  His main symptoms have been shortness of breath but he has at one time developed some vague left upper chest pain that was nonradiating and did not cause any nausea or diaphoresis.     did have a cardiac work-up in May 2016 which included right adenosine Cardiolite nuclear perfusion study.  Which was normal with no EKG evidence of ischemia.  The patient presents today with their wife who contributes to the history of their care.     The following portions of the patient's history were reviewed and updated as appropriate: allergies, current medications, past family history, past medical history, past social history, past surgical history and problem list.     Review of Systems:     Review of Systems   Constitutional: Positive for activity change and fatigue. Negative for appetite change, diaphoresis, unexpected weight gain and unexpected weight loss.   HENT: Negative.    Eyes: Negative.  Negative for blurred vision, " double vision and visual disturbance.   Respiratory: Positive for cough and shortness of breath. Negative for apnea, chest tightness and wheezing.         Former long-term smoker, has stopped smoking cigarettes approximately 2 years ago   Cardiovascular: Positive for chest pain, palpitations and leg swelling.   Gastrointestinal: Negative.  Negative for abdominal distention, abdominal pain, nausea, vomiting, GERD and indigestion.   Endocrine: Negative.    Genitourinary: Negative.  Negative for decreased libido and hematuria.   Musculoskeletal: Positive for arthralgias. Negative for back pain, joint swelling, myalgias, neck pain and neck stiffness.   Skin: Negative.  Negative for pallor and bruise.   Allergic/Immunologic: Negative.    Neurological: Positive for weakness. Negative for dizziness, tremors, syncope, facial asymmetry, speech difficulty, light-headedness, numbness, headache and confusion.   Hematological: Negative.  Does not bruise/bleed easily.   Psychiatric/Behavioral: Negative.  Negative for agitation and stress. The patient is not nervous/anxious.    All other systems reviewed and are negative.         Current Outpatient Medications:   •  albuterol (PROVENTIL HFA;VENTOLIN HFA) 108 (90 Base) MCG/ACT inhaler, Inhale 2 puffs Every 6 (Six) Hours As Needed for Wheezing or Shortness of Air., Disp: 1 inhaler, Rfl: 3  •  amitriptyline (ELAVIL) 25 MG tablet, TAKE 1-3 AT BEDTIME AS NEEDED FOR SLEEP, Disp: 270 tablet, Rfl: 4  •  ASPIRIN ADULT LOW DOSE 81 MG EC tablet, TAKE ONE TABLET BY MOUTH DAILY, Disp: 30 tablet, Rfl: 3  •  atorvastatin (LIPITOR) 10 MG tablet, TAKE ONE TABLET BY MOUTH DAILY, Disp: 30 tablet, Rfl: 5  •  bisoprolol (ZEBeta) 5 MG tablet, TAKE ONE TABLET BY MOUTH DAILY, Disp: 30 tablet, Rfl: 2  •  celecoxib (CELEBREX) 100 MG capsule, Take 1 capsule by mouth 2 (Two) Times a Day As Needed for Moderate Pain ., Disp: 180 capsule, Rfl: 4  •  DULoxetine (CYMBALTA) 60 MG capsule, TAKE ONE CAPSULE BY MOUTH  "DAILY, Disp: 30 capsule, Rfl: 11  •  folic acid (FOLVITE) 1 MG tablet, Take 1 mg by mouth Daily., Disp: , Rfl:   •  gabapentin (NEURONTIN) 300 MG capsule, Take 1 capsule by mouth 3 (Three) Times a Day As Needed (nerve pain)., Disp: 90 capsule, Rfl: 2  •  isosorbide mononitrate (IMDUR) 30 MG 24 hr tablet, TAKE ONE TABLET BY MOUTH DAILY, Disp: 30 tablet, Rfl: 2  •  LEFLUNOMIDE PO, Take  by mouth., Disp: , Rfl:   •  methocarbamol (ROBAXIN) 750 MG tablet, TAKE ONE TABLET BY MOUTH THREE TIMES A DAY, Disp: 90 tablet, Rfl: 1  •  methotrexate 2.5 MG tablet, Take 8 tablets by mouth 1 (One) Time Per Week., Disp: 96 tablet, Rfl: 0  •  nitroglycerin (NITROSTAT) 0.4 MG SL tablet, 1 under the tongue as needed for angina, may repeat q5mins for up three doses, Disp: 100 tablet, Rfl: 11  •  predniSONE (DELTASONE) 10 MG tablet, Take 10 mg by mouth Daily. As needed, Disp: , Rfl:   •  Triamcinolone Acetonide (NASACORT) 55 MCG/ACT nasal inhaler, 2 sprays into each nostril daily., Disp: , Rfl:   •  vitamin B-12 (CYANOCOBALAMIN) 1000 MCG tablet, TAKE ONE TABLET BY MOUTH DAILY, Disp: 30 tablet, Rfl: 8  •  benzonatate (TESSALON) 200 MG capsule, Take 1 capsule by mouth 3 (Three) Times a Day As Needed for Cough., Disp: 30 capsule, Rfl: 0  •  Omeprazole 20 MG tablet delayed-release, TAKE ONE TABLET BY MOUTH DAILY, Disp: 30 each, Rfl: 5    Vitals:    08/30/19 1008   BP: 106/68   BP Location: Left arm   Patient Position: Sitting   Cuff Size: Adult   Pulse: 65   SpO2: 97%   Weight: 61.2 kg (135 lb)   Height: 180.3 cm (71\")       Physical Exam:     Physical Exam   Constitutional: He is oriented to person, place, and time. He appears well-developed and well-nourished. No distress.   HENT:   Head: Normocephalic and atraumatic.   Eyes: Conjunctivae and EOM are normal. Pupils are equal, round, and reactive to light. No scleral icterus.   Neck: Trachea normal, normal range of motion and full passive range of motion without pain. Neck supple. No JVD " present. Carotid bruit is not present. No thyromegaly present.   Cardiovascular: Normal rate, regular rhythm, S1 normal, S2 normal, normal heart sounds and intact distal pulses. PMI is not displaced. Exam reveals no gallop and no friction rub.   No murmur heard.  Pulses:       Carotid pulses are 1+ on the right side, and 1+ on the left side.  Pulmonary/Chest: Effort normal and breath sounds normal. No respiratory distress. He has no wheezes. He has no rales. He exhibits no tenderness.   Abdominal: Soft. Bowel sounds are normal. He exhibits no mass. There is no tenderness.   Musculoskeletal: Normal range of motion. He exhibits no edema.   I am not able to visualize any lower extremity edema.  He appears to be neurovascularly intact.   Neurological: He is alert and oriented to person, place, and time.   Skin: Skin is warm and dry. Capillary refill takes less than 2 seconds. No rash noted. He is not diaphoretic.   Psychiatric: He has a normal mood and affect. His behavior is normal. Judgment and thought content normal.   Nursing note and vitals reviewed.      Procedures    Results:   Reviewed  medication and allergy profile and updated as needed.  Also reviewed his most recent cardiac work-up regadenoson stress test that was in 2016.    Assessment/Plan:   No changes made in medication regimen will schedule a myocardial perfusion stress test and echocardiogram to evaluate cardiac muscle functioning and for any ischemic signs in his perfusion stress test.  We will have Mr. Dunn aloe up in 1 month after his battery of cardiac testing is completed.  Elliott was seen today for follow-up, hypertension, palpitations, edema and carotid artery disease.    Diagnoses and all orders for this visit:    SANCHEZ (dyspnea on exertion)  -     Stress Test With Myocardial Perfusion Two Day; Future  -     Adult Transthoracic Echo Complete W/ Cont if Necessary Per Protocol; Future    Essential hypertension  -     Stress Test With  Myocardial Perfusion Two Day; Future    Mixed hyperlipidemia  -     Stress Test With Myocardial Perfusion Two Day; Future    Pulmonary emphysema, unspecified emphysema type (CMS/HCC)    Former smoker    Anginal equivalent (CMS/HCC)  -     Stress Test With Myocardial Perfusion Two Day; Future  -     Adult Transthoracic Echo Complete W/ Cont if Necessary Per Protocol; Future    Arteriosclerotic vascular disease  -     Stress Test With Myocardial Perfusion Two Day; Future  -     Adult Transthoracic Echo Complete W/ Cont if Necessary Per Protocol; Future        Return in about 1 month (around 9/30/2019) for F/U after testing complete.    Robert Ruth, APRN

## 2019-09-03 DIAGNOSIS — G89.4 CHRONIC PAIN SYNDROME: ICD-10-CM

## 2019-09-04 RX ORDER — METHOCARBAMOL 750 MG/1
TABLET, FILM COATED ORAL
Qty: 90 TABLET | Refills: 0 | Status: SHIPPED | OUTPATIENT
Start: 2019-09-04 | End: 2019-10-04 | Stop reason: SDUPTHER

## 2019-09-11 ENCOUNTER — LAB (OUTPATIENT)
Dept: LAB | Facility: HOSPITAL | Age: 64
End: 2019-09-11

## 2019-09-11 DIAGNOSIS — D47.2 MGUS (MONOCLONAL GAMMOPATHY OF UNKNOWN SIGNIFICANCE): ICD-10-CM

## 2019-09-11 LAB
ALBUMIN SERPL-MCNC: 4 G/DL (ref 3.5–5.2)
ALBUMIN/GLOB SERPL: 1 G/DL
ALP SERPL-CCNC: 119 U/L (ref 39–117)
ALT SERPL W P-5'-P-CCNC: 37 U/L (ref 1–41)
ANION GAP SERPL CALCULATED.3IONS-SCNC: 9.7 MMOL/L (ref 5–15)
AST SERPL-CCNC: 42 U/L (ref 1–40)
BASOPHILS # BLD AUTO: 0.06 10*3/MM3 (ref 0–0.2)
BASOPHILS NFR BLD AUTO: 1 % (ref 0–1.5)
BILIRUB SERPL-MCNC: 0.3 MG/DL (ref 0.2–1.2)
BUN BLD-MCNC: 19 MG/DL (ref 8–23)
BUN/CREAT SERPL: 20.7 (ref 7–25)
CALCIUM SPEC-SCNC: 9.2 MG/DL (ref 8.6–10.5)
CHLORIDE SERPL-SCNC: 105 MMOL/L (ref 98–107)
CO2 SERPL-SCNC: 26.3 MMOL/L (ref 22–29)
CREAT BLD-MCNC: 0.92 MG/DL (ref 0.76–1.27)
DEPRECATED RDW RBC AUTO: 51.5 FL (ref 37–54)
EOSINOPHIL # BLD AUTO: 0.24 10*3/MM3 (ref 0–0.4)
EOSINOPHIL NFR BLD AUTO: 3.8 % (ref 0.3–6.2)
ERYTHROCYTE [DISTWIDTH] IN BLOOD BY AUTOMATED COUNT: 14.1 % (ref 12.3–15.4)
GFR SERPL CREATININE-BSD FRML MDRD: 83 ML/MIN/1.73
GLOBULIN UR ELPH-MCNC: 3.9 GM/DL
GLUCOSE BLD-MCNC: 63 MG/DL (ref 65–99)
HCT VFR BLD AUTO: 44.2 % (ref 37.5–51)
HGB BLD-MCNC: 14.2 G/DL (ref 13–17.7)
IMM GRANULOCYTES # BLD AUTO: 0.02 10*3/MM3 (ref 0–0.05)
IMM GRANULOCYTES NFR BLD AUTO: 0.3 % (ref 0–0.5)
LYMPHOCYTES # BLD AUTO: 2.11 10*3/MM3 (ref 0.7–3.1)
LYMPHOCYTES NFR BLD AUTO: 33.7 % (ref 19.6–45.3)
MCH RBC QN AUTO: 32.2 PG (ref 26.6–33)
MCHC RBC AUTO-ENTMCNC: 32.1 G/DL (ref 31.5–35.7)
MCV RBC AUTO: 100.2 FL (ref 79–97)
MONOCYTES # BLD AUTO: 0.84 10*3/MM3 (ref 0.1–0.9)
MONOCYTES NFR BLD AUTO: 13.4 % (ref 5–12)
NEUTROPHILS # BLD AUTO: 3 10*3/MM3 (ref 1.7–7)
NEUTROPHILS NFR BLD AUTO: 47.8 % (ref 42.7–76)
NRBC BLD AUTO-RTO: 0 /100 WBC (ref 0–0.2)
PLATELET # BLD AUTO: 201 10*3/MM3 (ref 140–450)
PMV BLD AUTO: 10.6 FL (ref 6–12)
POTASSIUM BLD-SCNC: 4.8 MMOL/L (ref 3.5–5.2)
PROT SERPL-MCNC: 7.9 G/DL (ref 6–8.5)
RBC # BLD AUTO: 4.41 10*6/MM3 (ref 4.14–5.8)
SODIUM BLD-SCNC: 141 MMOL/L (ref 136–145)
WBC NRBC COR # BLD: 6.27 10*3/MM3 (ref 3.4–10.8)

## 2019-09-11 PROCEDURE — 85025 COMPLETE CBC W/AUTO DIFF WBC: CPT

## 2019-09-11 PROCEDURE — 83883 ASSAY NEPHELOMETRY NOT SPEC: CPT

## 2019-09-11 PROCEDURE — 36415 COLL VENOUS BLD VENIPUNCTURE: CPT

## 2019-09-11 PROCEDURE — 80053 COMPREHEN METABOLIC PANEL: CPT

## 2019-09-11 PROCEDURE — 84165 PROTEIN E-PHORESIS SERUM: CPT

## 2019-09-11 PROCEDURE — 86334 IMMUNOFIX E-PHORESIS SERUM: CPT

## 2019-09-11 PROCEDURE — 82784 ASSAY IGA/IGD/IGG/IGM EACH: CPT

## 2019-09-12 ENCOUNTER — HOSPITAL ENCOUNTER (OUTPATIENT)
Dept: NUCLEAR MEDICINE | Facility: HOSPITAL | Age: 64
Discharge: HOME | End: 2019-09-12

## 2019-09-12 DIAGNOSIS — I10 ESSENTIAL HYPERTENSION: ICD-10-CM

## 2019-09-12 DIAGNOSIS — I70.90 ARTERIOSCLEROTIC VASCULAR DISEASE: ICD-10-CM

## 2019-09-12 DIAGNOSIS — I20.8 ANGINAL EQUIVALENT (HCC): ICD-10-CM

## 2019-09-12 DIAGNOSIS — J43.9 PULMONARY EMPHYSEMA, UNSPECIFIED EMPHYSEMA TYPE (HCC): ICD-10-CM

## 2019-09-12 DIAGNOSIS — R06.09 DOE (DYSPNEA ON EXERTION): ICD-10-CM

## 2019-09-12 DIAGNOSIS — Z87.891 FORMER SMOKER: ICD-10-CM

## 2019-09-12 DIAGNOSIS — E78.2 MIXED HYPERLIPIDEMIA: ICD-10-CM

## 2019-09-12 LAB
ALBUMIN SERPL-MCNC: 3.7 G/DL (ref 2.9–4.4)
ALBUMIN/GLOB SERPL: 1 {RATIO} (ref 0.7–1.7)
ALPHA1 GLOB FLD ELPH-MCNC: 0.2 G/DL (ref 0–0.4)
ALPHA2 GLOB SERPL ELPH-MCNC: 0.8 G/DL (ref 0.4–1)
B-GLOBULIN SERPL ELPH-MCNC: 0.8 G/DL (ref 0.7–1.3)
GAMMA GLOB SERPL ELPH-MCNC: 2 G/DL (ref 0.4–1.8)
GLOBULIN SER CALC-MCNC: 3.8 G/DL (ref 2.2–3.9)
IGA SERPL-MCNC: 99 MG/DL (ref 61–437)
IGG SERPL-MCNC: 2144 MG/DL (ref 700–1600)
IGM SERPL-MCNC: 28 MG/DL (ref 20–172)
INTERPRETATION SERPL IEP-IMP: ABNORMAL
KAPPA LC SERPL-MCNC: 64.2 MG/L (ref 3.3–19.4)
KAPPA LC/LAMBDA SER: 7.73 {RATIO} (ref 0.26–1.65)
LAMBDA LC FREE SERPL-MCNC: 8.3 MG/L (ref 5.7–26.3)
Lab: ABNORMAL
M-SPIKE: 1.7 G/DL
PROT SERPL-MCNC: 7.5 G/DL (ref 6–8.5)

## 2019-09-12 PROCEDURE — 93017 CV STRESS TEST TRACING ONLY: CPT

## 2019-09-12 PROCEDURE — 78452 HT MUSCLE IMAGE SPECT MULT: CPT | Performed by: INTERNAL MEDICINE

## 2019-09-12 PROCEDURE — 0 TECHNETIUM SESTAMIBI: Performed by: NURSE PRACTITIONER

## 2019-09-12 PROCEDURE — A9500 TC99M SESTAMIBI: HCPCS | Performed by: NURSE PRACTITIONER

## 2019-09-12 PROCEDURE — 78452 HT MUSCLE IMAGE SPECT MULT: CPT

## 2019-09-12 PROCEDURE — 25010000002 REGADENOSON 0.4 MG/5ML SOLUTION: Performed by: NURSE PRACTITIONER

## 2019-09-12 PROCEDURE — 93018 CV STRESS TEST I&R ONLY: CPT | Performed by: INTERNAL MEDICINE

## 2019-09-12 RX ADMIN — TECHNETIUM TC 99M SESTAMIBI 1 DOSE: 1 INJECTION INTRAVENOUS at 08:13

## 2019-09-12 RX ADMIN — REGADENOSON 0.4 MG: 0.08 INJECTION, SOLUTION INTRAVENOUS at 08:13

## 2019-09-13 ENCOUNTER — TELEPHONE (OUTPATIENT)
Dept: CARDIOLOGY | Facility: CLINIC | Age: 64
End: 2019-09-13

## 2019-09-13 ENCOUNTER — HOSPITAL ENCOUNTER (OUTPATIENT)
Dept: NUCLEAR MEDICINE | Facility: HOSPITAL | Age: 64
Discharge: HOME | End: 2019-09-13

## 2019-09-13 DIAGNOSIS — I51.3 THROMBOSIS OF RIGHT ATRIUM: ICD-10-CM

## 2019-09-13 DIAGNOSIS — Q24.2: Primary | ICD-10-CM

## 2019-09-13 LAB
BH CV STRESS COMMENTS STAGE 1: NORMAL
BH CV STRESS DOSE REGADENOSON STAGE 1: 0.4
BH CV STRESS DURATION MIN STAGE 1: 0
BH CV STRESS DURATION SEC STAGE 1: 10
BH CV STRESS PROTOCOL 1: NORMAL
BH CV STRESS RECOVERY BP: NORMAL MMHG
BH CV STRESS RECOVERY HR: 93 BPM
BH CV STRESS STAGE 1: 1
LV EF NUC BP: 64 %
MAXIMAL PREDICTED HEART RATE: 156 BPM
PERCENT MAX PREDICTED HR: 60.9 %
STRESS BASELINE BP: NORMAL MMHG
STRESS BASELINE HR: 62 BPM
STRESS PERCENT HR: 72 %
STRESS POST PEAK BP: NORMAL MMHG
STRESS POST PEAK HR: 95 BPM
STRESS TARGET HR: 133 BPM

## 2019-09-13 PROCEDURE — 0 TECHNETIUM SESTAMIBI: Performed by: NURSE PRACTITIONER

## 2019-09-13 PROCEDURE — A9500 TC99M SESTAMIBI: HCPCS | Performed by: NURSE PRACTITIONER

## 2019-09-13 RX ADMIN — TECHNETIUM TC 99M SESTAMIBI 1 DOSE: 1 INJECTION INTRAVENOUS at 07:15

## 2019-09-13 NOTE — TELEPHONE ENCOUNTER
"----- Message from Robert Ruth Jr., APRN sent at 9/9/2019 10:06 AM EDT -----  I believe Dr. Cedillo was going to \"touch base\" with Dr. Arreola regarding Mr. Dunn, due to the questionable thrombus.  Also, whether he will need further work-up as in a GARRY to evaluate for a potential thrombus or cor triatriatum. Thanks.  "

## 2019-09-16 ENCOUNTER — TELEPHONE (OUTPATIENT)
Dept: CARDIOLOGY | Facility: CLINIC | Age: 64
End: 2019-09-16

## 2019-09-16 DIAGNOSIS — G89.4 CHRONIC PAIN SYNDROME: ICD-10-CM

## 2019-09-16 DIAGNOSIS — M47.812 SPONDYLOSIS OF CERVICAL REGION WITHOUT MYELOPATHY OR RADICULOPATHY: ICD-10-CM

## 2019-09-16 RX ORDER — GABAPENTIN 300 MG/1
CAPSULE ORAL
Qty: 90 CAPSULE | Refills: 1 | Status: SHIPPED | OUTPATIENT
Start: 2019-09-16 | End: 2020-01-13 | Stop reason: SDUPTHER

## 2019-09-19 ENCOUNTER — HOSPITAL ENCOUNTER (OUTPATIENT)
Dept: CARDIOLOGY | Facility: HOSPITAL | Age: 64
Discharge: HOME | End: 2019-09-19
Attending: INTERNAL MEDICINE | Admitting: INTERNAL MEDICINE

## 2019-09-19 VITALS
HEART RATE: 64 BPM | TEMPERATURE: 97.2 F | HEIGHT: 71 IN | DIASTOLIC BLOOD PRESSURE: 75 MMHG | OXYGEN SATURATION: 100 % | RESPIRATION RATE: 19 BRPM | BODY MASS INDEX: 18.9 KG/M2 | WEIGHT: 135 LBS | SYSTOLIC BLOOD PRESSURE: 122 MMHG

## 2019-09-19 DIAGNOSIS — I51.3 THROMBOSIS OF RIGHT ATRIUM: ICD-10-CM

## 2019-09-19 DIAGNOSIS — Q24.2: ICD-10-CM

## 2019-09-19 LAB
BH CV ECHO MEAS - BSA(HAYCOCK): 1.7 M^2
BH CV ECHO MEAS - BSA: 1.8 M^2
BH CV ECHO MEAS - BZI_BMI: 18.8 KILOGRAMS/M^2
BH CV ECHO MEAS - BZI_METRIC_HEIGHT: 180.3 CM
BH CV ECHO MEAS - BZI_METRIC_WEIGHT: 61.2 KG

## 2019-09-19 PROCEDURE — 93320 DOPPLER ECHO COMPLETE: CPT | Performed by: INTERNAL MEDICINE

## 2019-09-19 PROCEDURE — 25010000002 MIDAZOLAM PER 1 MG: Performed by: INTERNAL MEDICINE

## 2019-09-19 PROCEDURE — 93320 DOPPLER ECHO COMPLETE: CPT

## 2019-09-19 PROCEDURE — 93312 ECHO TRANSESOPHAGEAL: CPT

## 2019-09-19 PROCEDURE — 25010000002 FENTANYL CITRATE (PF) 100 MCG/2ML SOLUTION: Performed by: INTERNAL MEDICINE

## 2019-09-19 PROCEDURE — 93325 DOPPLER ECHO COLOR FLOW MAPG: CPT

## 2019-09-19 PROCEDURE — 93312 ECHO TRANSESOPHAGEAL: CPT | Performed by: INTERNAL MEDICINE

## 2019-09-19 PROCEDURE — 93325 DOPPLER ECHO COLOR FLOW MAPG: CPT | Performed by: INTERNAL MEDICINE

## 2019-09-19 RX ORDER — MIDAZOLAM HYDROCHLORIDE 1 MG/ML
INJECTION INTRAMUSCULAR; INTRAVENOUS
Status: COMPLETED | OUTPATIENT
Start: 2019-09-19 | End: 2019-09-19

## 2019-09-19 RX ORDER — FENTANYL CITRATE 50 UG/ML
INJECTION, SOLUTION INTRAMUSCULAR; INTRAVENOUS
Status: COMPLETED | OUTPATIENT
Start: 2019-09-19 | End: 2019-09-19

## 2019-09-19 RX ADMIN — MIDAZOLAM HYDROCHLORIDE 1 MG: 1 INJECTION, SOLUTION INTRAMUSCULAR; INTRAVENOUS at 08:19

## 2019-09-19 RX ADMIN — FENTANYL CITRATE 25 MCG: 50 INJECTION, SOLUTION INTRAMUSCULAR; INTRAVENOUS at 08:19

## 2019-09-19 RX ADMIN — TOPICAL ANESTHETIC 1 SPRAY: 200 SPRAY DENTAL; PERIODONTAL at 08:15

## 2019-09-19 RX ADMIN — TOPICAL ANESTHETIC 1 SPRAY: 200 SPRAY DENTAL; PERIODONTAL at 08:10

## 2019-09-30 ENCOUNTER — OFFICE VISIT (OUTPATIENT)
Dept: PULMONOLOGY | Facility: CLINIC | Age: 64
End: 2019-09-30

## 2019-09-30 ENCOUNTER — LAB (OUTPATIENT)
Dept: LAB | Facility: HOSPITAL | Age: 64
End: 2019-09-30

## 2019-09-30 VITALS
SYSTOLIC BLOOD PRESSURE: 100 MMHG | BODY MASS INDEX: 18.9 KG/M2 | RESPIRATION RATE: 18 BRPM | HEIGHT: 71 IN | WEIGHT: 135 LBS | HEART RATE: 70 BPM | OXYGEN SATURATION: 97 % | DIASTOLIC BLOOD PRESSURE: 60 MMHG

## 2019-09-30 DIAGNOSIS — R06.02 SHORTNESS OF BREATH: ICD-10-CM

## 2019-09-30 DIAGNOSIS — J43.2 CENTRILOBULAR EMPHYSEMA (HCC): ICD-10-CM

## 2019-09-30 DIAGNOSIS — J44.9 CHRONIC OBSTRUCTIVE PULMONARY DISEASE, UNSPECIFIED COPD TYPE (HCC): ICD-10-CM

## 2019-09-30 DIAGNOSIS — R91.8 MULTIPLE NODULES OF LUNG: ICD-10-CM

## 2019-09-30 DIAGNOSIS — R06.02 SHORTNESS OF BREATH: Primary | ICD-10-CM

## 2019-09-30 DIAGNOSIS — Z87.891 PERSONAL HISTORY OF TOBACCO USE, PRESENTING HAZARDS TO HEALTH: ICD-10-CM

## 2019-09-30 DIAGNOSIS — R93.89 ABNORMAL CT OF THE CHEST: ICD-10-CM

## 2019-09-30 PROCEDURE — 82104 ALPHA-1-ANTITRYPSIN PHENO: CPT

## 2019-09-30 PROCEDURE — 36415 COLL VENOUS BLD VENIPUNCTURE: CPT

## 2019-09-30 PROCEDURE — 99245 OFF/OP CONSLTJ NEW/EST HI 55: CPT | Performed by: INTERNAL MEDICINE

## 2019-09-30 PROCEDURE — 82103 ALPHA-1-ANTITRYPSIN TOTAL: CPT

## 2019-09-30 RX ORDER — INFLUENZA A VIRUS A/MICHIGAN/45/2015 X-275 (H1N1) ANTIGEN (FORMALDEHYDE INACTIVATED), INFLUENZA A VIRUS A/SINGAPORE/INFIMH-16-0019/2016 IVR-186 (H3N2) ANTIGEN (FORMALDEHYDE INACTIVATED), INFLUENZA B VIRUS B/PHUKET/3073/2013 ANTIGEN (FORMALDEHYDE INACTIVATED), AND INFLUENZA B VIRUS B/MARYLAND/15/2016 BX-69A ANTIGEN (FORMALDEHYDE INACTIVATED) 15; 15; 15; 15 UG/.5ML; UG/.5ML; UG/.5ML; UG/.5ML
INJECTION, SUSPENSION INTRAMUSCULAR
Refills: 0 | COMMUNITY
Start: 2019-09-21 | End: 2019-11-20

## 2019-09-30 RX ORDER — LEFLUNOMIDE 10 MG/1
20 TABLET ORAL DAILY
COMMUNITY
Start: 2019-09-03 | End: 2022-06-13 | Stop reason: SDUPTHER

## 2019-10-02 LAB
A1AT SERPL-MCNC: 119 MG/DL (ref 90–200)
PHENOTYPE: NORMAL

## 2019-10-04 DIAGNOSIS — G89.4 CHRONIC PAIN SYNDROME: ICD-10-CM

## 2019-10-07 RX ORDER — METHOCARBAMOL 750 MG/1
TABLET, FILM COATED ORAL
Qty: 90 TABLET | Refills: 0 | Status: SHIPPED | OUTPATIENT
Start: 2019-10-07 | End: 2019-11-03 | Stop reason: SDUPTHER

## 2019-10-10 ENCOUNTER — OFFICE VISIT (OUTPATIENT)
Dept: ONCOLOGY | Facility: CLINIC | Age: 64
End: 2019-10-10

## 2019-10-10 VITALS
HEIGHT: 71 IN | TEMPERATURE: 97.7 F | WEIGHT: 133 LBS | SYSTOLIC BLOOD PRESSURE: 113 MMHG | DIASTOLIC BLOOD PRESSURE: 70 MMHG | HEART RATE: 71 BPM | RESPIRATION RATE: 19 BRPM | BODY MASS INDEX: 18.62 KG/M2

## 2019-10-10 DIAGNOSIS — D47.2 MGUS (MONOCLONAL GAMMOPATHY OF UNKNOWN SIGNIFICANCE): Primary | ICD-10-CM

## 2019-10-10 PROCEDURE — 99213 OFFICE O/P EST LOW 20 MIN: CPT | Performed by: INTERNAL MEDICINE

## 2019-10-10 NOTE — PROGRESS NOTES
PROBLEM LIST:  Oncology/Hematology History    1.  MGUS: Had been having mid back pains and was seen by neurology, was found to have 1200 mg of immunoglobulin G monoclonal protein in the serum with a normal IgA and IgM level this was in July 2016.  Workup August 2016 included a bone survey that was negative other than for degenerative joint disease with left eighth rib healed fracture that was seen on prior bone scan.  Normal creatinine, ionized calcium of 1.34, normal total protein to albumin ratio.  Normal sedimentation rate and C-reactive protein, serum monoclonal protein present at 1100 mg/dL of immunoglobulin G kappa with normal IgA and M levels.  Urine monoclonal protein was too scant to quantify.  Kappa to lambda ratio of 4.58 with elevation of At 42.85.  CBC was unremarkable with normal white count and platelet count and hemoglobin of 17 baseline.  Baseline PET/CT 9/1/2016 was negative.   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  This genetic alteration is found in approximately 15-18 percent of patients with plasma cell myeloma by FISH analysis and is associated with a favorable prognosis in the absence of poor prognostic markers.   b.)  3/6/2017 follow-up PET/CT was negative.    2.  History of prostate cancer: Status post prostatectomy 2014, under the care of Dr. Varela who he sees annually at this point.    3.  Squamous cell cancer of the skin, followed by Dr. Izaguirre.              MGUS (monoclonal gammopathy of unknown significance)    7/1/2016 Initial Diagnosis     MGUS (monoclonal gammopathy of unknown significance)         8/21/2017 -  Other Event     Myeloma panel: Urine immunoelectrophoresis monoclonal protein too small to quantify, serum immunoelectrophoresis M-spike stable at 1.3g/dl, ionized calcium 5.2, kappa to lambda ratio 4.10, beta-2 microglobulin 2.2, C-reactive protein less  than 0.50, creatinine 1.3, sedimentation rate to.  CBC WBC 9600, hemoglobin 15.6, hematocrit 47.1%, platelet count 209,000.           3/12/2018 -  Other Event     Myeloma panel: Urine immunoelectrophoresis with 122 mg/24 hour protein, monoclonal kappa free light chains 21.2%, monoclonal IgG kappa 7.5%.  Sedimentation rate 5, immunoglobulin free light chains free kappa light chains 49.8, normal lambda light chains 12.2, kappa/lambda ratio 4.08.  Serum immunoelectrophoresis M spike 1.7g/dL, C-reactive protein 1.60, CMP unremarkable with creatinine 1.10, serum calcium 9.2, ionized calcium 5.1, beta-2 microglobulin 2.5, CBC normal with a WBC of 9.07, hemoglobin 15.9, hematocrit 47.5%, platelets 226,000.            REASON FOR VISIT: Follow-up of his MGUS    HISTORY OF PRESENT ILLNESS:   64 y.o.  male presents today for follow-up of his monoclonal gammopathy.  Its been 6 months since he was seen in this office.  Clinically doing reasonably well.  He is followed by Dr. Dickson for right upper quadrant lesion.  He has fairly profound COPD.  He is not oxygen dependent.    Past medical history, social history and family history was reviewed and unchanged from prior visit.    Review of Systems:    Review of Systems - Oncology   A comprehensive 14 point review of systems was performed and was negative except as mentioned.      Medications:        Current Outpatient Medications:   •  albuterol (PROVENTIL HFA;VENTOLIN HFA) 108 (90 Base) MCG/ACT inhaler, Inhale 2 puffs Every 6 (Six) Hours As Needed for Wheezing or Shortness of Air., Disp: 1 inhaler, Rfl: 3  •  amitriptyline (ELAVIL) 25 MG tablet, TAKE 1-3 AT BEDTIME AS NEEDED FOR SLEEP, Disp: 270 tablet, Rfl: 4  •  ASPIRIN ADULT LOW DOSE 81 MG EC tablet, TAKE ONE TABLET BY MOUTH DAILY, Disp: 30 tablet, Rfl: 3  •  atorvastatin (LIPITOR) 10 MG tablet, TAKE ONE TABLET BY MOUTH DAILY, Disp: 30 tablet, Rfl: 5  •  benzonatate (TESSALON) 200 MG capsule, Take 1 capsule by mouth 3 (Three)  Times a Day As Needed for Cough., Disp: 30 capsule, Rfl: 0  •  bisoprolol (ZEBeta) 5 MG tablet, TAKE ONE TABLET BY MOUTH DAILY, Disp: 30 tablet, Rfl: 2  •  celecoxib (CELEBREX) 100 MG capsule, Take 1 capsule by mouth 2 (Two) Times a Day As Needed for Moderate Pain ., Disp: 180 capsule, Rfl: 4  •  DULoxetine (CYMBALTA) 60 MG capsule, TAKE ONE CAPSULE BY MOUTH DAILY, Disp: 30 capsule, Rfl: 11  •  FLUZONE QUADRIVALENT 0.5 ML suspension prefilled syringe injection, PHARMACIST ADMINISTERED IMMUNIZATION ADMINISTERED AT TIME OF DISPENSING, Disp: , Rfl: 0  •  folic acid (FOLVITE) 1 MG tablet, Take 1 mg by mouth Daily., Disp: , Rfl:   •  gabapentin (NEURONTIN) 300 MG capsule, TAKE ONE CAPSULE BY MOUTH THREE TIMES A DAY AS NEEDED FOR NERVE PAIN, Disp: 90 capsule, Rfl: 1  •  isosorbide mononitrate (IMDUR) 30 MG 24 hr tablet, TAKE ONE TABLET BY MOUTH DAILY, Disp: 30 tablet, Rfl: 2  •  leflunomide (ARAVA) 10 MG tablet, , Disp: , Rfl:   •  LEFLUNOMIDE PO, Take  by mouth., Disp: , Rfl:   •  methocarbamol (ROBAXIN) 750 MG tablet, TAKE ONE TABLET BY MOUTH THREE TIMES A DAY, Disp: 90 tablet, Rfl: 0  •  methotrexate 2.5 MG tablet, Take 8 tablets by mouth 1 (One) Time Per Week., Disp: 96 tablet, Rfl: 0  •  nitroglycerin (NITROSTAT) 0.4 MG SL tablet, 1 under the tongue as needed for angina, may repeat q5mins for up three doses, Disp: 100 tablet, Rfl: 11  •  Omeprazole 20 MG tablet delayed-release, TAKE ONE TABLET BY MOUTH DAILY, Disp: 30 each, Rfl: 5  •  predniSONE (DELTASONE) 10 MG tablet, Take 10 mg by mouth Daily. As needed, Disp: , Rfl:   •  tiotropium bromide-olodaterol (STIOLTO RESPIMAT) 2.5-2.5 MCG/ACT aerosol solution inhaler, Inhale 2 puffs Daily., Disp: 1 inhaler, Rfl: 5  •  Triamcinolone Acetonide (NASACORT) 55 MCG/ACT nasal inhaler, 2 sprays into each nostril daily., Disp: , Rfl:   •  vitamin B-12 (CYANOCOBALAMIN) 1000 MCG tablet, TAKE ONE TABLET BY MOUTH DAILY, Disp: 30 tablet, Rfl: 8      ALLERGIES:    Allergies   Allergen  "Reactions   • Plaquenil [Hydroxychloroquine Sulfate] Other (See Comments)     Black eyes   • Cyclobenzaprine Other (See Comments)     Wide awake   • Pravastatin Other (See Comments)     Weakness / fatigue  Weakness / fatigue  Weakness / fatigue         Physical Exam    VITAL SIGNS:  /70   Pulse 71   Temp 97.7 °F (36.5 °C) (Temporal)   Resp 19   Ht 180.3 cm (70.98\")   Wt 60.3 kg (133 lb)   BMI 18.56 kg/m²     Wt Readings from Last 3 Encounters:   10/10/19 60.3 kg (133 lb)   09/30/19 61.2 kg (135 lb)   09/19/19 61.2 kg (135 lb)       Body mass index is 18.56 kg/m². Body surface area is 1.77 meters squared.         Performance Status: 1    General: thin appearing, in no acute distress  HEENT: sclera anicteric, oropharynx clear, neck is supple  Lymphatics: no cervical, supraclavicular, or axillary adenopathy  Cardiovascular: regular rate and rhythm, no murmurs, rubs or gallops  Lungs: clear to auscultation bilaterally  Abdomen: soft, nontender, nondistended.  No palpable organomegaly  Extremities: no lower extremity edema  Skin: no rashes, lesions, bruising, or petechiae  Msk:  Shows no weakness of the large muscle groups  Psych: Mood is stable        RECENT LABS:    Lab Results   Component Value Date    HGB 14.2 09/11/2019    HCT 44.2 09/11/2019    .2 (H) 09/11/2019     09/11/2019    WBC 6.27 09/11/2019    NEUTROABS 3.00 09/11/2019    LYMPHSABS 2.11 09/11/2019    MONOSABS 0.84 09/11/2019    EOSABS 0.24 09/11/2019    BASOSABS 0.06 09/11/2019       Lab Results   Component Value Date    GLUCOSE 63 (L) 09/11/2019    BUN 19 09/11/2019    CREATININE 0.92 09/11/2019     09/11/2019    K 4.8 09/11/2019     09/11/2019    CO2 26.3 09/11/2019    CALCIUM 9.2 09/11/2019    PROTEINTOT 7.9 09/11/2019    ALBUMIN 4.00 09/11/2019    ALBUMIN 3.7 09/11/2019    BILITOT 0.3 09/11/2019    ALKPHOS 119 (H) 09/11/2019    AST 42 (H) 09/11/2019    ALT 37 09/11/2019       Lab Results   Component Value Date    " MSPIKE 1.7 (H) 09/11/2019    MSPIKE 1.7 (H) 03/11/2019    MSPIKE 1.5 (H) 08/31/2018     Lab Results   Component Value Date    FREEKAPPAL 64.2 (H) 09/11/2019    FREEKAPPAL 49.0 (H) 03/11/2019    FREEKAPPAL 57.6 (H) 08/31/2018     Lab Results   Component Value Date    IGLFLC 8.3 09/11/2019    IGLFLC 7.9 03/11/2019    IGLFLC 10.3 08/31/2018         Xr Chest 2 View    Result Date: 8/17/2019  No radiographic evidence of acute cardiac or pulmonary disease. Stable chronic increased interstitial markings.      This report was finalized on 8/17/2019 8:12 AM by Obdulia Feliciano MD.          Assessment/Plan    1.  Monoclonal gammopathy of uncertain significance with the M spike of 1.7 g/dL.  His numbers have been relatively stable for a number of years.  We will continue to closely follow-up with him.  He will likely need a bone survey next year.  Otherwise no other changes for now.    2.  Severe COPD with a right upper lobe lesion.  Followed by Dr. Dickson.        I spent a total of 15 minutes in direct patient care, greater than 10 minutes (greater than 50%) were spent in coordination of care, and counseling the patient regarding  MGUS . Answered any questions patient had with the plan.      Nicholas Miller MD  [facility] Hematology and Oncology    Return in (Approximately): 6 months    Orders Placed This Encounter   Procedures   • MARKO, PE & Free LT Chains, Ser   • Comprehensive Metabolic Panel   • CBC & Differential       10/10/2019         Please note that portions of this note may have been completed with a voice recognition program. Efforts were made to edit the dictations, but occasionally words are mistranscribed.

## 2019-10-11 ENCOUNTER — HOSPITAL ENCOUNTER (OUTPATIENT)
Dept: CT IMAGING | Facility: HOSPITAL | Age: 64
Discharge: HOME | End: 2019-10-11
Admitting: INTERNAL MEDICINE

## 2019-10-11 DIAGNOSIS — J44.9 CHRONIC OBSTRUCTIVE PULMONARY DISEASE, UNSPECIFIED COPD TYPE (HCC): ICD-10-CM

## 2019-10-11 DIAGNOSIS — J43.2 CENTRILOBULAR EMPHYSEMA (HCC): ICD-10-CM

## 2019-10-11 DIAGNOSIS — R91.8 MULTIPLE NODULES OF LUNG: ICD-10-CM

## 2019-10-11 DIAGNOSIS — R93.89 ABNORMAL CT OF THE CHEST: ICD-10-CM

## 2019-10-11 PROCEDURE — 71250 CT THORAX DX C-: CPT

## 2019-10-16 ENCOUNTER — OFFICE VISIT (OUTPATIENT)
Dept: CARDIOLOGY | Facility: CLINIC | Age: 64
End: 2019-10-16

## 2019-10-16 VITALS
DIASTOLIC BLOOD PRESSURE: 70 MMHG | HEIGHT: 71 IN | WEIGHT: 132 LBS | HEART RATE: 84 BPM | SYSTOLIC BLOOD PRESSURE: 100 MMHG | BODY MASS INDEX: 18.48 KG/M2 | OXYGEN SATURATION: 98 %

## 2019-10-16 DIAGNOSIS — I20.0 UNSTABLE ANGINA (HCC): ICD-10-CM

## 2019-10-16 DIAGNOSIS — Q24.5 ANOMALOUS CORONARY ARTERY ORIGIN: ICD-10-CM

## 2019-10-16 DIAGNOSIS — I70.90 ARTERIOSCLEROTIC VASCULAR DISEASE: ICD-10-CM

## 2019-10-16 DIAGNOSIS — I10 ESSENTIAL HYPERTENSION: Chronic | ICD-10-CM

## 2019-10-16 DIAGNOSIS — E78.2 MIXED HYPERLIPIDEMIA: ICD-10-CM

## 2019-10-16 DIAGNOSIS — J43.8 OTHER EMPHYSEMA (HCC): Chronic | ICD-10-CM

## 2019-10-16 DIAGNOSIS — J44.9: Primary | ICD-10-CM

## 2019-10-16 PROBLEM — J44.89: Status: ACTIVE | Noted: 2019-10-16

## 2019-10-16 PROCEDURE — 99214 OFFICE O/P EST MOD 30 MIN: CPT | Performed by: NURSE PRACTITIONER

## 2019-10-16 RX ORDER — BENZONATATE 200 MG/1
200 CAPSULE ORAL 3 TIMES DAILY PRN
Qty: 30 CAPSULE | Refills: 0 | Status: SHIPPED | OUTPATIENT
Start: 2019-10-16 | End: 2020-01-13

## 2019-10-16 RX ORDER — AZITHROMYCIN 500 MG/1
500 TABLET, FILM COATED ORAL DAILY
Qty: 5 TABLET | Refills: 0 | Status: SHIPPED | OUTPATIENT
Start: 2019-10-16 | End: 2019-10-21

## 2019-10-16 NOTE — PROGRESS NOTES
"Elliott Dunn is a 64 y.o. male.  MRN #: [id number]    Referring Provider: Ladonna Means MD    Chief Complaint:   Chief Complaint   Patient presents with   • Follow-up   • Hypertension        History of Present Illness:  Mr. Dunn is a 64-year-old gentleman that presents for one-month follow-up after cardiac testing.  Patient was evaluated 1 month ago with symptoms of increasing fatigue and increasing shortness of breath with lower extremity edema.  Patient commenting that he \"gives out\" after with one flight of stairs.  Patient did have a cardiac ischemia work-up in May 2016 which was negative for any EKG or clinical findings for myocardial ischemia.  Patient's myocardial perfusion vasodilator stress test that was done on September 2019 was negative for any myocardial ischemia or EKG findings suggesting ischemia.  Transthoracic echocardiogram showed normal LV systolic functioning with an estimated ejection fraction of 55 to 60%.  Trace mitral regurg and trace tricuspid regurg.  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  Patient then underwent transesophageal echocardiogram which showed normal LV systolic function, but did show a pericardial effusion.  With today's follow-up visit patient reports that he has been evaluated by pulmonology for his shortness of breath and persistent cough.  He has been prescribed an inhaler which has helped improve his shortness of breath however patient reports today that he has had an increase in cough with shortness of breath and productive discolored sputum.  His wife states that he is also had fever.    The patient presents today with their wife who contributes to the history of their care.     The following portions of the patient's history were reviewed and updated as appropriate: allergies, current medications, past family history, past medical history, past social history, past surgical history and problem list. "     Review of Systems:     Review of Systems   Constitutional: Positive for activity change, chills, fatigue and fever. Negative for appetite change, diaphoresis, unexpected weight gain and unexpected weight loss.   HENT: Positive for congestion.    Eyes: Negative.  Negative for blurred vision, double vision, photophobia and visual disturbance.   Respiratory: Positive for cough and wheezing. Negative for apnea, chest tightness and shortness of breath.    Cardiovascular: Positive for palpitations. Negative for chest pain and leg swelling.   Gastrointestinal: Negative.  Negative for abdominal distention, abdominal pain, nausea, vomiting, GERD and indigestion.   Endocrine: Negative.  Negative for cold intolerance, heat intolerance, polydipsia, polyphagia and polyuria.   Genitourinary: Negative.  Negative for decreased libido, frequency, genital sores, hematuria and urgency.   Musculoskeletal: Positive for arthralgias and myalgias. Negative for back pain, joint swelling, neck pain and neck stiffness.   Skin: Negative.  Negative for color change, pallor and bruise.   Allergic/Immunologic: Negative.    Neurological: Negative.  Negative for dizziness, tremors, seizures, syncope, facial asymmetry, speech difficulty, weakness, light-headedness and numbness.   Hematological: Negative.  Negative for adenopathy. Does not bruise/bleed easily.   Psychiatric/Behavioral: Negative.  Negative for agitation, decreased concentration, sleep disturbance, suicidal ideas and stress. The patient is not nervous/anxious.    All other systems reviewed and are negative.         Current Outpatient Medications:   •  albuterol (PROVENTIL HFA;VENTOLIN HFA) 108 (90 Base) MCG/ACT inhaler, Inhale 2 puffs Every 6 (Six) Hours As Needed for Wheezing or Shortness of Air., Disp: 1 inhaler, Rfl: 3  •  amitriptyline (ELAVIL) 25 MG tablet, TAKE 1-3 AT BEDTIME AS NEEDED FOR SLEEP, Disp: 270 tablet, Rfl: 4  •  ASPIRIN ADULT LOW DOSE 81 MG EC tablet, TAKE ONE  TABLET BY MOUTH DAILY, Disp: 30 tablet, Rfl: 3  •  atorvastatin (LIPITOR) 10 MG tablet, TAKE ONE TABLET BY MOUTH DAILY, Disp: 30 tablet, Rfl: 5  •  benzonatate (TESSALON) 200 MG capsule, Take 1 capsule by mouth 3 (Three) Times a Day As Needed for Cough., Disp: 30 capsule, Rfl: 0  •  bisoprolol (ZEBeta) 5 MG tablet, TAKE ONE TABLET BY MOUTH DAILY, Disp: 30 tablet, Rfl: 2  •  celecoxib (CELEBREX) 100 MG capsule, Take 1 capsule by mouth 2 (Two) Times a Day As Needed for Moderate Pain ., Disp: 180 capsule, Rfl: 4  •  DULoxetine (CYMBALTA) 60 MG capsule, TAKE ONE CAPSULE BY MOUTH DAILY, Disp: 30 capsule, Rfl: 11  •  FLUZONE QUADRIVALENT 0.5 ML suspension prefilled syringe injection, PHARMACIST ADMINISTERED IMMUNIZATION ADMINISTERED AT TIME OF DISPENSING, Disp: , Rfl: 0  •  folic acid (FOLVITE) 1 MG tablet, Take 1 mg by mouth Daily., Disp: , Rfl:   •  gabapentin (NEURONTIN) 300 MG capsule, TAKE ONE CAPSULE BY MOUTH THREE TIMES A DAY AS NEEDED FOR NERVE PAIN, Disp: 90 capsule, Rfl: 1  •  isosorbide mononitrate (IMDUR) 30 MG 24 hr tablet, TAKE ONE TABLET BY MOUTH DAILY, Disp: 30 tablet, Rfl: 2  •  leflunomide (ARAVA) 10 MG tablet, , Disp: , Rfl:   •  methocarbamol (ROBAXIN) 750 MG tablet, TAKE ONE TABLET BY MOUTH THREE TIMES A DAY, Disp: 90 tablet, Rfl: 0  •  methotrexate 2.5 MG tablet, Take 8 tablets by mouth 1 (One) Time Per Week., Disp: 96 tablet, Rfl: 0  •  nitroglycerin (NITROSTAT) 0.4 MG SL tablet, 1 under the tongue as needed for angina, may repeat q5mins for up three doses, Disp: 100 tablet, Rfl: 11  •  predniSONE (DELTASONE) 10 MG tablet, Take 10 mg by mouth Daily. As needed, Disp: , Rfl:   •  tiotropium bromide-olodaterol (STIOLTO RESPIMAT) 2.5-2.5 MCG/ACT aerosol solution inhaler, Inhale 2 puffs Daily., Disp: 1 inhaler, Rfl: 5  •  Triamcinolone Acetonide (NASACORT) 55 MCG/ACT nasal inhaler, 2 sprays into each nostril daily., Disp: , Rfl:   •  vitamin B-12 (CYANOCOBALAMIN) 1000 MCG tablet, TAKE ONE TABLET BY MOUTH  "DAILY, Disp: 30 tablet, Rfl: 8  •  azithromycin (ZITHROMAX) 500 MG tablet, Take 1 tablet by mouth Daily for 5 days., Disp: 5 tablet, Rfl: 0  •  Omeprazole 20 MG tablet delayed-release, TAKE ONE TABLET BY MOUTH DAILY, Disp: 30 each, Rfl: 5    Vitals:    10/16/19 1329   BP: 100/70   BP Location: Right arm   Patient Position: Sitting   Cuff Size: Adult   Pulse: 84   SpO2: 98%   Weight: 59.9 kg (132 lb)   Height: 180.3 cm (70.98\")       Physical Exam:     Physical Exam   Constitutional: He is oriented to person, place, and time. He appears well-developed and well-nourished. No distress.   HENT:   Head: Normocephalic and atraumatic.   Eyes: Conjunctivae are normal. Pupils are equal, round, and reactive to light. No scleral icterus.   Neck: Trachea normal, normal range of motion and full passive range of motion without pain. Neck supple. No JVD present. Carotid bruit is not present. No thyroid mass and no thyromegaly present.   Cardiovascular: Normal rate, regular rhythm, S1 normal, S2 normal, normal heart sounds, intact distal pulses and normal pulses. PMI is not displaced. Exam reveals no gallop and no friction rub.   No murmur heard.  Pulmonary/Chest: Effort normal and breath sounds normal. No respiratory distress. He has no wheezes. He has no rales. He exhibits no tenderness.   Abdominal: Soft. Bowel sounds are normal. He exhibits no mass. There is no tenderness.   Musculoskeletal: Normal range of motion. He exhibits no edema.   Neurovascular checks are intact   Lymphadenopathy:     He has no cervical adenopathy.   Neurological: He is alert and oriented to person, place, and time.   Skin: Skin is warm and dry. Capillary refill takes less than 2 seconds. No rash noted. He is not diaphoretic.   Psychiatric: He has a normal mood and affect. His behavior is normal. Judgment and thought content normal.   Nursing note and vitals reviewed.      Procedures    Results:   Reviewed results of echocardiogram as well as " transesophageal echocardiogram with Mr. Dunn and his wife and that his echocardiograms are within normal limits.  On transesophageal echocardiogram there was no evidence of cor triatriatum.    His stress test was negative for any findings of EKG or clinical findings of ischemia.  At patient's request they asked that I review his CT of his chest results.  The patient does have right upper lobe nodules that are present but inconclusive at present.  The patient states that his pulmonologist is going to monitor this and they are going to get follow-up CT scans of his chest every 4 to 6 months.    Assessment/Plan:   The symptoms sound like this is an upper respiratory infection and will treat with Zithromax 500 mg daily and Tessalon Perles 200 mg 3 times daily as needed for cough.  Patient is to follow-up with his lung doctor in 1 month.  Cardiac follow-up in 6 months or as needed for any cardiac related issues.  Elliott was seen today for follow-up and hypertension.    Diagnoses and all orders for this visit:    Bronchitis, obstructive, chronic (CMS/HCC)  -     azithromycin (ZITHROMAX) 500 MG tablet; Take 1 tablet by mouth Daily for 5 days.  -     benzonatate (TESSALON) 200 MG capsule; Take 1 capsule by mouth 3 (Three) Times a Day As Needed for Cough.    Essential hypertension  -     azithromycin (ZITHROMAX) 500 MG tablet; Take 1 tablet by mouth Daily for 5 days.  -     benzonatate (TESSALON) 200 MG capsule; Take 1 capsule by mouth 3 (Three) Times a Day As Needed for Cough.    Mixed hyperlipidemia  -     azithromycin (ZITHROMAX) 500 MG tablet; Take 1 tablet by mouth Daily for 5 days.  -     benzonatate (TESSALON) 200 MG capsule; Take 1 capsule by mouth 3 (Three) Times a Day As Needed for Cough.    Unstable angina (CMS/HCC)  -     azithromycin (ZITHROMAX) 500 MG tablet; Take 1 tablet by mouth Daily for 5 days.  -     benzonatate (TESSALON) 200 MG capsule; Take 1 capsule by mouth 3 (Three) Times a Day As Needed for  Cough.    Anomalous coronary artery origin  -     azithromycin (ZITHROMAX) 500 MG tablet; Take 1 tablet by mouth Daily for 5 days.  -     benzonatate (TESSALON) 200 MG capsule; Take 1 capsule by mouth 3 (Three) Times a Day As Needed for Cough.    Arteriosclerotic vascular disease  -     azithromycin (ZITHROMAX) 500 MG tablet; Take 1 tablet by mouth Daily for 5 days.  -     benzonatate (TESSALON) 200 MG capsule; Take 1 capsule by mouth 3 (Three) Times a Day As Needed for Cough.    Other emphysema (CMS/HCC)  -     azithromycin (ZITHROMAX) 500 MG tablet; Take 1 tablet by mouth Daily for 5 days.  -     benzonatate (TESSALON) 200 MG capsule; Take 1 capsule by mouth 3 (Three) Times a Day As Needed for Cough.        Return in about 6 months (around 4/16/2020).    MARK Rivas

## 2019-11-03 DIAGNOSIS — G89.4 CHRONIC PAIN SYNDROME: ICD-10-CM

## 2019-11-04 RX ORDER — BISOPROLOL FUMARATE 5 MG/1
TABLET, FILM COATED ORAL
Qty: 90 TABLET | Refills: 2 | Status: SHIPPED | OUTPATIENT
Start: 2019-11-04 | End: 2020-08-11

## 2019-11-04 RX ORDER — METHOCARBAMOL 750 MG/1
TABLET, FILM COATED ORAL
Qty: 90 TABLET | Refills: 0 | Status: SHIPPED | OUTPATIENT
Start: 2019-11-04 | End: 2019-12-03 | Stop reason: SDUPTHER

## 2019-11-04 RX ORDER — ISOSORBIDE MONONITRATE 30 MG/1
TABLET, EXTENDED RELEASE ORAL
Qty: 90 TABLET | Refills: 2 | Status: SHIPPED | OUTPATIENT
Start: 2019-11-04 | End: 2020-07-13

## 2019-11-20 ENCOUNTER — OFFICE VISIT (OUTPATIENT)
Dept: INTERNAL MEDICINE | Facility: CLINIC | Age: 64
End: 2019-11-20

## 2019-11-20 VITALS
RESPIRATION RATE: 16 BRPM | TEMPERATURE: 97.5 F | SYSTOLIC BLOOD PRESSURE: 116 MMHG | HEIGHT: 71 IN | DIASTOLIC BLOOD PRESSURE: 74 MMHG | BODY MASS INDEX: 18.42 KG/M2 | HEART RATE: 64 BPM | OXYGEN SATURATION: 98 %

## 2019-11-20 DIAGNOSIS — R21 SKIN RASH: Primary | ICD-10-CM

## 2019-11-20 PROCEDURE — 99213 OFFICE O/P EST LOW 20 MIN: CPT | Performed by: FAMILY MEDICINE

## 2019-11-20 RX ORDER — CERAMIDES 1,3,6-II
1 CREAM (GRAM) TOPICAL 2 TIMES DAILY
Qty: 453 G | Refills: 3 | Status: SHIPPED | OUTPATIENT
Start: 2019-11-20 | End: 2020-01-20

## 2019-11-20 RX ORDER — FLUOCINONIDE 0.5 MG/G
OINTMENT TOPICAL 2 TIMES DAILY
Qty: 120 G | Refills: 0 | Status: SHIPPED | OUTPATIENT
Start: 2019-11-20 | End: 2020-01-20

## 2019-11-20 NOTE — PROGRESS NOTES
"Subjective    Elliott Dunn is a 64 y.o. male here for:    Chief Complaint   Patient presents with   • Rash       History of Present Illness     Red rash started one month ago. He started having a rash on torso then down to legs. Was painful and itchy. Had low grade fever, cough, muscle aches. No history of eczema, psoriasis. Goes to rheumatology later today. No medicine changes recently.      The following portions of the patient's history were reviewed and updated as appropriate: allergies, current medications, past family history, past medical history, past social history, past surgical history and problem list.    Review of Systems   Constitutional: Positive for fatigue.   Musculoskeletal: Positive for arthralgias.   Skin: Positive for dry skin and rash.       Visit Vitals  /74   Pulse 64   Temp 97.5 °F (36.4 °C) (Temporal)   Resp 16   Ht 180.3 cm (70.98\")   SpO2 98%   BMI 18.42 kg/m²         Objective   Physical Exam   Constitutional: He is oriented to person, place, and time. Vital signs are normal. He appears well-developed and well-nourished. He is active.  Non-toxic appearance. He does not have a sickly appearance. He does not appear ill. No distress.   HENT:   Head: Normocephalic and atraumatic.   Right Ear: Hearing normal.   Left Ear: Hearing normal.   Nose: Nose normal.   Mouth/Throat: Mucous membranes are not dry.   Eyes: EOM are normal. No scleral icterus.   Neck: Phonation normal. Neck supple.   Pulmonary/Chest: Effort normal.   Musculoskeletal:        Right lower leg: He exhibits edema (mild).        Left lower leg: He exhibits edema (mild pitting).   Neurological: He is alert and oriented to person, place, and time. He displays no tremor. No cranial nerve deficit.   Skin: Skin is warm. Rash (erythematous dry scaled plaques widespread across both legs, mostly posterior aspects, knees spared. ) noted. He is not diaphoretic. No cyanosis. No pallor.   He also has faint rash that is also " erythematous, dry on elbow areas bilaterally though faint especially compared to legs. No rash on torso at this time.   Psychiatric: He has a normal mood and affect. His speech is normal and behavior is normal. Judgment and thought content normal. Cognition and memory are normal.   Nursing note and vitals reviewed.              Assessment/Plan     Problem List Items Addressed This Visit     None      Visit Diagnoses     Skin rash    -  Primary    Relevant Medications    Emollient (CERAVE) cream    fluocinonide (LIDEX) 0.05 % ointment          · See rheumatology later today as scheduled. Discussed ddx including psoriasis, eczema, fungal rash, drug eruption, etc. If rash persists may need biopsy and/or dermatology referral.    Ladonna Means MD

## 2019-11-22 ENCOUNTER — LAB (OUTPATIENT)
Dept: LAB | Facility: HOSPITAL | Age: 64
End: 2019-11-22

## 2019-11-22 ENCOUNTER — TRANSCRIBE ORDERS (OUTPATIENT)
Dept: LAB | Facility: HOSPITAL | Age: 64
End: 2019-11-22

## 2019-11-22 DIAGNOSIS — R94.5 NONSPECIFIC ABNORMAL RESULTS OF LIVER FUNCTION STUDY: ICD-10-CM

## 2019-11-22 DIAGNOSIS — M75.102 ROTATOR CUFF SYNDROME OF LEFT SHOULDER: ICD-10-CM

## 2019-11-22 DIAGNOSIS — R21 RASH, SKIN: ICD-10-CM

## 2019-11-22 DIAGNOSIS — M15.0 PRIMARY GENERALIZED HYPERTROPHIC OSTEOARTHROSIS: ICD-10-CM

## 2019-11-22 DIAGNOSIS — M06.09 RHEUMATOID ARTHRITIS OF MULTIPLE SITES WITHOUT RHEUMATOID FACTOR (HCC): ICD-10-CM

## 2019-11-22 DIAGNOSIS — M47.816 LUMBAR SPONDYLOSIS: ICD-10-CM

## 2019-11-22 DIAGNOSIS — Z72.0 TOBACCO ABUSE: ICD-10-CM

## 2019-11-22 DIAGNOSIS — M06.09 RHEUMATOID ARTHRITIS OF MULTIPLE SITES WITHOUT RHEUMATOID FACTOR (HCC): Primary | ICD-10-CM

## 2019-11-22 DIAGNOSIS — D47.2 MONOCLONAL PARAPROTEINEMIA: ICD-10-CM

## 2019-11-22 LAB
ALBUMIN SERPL-MCNC: 4.1 G/DL (ref 3.5–5.2)
ALBUMIN/GLOB SERPL: 1.1 G/DL
ALP SERPL-CCNC: 131 U/L (ref 39–117)
ALT SERPL W P-5'-P-CCNC: 46 U/L (ref 1–41)
ANION GAP SERPL CALCULATED.3IONS-SCNC: 9.5 MMOL/L (ref 5–15)
AST SERPL-CCNC: 49 U/L (ref 1–40)
BASOPHILS # BLD AUTO: 0.03 10*3/MM3 (ref 0–0.2)
BASOPHILS NFR BLD AUTO: 0.5 % (ref 0–1.5)
BILIRUB SERPL-MCNC: 0.3 MG/DL (ref 0.2–1.2)
BUN BLD-MCNC: 27 MG/DL (ref 8–23)
BUN/CREAT SERPL: 32.1 (ref 7–25)
CALCIUM SPEC-SCNC: 9.3 MG/DL (ref 8.6–10.5)
CHLORIDE SERPL-SCNC: 103 MMOL/L (ref 98–107)
CO2 SERPL-SCNC: 25.5 MMOL/L (ref 22–29)
CREAT BLD-MCNC: 0.84 MG/DL (ref 0.76–1.27)
CRP SERPL-MCNC: 0.22 MG/DL (ref 0–0.5)
DEPRECATED RDW RBC AUTO: 45.8 FL (ref 37–54)
EOSINOPHIL # BLD AUTO: 0.27 10*3/MM3 (ref 0–0.4)
EOSINOPHIL NFR BLD AUTO: 4.4 % (ref 0.3–6.2)
ERYTHROCYTE [DISTWIDTH] IN BLOOD BY AUTOMATED COUNT: 13.1 % (ref 12.3–15.4)
ERYTHROCYTE [SEDIMENTATION RATE] IN BLOOD: 8 MM/HR (ref 0–20)
GFR SERPL CREATININE-BSD FRML MDRD: 92 ML/MIN/1.73
GLOBULIN UR ELPH-MCNC: 3.9 GM/DL
GLUCOSE BLD-MCNC: 102 MG/DL (ref 65–99)
HCT VFR BLD AUTO: 42.1 % (ref 37.5–51)
HGB BLD-MCNC: 14 G/DL (ref 13–17.7)
IMM GRANULOCYTES # BLD AUTO: 0.01 10*3/MM3 (ref 0–0.05)
IMM GRANULOCYTES NFR BLD AUTO: 0.2 % (ref 0–0.5)
LYMPHOCYTES # BLD AUTO: 2.14 10*3/MM3 (ref 0.7–3.1)
LYMPHOCYTES NFR BLD AUTO: 35.1 % (ref 19.6–45.3)
MCH RBC QN AUTO: 31.9 PG (ref 26.6–33)
MCHC RBC AUTO-ENTMCNC: 33.3 G/DL (ref 31.5–35.7)
MCV RBC AUTO: 95.9 FL (ref 79–97)
MONOCYTES # BLD AUTO: 1.04 10*3/MM3 (ref 0.1–0.9)
MONOCYTES NFR BLD AUTO: 17 % (ref 5–12)
NEUTROPHILS # BLD AUTO: 2.61 10*3/MM3 (ref 1.7–7)
NEUTROPHILS NFR BLD AUTO: 42.8 % (ref 42.7–76)
NRBC BLD AUTO-RTO: 0 /100 WBC (ref 0–0.2)
PLATELET # BLD AUTO: 202 10*3/MM3 (ref 140–450)
PMV BLD AUTO: 11 FL (ref 6–12)
POTASSIUM BLD-SCNC: 4.8 MMOL/L (ref 3.5–5.2)
PROT SERPL-MCNC: 8 G/DL (ref 6–8.5)
RBC # BLD AUTO: 4.39 10*6/MM3 (ref 4.14–5.8)
SODIUM BLD-SCNC: 138 MMOL/L (ref 136–145)
WBC NRBC COR # BLD: 6.1 10*3/MM3 (ref 3.4–10.8)

## 2019-11-22 PROCEDURE — 86140 C-REACTIVE PROTEIN: CPT

## 2019-11-22 PROCEDURE — 85025 COMPLETE CBC W/AUTO DIFF WBC: CPT

## 2019-11-22 PROCEDURE — 80053 COMPREHEN METABOLIC PANEL: CPT

## 2019-11-22 PROCEDURE — 36415 COLL VENOUS BLD VENIPUNCTURE: CPT

## 2019-11-22 PROCEDURE — 85652 RBC SED RATE AUTOMATED: CPT

## 2019-11-25 ENCOUNTER — OFFICE VISIT (OUTPATIENT)
Dept: PULMONOLOGY | Facility: CLINIC | Age: 64
End: 2019-11-25

## 2019-11-25 VITALS
RESPIRATION RATE: 18 BRPM | DIASTOLIC BLOOD PRESSURE: 70 MMHG | SYSTOLIC BLOOD PRESSURE: 130 MMHG | BODY MASS INDEX: 19.04 KG/M2 | HEART RATE: 70 BPM | WEIGHT: 136 LBS | HEIGHT: 71 IN | OXYGEN SATURATION: 94 %

## 2019-11-25 DIAGNOSIS — Z87.891 PERSONAL HISTORY OF TOBACCO USE, PRESENTING HAZARDS TO HEALTH: ICD-10-CM

## 2019-11-25 DIAGNOSIS — J44.9 CHRONIC OBSTRUCTIVE PULMONARY DISEASE, UNSPECIFIED COPD TYPE (HCC): ICD-10-CM

## 2019-11-25 DIAGNOSIS — R93.89 ABNORMAL CT OF THE CHEST: ICD-10-CM

## 2019-11-25 DIAGNOSIS — J43.2 CENTRILOBULAR EMPHYSEMA (HCC): ICD-10-CM

## 2019-11-25 DIAGNOSIS — R06.02 SHORTNESS OF BREATH: ICD-10-CM

## 2019-11-25 DIAGNOSIS — J44.9 CHRONIC OBSTRUCTIVE PULMONARY DISEASE, UNSPECIFIED COPD TYPE (HCC): Primary | ICD-10-CM

## 2019-11-25 PROCEDURE — 94618 PULMONARY STRESS TESTING: CPT | Performed by: NURSE PRACTITIONER

## 2019-11-25 PROCEDURE — 99214 OFFICE O/P EST MOD 30 MIN: CPT | Performed by: NURSE PRACTITIONER

## 2019-11-25 PROCEDURE — 94726 PLETHYSMOGRAPHY LUNG VOLUMES: CPT | Performed by: INTERNAL MEDICINE

## 2019-11-25 PROCEDURE — 94729 DIFFUSING CAPACITY: CPT | Performed by: INTERNAL MEDICINE

## 2019-11-25 PROCEDURE — 94060 EVALUATION OF WHEEZING: CPT | Performed by: INTERNAL MEDICINE

## 2019-11-25 NOTE — PROGRESS NOTES
"Chief Complaint   Patient presents with   • Follow-up   • Breathing Problem         Subjective   Elliott Dunn is a 64 y.o. male.     History of Present Illness   The patient comes in today for follow-up of shortness of breath and COPD.    He denies any respiratory illness since his last visit.  His symptoms have been stable.    He is using Stiolto daily.  He has a rescue inhaler but states he has not needed to use it.    He reports having shortness of breath if he walks up an incline but denies significant shortness of breath on level ground.    He uses Nasacort on a daily basis.    He is on prednisone as needed for arthritis issues.    He quit smoking 2 years ago.    The following portions of the patient's history were reviewed and updated as appropriate: allergies, current medications, past family history, past medical history, past social history and past surgical history.    Review of Systems   Constitutional: Negative for chills and fever.   HENT: Negative for rhinorrhea, sinus pressure and sore throat.    Respiratory: Negative for cough, chest tightness, shortness of breath and wheezing.    Psychiatric/Behavioral: Negative for sleep disturbance.       Objective   Visit Vitals  /70   Pulse 70   Resp 18   Ht 180.3 cm (70.98\")   Wt 61.7 kg (136 lb)   SpO2 94%   BMI 18.98 kg/m²     Physical Exam   Constitutional: He is oriented to person, place, and time.   HENT:   Head: Normocephalic and atraumatic.   Eyes: EOM are normal.   Neck: Neck supple.   Cardiovascular: Normal rate and regular rhythm.   Pulmonary/Chest: Effort normal. No respiratory distress.   Somewhat decreased A/E without wheezing noted.   Musculoskeletal: He exhibits no edema.   Neurological: He is alert and oriented to person, place, and time.   Psychiatric: He has a normal mood and affect.   Vitals reviewed.          Assessment/Plan   Elliott was seen today for follow-up and breathing problem.    Diagnoses and all orders for this " visit:    Chronic obstructive pulmonary disease, unspecified COPD type (CMS/[facility])    Shortness of breath  -     Converted Six Minute Walk    Personal history of tobacco use, presenting hazards to health    Abnormal CT of the chest    Other orders  -     tiotropium bromide-olodaterol (STIOLTO RESPIMAT) 2.5-2.5 MCG/ACT aerosol solution inhaler; Inhale 2 puffs Daily.         Return in about 5 months (around 4/25/2020) for Recheck, For Dr. Dicksno.    DISCUSSION (if any):  I reviewed his CT scan from October 2019 which shows several nodules the largest measuring 1.4 cm.  Unfortunately the radiologist did not compare the CT scan to the previous one completed February 2019 which was a low-dose CT scan.  In comparing these CT scans there is not a significant change in the size of the nodules. I reviewed the images of both scans with Dr. Dickson.  According to Fleischner guidelines the CT scan should be repeated in 12 months. He can repeat in September 2020.  If he has any change in his symptoms we may repeat before then.    I reviewed the PFT from today and discussed the results with the patient. The PFT shows mild to moderate obstruction with a decreased diffusion capacity.    On 6-minute walk, he did not display any exercise hypoxemia.  Total walk distance 360 m.    I discussed the patient's lab work including alpha-1 antitrypsin levels.  His lab values are 119 MZ.  We have discussed that he has a carrier for the deficiency but he does not have the deficiency.  He verbalizes this understanding.  He states he has notified his biological children and siblings to get tested as well.    I have asked him to continue using Stiolto on a daily basis and to continue using albuterol as needed for shortness of breath and wheezing.    He is doing well using Nasacort and should continue using this on a regular basis.    Dictated utilizing Dragon dictation.    This document was electronically signed by MARK Corral November 25,  2019  4:28 PM

## 2019-12-03 DIAGNOSIS — G89.4 CHRONIC PAIN SYNDROME: ICD-10-CM

## 2019-12-03 RX ORDER — METHOCARBAMOL 750 MG/1
TABLET, FILM COATED ORAL
Qty: 90 TABLET | Refills: 0 | Status: SHIPPED | OUTPATIENT
Start: 2019-12-03 | End: 2020-01-02

## 2020-01-02 DIAGNOSIS — G89.4 CHRONIC PAIN SYNDROME: ICD-10-CM

## 2020-01-02 RX ORDER — METHOCARBAMOL 750 MG/1
TABLET, FILM COATED ORAL
Qty: 90 TABLET | Refills: 0 | Status: SHIPPED | OUTPATIENT
Start: 2020-01-02 | End: 2020-02-03

## 2020-01-02 RX ORDER — ATORVASTATIN CALCIUM 10 MG/1
TABLET, FILM COATED ORAL
Qty: 30 TABLET | Refills: 4 | Status: ON HOLD | OUTPATIENT
Start: 2020-01-02 | End: 2020-07-09

## 2020-01-09 ENCOUNTER — OFFICE VISIT (OUTPATIENT)
Dept: ORTHOPEDIC SURGERY | Facility: CLINIC | Age: 65
End: 2020-01-09

## 2020-01-09 VITALS — RESPIRATION RATE: 18 BRPM | HEIGHT: 71 IN | BODY MASS INDEX: 19.04 KG/M2 | WEIGHT: 136 LBS

## 2020-01-09 DIAGNOSIS — S46.012D TRAUMATIC COMPLETE TEAR OF LEFT ROTATOR CUFF, SUBSEQUENT ENCOUNTER: Primary | ICD-10-CM

## 2020-01-09 DIAGNOSIS — M75.42 ROTATOR CUFF IMPINGEMENT SYNDROME OF LEFT SHOULDER: ICD-10-CM

## 2020-01-09 PROCEDURE — 99214 OFFICE O/P EST MOD 30 MIN: CPT | Performed by: ORTHOPAEDIC SURGERY

## 2020-01-09 NOTE — PROGRESS NOTES
Subjective   Patient ID: Elliott Dunn is a 64 y.o. male  Follow-up and Pain of the Left Shoulder (Patient is here today for left shoulder pain, he states the pain is getting worse and he would like to discuss surgery/)             History of Present Illness    Right-hand-dominant male with continued left shoulder pain to minimal degree primary complaint is loss of use of the shoulder with weakness and forward reaching.  He is able to use his right hand to lift his left arm and does have pain with overhead reaching with the left arm in that fashion.  Denies neck pain paresthesia, prior MRI left shoulder has shown a full thickness minimally retracted  tear of the supraspinatus.    He is followed on a regular annual basis by Dr. ortiz has undergone Holter monitoring stress test and has been told he has high cholesterol but no other significant recent cardiac illness according to his wife.    Review of Systems   Constitutional: Negative for fever.   HENT: Negative for dental problem and voice change.    Eyes: Negative for visual disturbance.   Respiratory: Negative for shortness of breath.    Cardiovascular: Negative for chest pain.   Gastrointestinal: Negative for abdominal pain.   Genitourinary: Negative for dysuria.   Musculoskeletal: Positive for arthralgias. Negative for gait problem and joint swelling.   Skin: Negative for rash.   Neurological: Negative for speech difficulty.   Hematological: Does not bruise/bleed easily.   Psychiatric/Behavioral: Negative for confusion.       Past Medical History:   Diagnosis Date   • Acquired absence of all teeth    • Allergic    • Anomalous coronary artery origin    • Arrhythmia    • Arthritis    • Arthritis of lumbar spine 7/29/2016   • Back pain 6/17/2016   • Cancer (CMS/[facility])     prostate   • Cataract    • Cervical spine disease 6/17/2016   • CHF (congestive heart failure) (CMS/[facility])    • Chronic obstructive pulmonary disease (CMS/[facility])    • Colon polyps    • Deafness in  left ear    • Derangement of anterior horn of medial meniscus    • Difficulty swallowing    • Disorder of lumbar spine 6/17/2016   • Disorder of thoracic spine 6/17/2016   • Diverticulitis of colon    • Erectile dysfunction    • Esophageal reflux    • Essential hypertension    • Glaucoma    • Heart murmur    • High cholesterol    • Inflammatory polyarthropathy (CMS/HCC)     Per Dr. Haiedr. Negative NEO, normal ESR, RF, anti-ccp and did not improve with prednisone per notes.   • Inguinal hernia    • Lateral meniscus derangement    • Left otitis media with spontaneous rupture of eardrum    • Locking of left knee    • Low back pain    • MGUS (monoclonal gammopathy of unknown significance)     likely diagnosis   • Neuromuscular disorder (CMS/HCC)    • Neuropathic arthritis    • Perforation of left tympanic membrane    • Pulmonary emphysema (CMS/HCC)    • Scoliosis    • Skin cancer     skin cancer   • Skin cancer of face     squamous cell   • Spina bifida occulta 6/17/2016   • Tobacco abuse 11/9/2017   • Visual impairment    • Wears glasses         Past Surgical History:   Procedure Laterality Date   • COLONOSCOPY     • ENDOSCOPY N/A 4/10/2017    Procedure: ESOPHAGOGASTRODUODENOSCOPY WITH BIOPSY;  Surgeon: Alexander Ritchie MD;  Location: [facility] ENDOSCOPY;  Service:    • EYE SURGERY     • HERNIA REPAIR     • INGUINAL HERNIA REPAIR Right    • INGUINAL HERNIA REPAIR     • KNEE SURGERY Left    • LYMPH NODE BIOPSY     • PROSTATE SURGERY  2004   • PROSTATECTOMY  06/30/2014   • PROSTATECTOMY      Prostatectomy Radical   • SKIN CANCER EXCISION  2017    both sides of body/squamous cell melanoma   • TYMPANOPLASTY W/ MASTOIDECTOMY     • UMBILICAL HERNIA REPAIR         Family History   Problem Relation Age of Onset   • Diabetes Mother    • Hypertension Mother    • Obesity Mother    • Stroke Mother 65   • Arthritis Mother    • Hyperlipidemia Mother    • Vision loss Mother    • Cancer Father    • Cancer Sister    • Diabetes Brother    •  Cancer Brother    • Heart attack Brother    • Alcohol abuse Brother    • Drug abuse Brother    • Hearing loss Brother    • Learning disabilities Brother        Social History     Socioeconomic History   • Marital status:      Spouse name: Not on file   • Number of children: Not on file   • Years of education: Not on file   • Highest education level: Not on file   Tobacco Use   • Smoking status: Former Smoker     Packs/day: 1.00     Years: 51.00     Pack years: 51.00     Types: Cigarettes     Start date: 1963     Last attempt to quit: 2018     Years since quittin.9   • Smokeless tobacco: Never Used   Substance and Sexual Activity   • Alcohol use: No   • Drug use: No   • Sexual activity: Yes     Partners: Female     Birth control/protection: None       I have reviewed the above medical and surgical history, family history, social history, medications, allergies and review of systems.    Allergies   Allergen Reactions   • Plaquenil [Hydroxychloroquine Sulfate] Other (See Comments)     Black eyes   • Cyclobenzaprine Other (See Comments)     Wide awake   • Pravastatin Other (See Comments)     Weakness / fatigue  Weakness / fatigue  Weakness / fatigue         Current Outpatient Medications:   •  albuterol (PROVENTIL HFA;VENTOLIN HFA) 108 (90 Base) MCG/ACT inhaler, Inhale 2 puffs Every 6 (Six) Hours As Needed for Wheezing or Shortness of Air., Disp: 1 inhaler, Rfl: 3  •  amitriptyline (ELAVIL) 25 MG tablet, TAKE 1-3 AT BEDTIME AS NEEDED FOR SLEEP, Disp: 270 tablet, Rfl: 4  •  ASPIRIN ADULT LOW DOSE 81 MG EC tablet, TAKE ONE TABLET BY MOUTH DAILY, Disp: 30 tablet, Rfl: 3  •  atorvastatin (LIPITOR) 10 MG tablet, TAKE ONE TABLET BY MOUTH DAILY, Disp: 30 tablet, Rfl: 4  •  benzonatate (TESSALON) 200 MG capsule, Take 1 capsule by mouth 3 (Three) Times a Day As Needed for Cough., Disp: 30 capsule, Rfl: 0  •  bisoprolol (ZEBeta) 5 MG tablet, TAKE ONE TABLET BY MOUTH DAILY, Disp: 90 tablet, Rfl: 2  •  celecoxib  "(CELEBREX) 100 MG capsule, Take 1 capsule by mouth 2 (Two) Times a Day As Needed for Moderate Pain ., Disp: 180 capsule, Rfl: 4  •  DULoxetine (CYMBALTA) 60 MG capsule, TAKE ONE CAPSULE BY MOUTH DAILY, Disp: 30 capsule, Rfl: 11  •  Emollient (CERAVE) cream, Apply 1 application topically 2 (Two) Times a Day., Disp: 453 g, Rfl: 3  •  fluocinonide (LIDEX) 0.05 % ointment, Apply  topically to the appropriate area as directed 2 (Two) Times a Day., Disp: 120 g, Rfl: 0  •  folic acid (FOLVITE) 1 MG tablet, Take 1 mg by mouth Daily., Disp: , Rfl:   •  gabapentin (NEURONTIN) 300 MG capsule, TAKE ONE CAPSULE BY MOUTH THREE TIMES A DAY AS NEEDED FOR NERVE PAIN, Disp: 90 capsule, Rfl: 1  •  isosorbide mononitrate (IMDUR) 30 MG 24 hr tablet, TAKE ONE TABLET BY MOUTH DAILY, Disp: 90 tablet, Rfl: 2  •  leflunomide (ARAVA) 10 MG tablet, , Disp: , Rfl:   •  methocarbamol (ROBAXIN) 750 MG tablet, TAKE ONE TABLET BY MOUTH THREE TIMES A DAY, Disp: 90 tablet, Rfl: 0  •  methotrexate 2.5 MG tablet, Take 8 tablets by mouth 1 (One) Time Per Week., Disp: 96 tablet, Rfl: 0  •  nitroglycerin (NITROSTAT) 0.4 MG SL tablet, 1 under the tongue as needed for angina, may repeat q5mins for up three doses, Disp: 100 tablet, Rfl: 11  •  Omeprazole 20 MG tablet delayed-release, TAKE ONE TABLET BY MOUTH DAILY, Disp: 30 each, Rfl: 5  •  predniSONE (DELTASONE) 10 MG tablet, Take 10 mg by mouth Daily. As needed, Disp: , Rfl:   •  tiotropium bromide-olodaterol (STIOLTO RESPIMAT) 2.5-2.5 MCG/ACT aerosol solution inhaler, Inhale 2 puffs Daily., Disp: 1 inhaler, Rfl: 5  •  Triamcinolone Acetonide (NASACORT) 55 MCG/ACT nasal inhaler, 2 sprays into each nostril daily., Disp: , Rfl:   •  vitamin B-12 (CYANOCOBALAMIN) 1000 MCG tablet, TAKE ONE TABLET BY MOUTH DAILY, Disp: 30 tablet, Rfl: 8    Objective   Resp 18   Ht 180.1 cm (70.9\")   Wt 61.7 kg (136 lb)   BMI 19.02 kg/m²    Physical Exam  Constitutional: Patient is oriented to person, place, and time. Patient " appears well-developed and well-nourished.   HENT:Head: Normocephalic and atraumatic.   Eyes: EOM are normal. Pupils are equal, round, and reactive to light.   Neck: Normal range of motion. Neck supple.   Cardiovascular: Normal rate.    Pulmonary/Chest: Effort normal and breath sounds normal.   Abdominal: Soft.   Neurological: Patient is alert and oriented to person, place, and time.   Skin: Skin is warm and dry.   Psychiatric: Patient has a normal mood and affect.   Nursing note and vitals reviewed.       [unfilled]   Left shoulder: Point tenderness over the anterolateral acromial area no tenderness at the AC joint or proximal biceps tendon.  Negative subscap liftoff test.  Forward elevation actively the left shoulder is limited to 40 degrees, active abduction limited to 30 degrees, internal rotation thumb back to the SI joint.  Positive drop arm test    Assessment/Plan   Review of Radiographic Studies:    No new imaging done today.  Prior MR left shoulder confirms minimally retracted full-thickness rotator cuff supraspinatus tear left shoulder with impingement      Procedures     Elliott was seen today for follow-up and pain.    Diagnoses and all orders for this visit:    Traumatic complete tear of left rotator cuff, subsequent encounter    Rotator cuff impingement syndrome of left shoulder       Orthopedic activities reviewed and patient expressed appreciation, Risk, benefits, and merits of treatment alternatives reviewed with the patient and questions answered, The nature of the proposed surgery reviewed with the patient including risks, benefits, rehabilitation, recovery timeframe, and outcome expectations and Using illustrations and models, the nature of the pathology was explained to the patient      Recommendations/Plan:   Surgery: Surgery proposed at this visit as noted.    Patient agreeable to call or return sooner for any concerns.             Impression:  Left nondominant shoulder impingement  full-thickness supraspinatus minimally retracted tear with primary complaint loss of use minimal pain  Plan:  Diagnostic arthroscopy left shoulder with subacromial decompression rotator cuff repair debridement and/or other procedures as indicated with Dr. Pandey

## 2020-01-13 ENCOUNTER — OFFICE VISIT (OUTPATIENT)
Dept: INTERNAL MEDICINE | Facility: CLINIC | Age: 65
End: 2020-01-13

## 2020-01-13 VITALS
OXYGEN SATURATION: 99 % | RESPIRATION RATE: 18 BRPM | TEMPERATURE: 96.9 F | HEIGHT: 71 IN | BODY MASS INDEX: 19.35 KG/M2 | DIASTOLIC BLOOD PRESSURE: 80 MMHG | SYSTOLIC BLOOD PRESSURE: 130 MMHG | WEIGHT: 138.25 LBS | HEART RATE: 60 BPM

## 2020-01-13 DIAGNOSIS — G63 NEUROPATHY ASSOCIATED WITH MGUS (HCC): ICD-10-CM

## 2020-01-13 DIAGNOSIS — J43.1 PANLOBULAR EMPHYSEMA (HCC): ICD-10-CM

## 2020-01-13 DIAGNOSIS — Z71.85 IMMUNIZATION COUNSELING: ICD-10-CM

## 2020-01-13 DIAGNOSIS — Z00.00 WELCOME TO MEDICARE PREVENTIVE VISIT: Primary | ICD-10-CM

## 2020-01-13 DIAGNOSIS — M47.812 SPONDYLOSIS OF CERVICAL REGION WITHOUT MYELOPATHY OR RADICULOPATHY: ICD-10-CM

## 2020-01-13 DIAGNOSIS — Z87.891 FORMER SMOKER: ICD-10-CM

## 2020-01-13 DIAGNOSIS — I20.8 ANGINAL EQUIVALENT (HCC): ICD-10-CM

## 2020-01-13 DIAGNOSIS — D47.2 NEUROPATHY ASSOCIATED WITH MGUS (HCC): ICD-10-CM

## 2020-01-13 DIAGNOSIS — C61 MALIGNANT NEOPLASM OF PROSTATE (HCC): ICD-10-CM

## 2020-01-13 DIAGNOSIS — J44.9: ICD-10-CM

## 2020-01-13 DIAGNOSIS — D47.2 MGUS (MONOCLONAL GAMMOPATHY OF UNKNOWN SIGNIFICANCE): ICD-10-CM

## 2020-01-13 DIAGNOSIS — M06.4 INFLAMMATORY POLYARTHROPATHY (HCC): ICD-10-CM

## 2020-01-13 DIAGNOSIS — E78.49 OTHER HYPERLIPIDEMIA: ICD-10-CM

## 2020-01-13 DIAGNOSIS — G89.4 CHRONIC PAIN SYNDROME: ICD-10-CM

## 2020-01-13 PROBLEM — R06.09 DOE (DYSPNEA ON EXERTION): Status: RESOLVED | Noted: 2017-01-17 | Resolved: 2020-01-13

## 2020-01-13 PROCEDURE — G0403 EKG FOR INITIAL PREVENT EXAM: HCPCS | Performed by: FAMILY MEDICINE

## 2020-01-13 PROCEDURE — G0402 INITIAL PREVENTIVE EXAM: HCPCS | Performed by: FAMILY MEDICINE

## 2020-01-13 RX ORDER — ASPIRIN 81 MG/1
81 TABLET ORAL DAILY
COMMUNITY

## 2020-01-13 RX ORDER — MULTIPLE VITAMINS W/ MINERALS TAB 9MG-400MCG
1 TAB ORAL DAILY
COMMUNITY

## 2020-01-13 RX ORDER — CELECOXIB 100 MG/1
100 CAPSULE ORAL DAILY
Qty: 90 CAPSULE | Refills: 3 | Status: SHIPPED | OUTPATIENT
Start: 2020-01-13 | End: 2020-07-10 | Stop reason: HOSPADM

## 2020-01-13 RX ORDER — GABAPENTIN 300 MG/1
300 CAPSULE ORAL 3 TIMES DAILY PRN
Qty: 270 CAPSULE | Refills: 1 | Status: SHIPPED | OUTPATIENT
Start: 2020-01-13 | End: 2020-03-24 | Stop reason: SDUPTHER

## 2020-01-13 RX ORDER — UBIDECARENONE 100 MG
100 CAPSULE ORAL DAILY
COMMUNITY
End: 2022-07-19

## 2020-01-13 NOTE — PATIENT INSTRUCTIONS
Chronic Obstructive Pulmonary Disease    Chronic obstructive pulmonary disease (COPD) is a long-term (chronic) condition that affects the lungs. COPD is a general term that can be used to describe many different lung problems that cause lung swelling (inflammation) and limit airflow, including chronic bronchitis and emphysema. If you have COPD, your lung function will probably never return to normal. In most cases, it gets worse over time. However, there are steps you can take to slow the progression of the disease and improve your quality of life.  What are the causes?  This condition may be caused by:  · Smoking. This is the most common cause.  · Certain genes passed down through families.  What increases the risk?  The following factors may make you more likely to develop this condition:  · Secondhand smoke from cigarettes, pipes, or cigars.  · Exposure to chemicals and other irritants such as fumes and dust in the work environment.  · Chronic lung conditions or infections.  What are the signs or symptoms?  Symptoms of this condition include:  · Shortness of breath, especially during physical activity.  · Chronic cough with a large amount of thick mucus. Sometimes the cough may not have any mucus (dry cough).  · Wheezing.  · Rapid breaths.  · Gray or bluish discoloration (cyanosis) of the skin, especially in your fingers, toes, or lips.  · Feeling tired (fatigue).  · Weight loss.  · Chest tightness.  · Frequent infections.  · Episodes when breathing symptoms become much worse (exacerbations).  · Swelling in the ankles, feet, or legs. This may occur in later stages of the disease.  How is this diagnosed?  This condition is diagnosed based on:  · Your medical history.  · A physical exam.  You may also have tests, including:  · Lung (pulmonary) function tests. This may include a spirometry test, which measures your ability to exhale properly.  · Chest X-ray.  · CT scan.  · Blood tests.  How is this  treated?  This condition may be treated with:  · Medicines. These may include inhaled rescue medicines to treat acute exacerbations as well as long-term, or maintenance, medicines to prevent flare-ups of COPD.  ? Bronchodilators help treat COPD by dilating the airways to allow increased airflow and make your breathing more comfortable.  ? Steroids can reduce airway inflammation and help prevent exacerbations.  · Smoking cessation. If you smoke, your health care provider may ask you to quit, and may also recommend therapy or replacement products to help you quit.  · Pulmonary rehabilitation. This may involve working with a team of health care providers and specialists, such as respiratory, occupational, and physical therapists.  · Exercise and physical activity. These are beneficial for nearly all people with COPD.  · Nutrition therapy to gain weight, if you are underweight.  · Oxygen. Supplemental oxygen therapy is only helpful if you have a low oxygen level in your blood (hypoxemia).  · Lung surgery or transplant.  · Palliative care. This is to help people with COPD feel comfortable when treatment is no longer working.  Follow these instructions at home:  Medicines  · Take over-the-counter and prescription medicines (inhaled or pills) only as told by your health care provider.  · Talk to your health care provider before taking any cough or allergy medicines. You may need to avoid certain medicines that dry out your airways.  Lifestyle  · If you are a smoker, the most important thing that you can do is to stop smoking. Do not use any products that contain nicotine or tobacco, such as cigarettes and e-cigarettes. If you need help quitting, ask your health care provider. Continuing to smoke will cause the disease to progress faster.  · Avoid exposure to things that irritate your lungs, such as smoke, chemicals, and fumes.  · Stay active, but balance activity with periods of rest. Exercise and physical activity will  help you maintain your ability to do things you want to do.  · Learn and use relaxation techniques to manage stress and to control your breathing.  · Get the right amount of sleep and get quality sleep. Most adults need 7 or more hours per night.  · Eat healthy foods. Eating smaller, more frequent meals and resting before meals may help you maintain your strength.  Controlled breathing  Learn and use controlled breathing techniques as directed by your health care provider. Controlled breathing techniques include:  · Pursed lip breathing. Start by breathing in (inhaling) through your nose for 1 second. Then, purse your lips as if you were going to whistle and breathe out (exhale) through the pursed lips for 2 seconds.  · Diaphragmatic breathing. Start by putting one hand on your abdomen just above your waist. Inhale slowly through your nose. The hand on your abdomen should move out. Then purse your lips and exhale slowly. You should be able to feel the hand on your abdomen moving in as you exhale.  Controlled coughing  Learn and use controlled coughing to clear mucus from your lungs. Controlled coughing is a series of short, progressive coughs. The steps of controlled coughing are:  1. Lean your head slightly forward.  2. Breathe in deeply using diaphragmatic breathing.  3. Try to hold your breath for 3 seconds.  4. Keep your mouth slightly open while coughing twice.  5. Spit any mucus out into a tissue.  6. Rest and repeat the steps once or twice as needed.  General instructions  · Make sure you receive all the vaccines that your health care provider recommends, especially the pneumococcal and influenza vaccines. Preventing infection and hospitalization is very important when you have COPD.  · Use oxygen therapy and pulmonary rehabilitation if directed to by your health care provider. If you require home oxygen therapy, ask your health care provider whether you should purchase a pulse oximeter to measure your oxygen  level at home.  · Work with your health care provider to develop a COPD action plan. This will help you know what steps to take if your condition gets worse.  · Keep other chronic health conditions under control as told by your health care provider.  · Avoid extreme temperature and humidity changes.  · Avoid contact with people who have an illness that spreads from person to person (is contagious), such as viral infections or pneumonia.  · Keep all follow-up visits as told by your health care provider. This is important.  Contact a health care provider if:  · You are coughing up more mucus than usual.  · There is a change in the color or thickness of your mucus.  · Your breathing is more labored than usual.  · Your breathing is faster than usual.  · You have difficulty sleeping.  · You need to use your rescue medicines or inhalers more often than expected.  · You have trouble doing routine activities such as getting dressed or walking around the house.  Get help right away if:  · You have shortness of breath while you are resting.  · You have shortness of breath that prevents you from:  ? Being able to talk.  ? Performing your usual physical activities.  · You have chest pain lasting longer than 5 minutes.  · Your skin color is more blue (cyanotic) than usual.  · You measure low oxygen saturations for longer than 5 minutes with a pulse oximeter.  · You have a fever.  · You feel too tired to breathe normally.  Summary  · Chronic obstructive pulmonary disease (COPD) is a long-term (chronic) condition that affects the lungs.  · Your lung function will probably never return to normal. In most cases, it gets worse over time. However, there are steps you can take to slow the progression of the disease and improve your quality of life.  · Treatment for COPD may include taking medicines, quitting smoking, pulmonary rehabilitation, and changes to diet and exercise. As the disease progresses, you may need oxygen therapy, a  lung transplant, or palliative care.  · To help manage your condition, do not smoke, avoid exposure to things that irritate your lungs, stay up to date on all vaccines, and follow your health care provider's instructions for taking medicines.  This information is not intended to replace advice given to you by your health care provider. Make sure you discuss any questions you have with your health care provider.  Document Released: 2006 Document Revised: 2018 Document Reviewed: 2018  [employer] Interactive Patient Education © 2019 [employer]      Medicare Wellness  Personal Prevention Plan of Service     Date of Office Visit:  2020  Encounter Provider:  Ladonna Means MD  Place of Service:  [facility]  Patient Name: Elliott Dunn  :  1955    As part of the Medicare Wellness portion of your visit today, we are providing you with this personalized preventive plan of services (PPPS). This plan is based upon recommendations of the [place] Preventive Services Task Force (USPSTF) and the Advisory Committee on Immunization Practices (ACIP).    This lists the preventive care services that should be considered, and provides dates of when you are due. Items listed as completed are up-to-date and do not require any further intervention.    Health Maintenance   Topic Date Due   • ZOSTER VACCINE (2 of 2) 2021 (Originally 2017)   • LIPID PANEL  2020   • LUNG CANCER SCREENING  10/11/2020   • MEDICARE ANNUAL WELLNESS  2021   • TDAP/TD VACCINES (3 - Td) 2025   • COLONOSCOPY  2028   • HEPATITIS C SCREENING  Completed   • INFLUENZA VACCINE  Completed       No orders of the defined types were placed in this encounter.      Return in about 6 months (around 2020) for follow up.

## 2020-01-13 NOTE — PROGRESS NOTES
The ABCs of the Annual Wellness Visit  Welcome to Medicare Visit    Chief Complaint   Patient presents with   • Welcome To Medicare     Physical       Subjective   History of Present Illness:  Elliott Dunn is a 64 y.o. male who presents for a  Welcome to Medicare Visit.    insurance will not cover Celebrex if I write it but per wife they will if rheumatology writes it, for some reason she says they will not. Continues duloxetine for chronic pain. Continues gabapentin for nerve pain. Followed by hematology/oncology due to MGUS. Known spondylosis of cervical region. S/p tx for prostate cancer, due for PSA July. His doctor, sina, left [facility] and they do not want to see another provider. Was going for yearly levels, will come here in July when due for me to watch. Blood pressure is stable, continues Imdur due to anginal equivalent. On statin for hyperlipidemia. Followed by pulm for COPD (emphysema, bronchitis) and abnormal findings on CT scan found lung cancer screening.    HEALTH RISK ASSESSMENT    Recent Hospitalizations:  No hospitalization(s) within the last year.    Current Medical Providers:  Patient Care Team:  Ladonna Means MD as PCP - Clemencia Smith APRN as Referring Physician (Neurology)  Alexander Ritchie MD as Consulting Physician (General Surgery)  Cisco Varela MD as Consulting Physician (Urology)  Darien Haider MD as Consulting Physician (Rheumatology)    Smoking Status:  Social History     Tobacco Use   Smoking Status Former Smoker   • Packs/day: 1.00   • Years: 51.00   • Pack years: 51.00   • Types: Cigarettes   • Start date: 1963   • Last attempt to quit: 2018   • Years since quittin.0   Smokeless Tobacco Never Used       Alcohol Consumption:  Social History     Substance and Sexual Activity   Alcohol Use No       Depression Screen:   PHQ-2/PHQ-9 Depression Screening 2020   Little interest or pleasure in doing things 0   Feeling down,  depressed, or hopeless 0   Total Score 0       Fall Risk Screen:  NICOLAS Fall Risk Assessment has not been completed.    Health Habits and Functional and Cognitive Screening:  Functional & Cognitive Status 1/13/2020   Do you have difficulty preparing food and eating? Yes   Do you have difficulty bathing yourself, getting dressed or grooming yourself? Yes   Do you have difficulty using the toilet? No   Do you have difficulty moving around from place to place? Yes   Do you have trouble with steps or getting out of a bed or a chair? Yes   Current Diet Unhealthy Diet   Dental Exam Up to date   Eye Exam Up to date   Exercise (times per week) 7 times per week   Current Exercise Activities Include Walking   Do you need help using the phone?  No   Are you deaf or do you have serious difficulty hearing?  Yes   Do you need help with transportation? Yes   Do you need help shopping? No   Do you need help preparing meals?  No   Do you need help with housework?  No   Do you need help with laundry? No   Do you need help taking your medications? No   Do you need help managing money? No   Do you ever drive or ride in a car without wearing a seat belt? No   Have you felt unusual stress, anger or loneliness in the last month? No   Who do you live with? Spouse   If you need help, do you have trouble finding someone available to you? No   Have you been bothered in the last four weeks by sexual problems? No   Do you have difficulty concentrating, remembering or making decisions? Yes         Does the patient have evidence of cognitive impairment? No    Asprin use counseling:Taking ASA appropriately as indicated    Visual Acuity:    No exam data present    Age-appropriate Screening Schedule:  Refer to the list below for future screening recommendations based on patient's age, sex and/or medical conditions. Orders for these recommended tests are listed in the plan section. The patient has been provided with a written plan.    Health  Maintenance   Topic Date Due   • ZOSTER VACCINE (2 of 2) 01/21/2021 (Originally 5/23/2017)   • LIPID PANEL  07/08/2020   • TDAP/TD VACCINES (3 - Td) 02/01/2025   • COLONOSCOPY  04/20/2028   • INFLUENZA VACCINE  Completed          The following portions of the patient's history were reviewed and updated as appropriate: allergies, current medications, past family history, past medical history, past social history, past surgical history and problem list.    Outpatient Medications Prior to Visit   Medication Sig Dispense Refill   • amitriptyline (ELAVIL) 25 MG tablet TAKE 1-3 AT BEDTIME AS NEEDED FOR SLEEP 270 tablet 4   • aspirin 81 MG EC tablet Take 81 mg by mouth Daily.     • atorvastatin (LIPITOR) 10 MG tablet TAKE ONE TABLET BY MOUTH DAILY 30 tablet 4   • bisoprolol (ZEBeta) 5 MG tablet TAKE ONE TABLET BY MOUTH DAILY 90 tablet 2   • coenzyme Q10 100 MG capsule Take 100 mg by mouth Daily.     • DULoxetine (CYMBALTA) 60 MG capsule TAKE ONE CAPSULE BY MOUTH DAILY 30 capsule 11   • folic acid (FOLVITE) 1 MG tablet Take 1 mg by mouth Daily.     • Glucosamine-Chondroitin--200-150 MG tablet Take  by mouth.     • isosorbide mononitrate (IMDUR) 30 MG 24 hr tablet TAKE ONE TABLET BY MOUTH DAILY 90 tablet 2   • leflunomide (ARAVA) 10 MG tablet      • methocarbamol (ROBAXIN) 750 MG tablet TAKE ONE TABLET BY MOUTH THREE TIMES A DAY 90 tablet 0   • methotrexate 2.5 MG tablet Take 8 tablets by mouth 1 (One) Time Per Week. 96 tablet 0   • Multiple Vitamins-Minerals (MULTIVITAMIN WITH MINERALS) tablet tablet Take 1 tablet by mouth Daily.     • nitroglycerin (NITROSTAT) 0.4 MG SL tablet 1 under the tongue as needed for angina, may repeat q5mins for up three doses 100 tablet 11   • predniSONE (DELTASONE) 10 MG tablet Take 10 mg by mouth Daily. As needed     • tiotropium bromide-olodaterol (STIOLTO RESPIMAT) 2.5-2.5 MCG/ACT aerosol solution inhaler Inhale 2 puffs Daily. 1 inhaler 5   • vitamin B-12 (CYANOCOBALAMIN) 1000 MCG  tablet TAKE ONE TABLET BY MOUTH DAILY 30 tablet 8   • ASPIRIN ADULT LOW DOSE 81 MG EC tablet TAKE ONE TABLET BY MOUTH DAILY 30 tablet 3   • celecoxib (CELEBREX) 100 MG capsule Take 1 capsule by mouth 2 (Two) Times a Day As Needed for Moderate Pain . 180 capsule 4   • gabapentin (NEURONTIN) 300 MG capsule TAKE ONE CAPSULE BY MOUTH THREE TIMES A DAY AS NEEDED FOR NERVE PAIN 90 capsule 1   • albuterol (PROVENTIL HFA;VENTOLIN HFA) 108 (90 Base) MCG/ACT inhaler Inhale 2 puffs Every 6 (Six) Hours As Needed for Wheezing or Shortness of Air. 1 inhaler 3   • Emollient (CERAVE) cream Apply 1 application topically 2 (Two) Times a Day. 453 g 3   • fluocinonide (LIDEX) 0.05 % ointment Apply  topically to the appropriate area as directed 2 (Two) Times a Day. 120 g 0   • Omeprazole 20 MG tablet delayed-release TAKE ONE TABLET BY MOUTH DAILY 30 each 5   • Triamcinolone Acetonide (NASACORT) 55 MCG/ACT nasal inhaler 2 sprays into each nostril daily.     • benzonatate (TESSALON) 200 MG capsule Take 1 capsule by mouth 3 (Three) Times a Day As Needed for Cough. 30 capsule 0     No facility-administered medications prior to visit.        Patient Active Problem List   Diagnosis   • Cobalamin deficiency   • Hyperreflexia of lower extremity   • Abnormal gait   • IgG monoclonal protein disorder   • Spondylosis of cervical region without myelopathy or radiculopathy   • Dextroscoliosis   • MGUS (monoclonal gammopathy of unknown significance)   • Allergic rhinitis   • Other hyperlipidemia   • Malignant neoplasm of prostate (CMS/HCC)   • Chronic joint pain   • Primary insomnia   • Left-sided tinnitus   • Night sweats   • Unstable angina (CMS/HCC)   • Essential hypertension   • Panlobular emphysema (CMS/HCC)   • Anomalous coronary artery origin   • Chronic obstructive pulmonary disease (CMS/HCC)   • Pharyngoesophageal dysphagia   • Chronic pain syndrome   • Inflammatory polyarthropathy (CMS/HCC)   • Former smoker   • Bucket handle tear of medial  "meniscus of knee   • Primary open angle glaucoma (POAG) of both eyes   • Arteriosclerotic vascular disease   • Bilateral pseudophakia   • Corneal guttata   • Excess skin of both eyelids   • Posterior vitreous detachment of both eyes   • Vitreous floaters   • Anginal equivalent (CMS/HCC)   • Bronchitis, obstructive, chronic (CMS/HCC)   • Neuropathy associated with MGUS (CMS/HCC)       Advanced Care Planning:  Patient does not have an advance directive - information provided to the patient today    Review of Systems   Constitutional: Positive for appetite change.   Gastrointestinal: Negative for abdominal pain.   Musculoskeletal: Positive for arthralgias.   Psychiatric/Behavioral: Negative for dysphoric mood.   All other systems reviewed and are negative.      Compared to one year ago, the patient feels his physical health is the same.  Compared to one year ago, the patient feels his mental health is the same.    Reviewed chart for potential of high risk medication in the elderly: yes  Reviewed chart for potential of harmful drug interactions in the elderly:yes    Objective         Vitals:    01/13/20 1028   BP: 130/80   Pulse: 60   Resp: 18   Temp: 96.9 °F (36.1 °C)   TempSrc: Temporal   SpO2: 99%   Weight: 62.7 kg (138 lb 4 oz)   Height: 180.1 cm (70.91\")   PainSc:   3       Body mass index is 19.33 kg/m².  Discussed the patient's BMI with him. The BMI is in the acceptable range.    Physical Exam   Constitutional: He is oriented to person, place, and time. Vital signs are normal. He appears well-developed and well-nourished.  Non-toxic appearance. He does not appear ill. No distress.   Appears stated age. Well groomed. Thin.    HENT:   Head: Normocephalic and atraumatic.   Right Ear: Hearing, tympanic membrane, external ear and ear canal normal.   Left Ear: Hearing, tympanic membrane, external ear and ear canal normal.   Nose: Nose normal.   Mouth/Throat: Uvula is midline and oropharynx is clear and moist. Mucous " membranes are not dry. Abnormal dentition.   Eyes: Pupils are equal, round, and reactive to light. Conjunctivae, EOM and lids are normal. No scleral icterus.   Neck: Phonation normal. Neck supple. No thyromegaly present.   Cardiovascular: Normal rate and regular rhythm. Exam reveals distant heart sounds.   No murmur heard.  Pulmonary/Chest: Effort normal. No accessory muscle usage. He has no wheezes. He has no rhonchi. He has no rales.   Abdominal: Soft. Bowel sounds are normal. He exhibits no distension and no mass. There is no tenderness.   Neurological: He is alert and oriented to person, place, and time. He displays no tremor. No cranial nerve deficit. Gait abnormal.   Skin: Skin is warm. Capillary refill takes less than 2 seconds. He is not diaphoretic. No cyanosis.   weathered   Psychiatric: He has a normal mood and affect. His speech is normal and behavior is normal. Judgment and thought content normal. Cognition and memory are normal. He is attentive.   Nursing note and vitals reviewed.    Lab Results   Component Value Date    CHLPL 147 07/08/2019    TRIG 75 07/08/2019    HDL 50 07/08/2019    LDL 82 07/08/2019                 ECG 12 Lead  Date/Time: 1/13/2020 11:20 AM  Performed by: Ladonna Means MD  Authorized by: Ladonna Means MD   Comparison: not compared with previous ECG   Rhythm: sinus bradycardia  Rate: bradycardic  BPM: 53  Conduction: conduction normal  ST Segments: ST segments normal  QRS axis: normal  Other: no other findings  Comments: Normal except for rate (on bisoprolol)            Assessment/Plan   Medicare Risks and Personalized Health Plan  CMS Preventative Services Quick Reference  Advance Directive Discussion  Chronic Pain   Immunizations Discussed/Encouraged (specific immunizations; Shingrix )    The above risks/problems have been discussed with the patient.  Pertinent information has been shared with the patient in the After Visit Summary.  Follow up plans and orders are  seen below in the Assessment/Plan Section.    Diagnoses and all orders for this visit:    1. Welcome to Medicare preventive visit (Primary)  -     ECG 12 Lead    2. Inflammatory polyarthropathy (CMS/HCC)  Comments:  follow up with rheumatology  Orders:  -     celecoxib (CELEBREX) 100 MG capsule; Take 1 capsule by mouth Daily.  Dispense: 90 capsule; Refill: 3    3. Neuropathy associated with MGUS (CMS/HCC)  Comments:  Neuropathy may be related to MGUS and/or inflammoatory arthritis. Continue gabapentin, helps.    4. Malignant neoplasm of prostate (CMS/HCC)  Comments:  due for psa in July, plans to have done here and go back to Dr. Varela later if needed    5. Panlobular emphysema (CMS/HCC)  Comments:  follow up with pulmonary    6. Bronchitis, obstructive, chronic (CMS/HCC)  Comments:  follow up with pulmonary    7. Anginal equivalent (CMS/HCC)  Comments:  continue imdur, follow up with cardiology    8. MGUS (monoclonal gammopathy of unknown significance)  Comments:  follow up with hematology/oncology    9. Other hyperlipidemia  Comments:  continue statin therapy    10. Former smoker  Comments:  utd on lung ca screening scan, follow up pulmonary re abnormal findings    11. Spondylosis of cervical region without myelopathy or radiculopathy  -     gabapentin (NEURONTIN) 300 MG capsule; Take 1 capsule by mouth 3 (Three) Times a Day As Needed (nerve pain).  Dispense: 270 capsule; Refill: 1    12. Chronic pain syndrome  -     gabapentin (NEURONTIN) 300 MG capsule; Take 1 capsule by mouth 3 (Three) Times a Day As Needed (nerve pain).  Dispense: 270 capsule; Refill: 1    13. Immunization counseling  Comments:  encouraged shingrix at pharmacy        Follow Up:  Return in about 6 months (around 7/13/2020) for follow up.     An After Visit Summary and PPPS were given to the patient.

## 2020-01-14 ENCOUNTER — PREP FOR SURGERY (OUTPATIENT)
Dept: OTHER | Facility: HOSPITAL | Age: 65
End: 2020-01-14

## 2020-01-14 DIAGNOSIS — Z01.818 PREOP EXAMINATION: Primary | ICD-10-CM

## 2020-01-14 RX ORDER — CEFAZOLIN SODIUM 2 G/50ML
2 SOLUTION INTRAVENOUS
Status: CANCELLED | OUTPATIENT
Start: 2020-01-15 | End: 2020-01-16

## 2020-01-15 PROBLEM — Z01.818 PREOP EXAMINATION: Status: ACTIVE | Noted: 2020-01-15

## 2020-01-20 ENCOUNTER — HOSPITAL ENCOUNTER (OUTPATIENT)
Dept: GENERAL RADIOLOGY | Facility: HOSPITAL | Age: 65
Discharge: HOME | End: 2020-01-20
Admitting: ORTHOPAEDIC SURGERY

## 2020-01-20 ENCOUNTER — OFFICE VISIT (OUTPATIENT)
Dept: ORTHOPEDIC SURGERY | Facility: CLINIC | Age: 65
End: 2020-01-20

## 2020-01-20 ENCOUNTER — OFFICE VISIT (OUTPATIENT)
Dept: CARDIOLOGY | Facility: CLINIC | Age: 65
End: 2020-01-20

## 2020-01-20 ENCOUNTER — APPOINTMENT (OUTPATIENT)
Dept: PREADMISSION TESTING | Facility: HOSPITAL | Age: 65
End: 2020-01-20

## 2020-01-20 VITALS — WEIGHT: 140.2 LBS | BODY MASS INDEX: 19.63 KG/M2 | RESPIRATION RATE: 18 BRPM | HEIGHT: 71 IN

## 2020-01-20 VITALS
BODY MASS INDEX: 19.32 KG/M2 | SYSTOLIC BLOOD PRESSURE: 120 MMHG | WEIGHT: 138 LBS | HEIGHT: 71 IN | HEART RATE: 71 BPM | DIASTOLIC BLOOD PRESSURE: 80 MMHG | OXYGEN SATURATION: 99 %

## 2020-01-20 VITALS — BODY MASS INDEX: 19.64 KG/M2 | WEIGHT: 140.25 LBS | HEIGHT: 71 IN

## 2020-01-20 DIAGNOSIS — I70.90 ARTERIOSCLEROTIC VASCULAR DISEASE: ICD-10-CM

## 2020-01-20 DIAGNOSIS — Z01.818 PREOP EXAMINATION: ICD-10-CM

## 2020-01-20 DIAGNOSIS — I20.8 ANGINAL EQUIVALENT (HCC): ICD-10-CM

## 2020-01-20 DIAGNOSIS — I10 ESSENTIAL HYPERTENSION: Primary | Chronic | ICD-10-CM

## 2020-01-20 DIAGNOSIS — IMO0002 BURSITIS/TENDONITIS, SHOULDER: ICD-10-CM

## 2020-01-20 DIAGNOSIS — J43.1 PANLOBULAR EMPHYSEMA (HCC): Chronic | ICD-10-CM

## 2020-01-20 DIAGNOSIS — M75.122 NONTRAUMATIC COMPLETE TEAR OF LEFT ROTATOR CUFF: ICD-10-CM

## 2020-01-20 DIAGNOSIS — Q24.5 ANOMALOUS CORONARY ARTERY ORIGIN: ICD-10-CM

## 2020-01-20 DIAGNOSIS — M75.42 ROTATOR CUFF IMPINGEMENT SYNDROME OF LEFT SHOULDER: Primary | ICD-10-CM

## 2020-01-20 DIAGNOSIS — J44.9: ICD-10-CM

## 2020-01-20 DIAGNOSIS — E78.49 OTHER HYPERLIPIDEMIA: ICD-10-CM

## 2020-01-20 LAB
ALBUMIN SERPL-MCNC: 4.4 G/DL (ref 3.5–5.2)
ALBUMIN/GLOB SERPL: 1 G/DL
ALP SERPL-CCNC: 110 U/L (ref 39–117)
ALT SERPL W P-5'-P-CCNC: 51 U/L (ref 1–41)
ANION GAP SERPL CALCULATED.3IONS-SCNC: 10.6 MMOL/L (ref 5–15)
AST SERPL-CCNC: 46 U/L (ref 1–40)
BASOPHILS # BLD AUTO: 0.06 10*3/MM3 (ref 0–0.2)
BASOPHILS NFR BLD AUTO: 0.8 % (ref 0–1.5)
BILIRUB SERPL-MCNC: 0.5 MG/DL (ref 0.2–1.2)
BILIRUB UR QL STRIP: NEGATIVE
BUN BLD-MCNC: 27 MG/DL (ref 8–23)
BUN/CREAT SERPL: 27.3 (ref 7–25)
CALCIUM SPEC-SCNC: 9.8 MG/DL (ref 8.6–10.5)
CHLORIDE SERPL-SCNC: 98 MMOL/L (ref 98–107)
CLARITY UR: CLEAR
CO2 SERPL-SCNC: 28.4 MMOL/L (ref 22–29)
COLOR UR: YELLOW
CREAT BLD-MCNC: 0.99 MG/DL (ref 0.76–1.27)
DEPRECATED RDW RBC AUTO: 48.8 FL (ref 37–54)
EOSINOPHIL # BLD AUTO: 0.28 10*3/MM3 (ref 0–0.4)
EOSINOPHIL NFR BLD AUTO: 3.6 % (ref 0.3–6.2)
ERYTHROCYTE [DISTWIDTH] IN BLOOD BY AUTOMATED COUNT: 13.7 % (ref 12.3–15.4)
GFR SERPL CREATININE-BSD FRML MDRD: 76 ML/MIN/1.73
GLOBULIN UR ELPH-MCNC: 4.3 GM/DL
GLUCOSE BLD-MCNC: 89 MG/DL (ref 65–99)
GLUCOSE UR STRIP-MCNC: NEGATIVE MG/DL
HCT VFR BLD AUTO: 48 % (ref 37.5–51)
HGB BLD-MCNC: 16 G/DL (ref 13–17.7)
HGB UR QL STRIP.AUTO: NEGATIVE
IMM GRANULOCYTES # BLD AUTO: 0.04 10*3/MM3 (ref 0–0.05)
IMM GRANULOCYTES NFR BLD AUTO: 0.5 % (ref 0–0.5)
KETONES UR QL STRIP: NEGATIVE
LEUKOCYTE ESTERASE UR QL STRIP.AUTO: NEGATIVE
LYMPHOCYTES # BLD AUTO: 2.5 10*3/MM3 (ref 0.7–3.1)
LYMPHOCYTES NFR BLD AUTO: 32.1 % (ref 19.6–45.3)
MCH RBC QN AUTO: 32.4 PG (ref 26.6–33)
MCHC RBC AUTO-ENTMCNC: 33.3 G/DL (ref 31.5–35.7)
MCV RBC AUTO: 97.2 FL (ref 79–97)
MONOCYTES # BLD AUTO: 0.68 10*3/MM3 (ref 0.1–0.9)
MONOCYTES NFR BLD AUTO: 8.7 % (ref 5–12)
NEUTROPHILS # BLD AUTO: 4.23 10*3/MM3 (ref 1.7–7)
NEUTROPHILS NFR BLD AUTO: 54.3 % (ref 42.7–76)
NITRITE UR QL STRIP: NEGATIVE
NRBC BLD AUTO-RTO: 0 /100 WBC (ref 0–0.2)
PH UR STRIP.AUTO: 5.5 [PH] (ref 5–8)
PLATELET # BLD AUTO: 207 10*3/MM3 (ref 140–450)
PMV BLD AUTO: 10.2 FL (ref 6–12)
POTASSIUM BLD-SCNC: 4.5 MMOL/L (ref 3.5–5.2)
PROT SERPL-MCNC: 8.7 G/DL (ref 6–8.5)
PROT UR QL STRIP: NEGATIVE
RBC # BLD AUTO: 4.94 10*6/MM3 (ref 4.14–5.8)
SODIUM BLD-SCNC: 137 MMOL/L (ref 136–145)
SP GR UR STRIP: 1.02 (ref 1–1.03)
UROBILINOGEN UR QL STRIP: NORMAL
WBC NRBC COR # BLD: 7.79 10*3/MM3 (ref 3.4–10.8)

## 2020-01-20 PROCEDURE — 81003 URINALYSIS AUTO W/O SCOPE: CPT | Performed by: ORTHOPAEDIC SURGERY

## 2020-01-20 PROCEDURE — S0260 H&P FOR SURGERY: HCPCS | Performed by: ORTHOPAEDIC SURGERY

## 2020-01-20 PROCEDURE — 80053 COMPREHEN METABOLIC PANEL: CPT | Performed by: ORTHOPAEDIC SURGERY

## 2020-01-20 PROCEDURE — 85025 COMPLETE CBC W/AUTO DIFF WBC: CPT | Performed by: ORTHOPAEDIC SURGERY

## 2020-01-20 PROCEDURE — 71046 X-RAY EXAM CHEST 2 VIEWS: CPT

## 2020-01-20 PROCEDURE — 36415 COLL VENOUS BLD VENIPUNCTURE: CPT

## 2020-01-20 PROCEDURE — 87081 CULTURE SCREEN ONLY: CPT | Performed by: ORTHOPAEDIC SURGERY

## 2020-01-20 PROCEDURE — 99213 OFFICE O/P EST LOW 20 MIN: CPT | Performed by: NURSE PRACTITIONER

## 2020-01-20 NOTE — PROGRESS NOTES
Elliott Dunn is a 64 y.o. ambidextrous male referred by YU Means MD  is here today for a discussion of treatment plans, surgery, and rehabilitation.  Pre-op Exam of the Left Shoulder    CHIEF COMPLAINT:    Left shoulder pain, stiffness, weakness and marked functional limitation with clinical exam and MRI showing impingement bursitis and spurs, AC DJD and full thickness mild retracted rotator cuff tear.    History of Present Illness      64-year-old ambidextrous retired  who still is quite active and enjoys fishing, hunting and gardening.  He has not been able to tolerate the physical aspects of these activities the past several months.  He has been having over 1 year of left shoulder persistent and progressive pain stiffness weakness and marked functional limitation.  He has had clinical exam and imaging including an MRI February 2019 that demonstrates impingement bursitis, acromial spur, acromioclavicular joint arthritis with spurs and a full-thickness mildly retracted complete supraspinatus rotator cuff tendon tear.  He has had appropriate treatments and also referred by Ladonna Means MD, his primary care and seen Regan Coyle MD in recent orthopedic clinic visits with nonsurgical treatments including medication, therapy and injection.  Patient describes persisting gradually progressive left shoulder symptoms and limitations and Dr. Coyle offered elective diagnostic arthroscopy with likely labral debridement, subacromial decompression, distal clavicle excision and mini open rotator cuff repair.  Dr. Coyle referred him to my clinic for consideration of elective surgical reconstruction to address his shoulder pain and limitations.  Patient has seen Dr. Arreola his cardiologist for pre-operative evaluation and risk stratification.    PAST MEDICAL HISTORY:    Past Medical History:   Diagnosis Date   • Acquired absence of all teeth    • Allergic    • Anomalous coronary artery origin    • Arrhythmia     • Arthritis    • Arthritis of lumbar spine 7/29/2016   • Back pain 6/17/2016   • Cancer (CMS/HCC)     prostate   • Cataract    • Cervical spine disease 6/17/2016   • CHF (congestive heart failure) (CMS/HCC)    • Chronic obstructive pulmonary disease (CMS/HCC)    • Colon polyps    • Deafness in left ear    • Derangement of anterior horn of medial meniscus    • Difficulty swallowing    • Disorder of lumbar spine 6/17/2016   • Disorder of thoracic spine 6/17/2016   • Diverticulitis of colon    • Erectile dysfunction    • Esophageal reflux    • Essential hypertension    • Glaucoma    • Heart murmur    • High cholesterol    • Inflammatory polyarthropathy (CMS/HCC)     Per Dr. Haider. Negative NEO, normal ESR, RF, anti-ccp and did not improve with prednisone per notes.   • Inguinal hernia    • Lateral meniscus derangement    • Left otitis media with spontaneous rupture of eardrum    • Locking of left knee    • Low back pain    • MGUS (monoclonal gammopathy of unknown significance)     likely diagnosis   • Neuromuscular disorder (CMS/HCC)    • Neuropathic arthritis    • Perforation of left tympanic membrane    • Pulmonary emphysema (CMS/HCC)    • Scoliosis    • Skin cancer     skin cancer   • Skin cancer of face     squamous cell   • Spina bifida occulta 6/17/2016   • Tobacco abuse 11/9/2017   • Visual impairment    • Wears glasses        Past Surgical History:   Procedure Laterality Date   • COLONOSCOPY     • ENDOSCOPY N/A 4/10/2017    Procedure: ESOPHAGOGASTRODUODENOSCOPY WITH BIOPSY;  Surgeon: Alexander Ritchie MD;  Location: [facility] END[facility];  Service:    • EYE SURGERY     • HERNIA REPAIR     • INGUINAL HERNIA REPAIR Right    • INGUINAL HERNIA REPAIR     • KNEE SURGERY Left    • LYMPH NODE BIOPSY     • PROSTATE SURGERY  2004   • PROSTATECTOMY  06/30/2014   • PROSTATECTOMY      Prostatectomy Radical   • SKIN CANCER EXCISION  2017    both sides of body/squamous cell melanoma   • TYMPANOPLASTY W/ MASTOIDECTOMY     •  UMBILICAL HERNIA REPAIR         Family History   Problem Relation Age of Onset   • Diabetes Mother    • Hypertension Mother    • Obesity Mother    • Stroke Mother 65   • Arthritis Mother    • Hyperlipidemia Mother    • Vision loss Mother    • Cancer Father    • Cancer Sister    • Diabetes Brother    • Cancer Brother    • Heart attack Brother    • Alcohol abuse Brother    • Drug abuse Brother    • Hearing loss Brother    • Learning disabilities Brother        Social History     Socioeconomic History   • Marital status:      Spouse name: Not on file   • Number of children: Not on file   • Years of education: Not on file   • Highest education level: Not on file   Occupational History   • Occupation: retired    Tobacco Use   • Smoking status: Former Smoker     Packs/day: 1.00     Years: 51.00     Pack years: 51.00     Types: Cigarettes     Start date: 1963     Last attempt to quit: 2018     Years since quittin.0   • Smokeless tobacco: Never Used   Substance and Sexual Activity   • Alcohol use: No   • Drug use: No   • Sexual activity: Yes     Partners: Female     Birth control/protection: None   Social History Narrative    Left hand dominant, writes with right hand          Current Outpatient Medications:   •  albuterol (PROVENTIL HFA;VENTOLIN HFA) 108 (90 Base) MCG/ACT inhaler, Inhale 2 puffs Every 6 (Six) Hours As Needed for Wheezing or Shortness of Air., Disp: 1 inhaler, Rfl: 3  •  amitriptyline (ELAVIL) 25 MG tablet, TAKE 1-3 AT BEDTIME AS NEEDED FOR SLEEP, Disp: 270 tablet, Rfl: 4  •  aspirin 81 MG EC tablet, Take 81 mg by mouth Daily., Disp: , Rfl:   •  atorvastatin (LIPITOR) 10 MG tablet, TAKE ONE TABLET BY MOUTH DAILY, Disp: 30 tablet, Rfl: 4  •  bisoprolol (ZEBeta) 5 MG tablet, TAKE ONE TABLET BY MOUTH DAILY, Disp: 90 tablet, Rfl: 2  •  celecoxib (CELEBREX) 100 MG capsule, Take 1 capsule by mouth Daily., Disp: 90 capsule, Rfl: 3  •  coenzyme Q10 100 MG capsule, Take 100 mg by  mouth Daily., Disp: , Rfl:   •  DULoxetine (CYMBALTA) 60 MG capsule, TAKE ONE CAPSULE BY MOUTH DAILY, Disp: 30 capsule, Rfl: 11  •  folic acid (FOLVITE) 1 MG tablet, Take 1 mg by mouth Daily., Disp: , Rfl:   •  gabapentin (NEURONTIN) 300 MG capsule, Take 1 capsule by mouth 3 (Three) Times a Day As Needed (nerve pain)., Disp: 270 capsule, Rfl: 1  •  Glucosamine-Chondroitin--200-150 MG tablet, Take  by mouth., Disp: , Rfl:   •  isosorbide mononitrate (IMDUR) 30 MG 24 hr tablet, TAKE ONE TABLET BY MOUTH DAILY, Disp: 90 tablet, Rfl: 2  •  leflunomide (ARAVA) 10 MG tablet, , Disp: , Rfl:   •  methocarbamol (ROBAXIN) 750 MG tablet, TAKE ONE TABLET BY MOUTH THREE TIMES A DAY, Disp: 90 tablet, Rfl: 0  •  methotrexate 2.5 MG tablet, Take 8 tablets by mouth 1 (One) Time Per Week., Disp: 96 tablet, Rfl: 0  •  Multiple Vitamins-Minerals (MULTIVITAMIN WITH MINERALS) tablet tablet, Take 1 tablet by mouth Daily., Disp: , Rfl:   •  nitroglycerin (NITROSTAT) 0.4 MG SL tablet, 1 under the tongue as needed for angina, may repeat q5mins for up three doses, Disp: 100 tablet, Rfl: 11  •  predniSONE (DELTASONE) 10 MG tablet, Take 10 mg by mouth Daily. As needed, Disp: , Rfl:   •  tiotropium bromide-olodaterol (STIOLTO RESPIMAT) 2.5-2.5 MCG/ACT aerosol solution inhaler, Inhale 2 puffs Daily., Disp: 1 inhaler, Rfl: 5  •  Triamcinolone Acetonide (NASACORT) 55 MCG/ACT nasal inhaler, 2 sprays into each nostril daily., Disp: , Rfl:   •  vitamin B-12 (CYANOCOBALAMIN) 1000 MCG tablet, TAKE ONE TABLET BY MOUTH DAILY, Disp: 30 tablet, Rfl: 8    Allergies   Allergen Reactions   • Plaquenil [Hydroxychloroquine Sulfate] Other (See Comments)     Black eyes   • Cyclobenzaprine Other (See Comments)     Wide awake   • Pravastatin Other (See Comments)     Weakness / fatigue  Weakness / fatigue  Weakness / fatigue       I have reviewed the above medical and surgical history, family history, social history, medications, allergies and review of  "systems.    Review of Systems   Constitutional: Negative for fever.   HENT: Negative for dental problem and voice change.    Eyes: Negative for visual disturbance.   Respiratory: Negative for shortness of breath.    Cardiovascular: Negative for chest pain.   Gastrointestinal: Negative for abdominal pain.   Genitourinary: Negative for dysuria.   Musculoskeletal: Positive for arthralgias. Negative for gait problem and joint swelling.   Skin: Negative for rash.   Neurological: Positive for weakness (left shoulder). Negative for speech difficulty.   Hematological: Does not bruise/bleed easily.   Psychiatric/Behavioral: Negative for confusion.       PHYSICAL EXAMINATION:       Resp 18   Ht 180.1 cm (70.9\")   Wt 63.6 kg (140 lb 3.2 oz)   BMI 19.61 kg/m²     GENERAL [x] Well developed  []Ill appearing [x] No acute distress    HEENT [x]No acute changes [x] Normocephalic, atraumatic    NECK [x]Supple  [] No midline tenderness    LUNGS [x]Clear bilaterally [x]No wheezes []Rhonchi [] Rales    HEART [x] Regular rate  [x] Regular rhythm [] Irregular    ABDOMEN [x] Soft [x] Not tender [x] Not distended [x] Normal sounds    VAS/EXT [x] Normal Pulses []Edema []Cyanosis             SKIN [x] Warm [x]Dry []Pink []Ecchymosis []Cool    NEURO [x] Sensation Intact [x] Motor Grossly Intact [x] Pulse intact  [x] Weakness: left shoulder abduction, rotation    MUSCULOSKELETAL [x]  Positive impingement, positive drop arm sign        Physical Exam   Constitutional: He appears well-developed. No distress.   Musculoskeletal: He exhibits deformity (left shoulder supraspinatus atrophy).   Neurological: He is alert. Gait normal.   Skin: Skin is warm and dry. No rash noted. No erythema.   Psychiatric: He has a normal mood and affect. His speech is normal.   Vitals reviewed.    Left Shoulder Exam     Tenderness   The patient is experiencing tenderness in the acromioclavicular joint and acromion.    Range of Motion   Active abduction: 60 (passive " 110)   External rotation: 50   Forward flexion: 80 (130 passively)   Internal rotation 0 degrees: L4     Muscle Strength   Abduction: 3/5   Internal rotation: 5/5   External rotation: 4/5   Supraspinatus: 2/5   Biceps: 4/5     Tests   Apprehension: negative  Milner test: positive  Cross arm: positive  Impingement: positive  Drop arm: positive    Other   Erythema: absent  Sensation: normal  Pulse: present            Neurologic Exam     Mental Status   Attention: normal.   Speech: speech is normal   Level of consciousness: alert  Knowledge: good.     Motor Exam   Overall muscle tone: normal    Gait, Coordination, and Reflexes     Gait  Gait: normal             Independent Review of Radiographic Studies:    No new imaging done today.  Reviewed with patient today his left shoulder imaging and MRI findings.  Laboratory and Other Studies:  No new results reviewed today.   Medical Decision Making:    Limited progress with persistent and/or progressive symptoms.  Continue current management and any additional treatments and workup as outlined in plan.  Elective surgical treatment of chronic condition.  Medications as prescribed and only as tolerated.  Physical and occupational therapy planned.  Decision for surgery and patient counseling and as noted in report.  Review of prior ortho records        Elliott was seen today for pre-op exam.    Diagnoses and all orders for this visit:    Rotator cuff impingement syndrome of left shoulder    Bursitis/tendonitis, shoulder    Nontraumatic complete tear of left rotator cuff        *SPECIAL INSTRUCTIONS:      Multi-modal analgesia.   Rehabilitation PT/OT planned.    Assessment/Plan:  Discussion of orthopaedic goals and activities and patient and/or guardian expressed appreciation.  Risk, benefits, and merits of treatment alternatives reviewed with the patient and/or guardian and questions answered  Regular exercise as tolerated  Guided on proper techniques for mobility, strength,  agility and/or conditioning exercises  The nature of the proposed surgery reviewed with the patient including risks, benefits, rehabilitation, recovery timeframe, and outcome expectations  Ice, heat, and/or modalities as beneficial  Reduced physical activity as appropriate and avoid offending activity  Physical therapy referral planned  Take prescribed medications as instructed only as tolerated      Risks, benefits, and alternative treatments discussed with the patient: [x] Yes [] No    Risk benefits and merits of the proposed surgery were discussed and the patient's questions were answered risks discussed including and not limited to:  Anesthesia reactions  Infection  Deep venous thrombosis and pulmonary embolus  Nerve, vascular or tendon injury  Fracture  Deformity  Stiffness  Weakness  Prosthesis and implant issues such as loosening, material wear or dislocation  Skin necrosis  Revision surgery or further treatment  Recurrence of problem and condition     Informed consent: [] Signed  [x] To be obtained at hospital  [] Both    Recommendations/Plan:    Exercise, medications, injections, other patient advice, and return appointment as noted.  Brace: No brace was given at today's visit  Referral: No referrals made at today's visit  Test/Studies: No additional studies ordered.  Surgery: Surgery proposed at this visit as noted. and For intractable painful limiting condition, patient to consider elective surgical options.  Work/Activity Status:  Usual activities and routine exercise as tolerated.  No strenuous activity.    PLANNED SURGICAL PROCEDURE: Elective left shoulder diagnostic arthroscopy, labral debridement, subacromial decompression, distal clavicle excision and mini open rotator cuff repair.    Return in about 4 weeks (around 2/17/2020) for Post-op, recheck.  Patient is encouraged and agreeable to call or return sooner for any issues or concerns.

## 2020-01-20 NOTE — PROGRESS NOTES
Elliott Dunn is a 64 y.o. male.  MRN #: [id number]    Referring Provider: Ladonna Means MD    Chief Complaint:   Chief Complaint   Patient presents with   • Follow-up   • Pre-op Exam        History of Present Illness:  Mr Dunn is a 64-year-old gentleman that presents for a 3-month cardiac follow-up, as well as preoperative cardiac clearance for shoulder surgery.  Patient reports over the past 3 months he has been symptom-free with no unusual episodes of chest pain, chest pressure, palpitations, unusual shortness of breath with exertion, diaphoresis or nausea.  He has been able to resume his normal level of activity with the exception of use of his right arm due to his left rotator cuff limitations.   His GARRY that was performed September 19, 2019 which appears to be within normal limits with normal LV function with estimated ejection fraction of 60%.  Myocardial perfusion stress test September 2019 shows no EKG or perfusion evidence of ischemia.   a twelve-lead EKG performed by his primary care physician 1 week ago which shows a normal sinus rhythm with no acute changes or any evidence of ischemic findings.    The patient presents today with their wife who contributes to the history of their care.     The following portions of the patient's history were reviewed and updated as appropriate: allergies, current medications, past family history, past medical history, past social history, past surgical history and problem list.     Review of Systems:     Review of Systems   Constitutional: Negative.  Negative for activity change, appetite change, diaphoresis, fatigue, unexpected weight gain and unexpected weight loss.   HENT: Negative.    Eyes: Negative.  Negative for blurred vision, double vision, photophobia and visual disturbance.   Respiratory: Negative.  Negative for apnea, cough, chest tightness, shortness of breath and wheezing.         History of COPD with tobacco use.   Cardiovascular: Negative.   Negative for chest pain, palpitations and leg swelling.        History of CAD and history of hypertension.   Gastrointestinal: Negative.  Negative for abdominal distention, abdominal pain, blood in stool, nausea, vomiting, GERD and indigestion.   Endocrine: Negative.  Negative for cold intolerance, heat intolerance, polydipsia, polyphagia and polyuria.   Genitourinary: Negative.  Negative for decreased libido, frequency, genital sores, hematuria and urgency.   Musculoskeletal: Positive for arthralgias and myalgias. Negative for back pain, joint swelling, neck pain and neck stiffness.        History of left shoulder DJD with rotator cuff tear   Skin: Negative.  Negative for color change, pallor, rash and bruise.   Allergic/Immunologic: Negative.  Negative for environmental allergies, food allergies and immunocompromised state.   Neurological: Negative.  Negative for dizziness, tremors, seizures, syncope, facial asymmetry, speech difficulty, weakness, light-headedness and numbness.   Hematological: Negative.  Negative for adenopathy. Does not bruise/bleed easily.   Psychiatric/Behavioral: Negative.  Negative for agitation, decreased concentration, self-injury, sleep disturbance, suicidal ideas, negative for hyperactivity and stress. The patient is not nervous/anxious.    All other systems reviewed and are negative.         Current Outpatient Medications:   •  albuterol (PROVENTIL HFA;VENTOLIN HFA) 108 (90 Base) MCG/ACT inhaler, Inhale 2 puffs Every 6 (Six) Hours As Needed for Wheezing or Shortness of Air., Disp: 1 inhaler, Rfl: 3  •  amitriptyline (ELAVIL) 25 MG tablet, TAKE 1-3 AT BEDTIME AS NEEDED FOR SLEEP, Disp: 270 tablet, Rfl: 4  •  aspirin 81 MG EC tablet, Take 81 mg by mouth Daily., Disp: , Rfl:   •  atorvastatin (LIPITOR) 10 MG tablet, TAKE ONE TABLET BY MOUTH DAILY, Disp: 30 tablet, Rfl: 4  •  bisoprolol (ZEBeta) 5 MG tablet, TAKE ONE TABLET BY MOUTH DAILY, Disp: 90 tablet, Rfl: 2  •  celecoxib (CELEBREX)  "100 MG capsule, Take 1 capsule by mouth Daily., Disp: 90 capsule, Rfl: 3  •  coenzyme Q10 100 MG capsule, Take 100 mg by mouth Daily., Disp: , Rfl:   •  DULoxetine (CYMBALTA) 60 MG capsule, TAKE ONE CAPSULE BY MOUTH DAILY, Disp: 30 capsule, Rfl: 11  •  folic acid (FOLVITE) 1 MG tablet, Take 1 mg by mouth Daily., Disp: , Rfl:   •  gabapentin (NEURONTIN) 300 MG capsule, Take 1 capsule by mouth 3 (Three) Times a Day As Needed (nerve pain)., Disp: 270 capsule, Rfl: 1  •  Glucosamine-Chondroitin--200-150 MG tablet, Take  by mouth., Disp: , Rfl:   •  isosorbide mononitrate (IMDUR) 30 MG 24 hr tablet, TAKE ONE TABLET BY MOUTH DAILY, Disp: 90 tablet, Rfl: 2  •  leflunomide (ARAVA) 10 MG tablet, , Disp: , Rfl:   •  methocarbamol (ROBAXIN) 750 MG tablet, TAKE ONE TABLET BY MOUTH THREE TIMES A DAY, Disp: 90 tablet, Rfl: 0  •  methotrexate 2.5 MG tablet, Take 8 tablets by mouth 1 (One) Time Per Week., Disp: 96 tablet, Rfl: 0  •  Multiple Vitamins-Minerals (MULTIVITAMIN WITH MINERALS) tablet tablet, Take 1 tablet by mouth Daily., Disp: , Rfl:   •  nitroglycerin (NITROSTAT) 0.4 MG SL tablet, 1 under the tongue as needed for angina, may repeat q5mins for up three doses, Disp: 100 tablet, Rfl: 11  •  predniSONE (DELTASONE) 10 MG tablet, Take 10 mg by mouth Daily. As needed, Disp: , Rfl:   •  tiotropium bromide-olodaterol (STIOLTO RESPIMAT) 2.5-2.5 MCG/ACT aerosol solution inhaler, Inhale 2 puffs Daily., Disp: 1 inhaler, Rfl: 5  •  Triamcinolone Acetonide (NASACORT) 55 MCG/ACT nasal inhaler, 2 sprays into each nostril daily., Disp: , Rfl:   •  vitamin B-12 (CYANOCOBALAMIN) 1000 MCG tablet, TAKE ONE TABLET BY MOUTH DAILY, Disp: 30 tablet, Rfl: 8    Vitals:    01/20/20 1134   BP: 120/80   BP Location: Left arm   Patient Position: Sitting   Cuff Size: Adult   Pulse: 71   SpO2: 99%   Weight: 62.6 kg (138 lb)   Height: 180.1 cm (70.91\")       Physical Exam:     Physical Exam   Constitutional: He is oriented to person, place, and " time. He appears well-developed and well-nourished. No distress.   HENT:   Head: Normocephalic and atraumatic.   Right Ear: External ear normal.   Left Ear: External ear normal.   Nose: Nose normal.   Mouth/Throat: Oropharynx is clear and moist.   Eyes: Pupils are equal, round, and reactive to light. Conjunctivae and EOM are normal. No scleral icterus.   Neck: Trachea normal, normal range of motion and full passive range of motion without pain. Neck supple. No JVD present. Carotid bruit is not present. No thyroid mass and no thyromegaly present.   Cardiovascular: Normal rate, regular rhythm, S1 normal, S2 normal, normal heart sounds and intact distal pulses. PMI is not displaced. Exam reveals no gallop and no friction rub.   No murmur heard.  Pulses:       Carotid pulses are 1+ on the right side, and 1+ on the left side.  Twelve-lead EKG that was done 1 week ago by his primary care physician, reviewed and shows a normal sinus rhythm with no evidence of dysrhythmia, no evidence of ST changes suggestive of ischemia.   Pulmonary/Chest: Effort normal and breath sounds normal. No respiratory distress. He has no wheezes. He has no rales. He exhibits no tenderness.   Abdominal: Soft. Bowel sounds are normal. He exhibits no distension and no mass. There is no tenderness. No hernia.   Musculoskeletal: Normal range of motion. He exhibits no edema or deformity.   Neurological: He is alert and oriented to person, place, and time. No cranial nerve deficit. Coordination normal.   Skin: Skin is warm and dry. Capillary refill takes less than 2 seconds. No rash noted. He is not diaphoretic.   Psychiatric: He has a normal mood and affect. His behavior is normal. Judgment and thought content normal.   Nursing note and vitals reviewed.      Procedures    Results:   Reviewed cardiac diagnostic testing from September 2019 with patient.  Also reviewed twelve-lead EKG that was performed 1 week ago by his primary care physician.  Reviewed  medication regimen and updated.  Patient's vital signs are within normal limits.    Assessment/Plan:   No changes to be made in his medication regimen.  No further cardiac diagnostic testing is indicated with this visit.  Mr. Dunn may proceed with his scheduled shoulder surgery with low cardiac risk.  Elliott was seen today for follow-up and pre-op exam.    Diagnoses and all orders for this visit:    Essential hypertension  Bisoprolol 5 mg twice daily, well-controlled.  Other hyperlipidemia  Atorvastatin 10 mg p.o. q. p.m. monitored by his primary care physician.  Anomalous coronary artery origin    Arteriosclerotic vascular disease  Isosorbide 30 mg p.o. Daily  Aspirin 81 mg p.o. Daily  Bisoprolol 5 mg p.o. twice daily  Anginal equivalent (CMS/HCC)  Isosorbide 30 mg p.o. Daily  Aspirin 81 mg p.o. daily  Panlobular emphysema (CMS/HCC)  Monitored by his primary care physician and pulmonology.  Bronchitis, obstructive, chronic (CMS/HCC)  Monitored by his primary care physician and pulmonology.  Preop examination  May proceed with his scheduled shoulder surgery at low cardiac risk after review of his cardiac diagnostic testing from September 2019.      Return in about 6 months (around 7/20/2020).    MARK Rivas

## 2020-01-22 LAB — MRSA SPEC QL CULT: NORMAL

## 2020-01-23 ENCOUNTER — APPOINTMENT (OUTPATIENT)
Dept: PREADMISSION TESTING | Facility: HOSPITAL | Age: 65
End: 2020-01-23

## 2020-02-01 DIAGNOSIS — G89.4 CHRONIC PAIN SYNDROME: ICD-10-CM

## 2020-02-03 RX ORDER — METHOCARBAMOL 750 MG/1
TABLET, FILM COATED ORAL
Qty: 90 TABLET | Refills: 0 | Status: SHIPPED | OUTPATIENT
Start: 2020-02-03 | End: 2020-03-02

## 2020-02-05 DIAGNOSIS — Z98.890 STATUS POST ARTHROSCOPY OF SHOULDER: Primary | ICD-10-CM

## 2020-02-05 RX ORDER — HYDROCODONE BITARTRATE AND ACETAMINOPHEN 7.5; 325 MG/1; MG/1
1 TABLET ORAL EVERY 8 HOURS PRN
Qty: 21 TABLET | Refills: 0 | Status: SHIPPED | OUTPATIENT
Start: 2020-02-05 | End: 2020-07-10 | Stop reason: HOSPADM

## 2020-02-06 ENCOUNTER — ANESTHESIA (OUTPATIENT)
Dept: PERIOP | Facility: HOSPITAL | Age: 65
End: 2020-02-06

## 2020-02-06 ENCOUNTER — ANESTHESIA EVENT (OUTPATIENT)
Dept: PERIOP | Facility: HOSPITAL | Age: 65
End: 2020-02-06

## 2020-02-06 ENCOUNTER — HOSPITAL ENCOUNTER (OUTPATIENT)
Facility: HOSPITAL | Age: 65
Setting detail: HOSPITAL OUTPATIENT SURGERY
Discharge: HOME | End: 2020-02-06
Attending: ORTHOPAEDIC SURGERY | Admitting: ORTHOPAEDIC SURGERY

## 2020-02-06 VITALS
SYSTOLIC BLOOD PRESSURE: 124 MMHG | DIASTOLIC BLOOD PRESSURE: 77 MMHG | RESPIRATION RATE: 14 BRPM | HEART RATE: 66 BPM | TEMPERATURE: 98 F | OXYGEN SATURATION: 96 %

## 2020-02-06 DIAGNOSIS — Z01.818 PREOP EXAMINATION: ICD-10-CM

## 2020-02-06 PROCEDURE — 25010000003 CEFAZOLIN SODIUM-DEXTROSE 2-3 GM-%(50ML) RECONSTITUTED SOLUTION: Performed by: ORTHOPAEDIC SURGERY

## 2020-02-06 PROCEDURE — 25010000002 FENTANYL CITRATE (PF) 100 MCG/2ML SOLUTION: Performed by: NURSE ANESTHETIST, CERTIFIED REGISTERED

## 2020-02-06 PROCEDURE — 25010000002 DEXAMETHASONE PER 1 MG: Performed by: NURSE ANESTHETIST, CERTIFIED REGISTERED

## 2020-02-06 PROCEDURE — 25010000002 MIDAZOLAM PER 1MG: Performed by: NURSE ANESTHETIST, CERTIFIED REGISTERED

## 2020-02-06 PROCEDURE — 29823 SHO ARTHRS SRG XTNSV DBRDMT: CPT | Performed by: ORTHOPAEDIC SURGERY

## 2020-02-06 PROCEDURE — 25010000002 PROPOFOL 200 MG/20ML EMULSION: Performed by: NURSE ANESTHETIST, CERTIFIED REGISTERED

## 2020-02-06 PROCEDURE — 25010000002 METOCLOPRAMIDE PER 10 MG: Performed by: NURSE ANESTHETIST, CERTIFIED REGISTERED

## 2020-02-06 PROCEDURE — 94799 UNLISTED PULMONARY SVC/PX: CPT

## 2020-02-06 PROCEDURE — 25010000002 ONDANSETRON PER 1 MG: Performed by: NURSE ANESTHETIST, CERTIFIED REGISTERED

## 2020-02-06 RX ORDER — BUPIVACAINE HCL/0.9 % NACL/PF 0.125 %
8 PREFILLED PUMP RESERVOIR EPIDURAL CONTINUOUS
Status: DISCONTINUED | OUTPATIENT
Start: 2020-02-06 | End: 2020-02-06 | Stop reason: HOSPADM

## 2020-02-06 RX ORDER — SODIUM CHLORIDE 0.9 % (FLUSH) 0.9 %
10 SYRINGE (ML) INJECTION AS NEEDED
Status: DISCONTINUED | OUTPATIENT
Start: 2020-02-06 | End: 2020-02-06 | Stop reason: HOSPADM

## 2020-02-06 RX ORDER — BUPIVACAINE HYDROCHLORIDE 5 MG/ML
INJECTION, SOLUTION EPIDURAL; INTRACAUDAL
Status: COMPLETED
Start: 2020-02-06 | End: 2020-02-06

## 2020-02-06 RX ORDER — FENTANYL CITRATE 50 UG/ML
INJECTION, SOLUTION INTRAMUSCULAR; INTRAVENOUS
Status: COMPLETED
Start: 2020-02-06 | End: 2020-02-06

## 2020-02-06 RX ORDER — DEXAMETHASONE SODIUM PHOSPHATE 4 MG/ML
INJECTION, SOLUTION INTRA-ARTICULAR; INTRALESIONAL; INTRAMUSCULAR; INTRAVENOUS; SOFT TISSUE AS NEEDED
Status: DISCONTINUED | OUTPATIENT
Start: 2020-02-06 | End: 2020-02-06 | Stop reason: SURG

## 2020-02-06 RX ORDER — NEOSTIGMINE METHYLSULFATE 5 MG/5 ML
SYRINGE (ML) INTRAVENOUS AS NEEDED
Status: DISCONTINUED | OUTPATIENT
Start: 2020-02-06 | End: 2020-02-06 | Stop reason: SURG

## 2020-02-06 RX ORDER — BUPIVACAINE HYDROCHLORIDE 5 MG/ML
INJECTION, SOLUTION EPIDURAL; INTRACAUDAL AS NEEDED
Status: DISCONTINUED | OUTPATIENT
Start: 2020-02-06 | End: 2020-02-06 | Stop reason: SURG

## 2020-02-06 RX ORDER — MIDAZOLAM HYDROCHLORIDE 2 MG/2ML
INJECTION, SOLUTION INTRAMUSCULAR; INTRAVENOUS AS NEEDED
Status: DISCONTINUED | OUTPATIENT
Start: 2020-02-06 | End: 2020-02-06 | Stop reason: SURG

## 2020-02-06 RX ORDER — SODIUM CHLORIDE, SODIUM LACTATE, POTASSIUM CHLORIDE, CALCIUM CHLORIDE 600; 310; 30; 20 MG/100ML; MG/100ML; MG/100ML; MG/100ML
1000 INJECTION, SOLUTION INTRAVENOUS CONTINUOUS
Status: DISCONTINUED | OUTPATIENT
Start: 2020-02-06 | End: 2020-02-06 | Stop reason: HOSPADM

## 2020-02-06 RX ORDER — DEXAMETHASONE SODIUM PHOSPHATE 4 MG/ML
INJECTION, SOLUTION INTRA-ARTICULAR; INTRALESIONAL; INTRAMUSCULAR; INTRAVENOUS; SOFT TISSUE
Status: COMPLETED
Start: 2020-02-06 | End: 2020-02-06

## 2020-02-06 RX ORDER — FENTANYL CITRATE 50 UG/ML
INJECTION, SOLUTION INTRAMUSCULAR; INTRAVENOUS AS NEEDED
Status: DISCONTINUED | OUTPATIENT
Start: 2020-02-06 | End: 2020-02-06 | Stop reason: SURG

## 2020-02-06 RX ORDER — PROPOFOL 10 MG/ML
INJECTION, EMULSION INTRAVENOUS AS NEEDED
Status: DISCONTINUED | OUTPATIENT
Start: 2020-02-06 | End: 2020-02-06 | Stop reason: SURG

## 2020-02-06 RX ORDER — ONDANSETRON 2 MG/ML
INJECTION INTRAMUSCULAR; INTRAVENOUS AS NEEDED
Status: DISCONTINUED | OUTPATIENT
Start: 2020-02-06 | End: 2020-02-06 | Stop reason: SURG

## 2020-02-06 RX ORDER — METOCLOPRAMIDE HYDROCHLORIDE 5 MG/ML
INJECTION INTRAMUSCULAR; INTRAVENOUS AS NEEDED
Status: DISCONTINUED | OUTPATIENT
Start: 2020-02-06 | End: 2020-02-06 | Stop reason: SURG

## 2020-02-06 RX ORDER — ONDANSETRON 2 MG/ML
4 INJECTION INTRAMUSCULAR; INTRAVENOUS ONCE AS NEEDED
Status: DISCONTINUED | OUTPATIENT
Start: 2020-02-06 | End: 2020-02-06 | Stop reason: HOSPADM

## 2020-02-06 RX ORDER — CEFAZOLIN SODIUM 2 G/50ML
2 SOLUTION INTRAVENOUS
Status: COMPLETED | OUTPATIENT
Start: 2020-02-06 | End: 2020-02-06

## 2020-02-06 RX ORDER — MEPERIDINE HYDROCHLORIDE 25 MG/ML
50 INJECTION INTRAMUSCULAR; INTRAVENOUS; SUBCUTANEOUS
Status: DISCONTINUED | OUTPATIENT
Start: 2020-02-06 | End: 2020-02-06 | Stop reason: HOSPADM

## 2020-02-06 RX ORDER — MIDAZOLAM HYDROCHLORIDE 2 MG/2ML
INJECTION, SOLUTION INTRAMUSCULAR; INTRAVENOUS
Status: COMPLETED
Start: 2020-02-06 | End: 2020-02-06

## 2020-02-06 RX ORDER — LIDOCAINE HYDROCHLORIDE AND EPINEPHRINE 10; 10 MG/ML; UG/ML
INJECTION, SOLUTION INFILTRATION; PERINEURAL AS NEEDED
Status: DISCONTINUED | OUTPATIENT
Start: 2020-02-06 | End: 2020-02-06 | Stop reason: HOSPADM

## 2020-02-06 RX ORDER — ROCURONIUM BROMIDE 10 MG/ML
INJECTION, SOLUTION INTRAVENOUS AS NEEDED
Status: DISCONTINUED | OUTPATIENT
Start: 2020-02-06 | End: 2020-02-06 | Stop reason: SURG

## 2020-02-06 RX ADMIN — METOCLOPRAMIDE 10 MG: 5 INJECTION, SOLUTION INTRAMUSCULAR; INTRAVENOUS at 07:55

## 2020-02-06 RX ADMIN — DEXAMETHASONE SODIUM PHOSPHATE 10 MG: 4 INJECTION, SOLUTION INTRAMUSCULAR; INTRAVENOUS at 07:55

## 2020-02-06 RX ADMIN — ONDANSETRON 4 MG: 2 INJECTION INTRAMUSCULAR; INTRAVENOUS at 07:55

## 2020-02-06 RX ADMIN — CEFAZOLIN SODIUM 2 G: 2 SOLUTION INTRAVENOUS at 07:51

## 2020-02-06 RX ADMIN — GLYCOPYRROLATE 0.4 MG: 0.2 INJECTION, SOLUTION INTRAMUSCULAR; INTRAVENOUS at 08:39

## 2020-02-06 RX ADMIN — FENTANYL CITRATE 100 MCG: 50 INJECTION, SOLUTION INTRAMUSCULAR; INTRAVENOUS at 07:29

## 2020-02-06 RX ADMIN — MIDAZOLAM HYDROCHLORIDE 2 MG: 1 INJECTION, SOLUTION INTRAMUSCULAR; INTRAVENOUS at 07:29

## 2020-02-06 RX ADMIN — Medication 3 MG: at 08:39

## 2020-02-06 RX ADMIN — BUPIVACAINE HYDROCHLORIDE 20 ML: 5 INJECTION, SOLUTION EPIDURAL; INTRACAUDAL; PERINEURAL at 07:40

## 2020-02-06 RX ADMIN — Medication 8 ML/HR: at 09:17

## 2020-02-06 RX ADMIN — PROPOFOL 120 MG: 10 INJECTION, EMULSION INTRAVENOUS at 07:55

## 2020-02-06 RX ADMIN — LIDOCAINE HYDROCHLORIDE 100 MG: 20 INJECTION, SOLUTION INTRAVENOUS at 07:55

## 2020-02-06 RX ADMIN — ROCURONIUM BROMIDE 30 MG: 10 INJECTION INTRAVENOUS at 07:55

## 2020-02-06 RX ADMIN — DEXAMETHASONE SODIUM PHOSPHATE 4 MG: 4 INJECTION, SOLUTION INTRAMUSCULAR; INTRAVENOUS at 07:40

## 2020-02-06 RX ADMIN — FAMOTIDINE 20 MG: 10 INJECTION, SOLUTION INTRAVENOUS at 07:03

## 2020-02-06 RX ADMIN — SODIUM CHLORIDE, POTASSIUM CHLORIDE, SODIUM LACTATE AND CALCIUM CHLORIDE 1000 ML: 600; 310; 30; 20 INJECTION, SOLUTION INTRAVENOUS at 06:58

## 2020-02-06 NOTE — DISCHARGE INSTRUCTIONS
"Keep the affected extremity elevated above  level of the heart.  Use your ice pack as instructed, do not use continuously.  Use your crutches and or sling as directed  Follow your physicians instructions as previously directed.    No pushing, pulling, tugging,  heavy lifting, or strenuous activity.  No major decision making, driving, or drinking alcoholic beverages for 24 hours. ( due to the medications you have  received)  Always use good hand hygiene/washing techniques.  NO driving while taking pain medications.    * if you have an incision:  Check your incision area every day for signs of infection.   Check for:  * more redness, swelling, or pain  *more fluid or blood  *warmth  *pus or bad smell    To assist you in voiding:  Drink plenty of fluids  Listen to running water while attempting to void.    If you are unable to urinate and you have an uncomfortable urge to void or it has been   6 hours since you were discharged, return to the Emergency Room    ARROW PAIN PUMP  PATIENT INSTRUCTIONS    You have had regional anesthesia with a catheter as part of your anesthetic care.    Because of this your extremity may be numb and very weak.  You must protect   your extremity.  Wear the sling if your surgeon has given you one.  Take care to   avoid hot, very cold or sharp items.  As the block wears off, you may have a tingling   or a \"pins and needles\" sensation in your arm and hand.  This is normal.    The Arrow pain pump is infusing medicine all the time which will help control your   pain as the block wears off.  Your extremity may not be as numb after the first 12 to   18 hours.    Do not tug on or kink the catheter.  Keep the insertion site into the skin and any   connections clean.    CALL ANESTHESIA  IMMEDIATELY FOR:     -A metallic taste in your mouth       -Ringing in the ears        -Persistent tremors or shakes or a seizure      -Redness, pain or swelling at the catheter insertion site    -A cold, dusky or " dark extremity   -Severe pain and you can't move your fingers or toes    The Arrow pain ball is initially set at _____  mL/hour. The black arrow at the top of the   dial points to the rate the medication is being delivered. Do not set the pump in between   the numbers as it will not work correctly. The white clamp must be open at all times.    If you are having pain, increase the pain ball rate  to 14 ml/hour for ONE hour.   Then return to your original rate, or if pain is not adequately relieved you may increase it by 2mL/hour.  If your extremity is too numb, you may turn down the pain ball 2 mL/hour every 2 hours.    Do not titrate down too fast; you may then experience pain.    You may also take the prescribed pain medication from your doctor.     The pain ball and catheter may stay in up to 4 days.    Leaking at the catheter site may occur.  The pain ball is still working if it's controlling your pain.    Reinforce the dressing with additional tegaderm.    When the pain ball is empty it will be flat.  Remove the catheter by pulling off the dressing slowly   and pull the catheter out of the skin.  Confirm that the tip of the catheter is intact once removed.   If the catheter is stuck but not hurting, reposition your extremity and keep pulling slowly until   removed.  If the catheter is hurting and won't pull out,     CALL THE NUMBER BELOW:          PRIMARY CONTACT: Anesthesia Service   (Mon-Fri 7:00 AM-3:00 PM): [phone]    After 3:00 PM: [phone] (Tell the hospital  you have a pain pump and need  to speak with anesthesia)    DO NOT CUT THE CATHETER FOR ANY REASON:    After removal you may throw the pump and catheter in the regular trash.    Frequently Asked questions about Pain Blocks    1. What are the advantages of having a nerve block?    A nerve block decreases the pain after surgery and lessens the need for narcotics. Narcotics cause nausea, vomiting, sleepiness, constipation and delayed  mobility.  2. Will the procedure hurt?   You will be given sedation for the procedure. We need you to be alert enough to follow instructions during the block.  3. Am I required to have a nerve block?    No, this is a service that we offer to improve comfort and reduce pain medication requirement following surgery. You can refuse to have a nerve block.      Single Injection    1. Is it normal for my extremity to be numb after 16 hours?  Yes.  Weakness and numbness can last 24 hours or more. If you are concerned call Anesthesia.     2. After shoulder surgery my eyelid on the same side as my surgery Is dropping. Is this normal?   The medication injected may contact nerves that affect your eyelid and cause drooping. This will wear off as the pain block wears. If you are concerned call Anesthesia.    3. After shoulder surgery it feels like it is hard to take a deep breath. Is this normal?   Yes.  This will go away as the medication for the block wears off. If you are extremely short of breath call 911.      Continuous Infusion with Arrow Pain Pump Autofuser :    1. Who do I call if I  have a question or concern about the Arrow autoinfuser?  Call the AutoFuser disposable pain pump help line at [phone]      2. Can I get the clear dressing wet when Itake a shower?  No. This dressing must remain dry or It will loosen and the catheter may come out.    3. Does the catheter need to be removed immediately after the pain ball is empty?   No. The empty pain pump can remain in place until it is convenient to be removed. However, It is important that the catheter does get removed as soon as possible after completion.    4. If I decide I don't like the catheter after it Is placed, do I have to wait for the pain ball to go flat before Ican remove it?   No the catheter can be removed at anytime . Once it is removed it cannot be replaced.    5. If I accidentally pull the catheter out, will I be causing any problems?   No. Be  aware that the pain block will wear off in 2-4 hours so you must begin taking pain medication as prescribed.     6. How do I know if the medication Is infusing?   You pain ball will begin to shrink as the medication goes in. Then after 24 hours you will notice that the pain ball begins to get smaller.    7. What do I do If I notice clear or pink colored drainage collecting under the dressing?    Drainage around the catheter site is normal and can be clear, pink, and sometimes red. You can reinforce the dressing with anything that will absorb drainage.    8. When I remove the catheter should I expect some bleeding?   Yes. It is not uncommon for small amount of bleeding to occur after the catheter is removed. Apply direct pressure for 5 minutes and cover with a Band-Aid.    9. Will I need to take the pain medication ordered by my surgeon?   If you are going home on the day of surgery get your prescription filled. The pain ball will help decrease the need for pain medications but sometimes it is  necessary to take prescribed pain medication. If you are staying in the hospital overnight, you must communicate with your nurse about your pain level.          I have received a copy these instructions.     __________________________________________________________       Patient Signature    *Please ensure that patient receives a copy and a signed copy is placed in chart*

## 2020-02-06 NOTE — ANESTHESIA PROCEDURE NOTES
Peripheral Block      Patient reassessed immediately prior to procedure    Patient location during procedure: pre-op  Start time: 2/6/2020 7:28 AM  Stop time: 2/6/2020 7:42 AM  Reason for block: procedure for pain, at surgeon's request, post-op pain management and secondary anesthetic  Performed by  CRNA: Kyaw Alcaraz CRNA  Preanesthetic Checklist  Completed: patient identified, site marked, surgical consent, pre-op evaluation, timeout performed, IV checked, risks and benefits discussed and monitors and equipment checked  Prep:  Pt Position: right lateral decubitus  Sterile barriers:cap, gloves, mask and sterile barriers  Prep: ChloraPrep  Patient monitoring: blood pressure monitoring, continuous pulse oximetry and EKG  Procedure  Sedation:yes    Guidance:ultrasound guided  ULTRASOUND INTERPRETATION. Using ultrasound guidance a gauge needle was placed in close proximity to the brachial plexus nerve, at which point, under ultrasound guidance anesthetic was injected in the area of the nerve and spread of the anesthesia was seen on ultrasound in close proximity thereto.  There were no abnormalities seen on ultrasound; a digital image was taken; and the patient tolerated the procedure with no complications. Needle gauge: 18. Images:still images obtained, printed/placed on chart    Laterality:left  Block Type:interscalene  Injection Technique:catheter  Needle Type:echogenic  Needle Gauge:18 G  Resistance on Injection: none  Catheter Size:20 G  Catheter at skin depth (cm): 4.          Post Assessment  Injection Assessment: negative aspiration for heme, no paresthesia on injection and incremental injection  Patient Tolerance:comfortable throughout block  Complications:no  Additional Notes   Incremental injection with negative aspirate. No pain on injection. Normal injection resistance.  Vascular puncture avoided. Nerves and surrounding tissues identified with local spread around nerves visualized.

## 2020-02-06 NOTE — ANESTHESIA PREPROCEDURE EVALUATION
Anesthesia Evaluation     Patient summary reviewed and Nursing notes reviewed   no history of anesthetic complications:  NPO Solid Status: > 8 hours  NPO Liquid Status: > 8 hours           Airway   Mallampati: II  TM distance: >3 FB  Neck ROM: full  no difficulty expected  Dental - normal exam     Pulmonary - normal exam   (+) a smoker Former, COPD moderate,   Cardiovascular - normal exam    Rhythm: regular  Rate: normal    (+) hypertension, valvular problems/murmurs, CAD, angina, CHF , hyperlipidemia,       Neuro/Psych  (+) numbness,     GI/Hepatic/Renal/Endo    (+)  hiatal hernia, GERD,      Musculoskeletal     (+) back pain,   Abdominal    Substance History - negative use     OB/GYN negative ob/gyn ROS         Other   arthritis,    history of cancer remission                    Anesthesia Plan    ASA 3     general with block   (Risks and benefits discussed including risk of aspiration, recall and dental damage. All patient questions answered.    Patient told that a breathing tube will be used to manage the airway.    PNB--ISB catheter.    Will continue with plan of care.)  intravenous induction     Anesthetic plan, all risks, benefits, and alternatives have been provided, discussed and informed consent has been obtained with: patient.

## 2020-02-06 NOTE — OP NOTE
[address], [address]  [place]  [address] [phone]      OPERATIVE REPORT      PATIENT NAME:  Elliott Dunn                            YOB: 1955       PREOP DIAGNOSIS:   Left shoulder impingement, osteoarthritis, labral degenerative tears, subacromial bursitis, acromioclavicular arthritis, and complete supraspinatus rotator cuff tear.    POSTOP DIAGNOSIS:  Same plus large to massive markedly medially retracted rotator     cuff supraspinatus and infraspinatus tears.    PROCEDURE:    Left shoulder diagnostic arthroscopy with labral debridement,     subacromial bursectomy, assessment of large fragmented     retracted irreparable rotator cuff tears.    SURGEON:     Zaheer Pandey MD    OPERATIVE TEAM:  Circulator: Jonathan Zuñiga RN  Scrub Person: Felecia Cheatham Misty    ANESTHETIST:  CRNA: Venkata Walker, CRNA    ANESTHESIA:   General plus shoulder regional block.    ESTIMATED BLOOD LOSS:  5 ml    FINDINGS:   Labral degenerative tears, moderate glenohumeral osteoarthritis, rotator cuff large to massive supraspinatus and infraspinatus tears with retraction medially to the glenoid margin, and more complex and retracted than noted on patient MRI of about a year ago.   Intact long head of biceps tendon with synovitis, outlet subacromial bursitis, type 2 acromial spur impingement and stable acromioclavicular arthritis without spurs.    SPECIMENS:    None    IMPLANTS:   None.    COMPLICATIONS:    None.    DISPOSITION:    Stable to recovery.    INDICATIONS:     Shoulder pain, stiffness, weakness and dysfunction.    NARRATIVE:     Risks, benefits of proposed treatment and alternative options discussed and an informed consent for the elective surgical procedure obtained.  Risks discussed including but not limited to anesthesia, infection, nerve/vessel/tendon injury, fracture, DVT, PE, recurrent tear and further symptoms or limitations.  Goals outlined  including the potential for relief of pain and improved shoulder function and activity tolerance.    Antibiotic prophylaxis was given.  Surgeon site marking and a time out were performed prior to the procedure.  Anesthesia was effective and well-tolerated.  The patient was maintained in the modified reclined beach chair position with care taken to pad all areas and keep the away arm at and above the level of the atrium for DVT prophylaxis.  The shoulder, arm and hand was prepped and draped in the usual sterile fashion.  At the start of the procedure, a local injection was given at the portals and at the end of a shoulder injection given for post-op pain control.    Portal sites were made for the arthroscopic portion of the procedure.  Diagnostic evaluation of the glenohumeral joint and subacromial outlet space as noted above.  Treatment consisted of diagnostic assessment, labral and rotator cuff debridement with the full radius shaver, limited chondroplasty with the shaver, subacromial decompression bursectomy with the shaver and rotator cuff assessment of massive tears not amenable to repairs.  Minor bursal bleeding controlled well with arthroscopic cautery.  Representative arthroscopic photos were saved throughout the diagnostic assessment and arthroscopic procedure.  The shoulder was irrigated and suctioned clear.  Routine closure of the portal sites was performed.  A sterile dressing was applied and a shoulder immobilizer for support, protection and comfort.  Anesthesia was effective and well tolerated.  There were no complications of the procedure. The patient was transferred in stable condition to recovery.  Patient's wife informed of findings and potential recommendation for elective reverse total shoulder arthroplasty depending on his clinical progress and recovery after this arthroscopic treatment.

## 2020-02-06 NOTE — INTERVAL H&P NOTE
H&P reviewed. The patient was examined and there are no changes to the H&P, inclusive of the physical exam heart, lungs and procedure site specific exam as noted on the current (within 30 days) history and physical full detailed report.    Vitals:    02/06/20 0631   BP: 114/75   Pulse: 60   Resp: 16   Temp: 97.9 °F (36.6 °C)   SpO2: 97%       Rony Pandey MD  2/6/2020  7:44 AM

## 2020-02-06 NOTE — ANESTHESIA PROCEDURE NOTES
Airway  Urgency: elective    Date/Time: 2/6/2020 7:56 AM  Airway not difficult    General Information and Staff    Patient location during procedure: OR  CRNA: Venkata Walker CRNA    Indications and Patient Condition  Indications for airway management: airway protection    Preoxygenated: yes  Mask difficulty assessment: 1 - vent by mask    Final Airway Details  Final airway type: endotracheal airway      Successful airway: ETT  Cuffed: yes   Successful intubation technique: direct laryngoscopy  Facilitating devices/methods: intubating stylet  Endotracheal tube insertion site: oral  Blade: Damir  Blade size: 4  ETT size (mm): 7.5  Cormack-Lehane Classification: grade I - full view of glottis  Placement verified by: chest auscultation and capnometry   Measured from: lips  ETT/EBT  to lips (cm): 21  Number of attempts at approach: 1    Additional Comments  Dentition and Lips as preoperative assessment. Airway placed without complication. ETT cuff inflated to minimal occlusive pressure.

## 2020-02-06 NOTE — ANESTHESIA POSTPROCEDURE EVALUATION
Patient: Elliott Dunn    Procedure Summary     Date:  02/06/20 Room / Location:  [facility] OR  /  [facility]    Anesthesia Start:  0751 Anesthesia Stop:  0852    Procedure:  DIAGNOSTIC SHOULDER ARTHROSCOPY LEFT WITH LABRAL DEBRIDEMENT, SUBACROMIAL BURSECTOMY, ASSESSMENT OF LARGE FRAGMENTED RETRACTED IRREPARABLE ROTATOR CUFF TEARS (Left Shoulder) Diagnosis:       Preop examination      (Preop examination [Z01.818])    Surgeon:  Rony Pandey MD Provider:  Venkata Walker CRNA    Anesthesia Type:  general with block ASA Status:  3          Anesthesia Type: general with block    Vitals  Vitals Value Taken Time   /75 2/6/2020  9:49 AM   Temp 98.5 °F (36.9 °C) 2/6/2020  8:55 AM   Pulse 61 2/6/2020  9:58 AM   Resp 17 2/6/2020  9:45 AM   SpO2 95 % 2/6/2020  9:58 AM   Vitals shown include unvalidated device data.        Post Anesthesia Care and Evaluation    Patient location during evaluation: PACU  Patient participation: complete - patient participated  Level of consciousness: awake  Pain score: 3  Pain management: adequate  Airway patency: patent  Anesthetic complications: No anesthetic complications  PONV Status: controlled  Cardiovascular status: acceptable and stable  Respiratory status: acceptable and face mask  Hydration status: acceptable

## 2020-02-06 NOTE — PERIOPERATIVE NURSING NOTE
Dr. Pandey notified regarding plan on H&P not matching case request and consent order with instruction received that consent order is okay

## 2020-02-07 NOTE — PROGRESS NOTES
Patient: Elliott Dunn  Procedure(s):  DIAGNOSTIC SHOULDER ARTHROSCOPY LEFT WITH LABRAL DEBRIDEMENT, SUBACROMIAL BURSECTOMY, ASSESSMENT OF LARGE FRAGMENTED RETRACTED IRREPARABLE ROTATOR CUFF TEARS  Anesthesia type: general with block    Patient location: home  Last vitals:   Vitals:    02/06/20 1011   BP: 124/77   Pulse: 66   Resp: 14   Temp: 98 °F (36.7 °C)   SpO2: 96%     Level of consciousness: awake, alert and oriented    Post-anesthesia pain: adequate analgesia  Airway patency: patent  Respiratory: unassisted  Cardiovascular: stable and blood pressure at baseline  Hydration: euvolemic    Anesthetic complications: no

## 2020-02-09 NOTE — PROGRESS NOTES
POLLY Martin    Nerve Cath Post Op Call    Patient Name: Elliott Dunn  :  1955  MRN:  [id number]  Date of Discharge: 2020    Nerve Cath Post Op Call:    Analgesia:Excellent  Pain Score:2/10  Side Effects:None  Catheter Site:clean  Patient Controlled ON Q pump infusion rate: 6ml/hr  Catheter Plan: Will continue with plan at home without changes

## 2020-02-09 NOTE — PROGRESS NOTES
POLLY Martin    Nerve Cath Post Op Call    Patient Name: Elliott Dunn  :  1955  MRN:  [id number]  Date of Discharge: 2020    Nerve Cath Post Op Call:    Analgesia:Excellent  Pain Score:0/10  Side Effects:None  Catheter Site:clean    Plan- continue infusion and check patient tomorrow

## 2020-02-10 NOTE — PROGRESS NOTES
POLLY Martin    Nerve Cath Post Op Call    Patient Name: Elliott Dunn  :  1955  MRN:  [id number]  Date of Discharge: 2020    Nerve Cath Post Op Call:    Analgesia:Excellent  Pain Score:2/10  Side Effects:None  Catheter Site:clean  Catheter Plan: Patient/Family member report nerve catheter previously discontinued, tip intact

## 2020-02-20 ENCOUNTER — OFFICE VISIT (OUTPATIENT)
Dept: ORTHOPEDIC SURGERY | Facility: CLINIC | Age: 65
End: 2020-02-20

## 2020-02-20 VITALS — HEIGHT: 71 IN | WEIGHT: 140 LBS | RESPIRATION RATE: 18 BRPM | BODY MASS INDEX: 19.6 KG/M2

## 2020-02-20 DIAGNOSIS — Z98.890 STATUS POST ARTHROSCOPY OF SHOULDER: Primary | ICD-10-CM

## 2020-02-20 DIAGNOSIS — S46.012D TRAUMATIC COMPLETE TEAR OF LEFT ROTATOR CUFF, SUBSEQUENT ENCOUNTER: ICD-10-CM

## 2020-02-20 PROCEDURE — 99024 POSTOP FOLLOW-UP VISIT: CPT | Performed by: PHYSICIAN ASSISTANT

## 2020-02-20 NOTE — PROGRESS NOTES
"Subjective   Patient ID: Elliott Dunn is a 64 y.o. ambidextrous male is here today for a post-operative visit.  Post-op of the Left Shoulder (Arthroscopy With Labral Debridement, Subacromial Bursectomy, Assessment Of Large Fragmented Retracted Irreparable Rotator Cuff Tears 2/6/2020)          CHIEF COMPLAINT:      History of Present Illness      Pain controlled: [] no   [x] yes   Medication refill requested: [x] no   [] yes    Patient compliant with instructions: [] no   [x] yes   Other: Reports fair progress since surgery.  Patient states he was told \"his rotator cuff tendons were a repairable\".  He states the orthopedic surgeon did discuss with him a reverse total shoulder arthroplasty.     Past Medical History:   Diagnosis Date   • Acquired absence of all teeth    • Allergic    • Anomalous coronary artery origin    • Arrhythmia    • Arthritis    • Arthritis of lumbar spine 7/29/2016   • Back pain 6/17/2016   • Cristina esophagus    • Cancer (CMS/HCC)     prostate   • Cataract     REMOVED BILATERALL   • Cervical spine disease 6/17/2016   • CHF (congestive heart failure) (CMS/HCC)    • Chronic obstructive pulmonary disease (CMS/HCC)    • Colon polyps    • Deafness in left ear    • Derangement of anterior horn of medial meniscus    • Difficulty swallowing    • Disorder of lumbar spine 6/17/2016   • Disorder of thoracic spine 6/17/2016   • Diverticulitis of colon    • DJD (degenerative joint disease)    • Elevated liver enzymes    • Erectile dysfunction    • Esophageal reflux    • Essential hypertension     PT DENIES SAYS IT RUNS LOW   • Glaucoma    • Hard of hearing     LEFT EAR NO AIDS WORN   • Heart murmur    • Hiatal hernia    • High cholesterol    • Inflammatory polyarthropathy (CMS/HCC)     Per Dr. Haider. Negative NEO, normal ESR, RF, anti-ccp and did not improve with prednisone per notes.   • Inguinal hernia    • Insomnia    • Lateral meniscus derangement    • Left otitis media with spontaneous rupture of " eardrum    • Locking of left knee    • Low back pain    • Meniere's disease    • MGUS (monoclonal gammopathy of unknown significance)     likely diagnosis   • Murmur    • Neuromuscular disorder (CMS/HCC)    • Neuropathic arthritis    • No natural teeth    • Perforation of left tympanic membrane    • Pulmonary emphysema (CMS/HCC)    • Recent shoulder injury     LEFT SHOULDER   • Scoliosis    • Skin cancer     skin cancer   • Skin cancer of face     squamous cell   • Spina bifida occulta 6/17/2016   • Tobacco abuse 11/9/2017   • Visual impairment    • Wears glasses    • Wears glasses     READING GLASSES ONLY        Past Surgical History:   Procedure Laterality Date   • COLONOSCOPY     • ENDOSCOPY N/A 4/10/2017    Procedure: ESOPHAGOGASTRODUODENOSCOPY WITH BIOPSY;  Surgeon: Alexander Ritchie MD;  Location: [facility] ENDOSCOPY;  Service:    • EYE SURGERY     • HERNIA REPAIR     • INGUINAL HERNIA REPAIR Right    • INGUINAL HERNIA REPAIR     • KNEE SURGERY Left    • LYMPH NODE BIOPSY     • MULTIPLE TOOTH EXTRACTIONS     • PROSTATE SURGERY  2004   • PROSTATECTOMY  06/30/2014   • PROSTATECTOMY      Prostatectomy Radical   • SHOULDER ARTHROSCOPY Left 2/6/2020    Procedure: DIAGNOSTIC SHOULDER ARTHROSCOPY LEFT WITH LABRAL DEBRIDEMENT, SUBACROMIAL BURSECTOMY, ASSESSMENT OF LARGE FRAGMENTED RETRACTED IRREPARABLE ROTATOR CUFF TEARS;  Surgeon: Rony Pandey MD;  Location: [facility] OR;  Service: Orthopedics;  Laterality: Left;   • SKIN CANCER EXCISION  2017    both sides of body/squamous cell melanoma   • TYMPANOPLASTY W/ MASTOIDECTOMY     • UMBILICAL HERNIA REPAIR         Allergies   Allergen Reactions   • Cyclobenzaprine Other (See Comments)     Wide awake   • Plaquenil [Hydroxychloroquine Sulfate] Other (See Comments)     Black eyes   • Pravastatin Myalgia     Weakness / fatigue       Review of Systems   Constitutional: Negative for diaphoresis, fever and unexpected weight change.   HENT: Negative for dental problem and sore  "throat.    Eyes: Negative for visual disturbance.   Respiratory: Negative for shortness of breath.    Cardiovascular: Negative for chest pain.   Gastrointestinal: Negative for abdominal pain, constipation, diarrhea, nausea and vomiting.   Genitourinary: Negative for difficulty urinating and frequency.   Musculoskeletal: Positive for arthralgias.   Neurological: Negative for headaches.   Hematological: Does not bruise/bleed easily.     I have reviewed the medical and surgical history, family history, social history, medications, and/or allergies, and the review of systems of this report.    Objective   Resp 18   Ht 180.3 cm (71\")   Wt 63.5 kg (140 lb)   BMI 19.53 kg/m²       Signs of infection: [x] no                    [] yes   Drainage: [x] no                    [] yes   Incision: [x] healing well     []healed well   Motor exam intact: [] no                    [x] yes   Neurovascular exam intact: [] no                    [x] yes   Signs of compartment syndrome: [x] no                    [] yes   Signs of DVT: [x] no                    [] yes   Other:      Physical Exam  Ortho Exam    Extremity DVT signs are negative by clinical screen.  Neurologic Exam    Assessment/Plan     Independent Review of Radiographic Studies:    No new imaging done today.    Laboratory and Other Studies:  No new results reviewed today.     Medical Decision Making:    Stable neurovascular exam.       Procedures     Elliott was seen today for post-op.    Diagnoses and all orders for this visit:    Status post arthroscopy of shoulder    Traumatic complete tear of left rotator cuff, subsequent encounter         Recommendations/Plan:     Sutures Staples or Pins [x] Removed today  [] At prior visit  [] Plan removal later   Physical therapy: []rehab facility  []outpatient referral  [] therapy ongoing  HEP was given   Ultrasound: [x]not ordered         []order given to patient   Labs: [x]not ordered         []order given to patient   Weight " Bearing status: []Full []WBAT [x]PWB []NWB []Other       Follow up in 6 weeks for a planned right reverse total shoulder arthroplasty  Patient is encouraged and agreeable to call or return sooner for any issues or concerns.

## 2020-03-02 DIAGNOSIS — G89.4 CHRONIC PAIN SYNDROME: ICD-10-CM

## 2020-03-02 DIAGNOSIS — M06.4 INFLAMMATORY POLYARTHROPATHY (HCC): ICD-10-CM

## 2020-03-02 DIAGNOSIS — F51.01 PRIMARY INSOMNIA: ICD-10-CM

## 2020-03-02 RX ORDER — METHOCARBAMOL 750 MG/1
TABLET, FILM COATED ORAL
Qty: 90 TABLET | Refills: 0 | Status: SHIPPED | OUTPATIENT
Start: 2020-03-02 | End: 2020-05-05

## 2020-03-02 RX ORDER — AMITRIPTYLINE HYDROCHLORIDE 25 MG/1
TABLET, FILM COATED ORAL
Qty: 90 TABLET | Refills: 3 | Status: SHIPPED | OUTPATIENT
Start: 2020-03-02 | End: 2020-07-06

## 2020-03-12 ENCOUNTER — PREP FOR SURGERY (OUTPATIENT)
Dept: OTHER | Facility: HOSPITAL | Age: 65
End: 2020-03-12

## 2020-03-12 DIAGNOSIS — M19.012 ARTHROPATHY OF LEFT SHOULDER: ICD-10-CM

## 2020-03-12 DIAGNOSIS — M12.812 ROTATOR CUFF TEAR ARTHROPATHY OF LEFT SHOULDER: Primary | ICD-10-CM

## 2020-03-12 DIAGNOSIS — M75.102 ROTATOR CUFF TEAR ARTHROPATHY OF LEFT SHOULDER: Primary | ICD-10-CM

## 2020-03-12 RX ORDER — CEFAZOLIN SODIUM 2 G/50ML
2 SOLUTION INTRAVENOUS
Status: CANCELLED | OUTPATIENT
Start: 2020-03-13 | End: 2020-03-14

## 2020-03-17 ENCOUNTER — APPOINTMENT (OUTPATIENT)
Dept: PREADMISSION TESTING | Facility: HOSPITAL | Age: 65
End: 2020-03-17

## 2020-03-24 DIAGNOSIS — G89.4 CHRONIC PAIN SYNDROME: ICD-10-CM

## 2020-03-24 DIAGNOSIS — M47.812 SPONDYLOSIS OF CERVICAL REGION WITHOUT MYELOPATHY OR RADICULOPATHY: ICD-10-CM

## 2020-03-24 RX ORDER — GABAPENTIN 300 MG/1
300 CAPSULE ORAL 3 TIMES DAILY PRN
Qty: 270 CAPSULE | Refills: 1 | Status: SHIPPED | OUTPATIENT
Start: 2020-03-24 | End: 2020-07-30 | Stop reason: SDUPTHER

## 2020-04-07 ENCOUNTER — PATIENT MESSAGE (OUTPATIENT)
Dept: INTERNAL MEDICINE | Facility: CLINIC | Age: 65
End: 2020-04-07

## 2020-04-07 DIAGNOSIS — K21.00 GASTROESOPHAGEAL REFLUX DISEASE WITH ESOPHAGITIS: Primary | ICD-10-CM

## 2020-04-07 RX ORDER — PANTOPRAZOLE SODIUM 40 MG/1
40 TABLET, DELAYED RELEASE ORAL DAILY
Qty: 90 TABLET | Refills: 4 | Status: SHIPPED | OUTPATIENT
Start: 2020-04-07 | End: 2021-06-21

## 2020-04-07 NOTE — TELEPHONE ENCOUNTER
From: Elliott Dunn  To: Ladonna Means MD  Sent: 4/7/2020 10:32 AM EDT  Subject: Prescription Question    Could you please send me a prescription for a stomach medicine? My acid reflux is getting worse and because of the Cristina's Esophagus disease I am concerned it will make that issue worse as well. Thank you for your attention to this matter.    Elliott Dunn

## 2020-04-20 ENCOUNTER — TELEMEDICINE (OUTPATIENT)
Dept: CARDIOLOGY | Facility: CLINIC | Age: 65
End: 2020-04-20

## 2020-04-20 DIAGNOSIS — E78.49 OTHER HYPERLIPIDEMIA: ICD-10-CM

## 2020-04-20 DIAGNOSIS — I70.90 ARTERIOSCLEROTIC VASCULAR DISEASE: ICD-10-CM

## 2020-04-20 DIAGNOSIS — I20.8 ANGINAL EQUIVALENT (HCC): ICD-10-CM

## 2020-04-20 DIAGNOSIS — I10 ESSENTIAL HYPERTENSION: Primary | Chronic | ICD-10-CM

## 2020-04-20 DIAGNOSIS — Q24.5 ANOMALOUS CORONARY ARTERY ORIGIN: ICD-10-CM

## 2020-04-20 DIAGNOSIS — J43.9 PULMONARY EMPHYSEMA, UNSPECIFIED EMPHYSEMA TYPE (HCC): Chronic | ICD-10-CM

## 2020-04-20 PROCEDURE — 99213 OFFICE O/P EST LOW 20 MIN: CPT | Performed by: NURSE PRACTITIONER

## 2020-04-20 NOTE — PROGRESS NOTES
Elliott Dunn is a 64 y.o. male.  MRN #: [id number]    Referring Provider:     Chief Complaint:   Chief Complaint   Patient presents with   • Hypertension   • Shortness of Breath   • Coronary Artery Disease        History of Present Illness:  Mr Dunn is a 64-year-old gentleman that presents via telemedicine for his 6-month cardiac follow-up.  Patient has a history of essential hypertension, hyperlipidemia, anomalous CAD, history of angina, COPD.  With today's telemedicine evaluation patient reports no episodes of unusual chest pain, chest palpitations, chest pressure, diaphoresis, unusual shortness of breath, weight increase or fluid retention.   has his blood pressure monitored daily, and he reports that his blood pressure typically is 120/60.  He has no concerns regarding his medication regimen at present.    The patient presents today with their wife/self via telemedicine, who contributes to the history of their care.     The following portions of the patient's history were reviewed and updated as appropriate: allergies, current medications, past family history, past medical history, past social history, past surgical history and problem list.     Review of Systems:     Review of Systems   Constitutional: Positive for fatigue. Negative for activity change, appetite change, diaphoresis, unexpected weight gain and unexpected weight loss.   HENT: Negative.    Eyes: Negative.  Negative for blurred vision, double vision, photophobia and visual disturbance.   Respiratory: Positive for shortness of breath. Negative for apnea, cough, chest tightness and wheezing.         History of COPD, former smoker   Cardiovascular: Positive for chest pain. Negative for palpitations and leg swelling.   Gastrointestinal: Negative.  Negative for abdominal distention, abdominal pain, blood in stool, nausea, vomiting, GERD and indigestion.   Endocrine: Negative.  Negative for cold intolerance, heat intolerance, polydipsia,  polyphagia and polyuria.   Genitourinary: Negative.  Negative for decreased libido, frequency, genital sores, hematuria and urgency.   Musculoskeletal: Positive for arthralgias. Negative for back pain, gait problem, joint swelling, myalgias, neck pain, neck stiffness and bursitis.   Skin: Negative.  Negative for color change, pallor, rash and bruise.   Allergic/Immunologic: Negative.  Negative for environmental allergies, food allergies and immunocompromised state.   Neurological: Negative.  Negative for dizziness, tremors, seizures, syncope, facial asymmetry, speech difficulty, weakness, light-headedness and numbness.   Hematological: Negative.  Negative for adenopathy. Does not bruise/bleed easily.   Psychiatric/Behavioral: Negative.  Negative for agitation, decreased concentration, self-injury, sleep disturbance, suicidal ideas, negative for hyperactivity and stress. The patient is not nervous/anxious.    All other systems reviewed and are negative.         Current Outpatient Medications:   •  albuterol (PROVENTIL HFA;VENTOLIN HFA) 108 (90 Base) MCG/ACT inhaler, Inhale 2 puffs Every 6 (Six) Hours As Needed for Wheezing or Shortness of Air., Disp: 1 inhaler, Rfl: 3  •  amitriptyline (ELAVIL) 25 MG tablet, TAKE ONE TO THREE TABLETS BY MOUTH EVERY NIGHT AT BEDTIME AS NEEDED FOR SLEEP, Disp: 90 tablet, Rfl: 3  •  aspirin 81 MG EC tablet, Take 81 mg by mouth Daily., Disp: , Rfl:   •  atorvastatin (LIPITOR) 10 MG tablet, TAKE ONE TABLET BY MOUTH DAILY, Disp: 30 tablet, Rfl: 4  •  bisoprolol (ZEBeta) 5 MG tablet, TAKE ONE TABLET BY MOUTH DAILY, Disp: 90 tablet, Rfl: 2  •  celecoxib (CELEBREX) 100 MG capsule, Take 1 capsule by mouth Daily., Disp: 90 capsule, Rfl: 3  •  coenzyme Q10 100 MG capsule, Take 100 mg by mouth Daily., Disp: , Rfl:   •  DULoxetine (CYMBALTA) 60 MG capsule, TAKE ONE CAPSULE BY MOUTH DAILY, Disp: 30 capsule, Rfl: 11  •  folic acid (FOLVITE) 1 MG tablet, Take 1 mg by mouth Daily., Disp: , Rfl:   •   gabapentin (NEURONTIN) 300 MG capsule, Take 1 capsule by mouth 3 (Three) Times a Day As Needed (nerve pain)., Disp: 270 capsule, Rfl: 1  •  Glucosamine-Chondroitin--200-150 MG tablet, Take  by mouth., Disp: , Rfl:   •  HYDROcodone-acetaminophen (NORCO) 7.5-325 MG per tablet, Take 1 tablet by mouth Every 8 (Eight) Hours As Needed for pain, Disp: 21 tablet, Rfl: 0  •  isosorbide mononitrate (IMDUR) 30 MG 24 hr tablet, TAKE ONE TABLET BY MOUTH DAILY, Disp: 90 tablet, Rfl: 2  •  leflunomide (ARAVA) 10 MG tablet, , Disp: , Rfl:   •  methocarbamol (ROBAXIN) 750 MG tablet, TAKE ONE TABLET BY MOUTH THREE TIMES A DAY, Disp: 90 tablet, Rfl: 0  •  methotrexate 2.5 MG tablet, Take 8 tablets by mouth 1 (One) Time Per Week. (Patient taking differently: Take 20 mg by mouth 1 (One) Time Per Week. TAKES ON THURSDAY), Disp: 96 tablet, Rfl: 0  •  Multiple Vitamins-Minerals (MULTIVITAMIN WITH MINERALS) tablet tablet, Take 1 tablet by mouth Daily., Disp: , Rfl:   •  nitroglycerin (NITROSTAT) 0.4 MG SL tablet, 1 under the tongue as needed for angina, may repeat q5mins for up three doses, Disp: 100 tablet, Rfl: 11  •  pantoprazole (Protonix) 40 MG EC tablet, Take 1 tablet by mouth Daily., Disp: 90 tablet, Rfl: 4  •  predniSONE (DELTASONE) 10 MG tablet, Take 10 mg by mouth Daily. As needed, Disp: , Rfl:   •  tiotropium bromide-olodaterol (STIOLTO RESPIMAT) 2.5-2.5 MCG/ACT aerosol solution inhaler, Inhale 2 puffs Daily., Disp: 1 inhaler, Rfl: 5  •  Triamcinolone Acetonide (NASACORT) 55 MCG/ACT nasal inhaler, 2 sprays into each nostril daily., Disp: , Rfl:   •  vitamin B-12 (CYANOCOBALAMIN) 1000 MCG tablet, TAKE ONE TABLET BY MOUTH DAILY, Disp: 30 tablet, Rfl: 8    There were no vitals filed for this visit.    Physical Exam:     Physical Exam   Constitutional: He is oriented to person, place, and time. He appears well-developed and well-nourished. No distress.   Evaluation was done via telemedicine.  Patient appeared to be in no  distress, no visible respiratory difficulties, skin color appears to be normal, well oxygenated.   Pulmonary/Chest: Effort normal. No respiratory distress.   Musculoskeletal: Normal range of motion. He exhibits no edema.   Neurological: He is alert and oriented to person, place, and time.   Skin: Skin is warm and dry. Capillary refill takes less than 2 seconds. No rash noted.   Psychiatric: He has a normal mood and affect. His behavior is normal. Judgment and thought content normal.       Procedures    Results:   Reviewed medication regimen and updated as appropriate.  Reviewed medical, surgical, social history and updated.    Assessment/Plan:   No changes made in medication regimen.  Advised to continue healthy heart diet and no added salt diet and to keep sodium less than 1500 mg per 24 hours.  Advised on daily weights and monitoring and report any unusual weight increase of greater than 3 pounds in 24 hours or 5 pounds in 7 days.  Elliott was seen today for hypertension, shortness of breath and coronary artery disease.    Diagnoses and all orders for this visit:    Essential hypertension  Bisoprolol 5 mg p.o. Daily  Isosorbide 30 mg p.o. daily  Other hyperlipidemia  Atorvastatin 10 mg p.o. nightly  Anomalous coronary artery origin  Isosorbide 30 mg p.o. daily  Arteriosclerotic vascular disease  Isosorbide 30 mg p.o. daily  Anginal equivalent (CMS/HCC)  Asymptomatic at present, no recent complaints of chest pain or need for nitroglycerin.  Nitroglycerin 0.4 mg sublingual as needed.  Pulmonary emphysema, unspecified emphysema type (CMS/HCC)  Followed by pulmonology.      Return in about 6 months (around 10/20/2020).    MARK Rivas

## 2020-05-05 DIAGNOSIS — G89.4 CHRONIC PAIN SYNDROME: ICD-10-CM

## 2020-05-05 RX ORDER — TIOTROPIUM BROMIDE AND OLODATEROL 3.124; 2.736 UG/1; UG/1
SPRAY, METERED RESPIRATORY (INHALATION)
Qty: 1 INHALER | Refills: 4 | Status: SHIPPED | OUTPATIENT
Start: 2020-05-05 | End: 2020-10-29 | Stop reason: SDUPTHER

## 2020-05-05 RX ORDER — METHOCARBAMOL 750 MG/1
TABLET, FILM COATED ORAL
Qty: 90 TABLET | Refills: 0 | Status: SHIPPED | OUTPATIENT
Start: 2020-05-05 | End: 2020-06-17

## 2020-05-20 ENCOUNTER — LAB (OUTPATIENT)
Dept: LAB | Facility: HOSPITAL | Age: 65
End: 2020-05-20

## 2020-05-20 DIAGNOSIS — M12.812 ROTATOR CUFF TEAR ARTHROPATHY OF LEFT SHOULDER: ICD-10-CM

## 2020-05-20 DIAGNOSIS — M19.012 ARTHROPATHY OF LEFT SHOULDER: ICD-10-CM

## 2020-05-20 DIAGNOSIS — M75.102 ROTATOR CUFF TEAR ARTHROPATHY OF LEFT SHOULDER: ICD-10-CM

## 2020-05-20 LAB
ALBUMIN SERPL-MCNC: 4.1 G/DL (ref 3.5–5.2)
ALBUMIN/GLOB SERPL: 1 G/DL
ALP SERPL-CCNC: 119 U/L (ref 39–117)
ALT SERPL W P-5'-P-CCNC: 34 U/L (ref 1–41)
ANION GAP SERPL CALCULATED.3IONS-SCNC: 7.3 MMOL/L (ref 5–15)
APTT PPP: 32.6 SECONDS (ref 24.5–37.2)
AST SERPL-CCNC: 35 U/L (ref 1–40)
BASOPHILS # BLD AUTO: 0.05 10*3/MM3 (ref 0–0.2)
BASOPHILS NFR BLD AUTO: 0.7 % (ref 0–1.5)
BILIRUB SERPL-MCNC: 0.3 MG/DL (ref 0.2–1.2)
BUN BLD-MCNC: 23 MG/DL (ref 8–23)
BUN/CREAT SERPL: 24.7 (ref 7–25)
CALCIUM SPEC-SCNC: 9.6 MG/DL (ref 8.6–10.5)
CHLORIDE SERPL-SCNC: 103 MMOL/L (ref 98–107)
CO2 SERPL-SCNC: 26.7 MMOL/L (ref 22–29)
CREAT BLD-MCNC: 0.93 MG/DL (ref 0.76–1.27)
DEPRECATED RDW RBC AUTO: 44.4 FL (ref 37–54)
EOSINOPHIL # BLD AUTO: 0.18 10*3/MM3 (ref 0–0.4)
EOSINOPHIL NFR BLD AUTO: 2.6 % (ref 0.3–6.2)
ERYTHROCYTE [DISTWIDTH] IN BLOOD BY AUTOMATED COUNT: 12.8 % (ref 12.3–15.4)
GFR SERPL CREATININE-BSD FRML MDRD: 82 ML/MIN/1.73
GLOBULIN UR ELPH-MCNC: 4.2 GM/DL
GLUCOSE BLD-MCNC: 87 MG/DL (ref 65–99)
HBA1C MFR BLD: 5.4 % (ref 4.8–5.6)
HCT VFR BLD AUTO: 46 % (ref 37.5–51)
HGB BLD-MCNC: 15.4 G/DL (ref 13–17.7)
IMM GRANULOCYTES # BLD AUTO: 0.02 10*3/MM3 (ref 0–0.05)
IMM GRANULOCYTES NFR BLD AUTO: 0.3 % (ref 0–0.5)
INR PPP: 0.92 (ref 0.9–1.1)
LYMPHOCYTES # BLD AUTO: 1.81 10*3/MM3 (ref 0.7–3.1)
LYMPHOCYTES NFR BLD AUTO: 25.7 % (ref 19.6–45.3)
MCH RBC QN AUTO: 32.4 PG (ref 26.6–33)
MCHC RBC AUTO-ENTMCNC: 33.5 G/DL (ref 31.5–35.7)
MCV RBC AUTO: 96.8 FL (ref 79–97)
MONOCYTES # BLD AUTO: 0.96 10*3/MM3 (ref 0.1–0.9)
MONOCYTES NFR BLD AUTO: 13.6 % (ref 5–12)
NEUTROPHILS # BLD AUTO: 4.03 10*3/MM3 (ref 1.7–7)
NEUTROPHILS NFR BLD AUTO: 57.1 % (ref 42.7–76)
NRBC BLD AUTO-RTO: 0 /100 WBC (ref 0–0.2)
PLATELET # BLD AUTO: 220 10*3/MM3 (ref 140–450)
PMV BLD AUTO: 10.6 FL (ref 6–12)
POTASSIUM BLD-SCNC: 4.3 MMOL/L (ref 3.5–5.2)
PROT SERPL-MCNC: 8.3 G/DL (ref 6–8.5)
PROTHROMBIN TIME: 12.7 SECONDS (ref 12–15.1)
RBC # BLD AUTO: 4.75 10*6/MM3 (ref 4.14–5.8)
SODIUM BLD-SCNC: 137 MMOL/L (ref 136–145)
WBC NRBC COR # BLD: 7.05 10*3/MM3 (ref 3.4–10.8)

## 2020-05-20 PROCEDURE — 85025 COMPLETE CBC W/AUTO DIFF WBC: CPT

## 2020-05-20 PROCEDURE — 83036 HEMOGLOBIN GLYCOSYLATED A1C: CPT

## 2020-05-20 PROCEDURE — 85610 PROTHROMBIN TIME: CPT

## 2020-05-20 PROCEDURE — 36415 COLL VENOUS BLD VENIPUNCTURE: CPT

## 2020-05-20 PROCEDURE — 85730 THROMBOPLASTIN TIME PARTIAL: CPT

## 2020-05-20 PROCEDURE — 80053 COMPREHEN METABOLIC PANEL: CPT

## 2020-06-01 DIAGNOSIS — M25.50 CHRONIC JOINT PAIN: ICD-10-CM

## 2020-06-01 DIAGNOSIS — G89.29 CHRONIC JOINT PAIN: ICD-10-CM

## 2020-06-01 RX ORDER — DULOXETIN HYDROCHLORIDE 60 MG/1
CAPSULE, DELAYED RELEASE ORAL
Qty: 90 CAPSULE | Refills: 10 | Status: SHIPPED | OUTPATIENT
Start: 2020-06-01 | End: 2021-06-03

## 2020-06-17 DIAGNOSIS — G89.4 CHRONIC PAIN SYNDROME: ICD-10-CM

## 2020-06-17 RX ORDER — METHOCARBAMOL 750 MG/1
TABLET, FILM COATED ORAL
Qty: 90 TABLET | Refills: 0 | Status: SHIPPED | OUTPATIENT
Start: 2020-06-17 | End: 2020-07-06

## 2020-07-01 ENCOUNTER — OFFICE VISIT (OUTPATIENT)
Dept: ORTHOPEDIC SURGERY | Facility: CLINIC | Age: 65
End: 2020-07-01

## 2020-07-01 VITALS — HEIGHT: 71 IN | BODY MASS INDEX: 19.6 KG/M2 | RESPIRATION RATE: 18 BRPM | WEIGHT: 140 LBS

## 2020-07-01 DIAGNOSIS — M77.8 RIGHT WRIST TENDINITIS: ICD-10-CM

## 2020-07-01 DIAGNOSIS — M25.531 ARTHRALGIA OF RIGHT WRIST: Primary | ICD-10-CM

## 2020-07-01 DIAGNOSIS — M19.012 GLENOHUMERAL ARTHRITIS, LEFT: ICD-10-CM

## 2020-07-01 DIAGNOSIS — Z98.890 STATUS POST ARTHROSCOPY OF SHOULDER: ICD-10-CM

## 2020-07-01 DIAGNOSIS — M75.122 COMPLETE TEAR OF LEFT ROTATOR CUFF, UNSPECIFIED WHETHER TRAUMATIC: ICD-10-CM

## 2020-07-01 PROCEDURE — 99213 OFFICE O/P EST LOW 20 MIN: CPT | Performed by: PHYSICIAN ASSISTANT

## 2020-07-01 NOTE — PROGRESS NOTES
Subjective   Patient ID: Elliott Dunn is a 65 y.o. left hand dominant male  Pain and Cyst of the Right Wrist (Patient here today for right wrist pain, states there is a knot on wrist. Sx's started about a month ago. )         History of Present Illness    Patient presents with complaints of right wrist pain that has been ongoing for several months.  There has been no injury or trauma.  No redness or warmth.  He states at the current moment he is having very little pain although at times it has reached a level of 9 out of 10 pain.  Patient does do repetitive heavy lifting and pushing- which is when he notices the pain.   He denies numbness or tingling.  Denies forearm or elbow pain    Patient is also here for a pre op evaluation for a planned left reverse shoulder arthroplasty.  Patient is still experiencing significant pain and decreased range of motion to the left shoulder.  He did have a left shoulder arthroscopy February 2020 which revealed significant glenohumeral degenerative changes with massive medially retracted rotator cuff supraspinatus and infraspinatus tears.  These were deemed irreparable by the orthopedic surgeon.    Patient has tried oral and prescription analgesics, heat and ice without improvement.  He is noticing significant pain made worse with outward movement as well as significant stiffness to the left shoulder    Past Medical History:   Diagnosis Date   • Acquired absence of all teeth    • Allergic    • Anomalous coronary artery origin    • Arrhythmia    • Arthritis    • Arthritis of lumbar spine 7/29/2016   • Back pain 6/17/2016   • Cristina esophagus    • Cancer (CMS/[facility])     prostate   • Cataract     REMOVED BILATERALL   • Cervical spine disease 6/17/2016   • CHF (congestive heart failure) (CMS/[facility])    • Chronic obstructive pulmonary disease (CMS/[facility])    • Colon polyps    • Deafness in left ear    • Derangement of anterior horn of medial meniscus    • Difficulty swallowing    • Disorder of  lumbar spine 6/17/2016   • Disorder of thoracic spine 6/17/2016   • Diverticulitis of colon    • DJD (degenerative joint disease)    • Elevated liver enzymes    • Erectile dysfunction    • Esophageal reflux    • Essential hypertension     PT DENIES SAYS IT RUNS LOW   • Glaucoma    • Hard of hearing     LEFT EAR NO AIDS WORN   • Heart murmur    • Hiatal hernia    • High cholesterol    • Inflammatory polyarthropathy (CMS/HCC)     Per Dr. Haider. Negative NEO, normal ESR, RF, anti-ccp and did not improve with prednisone per notes.   • Inguinal hernia    • Insomnia    • Lateral meniscus derangement    • Left otitis media with spontaneous rupture of eardrum    • Locking of left knee    • Low back pain    • Meniere's disease    • MGUS (monoclonal gammopathy of unknown significance)     likely diagnosis   • Murmur    • Neuromuscular disorder (CMS/HCC)    • Neuropathic arthritis    • No natural teeth    • Perforation of left tympanic membrane    • Pulmonary emphysema (CMS/HCC)    • Recent shoulder injury     LEFT SHOULDER   • Scoliosis    • Skin cancer     skin cancer   • Skin cancer of face     squamous cell   • Spina bifida occulta 6/17/2016   • Tobacco abuse 11/9/2017   • Visual impairment    • Wears glasses    • Wears glasses     READING GLASSES ONLY        Past Surgical History:   Procedure Laterality Date   • COLONOSCOPY     • ENDOSCOPY N/A 4/10/2017    Procedure: ESOPHAGOGASTRODUODENOSCOPY WITH BIOPSY;  Surgeon: Alexander Ritchie MD;  Location: [facility] ENDOSCOPY;  Service:    • EYE SURGERY     • HERNIA REPAIR     • INGUINAL HERNIA REPAIR Right    • INGUINAL HERNIA REPAIR     • KNEE SURGERY Left    • LYMPH NODE BIOPSY     • MULTIPLE TOOTH EXTRACTIONS     • PROSTATE SURGERY  2004   • PROSTATECTOMY  06/30/2014   • PROSTATECTOMY      Prostatectomy Radical   • SHOULDER ARTHROSCOPY Left 2/6/2020    Procedure: DIAGNOSTIC SHOULDER ARTHROSCOPY LEFT WITH LABRAL DEBRIDEMENT, SUBACROMIAL BURSECTOMY, ASSESSMENT OF LARGE FRAGMENTED  RETRACTED IRREPARABLE ROTATOR CUFF TEARS;  Surgeon: Rony Pandey MD;  Location: [facility];  Service: Orthopedics;  Laterality: Left;   • SKIN CANCER EXCISION  2017    both sides of body/squamous cell melanoma   • TYMPANOPLASTY W/ MASTOIDECTOMY     • UMBILICAL HERNIA REPAIR         Family History   Problem Relation Age of Onset   • Diabetes Mother    • Hypertension Mother    • Obesity Mother    • Stroke Mother 65   • Arthritis Mother    • Hyperlipidemia Mother    • Vision loss Mother    • Cancer Father    • Cancer Sister    • Diabetes Brother    • Cancer Brother    • Heart attack Brother    • Alcohol abuse Brother    • Drug abuse Brother    • Hearing loss Brother    • Learning disabilities Brother        Social History     Socioeconomic History   • Marital status:      Spouse name: Not on file   • Number of children: Not on file   • Years of education: Not on file   • Highest education level: Not on file   Occupational History   • Occupation: retired    Tobacco Use   • Smoking status: Former Smoker     Packs/day: 1.00     Years: 51.00     Pack years: 51.00     Types: Cigarettes     Start date: 1963     Last attempt to quit: 2018     Years since quittin.4   • Smokeless tobacco: Never Used   Substance and Sexual Activity   • Alcohol use: No   • Drug use: No   • Sexual activity: Defer     Partners: Female     Birth control/protection: None   Social History Narrative    Left hand dominant, writes with right hand         Current Outpatient Medications:   •  albuterol (PROVENTIL HFA;VENTOLIN HFA) 108 (90 Base) MCG/ACT inhaler, Inhale 2 puffs Every 6 (Six) Hours As Needed for Wheezing or Shortness of Air., Disp: 1 inhaler, Rfl: 3  •  amitriptyline (ELAVIL) 25 MG tablet, TAKE ONE TO THREE TABLETS BY MOUTH EVERY NIGHT AT BEDTIME AS NEEDED FOR SLEEP, Disp: 90 tablet, Rfl: 3  •  aspirin 81 MG EC tablet, Take 81 mg by mouth Daily., Disp: , Rfl:   •  atorvastatin (LIPITOR) 10 MG tablet, TAKE  ONE TABLET BY MOUTH DAILY, Disp: 30 tablet, Rfl: 4  •  bisoprolol (ZEBeta) 5 MG tablet, TAKE ONE TABLET BY MOUTH DAILY, Disp: 90 tablet, Rfl: 2  •  celecoxib (CELEBREX) 100 MG capsule, Take 1 capsule by mouth Daily., Disp: 90 capsule, Rfl: 3  •  coenzyme Q10 100 MG capsule, Take 100 mg by mouth Daily., Disp: , Rfl:   •  diclofenac (VOLTAREN) 1 % gel gel, Apply 4 g topically to the appropriate area as directed 3 (Three) Times a Day., Disp: 100 g, Rfl: 1  •  DULoxetine (CYMBALTA) 60 MG capsule, TAKE ONE CAPSULE BY MOUTH DAILY, Disp: 90 capsule, Rfl: 10  •  folic acid (FOLVITE) 1 MG tablet, Take 1 mg by mouth Daily., Disp: , Rfl:   •  gabapentin (NEURONTIN) 300 MG capsule, Take 1 capsule by mouth 3 (Three) Times a Day As Needed (nerve pain)., Disp: 270 capsule, Rfl: 1  •  Glucosamine-Chondroitin--200-150 MG tablet, Take  by mouth., Disp: , Rfl:   •  HYDROcodone-acetaminophen (NORCO) 7.5-325 MG per tablet, Take 1 tablet by mouth Every 8 (Eight) Hours As Needed for pain, Disp: 21 tablet, Rfl: 0  •  isosorbide mononitrate (IMDUR) 30 MG 24 hr tablet, TAKE ONE TABLET BY MOUTH DAILY, Disp: 90 tablet, Rfl: 2  •  leflunomide (ARAVA) 10 MG tablet, , Disp: , Rfl:   •  methocarbamol (ROBAXIN) 750 MG tablet, TAKE ONE TABLET BY MOUTH THREE TIMES A DAY, Disp: 90 tablet, Rfl: 0  •  methotrexate 2.5 MG tablet, Take 8 tablets by mouth 1 (One) Time Per Week. (Patient taking differently: Take 20 mg by mouth 1 (One) Time Per Week. TAKES ON THURSDAY), Disp: 96 tablet, Rfl: 0  •  Multiple Vitamins-Minerals (MULTIVITAMIN WITH MINERALS) tablet tablet, Take 1 tablet by mouth Daily., Disp: , Rfl:   •  nitroglycerin (NITROSTAT) 0.4 MG SL tablet, 1 under the tongue as needed for angina, may repeat q5mins for up three doses, Disp: 100 tablet, Rfl: 11  •  pantoprazole (Protonix) 40 MG EC tablet, Take 1 tablet by mouth Daily., Disp: 90 tablet, Rfl: 4  •  predniSONE (DELTASONE) 10 MG tablet, Take 10 mg by mouth Daily. As needed, Disp: , Rfl:  "  •  STIOLTO RESPIMAT 2.5-2.5 MCG/ACT aerosol solution inhaler, INHALE TWO INHALATION(S) BY MOUTH DAILY, Disp: 1 inhaler, Rfl: 4  •  Triamcinolone Acetonide (NASACORT) 55 MCG/ACT nasal inhaler, 2 sprays into each nostril daily., Disp: , Rfl:   •  vitamin B-12 (CYANOCOBALAMIN) 1000 MCG tablet, TAKE ONE TABLET BY MOUTH DAILY, Disp: 30 tablet, Rfl: 8    Allergies   Allergen Reactions   • Cyclobenzaprine Other (See Comments)     Wide awake   • Plaquenil [Hydroxychloroquine Sulfate] Other (See Comments)     Black eyes   • Pravastatin Myalgia     Weakness / fatigue       Review of Systems   Constitutional: Negative for fever.   HENT: Negative for dental problem and voice change.    Eyes: Negative for visual disturbance.   Respiratory: Negative for shortness of breath.    Cardiovascular: Negative for chest pain.   Gastrointestinal: Negative for abdominal pain.   Genitourinary: Negative for dysuria.   Musculoskeletal: Positive for arthralgias and joint swelling. Negative for gait problem.   Skin: Negative for rash.   Neurological: Negative for speech difficulty.   Hematological: Does not bruise/bleed easily.   Psychiatric/Behavioral: Negative for confusion.       I have reviewed the medical and surgical history, family history, social history, medications, and/or allergies, and the review of systems of this report.    Objective   Resp 18   Ht 180.3 cm (71\")   Wt 63.5 kg (140 lb)   BMI 19.53 kg/m²    Physical Exam   Constitutional: He is oriented to person, place, and time. He appears well-developed and well-nourished.   HENT:   Head: Normocephalic.   Eyes: Conjunctivae are normal.   Neck: No tracheal deviation present.   Cardiovascular: Normal rate and regular rhythm.   Pulmonary/Chest: Effort normal and breath sounds normal. No respiratory distress.   Abdominal: He exhibits no distension.   Musculoskeletal:        Right wrist: He exhibits normal range of motion, no swelling, no effusion, no crepitus, no deformity and no " laceration.        Arms:       Right hand: He exhibits normal capillary refill, no deformity and no swelling. Normal sensation noted. Normal strength noted.   Neurological: He is alert and oriented to person, place, and time.   Psychiatric: He has a normal mood and affect.   Nursing note and vitals reviewed.    Right Hand Exam     Range of Motion   Wrist   Extension: 40   Flexion: 60   Pronation: normal   Supination: normal     Tests   Phalen’s Sign: negative  Finkelstein's test: negative    Other   Erythema: absent  Sensation: normal  Pulse: present      Left Shoulder Exam     Range of Motion   Left shoulder active abduction: 85.   Passive abduction: 100   Forward flexion: 90   Internal rotation 0 degrees: Sacrum   Internal rotation 90 degrees: 40     Tests   Apprehension: positive  Cross arm: positive  Impingement: positive  Drop arm: positive  Sulcus: present    Other   Sensation: normal  Pulse: present              Neurologic Exam     Mental Status   Oriented to person, place, and time.      There are no signs of cellulitis or skin breakdown to the right hand.  Patient does have pain with active and against resistance wrist extension    There is no anatomical snuffbox tenderness to palpation    Assessment/Plan   Independent Review of Radiographic Studies:    AP and lateral of the right wrist, indication to evaluate fracture healing, and compared with prior imaging, shows interm fracture healing, callus and or periostitis in continued good position and alignment.    I did review prior xray/ MRI imaging of the left shoulder from 2/2019    Procedures       Elliott was seen today for pain and cyst.    Diagnoses and all orders for this visit:    Arthralgia of right wrist  -     XR Wrist 3+ View Right    Right wrist tendinitis    Status post arthroscopy of shoulder    Complete tear of left rotator cuff, unspecified whether traumatic    Glenohumeral arthritis, left    Other orders  -     diclofenac (VOLTAREN) 1 % gel  gel; Apply 4 g topically to the appropriate area as directed 3 (Three) Times a Day.       Discussion of orthopedic goals  Risk, benefits, and merits of treatment alternatives reviewed with the patient and questions answered  Reduced physical activity as appropriate  Weight bearing parameters reviewed  Ice, heat, and/or modalities as beneficial    Recommendations/Plan:  Exercise, medications, injections, other patient advice, and return appointment as noted.  Patient is encouraged to call or return for any issues or concerns.    PLAN: LEFT reverse total shoulder arthroplasty    Risks, benefits, and alternative treatments discussed with the patient: [x] Yes [] No    Risk benefits and merits of the proposed surgery were discussed and the patient's questions were answered risks discussed including and not limited to:  Anesthesia reactions  Blood loss and possible transfusion  Infection  Deep venous thrombosis and pulmonary embolus  Nerve, vascular or tendon injury  Fracture  Deformity  Stiffness  Weakness  Prosthesis and implant issues such as loosening, material wear or dislocation  Skin necrosis  Revision surgery or further treatment  Recurrence of problem and condition     Informed consent: [] Signed  [x] To be obtained at hospital  [] Both    Patient was provided a Velcro wrist immobilizer to be used daily for the next 2 weeks.  He is instructed to use ice compression to the wrist 20 minutes on 2 hours off at least 3 times daily.  We will also prescribe topical Voltaren gel to the hand      Because the patient has tried and failed numerous conservative measures, failed to respond to a shoulder arthroscopy, and given his shoulder arthroscopy findings of the glenohumeral arthritic changes as well as irreparable rotator cuff tendon tears I do feel as well as the orthopedic surgeon feels reverse shoulder arthroplasty would provide him overall relief and satisfaction               EMR Dragon-transcription  disclaimer:  This encounter note is an electronic transcription of spoken language to printed text.  Electronic transcription of spoken language may permit erroneous or at times nonsensical words or phrases to be inadvertently transcribed.  Although I have reviewed the note for such errors, some may still exist

## 2020-07-02 DIAGNOSIS — Z01.818 PREOP TESTING: Primary | ICD-10-CM

## 2020-07-03 DIAGNOSIS — G89.4 CHRONIC PAIN SYNDROME: ICD-10-CM

## 2020-07-03 DIAGNOSIS — F51.01 PRIMARY INSOMNIA: ICD-10-CM

## 2020-07-03 DIAGNOSIS — M06.4 INFLAMMATORY POLYARTHROPATHY (HCC): ICD-10-CM

## 2020-07-06 ENCOUNTER — APPOINTMENT (OUTPATIENT)
Dept: PREADMISSION TESTING | Facility: HOSPITAL | Age: 65
End: 2020-07-06

## 2020-07-06 ENCOUNTER — HOSPITAL ENCOUNTER (OUTPATIENT)
Dept: GENERAL RADIOLOGY | Facility: HOSPITAL | Age: 65
Discharge: HOME | End: 2020-07-06
Admitting: PHYSICIAN ASSISTANT

## 2020-07-06 VITALS — BODY MASS INDEX: 20.24 KG/M2 | WEIGHT: 144.6 LBS | HEIGHT: 71 IN

## 2020-07-06 DIAGNOSIS — M19.012 ARTHROPATHY OF LEFT SHOULDER: ICD-10-CM

## 2020-07-06 DIAGNOSIS — M12.812 ROTATOR CUFF TEAR ARTHROPATHY OF LEFT SHOULDER: ICD-10-CM

## 2020-07-06 DIAGNOSIS — M75.102 ROTATOR CUFF TEAR ARTHROPATHY OF LEFT SHOULDER: ICD-10-CM

## 2020-07-06 DIAGNOSIS — Z01.818 PREOP TESTING: ICD-10-CM

## 2020-07-06 LAB
ABO GROUP BLD: NORMAL
ALBUMIN SERPL-MCNC: 3.9 G/DL (ref 3.5–5.2)
ALBUMIN/GLOB SERPL: 1.1 G/DL
ALP SERPL-CCNC: 103 U/L (ref 39–117)
ALT SERPL W P-5'-P-CCNC: 39 U/L (ref 1–41)
ANION GAP SERPL CALCULATED.3IONS-SCNC: 6.8 MMOL/L (ref 5–15)
APTT PPP: 31.8 SECONDS (ref 24.5–37.2)
AST SERPL-CCNC: 46 U/L (ref 1–40)
BASOPHILS # BLD AUTO: 0.06 10*3/MM3 (ref 0–0.2)
BASOPHILS NFR BLD AUTO: 1 % (ref 0–1.5)
BILIRUB SERPL-MCNC: 0.5 MG/DL (ref 0.2–1.2)
BILIRUB UR QL STRIP: NEGATIVE
BUN SERPL-MCNC: 26 MG/DL (ref 8–23)
BUN/CREAT SERPL: 22.6 (ref 7–25)
CALCIUM SPEC-SCNC: 9 MG/DL (ref 8.6–10.5)
CHLORIDE SERPL-SCNC: 105 MMOL/L (ref 98–107)
CLARITY UR: CLEAR
CO2 SERPL-SCNC: 27.2 MMOL/L (ref 22–29)
COLOR UR: YELLOW
CREAT SERPL-MCNC: 1.15 MG/DL (ref 0.76–1.27)
DEPRECATED RDW RBC AUTO: 48.3 FL (ref 37–54)
EOSINOPHIL # BLD AUTO: 0.25 10*3/MM3 (ref 0–0.4)
EOSINOPHIL NFR BLD AUTO: 4 % (ref 0.3–6.2)
ERYTHROCYTE [DISTWIDTH] IN BLOOD BY AUTOMATED COUNT: 13.6 % (ref 12.3–15.4)
GFR SERPL CREATININE-BSD FRML MDRD: 64 ML/MIN/1.73
GLOBULIN UR ELPH-MCNC: 3.4 GM/DL
GLUCOSE SERPL-MCNC: 61 MG/DL (ref 65–99)
GLUCOSE UR STRIP-MCNC: NEGATIVE MG/DL
HCT VFR BLD AUTO: 42.7 % (ref 37.5–51)
HGB BLD-MCNC: 14.4 G/DL (ref 13–17.7)
HGB UR QL STRIP.AUTO: NEGATIVE
IMM GRANULOCYTES # BLD AUTO: 0.03 10*3/MM3 (ref 0–0.05)
IMM GRANULOCYTES NFR BLD AUTO: 0.5 % (ref 0–0.5)
INR PPP: 0.98 (ref 0.9–1.1)
KETONES UR QL STRIP: NEGATIVE
LEUKOCYTE ESTERASE UR QL STRIP.AUTO: NEGATIVE
LYMPHOCYTES # BLD AUTO: 2.01 10*3/MM3 (ref 0.7–3.1)
LYMPHOCYTES NFR BLD AUTO: 32.5 % (ref 19.6–45.3)
MCH RBC QN AUTO: 33 PG (ref 26.6–33)
MCHC RBC AUTO-ENTMCNC: 33.7 G/DL (ref 31.5–35.7)
MCV RBC AUTO: 97.7 FL (ref 79–97)
MONOCYTES # BLD AUTO: 0.58 10*3/MM3 (ref 0.1–0.9)
MONOCYTES NFR BLD AUTO: 9.4 % (ref 5–12)
NEUTROPHILS NFR BLD AUTO: 3.26 10*3/MM3 (ref 1.7–7)
NEUTROPHILS NFR BLD AUTO: 52.6 % (ref 42.7–76)
NITRITE UR QL STRIP: NEGATIVE
NRBC BLD AUTO-RTO: 0 /100 WBC (ref 0–0.2)
PH UR STRIP.AUTO: 7.5 [PH] (ref 5–8)
PLATELET # BLD AUTO: 202 10*3/MM3 (ref 140–450)
PMV BLD AUTO: 10 FL (ref 6–12)
POTASSIUM SERPL-SCNC: 4.3 MMOL/L (ref 3.5–5.2)
PROT SERPL-MCNC: 7.3 G/DL (ref 6–8.5)
PROT UR QL STRIP: NEGATIVE
PROTHROMBIN TIME: 13.4 SECONDS (ref 12–15.1)
RBC # BLD AUTO: 4.37 10*6/MM3 (ref 4.14–5.8)
RH BLD: POSITIVE
SODIUM SERPL-SCNC: 139 MMOL/L (ref 136–145)
SP GR UR STRIP: 1.02 (ref 1–1.03)
UROBILINOGEN UR QL STRIP: NORMAL
WBC # BLD AUTO: 6.19 10*3/MM3 (ref 3.4–10.8)

## 2020-07-06 PROCEDURE — 85025 COMPLETE CBC W/AUTO DIFF WBC: CPT | Performed by: PHYSICIAN ASSISTANT

## 2020-07-06 PROCEDURE — 81003 URINALYSIS AUTO W/O SCOPE: CPT

## 2020-07-06 PROCEDURE — U0002 COVID-19 LAB TEST NON-CDC: HCPCS | Performed by: PHYSICIAN ASSISTANT

## 2020-07-06 PROCEDURE — U0004 COV-19 TEST NON-CDC HGH THRU: HCPCS | Performed by: PHYSICIAN ASSISTANT

## 2020-07-06 PROCEDURE — 86901 BLOOD TYPING SEROLOGIC RH(D): CPT | Performed by: ORTHOPAEDIC SURGERY

## 2020-07-06 PROCEDURE — 71046 X-RAY EXAM CHEST 2 VIEWS: CPT

## 2020-07-06 PROCEDURE — 80053 COMPREHEN METABOLIC PANEL: CPT | Performed by: PHYSICIAN ASSISTANT

## 2020-07-06 PROCEDURE — 85610 PROTHROMBIN TIME: CPT | Performed by: PHYSICIAN ASSISTANT

## 2020-07-06 PROCEDURE — 86900 BLOOD TYPING SEROLOGIC ABO: CPT | Performed by: ORTHOPAEDIC SURGERY

## 2020-07-06 PROCEDURE — 36415 COLL VENOUS BLD VENIPUNCTURE: CPT

## 2020-07-06 PROCEDURE — 85730 THROMBOPLASTIN TIME PARTIAL: CPT | Performed by: PHYSICIAN ASSISTANT

## 2020-07-06 PROCEDURE — C9803 HOPD COVID-19 SPEC COLLECT: HCPCS

## 2020-07-06 PROCEDURE — 87081 CULTURE SCREEN ONLY: CPT | Performed by: PHYSICIAN ASSISTANT

## 2020-07-06 RX ORDER — METHOCARBAMOL 750 MG/1
TABLET, FILM COATED ORAL
Qty: 63 TABLET | Refills: 0 | Status: SHIPPED | OUTPATIENT
Start: 2020-07-06 | End: 2020-07-30 | Stop reason: SDUPTHER

## 2020-07-06 RX ORDER — AMITRIPTYLINE HYDROCHLORIDE 25 MG/1
TABLET, FILM COATED ORAL
Qty: 63 TABLET | Refills: 2 | Status: SHIPPED | OUTPATIENT
Start: 2020-07-06 | End: 2020-07-13

## 2020-07-06 NOTE — DISCHARGE INSTRUCTIONS
PAT PASS GIVEN/REVIEWED WITH PT.  VERBALIZED UNDERSTANDING OF THE FOLLOWING:  DO NOT EAT, DRINK, SMOKE, USE SMOKELESS TOBACCO OR CHEW GUM AFTER MIDNIGHT THE NIGHT BEFORE SURGERY.  THIS ALSO INCLUDES HARD CANDIES AND MINTS.    DO NOT SHAVE THE AREA TO BE OPERATED ON AT LEAST 48 HOURS PRIOR TO THE PROCEDURE.  DO NOT WEAR MAKE UP OR NAIL POLISH.  DO NOT LEAVE IN ANY PIERCING OR WEAR JEWELRY THE DAY OF SURGERY.      DO NOT USE ADHESIVES IF YOU WEAR DENTURES.    DO NOT WEAR EYE CONTACTS; BRING IN YOUR GLASSES.    ONLY TAKE MEDICATION THE MORNING OF YOUR PROCEDURE IF INSTRUCTED BY YOUR SURGEON WITH ENOUGH WATER TO SWALLOW THE MEDICATION.  IF YOUR SURGEON DID NOT SPECIFY WHICH MEDICATIONS TO TAKE, YOU WILL NEED TO CALL THEIR OFFICE FOR FURTHER INSTRUCTIONS AND DO AS THEY INSTRUCT.    LEAVE ANYTHING YOU CONSIDER VALUABLE AT HOME.    YOU WILL NEED TO ARRANGE FOR SOMEONE TO DRIVE YOU HOME AFTER SURGERY.  IT IS RECOMMENDED THAT YOU DO NOT DRIVE, WORK, DRINK ALCOHOL OR MAKE MAJOR DECISIONS FOR AT LEAST 24 HOURS AFTER YOUR PROCEDURE IS COMPLETE.      THE DAY OF YOUR PROCEDURE, BRING IN THE FOLLOWING IF APPLICABLE:   PICTURE ID AND INSURANCE/MEDICARE OR MEDICAID CARDS/ANY CO-PAY THAT MAY BE DUE   COPY OF ADVANCED DIRECTIVE/LIVING WILL/POWER OR    CPAP/BIPAP/INHALERS   SKIN PREP SHEET   YOUR PREADMISSION TESTING PASS (IF NOT A PHONE HISTORY)        PAT patient education COVID testing pre-op instructions reviewed with pt.  Verbalized understanding.      Chlorhexidine wipes along with instruction/verification sheet given to pt.  Instructed pt to apply stickers from chlorhexidine wipes to verification sheet once skin prep has been  completed, and to [place] [facility] the day of the procedure.  Pt. Verbalizes understanding.

## 2020-07-07 ENCOUNTER — ANESTHESIA EVENT (OUTPATIENT)
Dept: PERIOP | Facility: HOSPITAL | Age: 65
End: 2020-07-07

## 2020-07-07 LAB
MRSA SPEC QL CULT: NORMAL
REF LAB TEST METHOD: NORMAL
SARS-COV-2 RNA RESP QL NAA+PROBE: NOT DETECTED

## 2020-07-09 ENCOUNTER — ANESTHESIA (OUTPATIENT)
Dept: PERIOP | Facility: HOSPITAL | Age: 65
End: 2020-07-09

## 2020-07-09 ENCOUNTER — APPOINTMENT (OUTPATIENT)
Dept: GENERAL RADIOLOGY | Facility: HOSPITAL | Age: 65
End: 2020-07-09

## 2020-07-09 ENCOUNTER — HOSPITAL ENCOUNTER (OUTPATIENT)
Facility: HOSPITAL | Age: 65
Setting detail: OBSERVATION
Discharge: HOME | End: 2020-07-10
Attending: ORTHOPAEDIC SURGERY | Admitting: ORTHOPAEDIC SURGERY

## 2020-07-09 ENCOUNTER — APPOINTMENT (OUTPATIENT)
Dept: ULTRASOUND IMAGING | Facility: HOSPITAL | Age: 65
End: 2020-07-09

## 2020-07-09 DIAGNOSIS — Z78.9 IMPAIRED MOBILITY AND ADLS: Primary | ICD-10-CM

## 2020-07-09 DIAGNOSIS — Z96.612 STATUS POST REVERSE ARTHROPLASTY OF SHOULDER, LEFT: ICD-10-CM

## 2020-07-09 DIAGNOSIS — Z74.09 IMPAIRED MOBILITY AND ADLS: Primary | ICD-10-CM

## 2020-07-09 DIAGNOSIS — M12.812 ROTATOR CUFF TEAR ARTHROPATHY OF LEFT SHOULDER: ICD-10-CM

## 2020-07-09 DIAGNOSIS — M75.102 ROTATOR CUFF TEAR ARTHROPATHY OF LEFT SHOULDER: ICD-10-CM

## 2020-07-09 DIAGNOSIS — M19.012 ARTHROPATHY OF LEFT SHOULDER: ICD-10-CM

## 2020-07-09 LAB
ABO GROUP BLD: NORMAL
BLD GP AB SCN SERPL QL: NEGATIVE
RH BLD: POSITIVE
T&S EXPIRATION DATE: NORMAL

## 2020-07-09 PROCEDURE — 25010000002 PROPOFOL 200 MG/20ML EMULSION: Performed by: NURSE ANESTHETIST, CERTIFIED REGISTERED

## 2020-07-09 PROCEDURE — 25010000003 CEFAZOLIN SODIUM-DEXTROSE 2-3 GM-%(50ML) RECONSTITUTED SOLUTION: Performed by: PHYSICIAN ASSISTANT

## 2020-07-09 PROCEDURE — G0378 HOSPITAL OBSERVATION PER HR: HCPCS

## 2020-07-09 PROCEDURE — 25010000002 DEXAMETHASONE PER 1 MG: Performed by: NURSE ANESTHETIST, CERTIFIED REGISTERED

## 2020-07-09 PROCEDURE — 25010000002 ROPIVACAINE PER 1 MG: Performed by: NURSE ANESTHETIST, CERTIFIED REGISTERED

## 2020-07-09 PROCEDURE — 23472 RECONSTRUCT SHOULDER JOINT: CPT | Performed by: ORTHOPAEDIC SURGERY

## 2020-07-09 PROCEDURE — 25010000002 ONDANSETRON PER 1 MG: Performed by: NURSE ANESTHETIST, CERTIFIED REGISTERED

## 2020-07-09 PROCEDURE — 94799 UNLISTED PULMONARY SVC/PX: CPT

## 2020-07-09 PROCEDURE — 73030 X-RAY EXAM OF SHOULDER: CPT

## 2020-07-09 PROCEDURE — C1776 JOINT DEVICE (IMPLANTABLE): HCPCS | Performed by: ORTHOPAEDIC SURGERY

## 2020-07-09 PROCEDURE — 97161 PT EVAL LOW COMPLEX 20 MIN: CPT

## 2020-07-09 PROCEDURE — 86850 RBC ANTIBODY SCREEN: CPT | Performed by: PHYSICIAN ASSISTANT

## 2020-07-09 PROCEDURE — 25010000002 MIDAZOLAM PER 1MG: Performed by: NURSE ANESTHETIST, CERTIFIED REGISTERED

## 2020-07-09 PROCEDURE — 25010000003 CEFAZOLIN PER 500 MG: Performed by: ORTHOPAEDIC SURGERY

## 2020-07-09 PROCEDURE — 86900 BLOOD TYPING SEROLOGIC ABO: CPT | Performed by: PHYSICIAN ASSISTANT

## 2020-07-09 PROCEDURE — 25010000003 CEFAZOLIN SODIUM-DEXTROSE 1-4 GM-%(50ML) RECONSTITUTED SOLUTION: Performed by: ORTHOPAEDIC SURGERY

## 2020-07-09 PROCEDURE — 88300 SURGICAL PATH GROSS: CPT | Performed by: ORTHOPAEDIC SURGERY

## 2020-07-09 PROCEDURE — 86901 BLOOD TYPING SEROLOGIC RH(D): CPT | Performed by: PHYSICIAN ASSISTANT

## 2020-07-09 PROCEDURE — 99219 PR INITIAL OBSERVATION CARE/DAY 50 MINUTES: CPT | Performed by: NURSE PRACTITIONER

## 2020-07-09 PROCEDURE — 94640 AIRWAY INHALATION TREATMENT: CPT

## 2020-07-09 DEVICE — BASEPLT GLEN COMPR MINI W TPR ADAPTR 25: Type: IMPLANTABLE DEVICE | Site: SHOULDER | Status: FUNCTIONAL

## 2020-07-09 DEVICE — SCRW COMPRNSV CNTRL 6.5X30MM REUS: Type: IMPLANTABLE DEVICE | Site: SHOULDER | Status: FUNCTIONAL

## 2020-07-09 DEVICE — TRY HUM/SHLDR COMPREHENSIVE/REVERSE MINI COCR STD 40MM: Type: IMPLANTABLE DEVICE | Site: SHOULDER | Status: FUNCTIONAL

## 2020-07-09 DEVICE — IMPLANTABLE DEVICE: Type: IMPLANTABLE DEVICE | Site: SHOULDER | Status: FUNCTIONAL

## 2020-07-09 DEVICE — GLENOSPHERE VERSA DIAL FIX STD 36MM: Type: IMPLANTABLE DEVICE | Site: SHOULDER | Status: FUNCTIONAL

## 2020-07-09 DEVICE — SCRW FIX LK HEX 4.75X15MM: Type: IMPLANTABLE DEVICE | Site: SHOULDER | Status: FUNCTIONAL

## 2020-07-09 DEVICE — SCRW FIX LK HEX 4.75X25MM: Type: IMPLANTABLE DEVICE | Site: SHOULDER | Status: FUNCTIONAL

## 2020-07-09 DEVICE — BEAR HUM PROLNG STD 36MM: Type: IMPLANTABLE DEVICE | Site: SHOULDER | Status: FUNCTIONAL

## 2020-07-09 DEVICE — SCRW FIX LK HEX 4.75X30MM: Type: IMPLANTABLE DEVICE | Site: SHOULDER | Status: FUNCTIONAL

## 2020-07-09 DEVICE — STEM HUM/SHLDR COMPREHENSIVE MINI 11X83MM: Type: IMPLANTABLE DEVICE | Site: SHOULDER | Status: FUNCTIONAL

## 2020-07-09 RX ORDER — CEFAZOLIN SODIUM 2 G/50ML
2 SOLUTION INTRAVENOUS
Status: COMPLETED | OUTPATIENT
Start: 2020-07-09 | End: 2020-07-09

## 2020-07-09 RX ORDER — ROPIVACAINE HYDROCHLORIDE 5 MG/ML
INJECTION, SOLUTION EPIDURAL; INFILTRATION; PERINEURAL
Status: COMPLETED
Start: 2020-07-09 | End: 2020-07-09

## 2020-07-09 RX ORDER — MIDAZOLAM HYDROCHLORIDE 2 MG/2ML
INJECTION, SOLUTION INTRAMUSCULAR; INTRAVENOUS AS NEEDED
Status: DISCONTINUED | OUTPATIENT
Start: 2020-07-09 | End: 2020-07-09 | Stop reason: SURG

## 2020-07-09 RX ORDER — CEFAZOLIN SODIUM 1 G/50ML
1 SOLUTION INTRAVENOUS EVERY 8 HOURS
Status: COMPLETED | OUTPATIENT
Start: 2020-07-09 | End: 2020-07-10

## 2020-07-09 RX ORDER — BISOPROLOL FUMARATE 5 MG/1
5 TABLET, FILM COATED ORAL EVERY EVENING
Status: DISCONTINUED | OUTPATIENT
Start: 2020-07-09 | End: 2020-07-10 | Stop reason: HOSPADM

## 2020-07-09 RX ORDER — NITROGLYCERIN 0.4 MG/1
0.4 TABLET SUBLINGUAL
Status: DISCONTINUED | OUTPATIENT
Start: 2020-07-09 | End: 2020-07-10 | Stop reason: HOSPADM

## 2020-07-09 RX ORDER — NALOXONE HCL 0.4 MG/ML
0.4 VIAL (ML) INJECTION
Status: DISCONTINUED | OUTPATIENT
Start: 2020-07-09 | End: 2020-07-10 | Stop reason: HOSPADM

## 2020-07-09 RX ORDER — SODIUM CHLORIDE 0.9 % (FLUSH) 0.9 %
1-10 SYRINGE (ML) INJECTION AS NEEDED
Status: DISCONTINUED | OUTPATIENT
Start: 2020-07-09 | End: 2020-07-10 | Stop reason: HOSPADM

## 2020-07-09 RX ORDER — MORPHINE SULFATE 4 MG/ML
4 INJECTION, SOLUTION INTRAMUSCULAR; INTRAVENOUS
Status: DISCONTINUED | OUTPATIENT
Start: 2020-07-09 | End: 2020-07-10 | Stop reason: HOSPADM

## 2020-07-09 RX ORDER — FOLIC ACID 1 MG/1
1 TABLET ORAL EVERY EVENING
Status: DISCONTINUED | OUTPATIENT
Start: 2020-07-09 | End: 2020-07-10 | Stop reason: HOSPADM

## 2020-07-09 RX ORDER — PANTOPRAZOLE SODIUM 40 MG/1
40 TABLET, DELAYED RELEASE ORAL EVERY EVENING
Status: DISCONTINUED | OUTPATIENT
Start: 2020-07-09 | End: 2020-07-10 | Stop reason: HOSPADM

## 2020-07-09 RX ORDER — ONDANSETRON 2 MG/ML
4 INJECTION INTRAMUSCULAR; INTRAVENOUS ONCE AS NEEDED
Status: DISCONTINUED | OUTPATIENT
Start: 2020-07-09 | End: 2020-07-09 | Stop reason: HOSPADM

## 2020-07-09 RX ORDER — SODIUM CHLORIDE, SODIUM LACTATE, POTASSIUM CHLORIDE, CALCIUM CHLORIDE 600; 310; 30; 20 MG/100ML; MG/100ML; MG/100ML; MG/100ML
100 INJECTION, SOLUTION INTRAVENOUS CONTINUOUS
Status: DISCONTINUED | OUTPATIENT
Start: 2020-07-09 | End: 2020-07-10 | Stop reason: HOSPADM

## 2020-07-09 RX ORDER — DEXAMETHASONE SODIUM PHOSPHATE 10 MG/ML
INJECTION, SOLUTION INTRAMUSCULAR; INTRAVENOUS
Status: DISPENSED
Start: 2020-07-09 | End: 2020-07-09

## 2020-07-09 RX ORDER — BUPIVACAINE HCL/0.9 % NACL/PF 0.125 %
4-14 PREFILLED PUMP RESERVOIR EPIDURAL CONTINUOUS
Status: DISCONTINUED | OUTPATIENT
Start: 2020-07-09 | End: 2020-07-10 | Stop reason: HOSPADM

## 2020-07-09 RX ORDER — HYDROCODONE BITARTRATE AND ACETAMINOPHEN 7.5; 325 MG/1; MG/1
1 TABLET ORAL EVERY 6 HOURS PRN
Status: DISCONTINUED | OUTPATIENT
Start: 2020-07-09 | End: 2020-07-10 | Stop reason: HOSPADM

## 2020-07-09 RX ORDER — FLUTICASONE PROPIONATE 50 MCG
1 SPRAY, SUSPENSION (ML) NASAL EVERY EVENING
Status: DISCONTINUED | OUTPATIENT
Start: 2020-07-09 | End: 2020-07-10 | Stop reason: HOSPADM

## 2020-07-09 RX ORDER — SODIUM CHLORIDE, SODIUM LACTATE, POTASSIUM CHLORIDE, CALCIUM CHLORIDE 600; 310; 30; 20 MG/100ML; MG/100ML; MG/100ML; MG/100ML
1000 INJECTION, SOLUTION INTRAVENOUS CONTINUOUS
Status: DISCONTINUED | OUTPATIENT
Start: 2020-07-09 | End: 2020-07-09

## 2020-07-09 RX ORDER — ONDANSETRON 2 MG/ML
4 INJECTION INTRAMUSCULAR; INTRAVENOUS EVERY 6 HOURS PRN
Status: DISCONTINUED | OUTPATIENT
Start: 2020-07-09 | End: 2020-07-10 | Stop reason: HOSPADM

## 2020-07-09 RX ORDER — ATORVASTATIN CALCIUM 10 MG/1
10 TABLET, FILM COATED ORAL NIGHTLY
Status: DISCONTINUED | OUTPATIENT
Start: 2020-07-09 | End: 2020-07-10 | Stop reason: HOSPADM

## 2020-07-09 RX ORDER — DEXAMETHASONE SODIUM PHOSPHATE 4 MG/ML
INJECTION, SOLUTION INTRA-ARTICULAR; INTRALESIONAL; INTRAMUSCULAR; INTRAVENOUS; SOFT TISSUE AS NEEDED
Status: DISCONTINUED | OUTPATIENT
Start: 2020-07-09 | End: 2020-07-09 | Stop reason: SURG

## 2020-07-09 RX ORDER — MIDAZOLAM HYDROCHLORIDE 2 MG/2ML
INJECTION, SOLUTION INTRAMUSCULAR; INTRAVENOUS
Status: COMPLETED
Start: 2020-07-09 | End: 2020-07-09

## 2020-07-09 RX ORDER — LIDOCAINE HYDROCHLORIDE 20 MG/ML
INJECTION, SOLUTION INFILTRATION; PERINEURAL
Status: COMPLETED
Start: 2020-07-09 | End: 2020-07-09

## 2020-07-09 RX ORDER — MEPERIDINE HYDROCHLORIDE 25 MG/ML
25 INJECTION INTRAMUSCULAR; INTRAVENOUS; SUBCUTANEOUS ONCE
Status: DISCONTINUED | OUTPATIENT
Start: 2020-07-09 | End: 2020-07-09 | Stop reason: HOSPADM

## 2020-07-09 RX ORDER — ONDANSETRON 2 MG/ML
INJECTION INTRAMUSCULAR; INTRAVENOUS AS NEEDED
Status: DISCONTINUED | OUTPATIENT
Start: 2020-07-09 | End: 2020-07-09 | Stop reason: SURG

## 2020-07-09 RX ORDER — GABAPENTIN 300 MG/1
300 CAPSULE ORAL 3 TIMES DAILY PRN
Status: DISCONTINUED | OUTPATIENT
Start: 2020-07-09 | End: 2020-07-10 | Stop reason: HOSPADM

## 2020-07-09 RX ORDER — ROCURONIUM BROMIDE 10 MG/ML
INJECTION, SOLUTION INTRAVENOUS AS NEEDED
Status: DISCONTINUED | OUTPATIENT
Start: 2020-07-09 | End: 2020-07-09 | Stop reason: SURG

## 2020-07-09 RX ORDER — SODIUM CHLORIDE 0.9 % (FLUSH) 0.9 %
10 SYRINGE (ML) INJECTION AS NEEDED
Status: DISCONTINUED | OUTPATIENT
Start: 2020-07-09 | End: 2020-07-09 | Stop reason: HOSPADM

## 2020-07-09 RX ORDER — NEOSTIGMINE METHYLSULFATE 5 MG/5 ML
SYRINGE (ML) INTRAVENOUS AS NEEDED
Status: DISCONTINUED | OUTPATIENT
Start: 2020-07-09 | End: 2020-07-09 | Stop reason: SURG

## 2020-07-09 RX ORDER — MORPHINE SULFATE 2 MG/ML
2 INJECTION, SOLUTION INTRAMUSCULAR; INTRAVENOUS
Status: DISCONTINUED | OUTPATIENT
Start: 2020-07-09 | End: 2020-07-09 | Stop reason: HOSPADM

## 2020-07-09 RX ORDER — ROPIVACAINE HYDROCHLORIDE 5 MG/ML
INJECTION, SOLUTION EPIDURAL; INFILTRATION; PERINEURAL
Status: COMPLETED | OUTPATIENT
Start: 2020-07-09 | End: 2020-07-09

## 2020-07-09 RX ORDER — PROPOFOL 10 MG/ML
INJECTION, EMULSION INTRAVENOUS AS NEEDED
Status: DISCONTINUED | OUTPATIENT
Start: 2020-07-09 | End: 2020-07-09 | Stop reason: SURG

## 2020-07-09 RX ORDER — LIDOCAINE HYDROCHLORIDE 20 MG/ML
INJECTION, SOLUTION EPIDURAL; INFILTRATION; INTRACAUDAL; PERINEURAL
Status: COMPLETED | OUTPATIENT
Start: 2020-07-09 | End: 2020-07-09

## 2020-07-09 RX ORDER — DULOXETIN HYDROCHLORIDE 30 MG/1
60 CAPSULE, DELAYED RELEASE ORAL EVERY EVENING
Status: DISCONTINUED | OUTPATIENT
Start: 2020-07-09 | End: 2020-07-10 | Stop reason: HOSPADM

## 2020-07-09 RX ORDER — ONDANSETRON 4 MG/1
4 TABLET, FILM COATED ORAL EVERY 6 HOURS PRN
Status: DISCONTINUED | OUTPATIENT
Start: 2020-07-09 | End: 2020-07-10 | Stop reason: HOSPADM

## 2020-07-09 RX ORDER — ATORVASTATIN CALCIUM 10 MG/1
TABLET, FILM COATED ORAL
Qty: 60 TABLET | Refills: 3 | Status: SHIPPED | OUTPATIENT
Start: 2020-07-09 | End: 2020-07-30 | Stop reason: SDUPTHER

## 2020-07-09 RX ORDER — ASPIRIN 81 MG/1
81 TABLET ORAL EVERY EVENING
Status: DISCONTINUED | OUTPATIENT
Start: 2020-07-09 | End: 2020-07-10 | Stop reason: HOSPADM

## 2020-07-09 RX ORDER — CHOLECALCIFEROL (VITAMIN D3) 125 MCG
1000 CAPSULE ORAL EVERY EVENING
Status: DISCONTINUED | OUTPATIENT
Start: 2020-07-09 | End: 2020-07-10 | Stop reason: HOSPADM

## 2020-07-09 RX ORDER — ALBUTEROL SULFATE 2.5 MG/3ML
2.5 SOLUTION RESPIRATORY (INHALATION) EVERY 6 HOURS PRN
Status: DISCONTINUED | OUTPATIENT
Start: 2020-07-09 | End: 2020-07-10 | Stop reason: HOSPADM

## 2020-07-09 RX ORDER — MULTIPLE VITAMINS W/ MINERALS TAB 9MG-400MCG
1 TAB ORAL EVERY EVENING
Status: DISCONTINUED | OUTPATIENT
Start: 2020-07-09 | End: 2020-07-10 | Stop reason: HOSPADM

## 2020-07-09 RX ORDER — ISOSORBIDE MONONITRATE 30 MG/1
30 TABLET, EXTENDED RELEASE ORAL EVERY EVENING
Status: DISCONTINUED | OUTPATIENT
Start: 2020-07-09 | End: 2020-07-10 | Stop reason: HOSPADM

## 2020-07-09 RX ORDER — AMITRIPTYLINE HYDROCHLORIDE 25 MG/1
25 TABLET, FILM COATED ORAL NIGHTLY
Status: DISCONTINUED | OUTPATIENT
Start: 2020-07-09 | End: 2020-07-10 | Stop reason: HOSPADM

## 2020-07-09 RX ORDER — SODIUM CHLORIDE 0.9 % (FLUSH) 0.9 %
3 SYRINGE (ML) INJECTION EVERY 12 HOURS SCHEDULED
Status: DISCONTINUED | OUTPATIENT
Start: 2020-07-09 | End: 2020-07-10 | Stop reason: HOSPADM

## 2020-07-09 RX ADMIN — FAMOTIDINE 20 MG: 10 INJECTION, SOLUTION INTRAVENOUS at 07:09

## 2020-07-09 RX ADMIN — SODIUM CHLORIDE, POTASSIUM CHLORIDE, SODIUM LACTATE AND CALCIUM CHLORIDE 100 ML/HR: 600; 310; 30; 20 INJECTION, SOLUTION INTRAVENOUS at 12:04

## 2020-07-09 RX ADMIN — AMITRIPTYLINE HYDROCHLORIDE 25 MG: 25 TABLET, FILM COATED ORAL at 21:19

## 2020-07-09 RX ADMIN — PANTOPRAZOLE SODIUM 40 MG: 40 TABLET, DELAYED RELEASE ORAL at 17:09

## 2020-07-09 RX ADMIN — SODIUM CHLORIDE, PRESERVATIVE FREE 3 ML: 5 INJECTION INTRAVENOUS at 21:19

## 2020-07-09 RX ADMIN — MULTIPLE VITAMINS W/ MINERALS TAB 1 TABLET: TAB at 17:09

## 2020-07-09 RX ADMIN — DULOXETINE HYDROCHLORIDE 60 MG: 30 CAPSULE, DELAYED RELEASE ORAL at 17:10

## 2020-07-09 RX ADMIN — BISOPROLOL FUMARATE 5 MG: 5 TABLET ORAL at 17:09

## 2020-07-09 RX ADMIN — DEXAMETHASONE SODIUM PHOSPHATE 8 MG: 4 INJECTION, SOLUTION INTRAMUSCULAR; INTRAVENOUS at 08:25

## 2020-07-09 RX ADMIN — ISOSORBIDE MONONITRATE 30 MG: 30 TABLET, EXTENDED RELEASE ORAL at 17:10

## 2020-07-09 RX ADMIN — SODIUM CHLORIDE, POTASSIUM CHLORIDE, SODIUM LACTATE AND CALCIUM CHLORIDE 100 ML/HR: 600; 310; 30; 20 INJECTION, SOLUTION INTRAVENOUS at 23:58

## 2020-07-09 RX ADMIN — CEFAZOLIN SODIUM 1 G: 1 SOLUTION INTRAVENOUS at 17:11

## 2020-07-09 RX ADMIN — ROPIVACAINE HYDROCHLORIDE 20 ML: 5 INJECTION, SOLUTION EPIDURAL; INFILTRATION; PERINEURAL at 07:40

## 2020-07-09 RX ADMIN — LIDOCAINE HYDROCHLORIDE 10 ML: 20 INJECTION, SOLUTION EPIDURAL; INFILTRATION; INTRACAUDAL; PERINEURAL at 07:40

## 2020-07-09 RX ADMIN — ROCURONIUM BROMIDE 50 MG: 10 INJECTION INTRAVENOUS at 08:25

## 2020-07-09 RX ADMIN — GLYCOPYRROLATE 0.8 MG: 0.2 INJECTION, SOLUTION INTRAMUSCULAR; INTRAVENOUS at 10:31

## 2020-07-09 RX ADMIN — ATORVASTATIN CALCIUM 10 MG: 10 TABLET, FILM COATED ORAL at 21:19

## 2020-07-09 RX ADMIN — ONDANSETRON 4 MG: 2 INJECTION INTRAMUSCULAR; INTRAVENOUS at 08:25

## 2020-07-09 RX ADMIN — TIOTROPIUM BROMIDE AND OLODATEROL 2 PUFF: 3.124; 2.736 SPRAY, METERED RESPIRATORY (INHALATION) at 20:18

## 2020-07-09 RX ADMIN — SODIUM CHLORIDE, POTASSIUM CHLORIDE, SODIUM LACTATE AND CALCIUM CHLORIDE: 600; 310; 30; 20 INJECTION, SOLUTION INTRAVENOUS at 09:59

## 2020-07-09 RX ADMIN — SODIUM CHLORIDE, POTASSIUM CHLORIDE, SODIUM LACTATE AND CALCIUM CHLORIDE 1000 ML: 600; 310; 30; 20 INJECTION, SOLUTION INTRAVENOUS at 07:13

## 2020-07-09 RX ADMIN — CYANOCOBALAMIN TAB 500 MCG 1000 MCG: 500 TAB at 17:10

## 2020-07-09 RX ADMIN — Medication 6 ML/HR: at 11:18

## 2020-07-09 RX ADMIN — PROPOFOL 200 MG: 10 INJECTION, EMULSION INTRAVENOUS at 08:25

## 2020-07-09 RX ADMIN — CEFAZOLIN SODIUM 2 G: 2 SOLUTION INTRAVENOUS at 08:25

## 2020-07-09 RX ADMIN — Medication 5 MG: at 10:31

## 2020-07-09 RX ADMIN — MIDAZOLAM HYDROCHLORIDE 2 MG: 1 INJECTION, SOLUTION INTRAMUSCULAR; INTRAVENOUS at 07:17

## 2020-07-09 RX ADMIN — FOLIC ACID 1 MG: 1 TABLET ORAL at 17:09

## 2020-07-09 RX ADMIN — FLUTICASONE PROPIONATE 1 SPRAY: 50 SPRAY, METERED NASAL at 17:09

## 2020-07-09 RX ADMIN — ASPIRIN 81 MG: 81 TABLET, COATED ORAL at 17:10

## 2020-07-09 RX ADMIN — GABAPENTIN 300 MG: 300 CAPSULE ORAL at 17:35

## 2020-07-09 NOTE — ANESTHESIA POSTPROCEDURE EVALUATION
Patient: Elliott Dunn    Procedure Summary     Date:  07/09/20 Room / Location:  [facility] OR [facility] OR    Anesthesia Start:  0823 Anesthesia Stop:  1042    Procedure:  Left reverse total shoulder arthroplasty (Left Shoulder) Diagnosis:       Rotator cuff tear arthropathy of left shoulder      Arthropathy of left shoulder      (Rotator cuff tear arthropathy of left shoulder [M75.102, M12.812])      (Arthropathy of left shoulder [M19.012])    Surgeon:  Rony Pandey MD Provider:  Martin Edmond CRNA    Anesthesia Type:  general with block ASA Status:  3          Anesthesia Type: general with block    Vitals  Vitals Value Taken Time   /70 7/9/2020 12:16 PM   Temp 97.7 °F (36.5 °C) 7/9/2020 10:40 AM   Pulse 61 7/9/2020 12:19 PM   Resp 15 7/9/2020 11:45 AM   SpO2 95 % 7/9/2020 12:19 PM   Vitals shown include unvalidated device data.        Post Anesthesia Care and Evaluation    Patient location during evaluation: PACU  Patient participation: complete - patient participated  Level of consciousness: awake  Pain score: 3  Pain management: adequate  Airway patency: patent  Anesthetic complications: No anesthetic complications  PONV Status: controlled  Cardiovascular status: acceptable and stable  Respiratory status: acceptable and face mask  Hydration status: acceptable

## 2020-07-09 NOTE — ANESTHESIA PROCEDURE NOTES
Airway  Urgency: elective    Date/Time: 7/9/2020 8:37 AM  Airway not difficult    General Information and Staff    Patient location during procedure: OR  CRNA: Martin Edmond CRNA    Indications and Patient Condition  Indications for airway management: airway protection    Preoxygenated: yes  Mask difficulty assessment: 1 - vent by mask    Final Airway Details  Final airway type: endotracheal airway      Successful airway: ETT  Cuffed: yes   Successful intubation technique: direct laryngoscopy  Endotracheal tube insertion site: oral  Blade: Damir  Blade size: 3  ETT size (mm): 8.0  Cormack-Lehane Classification: grade I - full view of glottis  Placement verified by: chest auscultation and capnometry   Measured from: lips  ETT/EBT  to lips (cm): 21  Number of attempts at approach: 1  Assessment: lips, teeth, and gum same as pre-op and atraumatic intubation

## 2020-07-09 NOTE — ANESTHESIA PROCEDURE NOTES
Peripheral Block      Patient reassessed immediately prior to procedure    Reason for block: at surgeon's request and post-op pain management  Performed by  CRNA: Martin Edmond CRNA  Preanesthetic Checklist  Completed: patient identified, site marked, surgical consent, pre-op evaluation, timeout performed, IV checked, risks and benefits discussed and monitors and equipment checked  Prep:  Sterile barriers:cap, gloves, mask and sterile barriers  Prep: ChloraPrep  Patient monitoring: blood pressure monitoring, continuous pulse oximetry and EKG  Procedure  Sedation:yes    Guidance:ultrasound guided  ULTRASOUND INTERPRETATION. Using ultrasound guidance a gauge needle was placed in close proximity to the brachial plexus nerve, at which point, under ultrasound guidance anesthetic was injected in the area of the nerve and spread of the anesthesia was seen on ultrasound in close proximity thereto.  There were no abnormalities seen on ultrasound; a digital image was taken; and the patient tolerated the procedure with no complications. Images:still images obtained    Laterality:left  Block Type:interscalene  Injection Technique:catheter  Needle Type:echogenic  Needle Gauge:20 G  Resistance on Injection: none  Catheter Size:18 G  Cath Depth at skin: 5 cm    Medications Used: lidocaine PF (XYLOCAINE) injection 2 %, 10 mL  ropivacaine (NAROPIN) injection 0.5 %, 20 mL      Medications  Comment:Adjuncts per total volume of LA:    Decadron 10 mg PSF      If required, intravenous sedation was given -- see meds on anesthesia record.    Post Assessment  Injection Assessment: negative aspiration for heme, no paresthesia on injection and incremental injection  Patient Tolerance:comfortable throughout block  Complications:no  Additional Notes  Procedure:                CATHETER INTERSCALENE                                                                        Catheter at skin-5                                       Patient  analgesia was achieved with IV Sedation( see meds)      The pt was placed in semi-fowlers position with a slight tilt of the thorax contralateral to the insertion site.  The Insertion Site was prepped and draped in sterile fashion.  The skin was anesthetized with Lidocaine 1% 1ml injection utilizing a 25g needle.  Utilizing ultrasound guidance, a I-Flow 18 ga echogenic needle was advanced in-plane.  Major vessels (carotid and Internal Jugular) were visualized as the brachial plexus was approached at the approximate level of C-7/ T-1.  Cervical 5 and Branches of Cervical 6 nerve roots were visualized and the needle tip was placed posterior at the level of C-6 roots.  LA spread was visualized and injection was made incrementally every 5 mls with aspiration. Injection pressure was normal or little; there was no intraneural injection, no vascular injection.      The I-Flow 20  catheter was then placed under ultrasound guidance on the posterior aspect of the Brachial Plexus. Location of catheter was confirmed with NS or air injection visualized with ultrasound . The needle was then removed and the skin was sealed with Skin Affiix at catheter insertion site.  Skin was prepped with benzoin or mastisol and the labeled curled catheter was secured with steristrips and a transparent dressing.

## 2020-07-10 ENCOUNTER — READMISSION MANAGEMENT (OUTPATIENT)
Dept: CALL CENTER | Facility: HOSPITAL | Age: 65
End: 2020-07-10

## 2020-07-10 VITALS
HEIGHT: 71 IN | SYSTOLIC BLOOD PRESSURE: 128 MMHG | RESPIRATION RATE: 17 BRPM | BODY MASS INDEX: 21.67 KG/M2 | TEMPERATURE: 97.7 F | DIASTOLIC BLOOD PRESSURE: 63 MMHG | HEART RATE: 50 BPM | WEIGHT: 154.76 LBS | OXYGEN SATURATION: 98 %

## 2020-07-10 LAB
ANION GAP SERPL CALCULATED.3IONS-SCNC: 8.6 MMOL/L (ref 5–15)
BASOPHILS # BLD AUTO: 0.03 10*3/MM3 (ref 0–0.2)
BASOPHILS NFR BLD AUTO: 0.2 % (ref 0–1.5)
BUN SERPL-MCNC: 21 MG/DL (ref 8–23)
BUN/CREAT SERPL: 25 (ref 7–25)
CALCIUM SPEC-SCNC: 9.2 MG/DL (ref 8.6–10.5)
CHLORIDE SERPL-SCNC: 103 MMOL/L (ref 98–107)
CO2 SERPL-SCNC: 22.4 MMOL/L (ref 22–29)
CREAT SERPL-MCNC: 0.84 MG/DL (ref 0.76–1.27)
DEPRECATED RDW RBC AUTO: 48.1 FL (ref 37–54)
EOSINOPHIL # BLD AUTO: 0 10*3/MM3 (ref 0–0.4)
EOSINOPHIL NFR BLD AUTO: 0 % (ref 0.3–6.2)
ERYTHROCYTE [DISTWIDTH] IN BLOOD BY AUTOMATED COUNT: 13.5 % (ref 12.3–15.4)
GFR SERPL CREATININE-BSD FRML MDRD: 92 ML/MIN/1.73
GLUCOSE SERPL-MCNC: 134 MG/DL (ref 65–99)
HCT VFR BLD AUTO: 36.8 % (ref 37.5–51)
HGB BLD-MCNC: 12.3 G/DL (ref 13–17.7)
IMM GRANULOCYTES # BLD AUTO: 0.07 10*3/MM3 (ref 0–0.05)
IMM GRANULOCYTES NFR BLD AUTO: 0.5 % (ref 0–0.5)
LYMPHOCYTES # BLD AUTO: 1.02 10*3/MM3 (ref 0.7–3.1)
LYMPHOCYTES NFR BLD AUTO: 6.9 % (ref 19.6–45.3)
MCH RBC QN AUTO: 32.8 PG (ref 26.6–33)
MCHC RBC AUTO-ENTMCNC: 33.4 G/DL (ref 31.5–35.7)
MCV RBC AUTO: 98.1 FL (ref 79–97)
MONOCYTES # BLD AUTO: 1.63 10*3/MM3 (ref 0.1–0.9)
MONOCYTES NFR BLD AUTO: 11.1 % (ref 5–12)
NEUTROPHILS NFR BLD AUTO: 11.98 10*3/MM3 (ref 1.7–7)
NEUTROPHILS NFR BLD AUTO: 81.3 % (ref 42.7–76)
NRBC BLD AUTO-RTO: 0 /100 WBC (ref 0–0.2)
PLATELET # BLD AUTO: 171 10*3/MM3 (ref 140–450)
PMV BLD AUTO: 10.3 FL (ref 6–12)
POTASSIUM SERPL-SCNC: 4.8 MMOL/L (ref 3.5–5.2)
RBC # BLD AUTO: 3.75 10*6/MM3 (ref 4.14–5.8)
SODIUM SERPL-SCNC: 134 MMOL/L (ref 136–145)
WBC # BLD AUTO: 14.73 10*3/MM3 (ref 3.4–10.8)

## 2020-07-10 PROCEDURE — 80048 BASIC METABOLIC PNL TOTAL CA: CPT | Performed by: ORTHOPAEDIC SURGERY

## 2020-07-10 PROCEDURE — 97165 OT EVAL LOW COMPLEX 30 MIN: CPT

## 2020-07-10 PROCEDURE — 99024 POSTOP FOLLOW-UP VISIT: CPT | Performed by: ORTHOPAEDIC SURGERY

## 2020-07-10 PROCEDURE — G0378 HOSPITAL OBSERVATION PER HR: HCPCS

## 2020-07-10 PROCEDURE — 25010000003 CEFAZOLIN SODIUM-DEXTROSE 1-4 GM-%(50ML) RECONSTITUTED SOLUTION: Performed by: ORTHOPAEDIC SURGERY

## 2020-07-10 PROCEDURE — 85025 COMPLETE CBC W/AUTO DIFF WBC: CPT | Performed by: ORTHOPAEDIC SURGERY

## 2020-07-10 PROCEDURE — 25010000002 ENOXAPARIN PER 10 MG: Performed by: ORTHOPAEDIC SURGERY

## 2020-07-10 RX ORDER — HYDROCODONE BITARTRATE AND ACETAMINOPHEN 7.5; 325 MG/1; MG/1
1 TABLET ORAL EVERY 8 HOURS PRN
Qty: 21 TABLET | Refills: 0 | Status: SHIPPED | OUTPATIENT
Start: 2020-07-10 | End: 2020-07-20

## 2020-07-10 RX ADMIN — SODIUM CHLORIDE, PRESERVATIVE FREE 3 ML: 5 INJECTION INTRAVENOUS at 10:08

## 2020-07-10 RX ADMIN — CEFAZOLIN SODIUM 1 G: 1 SOLUTION INTRAVENOUS at 00:52

## 2020-07-10 RX ADMIN — ENOXAPARIN SODIUM 40 MG: 40 INJECTION SUBCUTANEOUS at 09:33

## 2020-07-11 NOTE — OUTREACH NOTE
Prep Survey      Responses   [facility] facility patient discharged from?  [place]   Is LACE score < 7 ?  No   Eligibility  [facility]   Date of Admission  07/09/20   Date of Discharge  07/10/20   Discharge Disposition  Home or Self Care   Discharge diagnosis   Left reverse total shoulder replacement   COVID-19 Test Status  Negative   Does the patient have one of the following disease processes/diagnoses(primary or secondary)?  Total Joint Replacement   Does the patient have Home health ordered?  No   Is there a DME ordered?  No   Prep survey completed?  Yes          Brissa Brumfield RN

## 2020-07-13 ENCOUNTER — TRANSITIONAL CARE MANAGEMENT TELEPHONE ENCOUNTER (OUTPATIENT)
Dept: CALL CENTER | Facility: HOSPITAL | Age: 65
End: 2020-07-13

## 2020-07-13 DIAGNOSIS — F51.01 PRIMARY INSOMNIA: ICD-10-CM

## 2020-07-13 DIAGNOSIS — M06.4 INFLAMMATORY POLYARTHROPATHY (HCC): ICD-10-CM

## 2020-07-13 DIAGNOSIS — G89.4 CHRONIC PAIN SYNDROME: ICD-10-CM

## 2020-07-13 LAB
LAB AP CASE REPORT: NORMAL
PATH REPORT.FINAL DX SPEC: NORMAL

## 2020-07-13 RX ORDER — ISOSORBIDE MONONITRATE 30 MG/1
TABLET, EXTENDED RELEASE ORAL
Qty: 30 TABLET | Refills: 1 | Status: SHIPPED | OUTPATIENT
Start: 2020-07-13 | End: 2020-09-21

## 2020-07-13 RX ORDER — AMITRIPTYLINE HYDROCHLORIDE 25 MG/1
TABLET, FILM COATED ORAL
Qty: 63 TABLET | Refills: 2 | Status: SHIPPED | OUTPATIENT
Start: 2020-07-13 | End: 2021-09-13 | Stop reason: SDUPTHER

## 2020-07-13 NOTE — OUTREACH NOTE
Call Center TCM Note      Responses   [facility] patient discharged from?  [facility]   Does the patient have one of the following disease processes/diagnoses(primary or secondary)?  Total Joint Replacement   Joint surgery performed?  Shoulder   TCM attempt successful?  Yes   Call start time  0928   Call end time  0931   Has the patient been back in either the hospital or Emergency Department since discharge?  No   Discharge diagnosis   Left reverse total shoulder replacement   Is patient permission given to speak with other caregiver?  Yes   Does the patient have all medications related to this admission filled (includes all antibiotics, pain medications, etc.)  Yes   Is the patient taking all medications as directed (includes completed medication regime)?  Yes   Is the patient able to teach back alternate methods of pain control?  Shoulder-elevate above heart/ keep in sling as advised, Reposition, Correct alignment, Short, frequent activity, Ice   Does the patient have a follow up appointment with their surgeon?  Yes   Has the patient kept scheduled appointments due by today?  N/A   Comments  PCP appt on 7/30   Psychosocial issues?  No   Has the patient fallen since discharge?  No   Did the patient receive a copy of their discharge instructions?  Yes   What is the patient's perception of their functional status since discharge?  Improving   Is the patient able to teach back signs and symptoms of infection?  Temp >100.4 for 24h or longer   Is the patient able to teach back how to prevent infection?  Eat well-balanced diet, Shower only as directed by surgeon, No lotion or creams, No tub baths, hot tub or swimming, Keep incision covered if drainage, Check incision daily, Wash hands before and after touching incision   Is the patient/caregiver able to teach back the hierarchy of who to call/visit for symptoms/problems? PCP, Specialist, Home health nurse, Urgent Care, ED, 911  Yes   TCM call completed?  Yes   Wrap  up additional comments  Patient says that he is doing well, no questions or concerns at this time, confirmed appt for 7/30.          Rosalina John RN    7/13/2020, 09:31

## 2020-07-16 ENCOUNTER — HOSPITAL ENCOUNTER (EMERGENCY)
Facility: HOSPITAL | Age: 65
Discharge: HOME | End: 2020-07-16
Attending: STUDENT IN AN ORGANIZED HEALTH CARE EDUCATION/TRAINING PROGRAM | Admitting: STUDENT IN AN ORGANIZED HEALTH CARE EDUCATION/TRAINING PROGRAM

## 2020-07-16 ENCOUNTER — APPOINTMENT (OUTPATIENT)
Dept: ULTRASOUND IMAGING | Facility: HOSPITAL | Age: 65
End: 2020-07-16

## 2020-07-16 ENCOUNTER — NURSE TRIAGE (OUTPATIENT)
Dept: CALL CENTER | Facility: HOSPITAL | Age: 65
End: 2020-07-16

## 2020-07-16 VITALS
DIASTOLIC BLOOD PRESSURE: 82 MMHG | SYSTOLIC BLOOD PRESSURE: 131 MMHG | WEIGHT: 145.8 LBS | OXYGEN SATURATION: 98 % | TEMPERATURE: 98 F | HEIGHT: 71 IN | RESPIRATION RATE: 18 BRPM | BODY MASS INDEX: 20.41 KG/M2 | HEART RATE: 77 BPM

## 2020-07-16 DIAGNOSIS — I82.612 CEPHALIC VEIN THROMBOSIS, LEFT: Primary | ICD-10-CM

## 2020-07-16 DIAGNOSIS — M79.89 LEFT ARM SWELLING: ICD-10-CM

## 2020-07-16 PROCEDURE — 99282 EMERGENCY DEPT VISIT SF MDM: CPT

## 2020-07-16 PROCEDURE — 93971 EXTREMITY STUDY: CPT

## 2020-07-16 PROCEDURE — 93931 UPPER EXTREMITY STUDY: CPT

## 2020-07-16 NOTE — TELEPHONE ENCOUNTER
"Recent left shoulder surgery, has swelling of left hand to elbow and numbness. Left hand is cold. Has a faint pulse according to wife. Coming to ED    Reason for Disposition  • Sounds like a serious complication to the triager    Additional Information  • Negative: Sounds like a life-threatening emergency to the triager  • Negative: Chest pain  • Negative: Difficulty breathing  • Negative: Acting confused (e.g., disoriented, slurred speech) or excessively sleepy  • Negative: Surgical incision symptoms and questions  • Negative: [1] Discomfort (pain, burning or stinging) when passing urine AND [2] male  • Negative: [1] Discomfort (pain, burning or stinging) when passing urine AND [2] female  • Negative: Constipation  • Negative: New or worsening leg (calf, thigh) pain  • Negative: New or worsening leg swelling  • Negative: Dizziness is severe, or persists > 24 hours after surgery  • Negative: Pain, redness, swelling, or pus at IV Site  • Negative: Symptoms arising from use of a urinary catheter (Hawley or Coude)  • Negative: Cast problems or questions  • Negative: Medication question  • Negative: [1] Widespread rash AND [2] bright red, sunburn-like  • Negative: [1] SEVERE headache AND [2] after spinal (epidural) anesthesia  • Negative: [1] Vomiting AND [2] persists > 4 hours  • Negative: [1] Vomiting AND [2] abdomen looks much more swollen than usual  • Negative: [1] Drinking very little AND [2] dehydration suspected (e.g., no urine > 12 hours, very dry mouth, very lightheaded)  • Negative: Patient sounds very sick or weak to the triager    Answer Assessment - Initial Assessment Questions  1. SYMPTOM: \"What's the main symptom you're concerned about?\" (e.g., pain, fever, vomiting)     Recent shoulder surgery and hand and fingers are numb and swollen  2. ONSET: \"When did one week  start?\"      week  3. SURGERY: \"What surgery was performed?\"      Shoulder surgery  4. DATE of SURGERY: \"When was surgery performed?\"       " "One week  5. ANESTHESIA: \" What type of anesthesia did you have?\" (e.g., general, spinal, epidural, local)      general  6. PAIN: \"Is there any pain?\" If so, ask: \"How bad is it?\"  (Scale 1-10; or mild, moderate, severe)     no  7. FEVER: \"Do you have a fever?\" If so, ask: \"What is your temperature, how was it measured, and when did it start?\"      no  8. VOMITING: \"Is there any vomiting?\" If yes, ask: \"How many times?\"      no  9. BLEEDING: \"Is there any bleeding?\" If so, ask: \"How much?\" and \"Where?\"      no  10. OTHER SYMPTOMS: \"Do you have any other symptoms?\" (e.g., drainage from wound, painful urination, constipation)         Swelling of the hand to elbow with numbness and hand is cold    Protocols used: POST-OP SYMPTOMS AND QUESTIONS-ADULT-      "

## 2020-07-17 ENCOUNTER — EPISODE CHANGES (OUTPATIENT)
Dept: CASE MANAGEMENT | Facility: OTHER | Age: 65
End: 2020-07-17

## 2020-07-17 NOTE — DISCHARGE INSTRUCTIONS
Use warm compresses as well as ibuprofen.  Stop using the sling and elevate your arm when possible.

## 2020-07-17 NOTE — ED PROVIDER NOTES
Subjective   65-year-old male that presents with concerns for left upper extremity swelling.  Patient had a complete shoulder replacement done by  1 week ago.  States it is left arm is still swollen and his fingers are numb.  Patient states pain is okay and actually has improved since surgery.  Patient has been wearing his sling most of the time.  He states that his arm does not feel like it is more swollen, but that it is not improving like he thought it would.          Review of Systems   All other systems reviewed and are negative.      Past Medical History:   Diagnosis Date   • Acquired absence of all teeth    • Allergic    • Anomalous coronary artery origin    • Arrhythmia    • Arthritis    • Arthritis of lumbar spine 7/29/2016   • Back pain 6/17/2016   • Cristina esophagus    • Cancer (CMS/HCC)     prostate   • Cataract     REMOVED BILATERALL   • Cervical spine disease 6/17/2016   • CHF (congestive heart failure) (CMS/HCC)    • Chronic obstructive pulmonary disease (CMS/HCC)    • Colon polyps    • Deafness in left ear    • Derangement of anterior horn of medial meniscus    • Difficulty swallowing    • Disorder of lumbar spine 6/17/2016   • Disorder of thoracic spine 6/17/2016   • Diverticulitis of colon    • DJD (degenerative joint disease)    • Elevated liver enzymes    • Erectile dysfunction    • Esophageal reflux    • Essential hypertension     PT DENIES SAYS IT RUNS LOW   • Glaucoma    • Hard of hearing     LEFT EAR NO AIDS WORN   • Heart murmur    • Hiatal hernia    • High cholesterol    • Impaired functional mobility, balance, gait, and endurance    • Inflammatory polyarthropathy (CMS/HCC)     Per Dr. Haider. Negative NEO, normal ESR, RF, anti-ccp and did not improve with prednisone per notes.   • Inguinal hernia    • Insomnia    • Lateral meniscus derangement    • Left otitis media with spontaneous rupture of eardrum    • Locking of left knee    • Low back pain    • Meniere's disease    • MGUS  (monoclonal gammopathy of unknown significance)     likely diagnosis   • Murmur    • Neuromuscular disorder (CMS/HCC)    • Neuropathic arthritis    • No natural teeth    • Perforation of left tympanic membrane    • Pulmonary emphysema (CMS/HCC)    • Recent shoulder injury     LEFT SHOULDER   • Scoliosis    • Skin cancer     skin cancer   • Skin cancer of face     squamous cell   • Spina bifida occulta 6/17/2016   • Tobacco abuse 11/9/2017   • Visual impairment    • Wears glasses    • Wears glasses     READING GLASSES ONLY       Allergies   Allergen Reactions   • Cyclobenzaprine Other (See Comments)     Wide awake   • Plaquenil [Hydroxychloroquine Sulfate] Other (See Comments)     Black eyes   • Pravastatin Myalgia     Weakness / fatigue       Past Surgical History:   Procedure Laterality Date   • COLONOSCOPY     • ENDOSCOPY N/A 4/10/2017    Procedure: ESOPHAGOGASTRODUODENOSCOPY WITH BIOPSY;  Surgeon: Alexander Ritchie MD;  Location: [facility] ENDOSCOPY;  Service:    • EYE SURGERY     • HERNIA REPAIR     • INGUINAL HERNIA REPAIR Right    • INGUINAL HERNIA REPAIR     • KNEE SURGERY Left    • LYMPH NODE BIOPSY     • MULTIPLE TOOTH EXTRACTIONS     • PROSTATE SURGERY  2004   • PROSTATECTOMY  06/30/2014   • PROSTATECTOMY      Prostatectomy Radical   • SHOULDER ARTHROSCOPY Left 2/6/2020    Procedure: DIAGNOSTIC SHOULDER ARTHROSCOPY LEFT WITH LABRAL DEBRIDEMENT, SUBACROMIAL BURSECTOMY, ASSESSMENT OF LARGE FRAGMENTED RETRACTED IRREPARABLE ROTATOR CUFF TEARS;  Surgeon: Rony Pandey MD;  Location: [facility] OR;  Service: Orthopedics;  Laterality: Left;   • SKIN CANCER EXCISION  2017    both sides of body/squamous cell melanoma   • TOTAL SHOULDER ARTHROPLASTY W/ DISTAL CLAVICLE EXCISION Left 7/9/2020    Procedure: Left reverse total shoulder arthroplasty;  Surgeon: Rony Pandey MD;  Location: [facility] OR;  Service: Orthopedics;  Laterality: Left;   • TYMPANOPLASTY W/ MASTOIDECTOMY     • UMBILICAL HERNIA REPAIR          Family History   Problem Relation Age of Onset   • Diabetes Mother    • Hypertension Mother    • Obesity Mother    • Stroke Mother 65   • Arthritis Mother    • Hyperlipidemia Mother    • Vision loss Mother    • Cancer Father    • Cancer Sister    • Diabetes Brother    • Cancer Brother    • Heart attack Brother    • Alcohol abuse Brother    • Drug abuse Brother    • Hearing loss Brother    • Learning disabilities Brother        Social History     Socioeconomic History   • Marital status:      Spouse name: Not on file   • Number of children: Not on file   • Years of education: Not on file   • Highest education level: Not on file   Occupational History   • Occupation: retired    Tobacco Use   • Smoking status: Former Smoker     Packs/day: 1.00     Years: 51.00     Pack years: 51.00     Types: Cigarettes     Start date: 1963     Last attempt to quit: 2018     Years since quittin.5   • Smokeless tobacco: Never Used   Substance and Sexual Activity   • Alcohol use: No   • Drug use: No   • Sexual activity: Yes     Partners: Female     Birth control/protection: None   Social History Narrative    Left hand dominant, writes with right hand           Objective   Physical Exam   Nursing note and vitals reviewed.      GEN: No acute distress  Head: Normocephalic, atraumatic  Eyes: Pupils equal round reactive to light  ENT: Posterior pharynx normal in appearance, oral mucosa is moist  Cardiovascular: Regular rate  Lungs: Clear to auscultation bilaterally  Abdomen: Soft, nontender, nondistended, no peritoneal signs  Extremities: Patient has left upper extremity swelling and forearm and the hand with dependent ecchymosis in the forearm.  Patient can make a fist with his hand but he cannot squeeze tightly due to swelling.  He has strong radial pulse.  Good capillary refill.  Neuro: GCS 15  Psych: Mood and affect are appropriate        Procedures           ED Course                                            MDM  Number of Diagnoses or Management Options  Cephalic vein thrombosis, left:   Left arm swelling:   Diagnosis management comments:    US Venous Doppler Upper Extremity Left (duplex) (Final result)   Result time 07/16/20 21:29:53   Final result by Billy Casarez IV, MD (07/16/20 21:29:53)             Impression:     Venous thrombosis as above.    Authenticated by Brown Casarez MD on 07/16/2020 09:29:53 PM        Narrative:     FINAL REPORT    TECHNIQUE:  Graded compression, spectral analysis and ultrasound images of  the venous system of the upper extremity were obtained.    CLINICAL HISTORY:  LUE PAIN, SWELLING    COMPARISON:  None    FINDINGS:  There is thrombus within the left cephalic vein along the left  bicep. The remaining venous system are fully compressible and  demonstrate no evidence of thrombosis.              US Arterial Doppler Upper Extremity Left (Final result)   Result time 07/16/20 21:29:57   Final result by Billy Casarez IV, MD (07/16/20 21:29:57)             Impression:     No significant peripheral vascular disease.    Authenticated by Brown Casarez MD on 07/16/2020 09:29:57 PM        Narrative:     FINAL REPORT    CLINICAL HISTORY:  pain, swelling    COMPARISON:  None    FINDINGS:  The arterial system of the left upper extremity is within normal  limits. The waveforms are triphasic. There is no stenosis.             I did speak with Dr. Pandey to let him know about his patient coming to the emergency department.  I did send him a text after imaging to let him know the results.  Therapy for this will be warm compresses and nonsteroidal anti-inflammatory drugs.       Amount and/or Complexity of Data Reviewed  Tests in the radiology section of CPT®: reviewed  Decide to obtain previous medical records or to obtain history from someone other than the patient: yes  Obtain history from someone other than the patient: yes  Review and summarize past medical  records: yes  Independent visualization of images, tracings, or specimens: yes        Final diagnoses:   Cephalic vein thrombosis, left   Left arm swelling            Brown Dale MD  07/16/20 [id number]

## 2020-07-20 ENCOUNTER — READMISSION MANAGEMENT (OUTPATIENT)
Dept: CALL CENTER | Facility: HOSPITAL | Age: 65
End: 2020-07-20

## 2020-07-20 ENCOUNTER — OFFICE VISIT (OUTPATIENT)
Dept: CARDIOLOGY | Facility: CLINIC | Age: 65
End: 2020-07-20

## 2020-07-20 VITALS
WEIGHT: 144 LBS | HEIGHT: 71 IN | BODY MASS INDEX: 20.16 KG/M2 | OXYGEN SATURATION: 97 % | DIASTOLIC BLOOD PRESSURE: 58 MMHG | HEART RATE: 61 BPM | SYSTOLIC BLOOD PRESSURE: 100 MMHG

## 2020-07-20 DIAGNOSIS — Q24.5 ANOMALOUS CORONARY ARTERY ORIGIN: ICD-10-CM

## 2020-07-20 DIAGNOSIS — I20.0 UNSTABLE ANGINA (HCC): ICD-10-CM

## 2020-07-20 DIAGNOSIS — E78.49 OTHER HYPERLIPIDEMIA: ICD-10-CM

## 2020-07-20 DIAGNOSIS — I70.90 ARTERIOSCLEROTIC VASCULAR DISEASE: ICD-10-CM

## 2020-07-20 DIAGNOSIS — I20.8 ANGINAL EQUIVALENT (HCC): ICD-10-CM

## 2020-07-20 DIAGNOSIS — C61 MALIGNANT NEOPLASM OF PROSTATE (HCC): ICD-10-CM

## 2020-07-20 DIAGNOSIS — I10 ESSENTIAL HYPERTENSION: Primary | Chronic | ICD-10-CM

## 2020-07-20 DIAGNOSIS — Z72.0 TOBACCO ABUSE: ICD-10-CM

## 2020-07-20 PROCEDURE — 99214 OFFICE O/P EST MOD 30 MIN: CPT | Performed by: NURSE PRACTITIONER

## 2020-07-20 NOTE — OUTREACH NOTE
Total Joint Week 2 Survey      Responses   [facility] [place], operated by [facility] patient discharged from?  [facility]   Does the patient have one of the following disease processes/diagnoses(primary or secondary)?  Total Joint Replacement   Joint surgery performed?  Shoulder   Week 2 attempt successful?  Yes   Call start time  1416   Call end time  1422   Has the patient been back in either the hospital or Emergency Department since discharge?  Yes   If the patient has been back to hospital or Emergency Department list date and reason  Spouse states the patient had a blood clot   Discharge diagnosis   Left reverse total shoulder replacement   Is patient permission given to speak with other caregiver?  Yes   Person spoke with today (if not patient) and relationship  spouse-Radha   Does the patient have all medications related to this admission filled (includes all antibiotics, pain medications, etc.)  Yes   Is the patient taking all medications as directed (includes completed medication regime)?  Yes   Does the patient have a follow up appointment with their surgeon?  Yes   Has the patient kept scheduled appointments due by today?  N/A   Comments  Patient will see surgeon on wednesday   Psychosocial issues?  No   When is the first therapy visit scheduled (PO Day) including how many days per week   Patient is doing home excercises currently   Has the patient began therapy sessions (either in the home or as an out patient)?  No   Has the patient fallen since discharge?  No   Did the patient receive a copy of their discharge instructions?  Yes   Nursing interventions  Reviewed instructions with patient   What is the patient's perception of their functional status since discharge?  Improving   Is the patient/caregiver able to teach back the hierarchy of who to call/visit for symptoms/problems? PCP, Specialist, Home health nurse, Urgent Care, ED, 911  Yes   Week 2 call completed?  Yes   Wrap up additional comments  Spouse states patient is  improving at this time.  He has better mobility in the past week.  He is f/u with providers.          Paulina Cervantes RN

## 2020-07-20 NOTE — PROGRESS NOTES
Elliott Dunn is a 65 y.o. male.  MRN #: [id number]    Referring Provider: Ladonna Means MD    Chief Complaint:   Chief Complaint   Patient presents with   • Follow-up   • Hypertension        History of Present Illness:  Mr Dunn is a 65-year-old gentleman that presents for 6-month cardiac follow-up.  Patient has prior history of essential hypertension, hyperlipidemia, ASCVD, prior history of angina.  Last cardiac work-up was in September 2019, transesophageal echocardiogram shows normal LV systolic functioning with EF of 56 to 60%.  No valvular or wall abnormalities.  MPS shows no EKG evidence of ischemia, no perfusion findings suggestive of ischemia, findings consistent with a low risk study.  With today's evaluation, from a cardiac standpoint he denies any episodes of chest pain, chest palpitations chest pressure unusual shortness of breath or diaphoresis at rest or with exertion.  Patient recently underwent left shoulder rotator cuff surgery repair.  He was evaluated in the emergency department 7/16/2020 for increasing left upper extremity pain and edema and decreased sensation.  Venous and arterial ultrasounds were obtained which shows a left cephalic vein thrombus.  Patient currently is on aspirin therapy.  He has follow-up with orthopedics Wednesday, July 22.    The patient presents today with their self who contributes to the history of their care.     The following portions of the patient's history were reviewed and updated as appropriate: allergies, current medications, past family history, past medical history, past social history, past surgical history and problem list.     Review of Systems:     Review of Systems   Constitutional: Positive for fatigue. Negative for activity change, appetite change, diaphoresis, unexpected weight gain and unexpected weight loss.   HENT: Negative.    Eyes: Negative.  Negative for blurred vision, double vision, photophobia and visual disturbance.   Respiratory:  Negative.  Negative for apnea, cough, chest tightness, shortness of breath and wheezing.    Cardiovascular: Negative for chest pain, palpitations and leg swelling.        Cephalic vein thrombus on Doppler ultrasound obtained July 16, 2020.  History of CAD, essential hypertension, hyperlipidemia.   Gastrointestinal: Negative for abdominal distention, abdominal pain, nausea, vomiting, GERD and indigestion.   Endocrine: Negative.  Negative for cold intolerance, heat intolerance, polydipsia, polyphagia and polyuria.   Genitourinary: Negative.  Negative for decreased libido, frequency, genital sores, hematuria and urgency.   Musculoskeletal: Positive for arthralgias. Negative for back pain, joint swelling, myalgias, neck pain and neck stiffness.        Left rotator cuff repair   Skin: Negative for color change, dry skin, pallor, rash, skin lesions and bruise.   Allergic/Immunologic: Negative.  Negative for environmental allergies, food allergies and immunocompromised state.   Neurological: Negative.  Negative for dizziness, tremors, seizures, syncope, facial asymmetry, speech difficulty, weakness, light-headedness and numbness.   Hematological: Negative.  Negative for adenopathy. Does not bruise/bleed easily.   Psychiatric/Behavioral: Negative.  Negative for agitation, decreased concentration, self-injury, sleep disturbance, suicidal ideas, negative for hyperactivity and stress. The patient is not nervous/anxious.    All other systems reviewed and are negative.         Current Outpatient Medications:   •  albuterol (PROVENTIL HFA;VENTOLIN HFA) 108 (90 Base) MCG/ACT inhaler, Inhale 2 puffs Every 6 (Six) Hours As Needed for Wheezing or Shortness of Air., Disp: 1 inhaler, Rfl: 3  •  amitriptyline (ELAVIL) 25 MG tablet, TAKE ONE TO THREE TABLETS BY MOUTH EVERY NIGHT AT BEDTIME AS NEEDED FOR SLEEP, Disp: 63 tablet, Rfl: 2  •  aspirin 81 MG EC tablet, Take 81 mg by mouth Daily., Disp: , Rfl:   •  atorvastatin (LIPITOR) 10 MG  tablet, TAKE ONE TABLET BY MOUTH DAILY, Disp: 60 tablet, Rfl: 3  •  bisoprolol (ZEBeta) 5 MG tablet, TAKE ONE TABLET BY MOUTH DAILY, Disp: 90 tablet, Rfl: 2  •  coenzyme Q10 100 MG capsule, Take 100 mg by mouth Daily., Disp: , Rfl:   •  diclofenac (VOLTAREN) 1 % gel gel, Apply 4 g topically to the appropriate area as directed 3 (Three) Times a Day., Disp: 100 g, Rfl: 1  •  DULoxetine (CYMBALTA) 60 MG capsule, TAKE ONE CAPSULE BY MOUTH DAILY, Disp: 90 capsule, Rfl: 10  •  folic acid (FOLVITE) 1 MG tablet, Take 1 mg by mouth Daily., Disp: , Rfl:   •  gabapentin (NEURONTIN) 300 MG capsule, Take 1 capsule by mouth 3 (Three) Times a Day As Needed (nerve pain)., Disp: 270 capsule, Rfl: 1  •  isosorbide mononitrate (IMDUR) 30 MG 24 hr tablet, TAKE ONE TABLET BY MOUTH DAILY, Disp: 30 tablet, Rfl: 1  •  leflunomide (ARAVA) 10 MG tablet, , Disp: , Rfl:   •  methocarbamol (ROBAXIN) 750 MG tablet, TAKE ONE TABLET BY MOUTH THREE TIMES A DAY, Disp: 63 tablet, Rfl: 0  •  methotrexate 2.5 MG tablet, Take 8 tablets by mouth 1 (One) Time Per Week. (Patient taking differently: Take 20 mg by mouth 1 (One) Time Per Week. TAKES ON THURSDAY), Disp: 96 tablet, Rfl: 0  •  Multiple Vitamins-Minerals (MULTIVITAMIN WITH MINERALS) tablet tablet, Take 1 tablet by mouth Daily., Disp: , Rfl:   •  nitroglycerin (NITROSTAT) 0.4 MG SL tablet, 1 under the tongue as needed for angina, may repeat q5mins for up three doses, Disp: 100 tablet, Rfl: 11  •  pantoprazole (Protonix) 40 MG EC tablet, Take 1 tablet by mouth Daily., Disp: 90 tablet, Rfl: 4  •  predniSONE (DELTASONE) 10 MG tablet, Take 10 mg by mouth Daily As Needed. As needed, Disp: , Rfl:   •  STIOLTO RESPIMAT 2.5-2.5 MCG/ACT aerosol solution inhaler, INHALE TWO INHALATION(S) BY MOUTH DAILY, Disp: 1 inhaler, Rfl: 4  •  vitamin B-12 (CYANOCOBALAMIN) 1000 MCG tablet, TAKE ONE TABLET BY MOUTH DAILY, Disp: 30 tablet, Rfl: 8  •  HYDROcodone-acetaminophen (NORCO) 7.5-325 MG per tablet, Take 1 tablet  "by mouth Every 8 (Eight) Hours As Needed for Moderate Pain ., Disp: 21 tablet, Rfl: 0  •  Triamcinolone Acetonide (NASACORT) 55 MCG/ACT nasal inhaler, 2 sprays into each nostril daily., Disp: , Rfl:     Vitals:    07/20/20 1133   BP: 100/58   BP Location: Right arm   Patient Position: Sitting   Cuff Size: Adult   Pulse: 61   SpO2: 97%   Weight: 65.3 kg (144 lb)   Height: 180.3 cm (71\")       Physical Exam:     Physical Exam   Constitutional: He is oriented to person, place, and time. He appears well-developed and well-nourished. No distress.   HENT:   Head: Normocephalic and atraumatic.   Eyes: No scleral icterus.   Neck: Normal range of motion. Neck supple. No JVD present. Carotid bruit is not present. No tracheal deviation present.   Cardiovascular: Normal rate, regular rhythm, S1 normal, S2 normal, normal heart sounds and intact distal pulses. PMI is not displaced. Exam reveals no gallop and no friction rub.   No murmur heard.  Pulses:       Carotid pulses are 1+ on the right side, and 1+ on the left side.       Radial pulses are 2+ on the right side, and 1+ on the left side.   Pulmonary/Chest: Effort normal and breath sounds normal. No respiratory distress. He has no wheezes. He has no rales. He exhibits no tenderness.   Abdominal: Soft. Bowel sounds are normal. He exhibits no mass. There is no tenderness.   Musculoskeletal: Normal range of motion. He exhibits edema.   Left upper extremity with edema.  Diminished radial pulse but palpable, capillary refill is present at less than 5 seconds.   Neurological: He is alert and oriented to person, place, and time. No sensory deficit.   Numbness to distal tip of all 5 digits left hand   Skin: Skin is warm and dry. Capillary refill takes less than 2 seconds. No rash noted. He is not diaphoretic.   Psychiatric: He has a normal mood and affect. His behavior is normal. Judgment and thought content normal.   Nursing note and vitals reviewed.      Procedures    Results: "   Reviewed medication regimen and updated.  Vital signs stable, blood pressure 100/58, heart rate 61 regular rate and rhythm.  Patient evaluated in the emergency department    Assessment/Plan:   No changes made in medication regimen.  Next cardiology follow-up will be 6 months.  Follow-up with Dr. Pandey in 48 hours.  Elliott was seen today for follow-up and hypertension.    Diagnoses and all orders for this visit:    Essential hypertension  Bisoprolol 5 mg p.o. daily  Other hyperlipidemia  Atorvastatin 10 mg p.o. daily  Unstable angina (CMS/HCC)  Isosorbide 30 mg p.o. Daily  Nitroglycerin as directed for chest pain, denies any recent episodes of angina type chest pain  Anomalous coronary artery origin    Arteriosclerotic vascular disease  Aspirin 81 mg p.o. daily  Anginal equivalent (CMS/HCC)  Stable with no recent episodes of chest pain.  Malignant neoplasm of prostate (CMS/HCC)  Followed through urology  Tobacco abuse  He continues to be a a non-smoker.      Return in about 6 months (around 1/20/2021).    MARK Rivas

## 2020-07-22 ENCOUNTER — OFFICE VISIT (OUTPATIENT)
Dept: ORTHOPEDIC SURGERY | Facility: CLINIC | Age: 65
End: 2020-07-22

## 2020-07-22 VITALS — WEIGHT: 144 LBS | HEIGHT: 71 IN | BODY MASS INDEX: 20.16 KG/M2 | RESPIRATION RATE: 18 BRPM

## 2020-07-22 DIAGNOSIS — Z96.612 S/P REVERSE TOTAL SHOULDER ARTHROPLASTY, LEFT: Primary | ICD-10-CM

## 2020-07-22 PROCEDURE — 99024 POSTOP FOLLOW-UP VISIT: CPT | Performed by: PHYSICIAN ASSISTANT

## 2020-07-22 NOTE — PROGRESS NOTES
Subjective   Patient ID: Elliott Dunn is a 65 y.o. left hand dominant male is here today for a post-operative visit.  Post-op of the Left Shoulder (S/P left reverse total shoulder arthroplasty on 7/9/20. Patient states he is doing ok, some swelling and numbness from left elbow to hand)          CHIEF COMPLAINT:    History of Present Illness      Pain controlled: [] no   [x] yes   Medication refill requested: [x] no   [] yes    Patient compliant with instructions: [] no   [x] yes   Other: Reports good progress since surgery.     Past Medical History:   Diagnosis Date   • Acquired absence of all teeth    • Allergic    • Anomalous coronary artery origin    • Arrhythmia    • Arthritis    • Arthritis of lumbar spine 7/29/2016   • Back pain 6/17/2016   • Cristina esophagus    • Cancer (CMS/HCC)     prostate   • Cataract     REMOVED BILATERALL   • Cervical spine disease 6/17/2016   • CHF (congestive heart failure) (CMS/HCC)    • Chronic obstructive pulmonary disease (CMS/HCC)    • Colon polyps    • Deafness in left ear    • Derangement of anterior horn of medial meniscus    • Difficulty swallowing    • Disorder of lumbar spine 6/17/2016   • Disorder of thoracic spine 6/17/2016   • Diverticulitis of colon    • DJD (degenerative joint disease)    • Elevated liver enzymes    • Erectile dysfunction    • Esophageal reflux    • Essential hypertension     PT DENIES SAYS IT RUNS LOW   • Glaucoma    • Hard of hearing     LEFT EAR NO AIDS WORN   • Heart murmur    • Hiatal hernia    • High cholesterol    • Impaired functional mobility, balance, gait, and endurance    • Inflammatory polyarthropathy (CMS/HCC)     Per Dr. Haider. Negative NEO, normal ESR, RF, anti-ccp and did not improve with prednisone per notes.   • Inguinal hernia    • Insomnia    • Lateral meniscus derangement    • Left otitis media with spontaneous rupture of eardrum    • Locking of left knee    • Low back pain    • Meniere's disease    • MGUS (monoclonal  gammopathy of unknown significance)     likely diagnosis   • Murmur    • Neuromuscular disorder (CMS/HCC)    • Neuropathic arthritis    • No natural teeth    • Perforation of left tympanic membrane    • Pulmonary emphysema (CMS/HCC)    • Recent shoulder injury     LEFT SHOULDER   • Scoliosis    • Skin cancer     skin cancer   • Skin cancer of face     squamous cell   • Spina bifida occulta 6/17/2016   • Tobacco abuse 11/9/2017   • Visual impairment    • Wears glasses    • Wears glasses     READING GLASSES ONLY        Past Surgical History:   Procedure Laterality Date   • COLONOSCOPY     • ENDOSCOPY N/A 4/10/2017    Procedure: ESOPHAGOGASTRODUODENOSCOPY WITH BIOPSY;  Surgeon: Alexander Ritchie MD;  Location: [facility] ENDOSCOPY;  Service:    • EYE SURGERY     • HERNIA REPAIR     • INGUINAL HERNIA REPAIR Right    • INGUINAL HERNIA REPAIR     • KNEE SURGERY Left    • LYMPH NODE BIOPSY     • MULTIPLE TOOTH EXTRACTIONS     • PROSTATE SURGERY  2004   • PROSTATECTOMY  06/30/2014   • PROSTATECTOMY      Prostatectomy Radical   • SHOULDER ARTHROSCOPY Left 2/6/2020    Procedure: DIAGNOSTIC SHOULDER ARTHROSCOPY LEFT WITH LABRAL DEBRIDEMENT, SUBACROMIAL BURSECTOMY, ASSESSMENT OF LARGE FRAGMENTED RETRACTED IRREPARABLE ROTATOR CUFF TEARS;  Surgeon: Rony Pandey MD;  Location: [facility] OR;  Service: Orthopedics;  Laterality: Left;   • SKIN CANCER EXCISION  2017    both sides of body/squamous cell melanoma   • TOTAL SHOULDER ARTHROPLASTY W/ DISTAL CLAVICLE EXCISION Left 7/9/2020    Procedure: Left reverse total shoulder arthroplasty;  Surgeon: Rony Pandey MD;  Location: [facility] OR;  Service: Orthopedics;  Laterality: Left;   • TYMPANOPLASTY W/ MASTOIDECTOMY     • UMBILICAL HERNIA REPAIR         Allergies   Allergen Reactions   • Cyclobenzaprine Other (See Comments)     Wide awake   • Plaquenil [Hydroxychloroquine Sulfate] Other (See Comments)     Black eyes   • Pravastatin Myalgia     Weakness / fatigue       Review of  "Systems   Constitutional: Negative for fever.   HENT: Negative for dental problem and voice change.    Eyes: Negative for visual disturbance.   Respiratory: Negative for shortness of breath.    Cardiovascular: Negative for chest pain.   Gastrointestinal: Negative for abdominal pain.   Genitourinary: Negative for dysuria.   Musculoskeletal: Positive for arthralgias. Negative for gait problem and joint swelling.   Skin: Negative for rash.   Neurological: Negative for speech difficulty.   Hematological: Does not bruise/bleed easily.   Psychiatric/Behavioral: Negative for confusion.     I have reviewed the medical and surgical history, family history, social history, medications, and/or allergies, and the review of systems of this report.    Objective   Resp 18   Ht 180.3 cm (71\")   Wt 65.3 kg (144 lb)   BMI 20.08 kg/m²       Signs of infection: [x] no                    [] yes   Drainage: [x] no                    [] yes   Incision: [x] healing well     []healed well   Motor exam intact: [] no                    [x] yes   Neurovascular exam intact: [] no                    [x] yes   Signs of compartment syndrome: [x] no                    [] yes   Signs of DVT: [x] no                    [] yes   Other: + swelling to left forearm     Physical Exam   Ortho Exam      Extremity DVT signs are negative by clinical screen.  Neurologic Exam    Assessment/Plan     Independent Review of Radiographic Studies:    No new imaging done today.    Laboratory and Other Studies:  No new results reviewed today.      Medical Decision Making:    Stable neurovascular exam.       Procedures     Elliott was seen today for post-op.    Diagnoses and all orders for this visit:    S/P reverse total shoulder arthroplasty, left  -     Ambulatory Referral to Physical Therapy Evaluate and treat, POST OP, Ortho         Recommendations/Plan:     Sutures Staples or Pins [x] Removed today  [] At prior visit  [] Plan removal later   Physical therapy: " []rehab facility  [x]outpatient referral  [] therapy ongoing   Ultrasound: [x]not ordered         []order given to patient   Labs: [x]not ordered         []order given to patient   Weight Bearing status: []Full []WBAT [x]PWB []NWB []Other   Patient was given tubigrip to help with edema  Follow up in 6 - 8 weeks  Patient is encouraged and agreeable to call or return sooner for any issues or concerns.

## 2020-07-30 ENCOUNTER — OFFICE VISIT (OUTPATIENT)
Dept: INTERNAL MEDICINE | Facility: CLINIC | Age: 65
End: 2020-07-30

## 2020-07-30 VITALS
RESPIRATION RATE: 18 BRPM | BODY MASS INDEX: 20.23 KG/M2 | DIASTOLIC BLOOD PRESSURE: 70 MMHG | OXYGEN SATURATION: 99 % | WEIGHT: 144.5 LBS | HEIGHT: 71 IN | SYSTOLIC BLOOD PRESSURE: 125 MMHG | HEART RATE: 71 BPM

## 2020-07-30 DIAGNOSIS — G89.4 CHRONIC PAIN SYNDROME: ICD-10-CM

## 2020-07-30 DIAGNOSIS — E78.49 OTHER HYPERLIPIDEMIA: ICD-10-CM

## 2020-07-30 DIAGNOSIS — M47.812 SPONDYLOSIS OF CERVICAL REGION WITHOUT MYELOPATHY OR RADICULOPATHY: ICD-10-CM

## 2020-07-30 DIAGNOSIS — C61 MALIGNANT NEOPLASM OF PROSTATE (HCC): Primary | ICD-10-CM

## 2020-07-30 DIAGNOSIS — I10 ESSENTIAL HYPERTENSION: Chronic | ICD-10-CM

## 2020-07-30 PROBLEM — Z01.818 PREOP EXAMINATION: Status: RESOLVED | Noted: 2020-01-15 | Resolved: 2020-07-30

## 2020-07-30 LAB
ALBUMIN SERPL-MCNC: 4.4 G/DL (ref 3.5–5.2)
ALBUMIN/GLOB SERPL: 1.3 G/DL
ALP SERPL-CCNC: 136 U/L (ref 39–117)
ALT SERPL-CCNC: 36 U/L (ref 1–41)
AST SERPL-CCNC: 33 U/L (ref 1–40)
BASOPHILS # BLD AUTO: 0.04 10*3/MM3 (ref 0–0.2)
BASOPHILS NFR BLD AUTO: 0.5 % (ref 0–1.5)
BILIRUB SERPL-MCNC: 0.4 MG/DL (ref 0–1.2)
BUN SERPL-MCNC: 32 MG/DL (ref 8–23)
BUN/CREAT SERPL: 31.7 (ref 7–25)
CALCIUM SERPL-MCNC: 9.6 MG/DL (ref 8.6–10.5)
CHLORIDE SERPL-SCNC: 103 MMOL/L (ref 98–107)
CHOLEST SERPL-MCNC: 134 MG/DL (ref 0–200)
CO2 SERPL-SCNC: 27.5 MMOL/L (ref 22–29)
CREAT SERPL-MCNC: 1.01 MG/DL (ref 0.76–1.27)
EOSINOPHIL # BLD AUTO: 0.42 10*3/MM3 (ref 0–0.4)
EOSINOPHIL NFR BLD AUTO: 5.3 % (ref 0.3–6.2)
ERYTHROCYTE [DISTWIDTH] IN BLOOD BY AUTOMATED COUNT: 14.2 % (ref 12.3–15.4)
GLOBULIN SER CALC-MCNC: 3.3 GM/DL
GLUCOSE SERPL-MCNC: 86 MG/DL (ref 65–99)
HCT VFR BLD AUTO: 44.1 % (ref 37.5–51)
HDLC SERPL-MCNC: 49 MG/DL (ref 40–60)
HGB BLD-MCNC: 14.2 G/DL (ref 13–17.7)
IMM GRANULOCYTES # BLD AUTO: 0.02 10*3/MM3 (ref 0–0.05)
IMM GRANULOCYTES NFR BLD AUTO: 0.3 % (ref 0–0.5)
LDLC SERPL CALC-MCNC: 74 MG/DL (ref 0–100)
LYMPHOCYTES # BLD AUTO: 2.13 10*3/MM3 (ref 0.7–3.1)
LYMPHOCYTES NFR BLD AUTO: 26.7 % (ref 19.6–45.3)
MCH RBC QN AUTO: 32.2 PG (ref 26.6–33)
MCHC RBC AUTO-ENTMCNC: 32.2 G/DL (ref 31.5–35.7)
MCV RBC AUTO: 100 FL (ref 79–97)
MONOCYTES # BLD AUTO: 0.88 10*3/MM3 (ref 0.1–0.9)
MONOCYTES NFR BLD AUTO: 11 % (ref 5–12)
NEUTROPHILS # BLD AUTO: 4.5 10*3/MM3 (ref 1.7–7)
NEUTROPHILS NFR BLD AUTO: 56.2 % (ref 42.7–76)
NRBC BLD AUTO-RTO: 0 /100 WBC (ref 0–0.2)
PLATELET # BLD AUTO: 230 10*3/MM3 (ref 140–450)
POTASSIUM SERPL-SCNC: 5.5 MMOL/L (ref 3.5–5.2)
PROT SERPL-MCNC: 7.7 G/DL (ref 6–8.5)
PSA SERPL-MCNC: 1.16 NG/ML (ref 0–4)
RBC # BLD AUTO: 4.41 10*6/MM3 (ref 4.14–5.8)
SODIUM SERPL-SCNC: 136 MMOL/L (ref 136–145)
TRIGL SERPL-MCNC: 54 MG/DL (ref 0–150)
VLDLC SERPL CALC-MCNC: 10.8 MG/DL
WBC # BLD AUTO: 7.99 10*3/MM3 (ref 3.4–10.8)

## 2020-07-30 PROCEDURE — 99214 OFFICE O/P EST MOD 30 MIN: CPT | Performed by: FAMILY MEDICINE

## 2020-07-30 RX ORDER — METHOCARBAMOL 750 MG/1
750 TABLET, FILM COATED ORAL 3 TIMES DAILY PRN
Qty: 270 TABLET | Refills: 3 | Status: SHIPPED | OUTPATIENT
Start: 2020-07-30 | End: 2023-02-06 | Stop reason: SDUPTHER

## 2020-07-30 RX ORDER — ATORVASTATIN CALCIUM 10 MG/1
10 TABLET, FILM COATED ORAL NIGHTLY
Qty: 90 TABLET | Refills: 3 | Status: SHIPPED | OUTPATIENT
Start: 2020-07-30 | End: 2021-09-20

## 2020-07-30 RX ORDER — GABAPENTIN 300 MG/1
300 CAPSULE ORAL 3 TIMES DAILY PRN
Qty: 270 CAPSULE | Refills: 1 | Status: SHIPPED | OUTPATIENT
Start: 2020-07-30 | End: 2021-07-08 | Stop reason: SDUPTHER

## 2020-07-30 NOTE — PROGRESS NOTES
"Subjective    Elliott Dunn is a 65 y.o. male here for:  Chief Complaint   Patient presents with   • Elevated PSA     Pt states he needs to have his PSA checked.        History per MA reviewed.    Prostate cancer s/p treatment with Dr. Varela, urology (moved from [place] to [place]). Last year PSA was. 0.0004. Due for check.  Hyperlipidemia   This is a chronic problem. The current episode started more than 1 year ago. He has no history of diabetes. Pertinent negatives include no shortness of breath. Current antihyperlipidemic treatment includes statins. Risk factors for coronary artery disease include male sex and hypertension.   Hypertension   This is a chronic problem. The current episode started more than 1 year ago. The problem has been waxing and waning since onset. The problem is controlled. Pertinent negatives include no shortness of breath. Agents associated with hypertension include steroids. Risk factors for coronary artery disease include male gender and dyslipidemia. Current antihypertension treatment includes beta blockers and direct vasodilators. The current treatment provides significant improvement. Hypertensive end-organ damage includes CAD/MI.          The following portions of the patient's history were reviewed and updated as appropriate: allergies, current medications, past family history, past medical history, past social history, past surgical history and problem list.    Review of Systems   Constitutional: Negative for fever.   Respiratory: Negative for shortness of breath.    Neurological: Positive for numbness (left palm s/p shoulder surgery).       Visit Vitals  /70   Pulse 71   Resp 18   Ht 180.3 cm (70.98\")   Wt 65.5 kg (144 lb 8 oz)   SpO2 99%   BMI 20.16 kg/m²         Objective   Physical Exam   Constitutional: He is oriented to person, place, and time. Vital signs are normal. He appears well-developed and well-nourished. He is active.  Non-toxic appearance. He does not have " a sickly appearance. He does not appear ill. No distress. Face mask in place.   HENT:   Head: Normocephalic and atraumatic.   Right Ear: Hearing normal.   Left Ear: Hearing normal.   Nose: Nose normal.   Eyes: EOM are normal. No scleral icterus.   Neck: Phonation normal. Neck supple.   Cardiovascular: Normal rate and regular rhythm.   Pulmonary/Chest: Effort normal and breath sounds normal.   Musculoskeletal: He exhibits deformity (sling for left arm).   Neurological: He is alert and oriented to person, place, and time. He displays no tremor. No cranial nerve deficit.   Skin: Skin is warm. He is not diaphoretic. No cyanosis. No pallor.   Psychiatric: He has a normal mood and affect. His speech is normal and behavior is normal. Judgment and thought content normal. Cognition and memory are normal.   Nursing note and vitals reviewed.        Assessment/Plan     Problem List Items Addressed This Visit        Cardiovascular and Mediastinum    Essential hypertension (Chronic)    Current Assessment & Plan     Hypertension is improving with treatment.  Continue current treatment regimen.  Blood pressure will be reassessed at the next regular appointment.         Other hyperlipidemia    Relevant Medications    atorvastatin (LIPITOR) 10 MG tablet    Other Relevant Orders    Lipid Panel       Nervous and Auditory    Chronic pain syndrome (Chronic)    Relevant Medications    gabapentin (NEURONTIN) 300 MG capsule    methocarbamol (ROBAXIN) 750 MG tablet       Musculoskeletal and Integument    Spondylosis of cervical region without myelopathy or radiculopathy    Relevant Medications    gabapentin (NEURONTIN) 300 MG capsule       Genitourinary    Malignant neoplasm of prostate (CMS/HCC) - Primary    Relevant Orders    Comprehensive Metabolic Panel    CBC & Differential    PSA DIAGNOSTIC          ·     Ladonna Means MD

## 2020-08-05 ENCOUNTER — READMISSION MANAGEMENT (OUTPATIENT)
Dept: CALL CENTER | Facility: HOSPITAL | Age: 65
End: 2020-08-05

## 2020-08-05 NOTE — OUTREACH NOTE
Total Joint Month 1 Survey      Responses   [facility] patient discharged from?  [facility]   Does the patient have one of the following disease processes/diagnoses(primary or secondary)?  Total Joint Replacement   Joint surgery performed?  Shoulder   Month 1 attempt successful?  Yes   Call start time  1646   Call end time  1651   Has the patient been back in either the hospital or Emergency Department since discharge?  Yes   If the patient has been back to hospital or Emergency Department list date and reason  Spouse states the patient had a blood clot   Discharge diagnosis   Left reverse total shoulder replacement   Person spoke with today (if not patient) and relationship  spouse-Radha   Is the patient taking all medications as directed (includes completed medication regime)?  Yes   Is the patient able to teach back alternate methods of pain control?  Ice, Shoulder-elevate above heart/ keep in sling as advised, Reposition   Has the patient kept scheduled appointments due by today?  Yes   Is the patient still attending therapy sessions(either in the home or as an outpatient)?  Yes   Has the patient fallen since discharge?  No   Comments  Left hand is swollen and numb, from the clot. Pt reports arm ROM is able to go to 120 degrees. Pain is tolerable per pt. Pt reports appetite is WNL, not sleeping well r/t discomfort in arm. Incision healed well, staples removed.    What is the patient's perception of their functional status since discharge?  Improving   Is the patient able to teach back signs and symptoms of infection?  Blisters around incision, Changes in mobility   Is the patient/caregiver able to teach back the hierarchy of who to call/visit for symptoms/problems? PCP, Specialist, Home health nurse, Urgent Care, ED, 911  Yes   Month 1 call completed?  Yes   Wrap up additional comments  Spouse states patient is improving at this time.  He has better mobility in the past week.  He is f/u with providers.           Sandy Isaac RN

## 2020-08-11 RX ORDER — BISOPROLOL FUMARATE 5 MG/1
TABLET, FILM COATED ORAL
Qty: 90 TABLET | Refills: 3 | Status: SHIPPED | OUTPATIENT
Start: 2020-08-11 | End: 2021-08-18 | Stop reason: SDUPTHER

## 2020-08-19 ENCOUNTER — LAB (OUTPATIENT)
Dept: LAB | Facility: HOSPITAL | Age: 65
End: 2020-08-19

## 2020-08-19 ENCOUNTER — TRANSCRIBE ORDERS (OUTPATIENT)
Dept: LAB | Facility: HOSPITAL | Age: 65
End: 2020-08-19

## 2020-08-19 DIAGNOSIS — M15.0 PRIMARY GENERALIZED HYPERTROPHIC OSTEOARTHROSIS: ICD-10-CM

## 2020-08-19 DIAGNOSIS — D47.2 MONOCLONAL PARAPROTEINEMIA: ICD-10-CM

## 2020-08-19 DIAGNOSIS — Z72.0 TOBACCO ABUSE: ICD-10-CM

## 2020-08-19 DIAGNOSIS — M47.816 LUMBAR SPONDYLOSIS: ICD-10-CM

## 2020-08-19 DIAGNOSIS — R94.5 NONSPECIFIC ABNORMAL RESULTS OF LIVER FUNCTION STUDY: ICD-10-CM

## 2020-08-19 DIAGNOSIS — M06.09 RHEUMATOID ARTHRITIS OF MULTIPLE SITES WITHOUT RHEUMATOID FACTOR (HCC): Primary | ICD-10-CM

## 2020-08-19 DIAGNOSIS — M06.09 RHEUMATOID ARTHRITIS OF MULTIPLE SITES WITHOUT RHEUMATOID FACTOR (HCC): ICD-10-CM

## 2020-08-19 LAB
ALBUMIN SERPL-MCNC: 4.2 G/DL (ref 3.5–5.2)
ALBUMIN/GLOB SERPL: 1.1 G/DL
ALP SERPL-CCNC: 110 U/L (ref 39–117)
ALT SERPL W P-5'-P-CCNC: 36 U/L (ref 1–41)
ANION GAP SERPL CALCULATED.3IONS-SCNC: 5.1 MMOL/L (ref 5–15)
AST SERPL-CCNC: 35 U/L (ref 1–40)
BASOPHILS # BLD AUTO: 0.06 10*3/MM3 (ref 0–0.2)
BASOPHILS NFR BLD AUTO: 0.7 % (ref 0–1.5)
BILIRUB SERPL-MCNC: 0.7 MG/DL (ref 0–1.2)
BUN SERPL-MCNC: 22 MG/DL (ref 8–23)
BUN/CREAT SERPL: 24.4 (ref 7–25)
CALCIUM SPEC-SCNC: 9.5 MG/DL (ref 8.6–10.5)
CHLORIDE SERPL-SCNC: 100 MMOL/L (ref 98–107)
CO2 SERPL-SCNC: 28.9 MMOL/L (ref 22–29)
CREAT SERPL-MCNC: 0.9 MG/DL (ref 0.76–1.27)
DEPRECATED RDW RBC AUTO: 46.8 FL (ref 37–54)
EOSINOPHIL # BLD AUTO: 0.27 10*3/MM3 (ref 0–0.4)
EOSINOPHIL NFR BLD AUTO: 3.2 % (ref 0.3–6.2)
ERYTHROCYTE [DISTWIDTH] IN BLOOD BY AUTOMATED COUNT: 13.4 % (ref 12.3–15.4)
GFR SERPL CREATININE-BSD FRML MDRD: 85 ML/MIN/1.73
GLOBULIN UR ELPH-MCNC: 3.8 GM/DL
GLUCOSE SERPL-MCNC: 92 MG/DL (ref 65–99)
HCT VFR BLD AUTO: 42.1 % (ref 37.5–51)
HGB BLD-MCNC: 14.4 G/DL (ref 13–17.7)
IMM GRANULOCYTES # BLD AUTO: 0.03 10*3/MM3 (ref 0–0.05)
IMM GRANULOCYTES NFR BLD AUTO: 0.4 % (ref 0–0.5)
LYMPHOCYTES # BLD AUTO: 2.85 10*3/MM3 (ref 0.7–3.1)
LYMPHOCYTES NFR BLD AUTO: 33.5 % (ref 19.6–45.3)
MCH RBC QN AUTO: 33 PG (ref 26.6–33)
MCHC RBC AUTO-ENTMCNC: 34.2 G/DL (ref 31.5–35.7)
MCV RBC AUTO: 96.6 FL (ref 79–97)
MONOCYTES # BLD AUTO: 0.69 10*3/MM3 (ref 0.1–0.9)
MONOCYTES NFR BLD AUTO: 8.1 % (ref 5–12)
NEUTROPHILS NFR BLD AUTO: 4.6 10*3/MM3 (ref 1.7–7)
NEUTROPHILS NFR BLD AUTO: 54.1 % (ref 42.7–76)
NRBC BLD AUTO-RTO: 0 /100 WBC (ref 0–0.2)
PLATELET # BLD AUTO: 254 10*3/MM3 (ref 140–450)
PMV BLD AUTO: 10.8 FL (ref 6–12)
POTASSIUM SERPL-SCNC: 4.5 MMOL/L (ref 3.5–5.2)
PROT SERPL-MCNC: 8 G/DL (ref 6–8.5)
RBC # BLD AUTO: 4.36 10*6/MM3 (ref 4.14–5.8)
SODIUM SERPL-SCNC: 134 MMOL/L (ref 136–145)
WBC # BLD AUTO: 8.5 10*3/MM3 (ref 3.4–10.8)

## 2020-08-19 PROCEDURE — 85025 COMPLETE CBC W/AUTO DIFF WBC: CPT

## 2020-08-19 PROCEDURE — 36415 COLL VENOUS BLD VENIPUNCTURE: CPT

## 2020-08-19 PROCEDURE — 80053 COMPREHEN METABOLIC PANEL: CPT

## 2020-08-26 ENCOUNTER — TRANSCRIBE ORDERS (OUTPATIENT)
Dept: ADMINISTRATIVE | Facility: HOSPITAL | Age: 65
End: 2020-08-26

## 2020-08-26 DIAGNOSIS — C61 PROSTATE CA (HCC): Primary | ICD-10-CM

## 2020-08-26 DIAGNOSIS — R97.20 ELEVATED PSA: ICD-10-CM

## 2020-09-03 ENCOUNTER — OFFICE VISIT (OUTPATIENT)
Dept: ORTHOPEDIC SURGERY | Facility: CLINIC | Age: 65
End: 2020-09-03

## 2020-09-03 VITALS — BODY MASS INDEX: 20.16 KG/M2 | HEIGHT: 71 IN | RESPIRATION RATE: 18 BRPM | WEIGHT: 144 LBS

## 2020-09-03 DIAGNOSIS — Z96.612 S/P REVERSE TOTAL SHOULDER ARTHROPLASTY, LEFT: ICD-10-CM

## 2020-09-03 DIAGNOSIS — G56.02 CARPAL TUNNEL SYNDROME OF LEFT WRIST: ICD-10-CM

## 2020-09-03 DIAGNOSIS — R20.2 LEFT HAND PARESTHESIA: Primary | ICD-10-CM

## 2020-09-03 PROCEDURE — 99024 POSTOP FOLLOW-UP VISIT: CPT | Performed by: PHYSICIAN ASSISTANT

## 2020-09-03 PROCEDURE — 99212 OFFICE O/P EST SF 10 MIN: CPT | Performed by: PHYSICIAN ASSISTANT

## 2020-09-03 NOTE — PROGRESS NOTES
Subjective   Patient ID: Elliott Dunn is a 65 y.o. left hand dominant male is here today for a post-operative visit.  Post-op of the Left Shoulder (reverse total shoulder arthroplasty 7/9/20, reports his shoulder is fine, reports 1st three fingers are numb on the left hand)          CHIEF COMPLAINT:    History of Present Illness      Pain controlled: [] no   [x] yes   Medication refill requested: [x] no   [] yes    Patient compliant with instructions: [] no   [x] yes   Other: Reports good progress since surgery.  He denies having any pain to the left shoulder.   He notes numbness and tingling to the first 3 digits of left hand.   No neck pain.     Past Medical History:   Diagnosis Date   • Acquired absence of all teeth    • Allergic    • Anomalous coronary artery origin    • Arrhythmia    • Arthritis    • Arthritis of lumbar spine 7/29/2016   • Back pain 6/17/2016   • Cristina esophagus    • Cancer (CMS/HCC)     prostate   • Cataract     REMOVED BILATERALL   • Cervical spine disease 6/17/2016   • CHF (congestive heart failure) (CMS/HCC)    • Chronic obstructive pulmonary disease (CMS/HCC)    • Colon polyps    • Deafness in left ear    • Derangement of anterior horn of medial meniscus    • Difficulty swallowing    • Disorder of lumbar spine 6/17/2016   • Disorder of thoracic spine 6/17/2016   • Diverticulitis of colon    • DJD (degenerative joint disease)    • Elevated liver enzymes    • Erectile dysfunction    • Esophageal reflux    • Essential hypertension     PT DENIES SAYS IT RUNS LOW   • Glaucoma    • Hard of hearing     LEFT EAR NO AIDS WORN   • Heart murmur    • Hiatal hernia    • High cholesterol    • Impaired functional mobility, balance, gait, and endurance    • Inflammatory polyarthropathy (CMS/HCC)     Per Dr. Haider. Negative NEO, normal ESR, RF, anti-ccp and did not improve with prednisone per notes.   • Inguinal hernia    • Insomnia    • Lateral meniscus derangement    • Left otitis media with  spontaneous rupture of eardrum    • Locking of left knee    • Low back pain    • Meniere's disease    • MGUS (monoclonal gammopathy of unknown significance)     likely diagnosis   • Murmur    • Neuromuscular disorder (CMS/HCC)    • Neuropathic arthritis    • No natural teeth    • Perforation of left tympanic membrane    • Pulmonary emphysema (CMS/HCC)    • Recent shoulder injury     LEFT SHOULDER   • Scoliosis    • Skin cancer     skin cancer   • Skin cancer of face     squamous cell   • Spina bifida occulta 6/17/2016   • Tobacco abuse 11/9/2017   • Visual impairment    • Wears glasses    • Wears glasses     READING GLASSES ONLY        Past Surgical History:   Procedure Laterality Date   • COLONOSCOPY     • ENDOSCOPY N/A 4/10/2017    Procedure: ESOPHAGOGASTRODUODENOSCOPY WITH BIOPSY;  Surgeon: Alexander Ritchie MD;  Location: [facility] ENDOSCOPY;  Service:    • EYE SURGERY     • HERNIA REPAIR     • INGUINAL HERNIA REPAIR Right    • INGUINAL HERNIA REPAIR     • KNEE SURGERY Left    • LYMPH NODE BIOPSY     • MULTIPLE TOOTH EXTRACTIONS     • PROSTATE SURGERY  2004   • PROSTATECTOMY  06/30/2014   • PROSTATECTOMY      Prostatectomy Radical   • SHOULDER ARTHROSCOPY Left 2/6/2020    Procedure: DIAGNOSTIC SHOULDER ARTHROSCOPY LEFT WITH LABRAL DEBRIDEMENT, SUBACROMIAL BURSECTOMY, ASSESSMENT OF LARGE FRAGMENTED RETRACTED IRREPARABLE ROTATOR CUFF TEARS;  Surgeon: Rony Pandey MD;  Location: [facility] OR;  Service: Orthopedics;  Laterality: Left;   • SKIN CANCER EXCISION  2017    both sides of body/squamous cell melanoma   • TOTAL SHOULDER ARTHROPLASTY W/ DISTAL CLAVICLE EXCISION Left 7/9/2020    Procedure: Left reverse total shoulder arthroplasty;  Surgeon: Rony Pandey MD;  Location: [facility] OR;  Service: Orthopedics;  Laterality: Left;   • TYMPANOPLASTY W/ MASTOIDECTOMY     • UMBILICAL HERNIA REPAIR         Allergies   Allergen Reactions   • Cyclobenzaprine Other (See Comments)     Wide awake   • Plaquenil  "[Hydroxychloroquine Sulfate] Other (See Comments)     Black eyes   • Pravastatin Myalgia     Weakness / fatigue       Review of Systems   Constitutional: Negative for diaphoresis, fever and unexpected weight change.   HENT: Negative for dental problem and sore throat.    Eyes: Negative for visual disturbance.   Respiratory: Negative for shortness of breath.    Cardiovascular: Negative for chest pain.   Gastrointestinal: Negative for abdominal pain, constipation, diarrhea, nausea and vomiting.   Genitourinary: Negative for difficulty urinating and frequency.   Musculoskeletal: Positive for arthralgias.   Neurological: Positive for numbness (left 1st three fingers). Negative for headaches.   Hematological: Does not bruise/bleed easily.     I have reviewed the medical and surgical history, family history, social history, medications, and/or allergies, and the review of systems of this report.    Objective   Resp 18   Ht 180.3 cm (70.98\")   Wt 65.3 kg (144 lb)   BMI 20.10 kg/m²       Signs of infection: [x] no                    [] yes   Drainage: [x] no                    [] yes   Incision: [x] healing well     []healed well   Motor exam intact: [] no                    [x] yes   Neurovascular exam intact: [] no                    [x] yes   Signs of compartment syndrome: [x] no                    [] yes   Signs of DVT: [x] no                    [] yes   Other:      Physical Exam   Constitutional: He is oriented to person, place, and time. He appears well-developed and well-nourished.   Pulmonary/Chest: Effort normal.   Musculoskeletal:        Left shoulder: He exhibits normal range of motion, no tenderness, no bony tenderness, no deformity and no pain.        Left hand: He exhibits normal capillary refill, no deformity and no swelling. Decreased sensation noted. Decreased sensation is present in the medial distribution.   Neurological: He is alert and oriented to person, place, and time.   Psychiatric: He has a " normal mood and affect.   Nursing note and vitals reviewed.    Left Shoulder Exam     Range of Motion   Active abduction: 110   Forward flexion: 120     Muscle Strength   The patient has normal left shoulder strength.    Tests   Drop arm: negative    Other   Erythema: absent               Neurologic Exam     Mental Status   Oriented to person, place, and time.       Positive left upper extremity Tinel sign    Assessment/Plan     Independent Review of Radiographic Studies:    No new imaging done today.    Laboratory and Other Studies:  No new results reviewed today.          Procedures     Elliott was seen today for post-op.    Diagnoses and all orders for this visit:    Left hand paresthesia  -     EMG & Nerve Conduction Test    S/P reverse total shoulder arthroplasty, left    Carpal tunnel syndrome of left wrist         Recommendations/Plan:     Sutures Staples or Pins [] Removed today  [] At prior visit  [] Plan removal later   Physical therapy: []rehab facility  []outpatient referral  [] therapy ongoing   Ultrasound: []not ordered         []order given to patient   Labs: []not ordered         []order given to patient   Weight Bearing status: []Full [x]WBAT []PWB []NWB []Other       Patient was provided with a cock-up wrist brace.  I would like to order an EMG the left upper extremity  Patient is encouraged and agreeable to call or return sooner for any issues or concerns.

## 2020-09-04 ENCOUNTER — HOSPITAL ENCOUNTER (OUTPATIENT)
Dept: NUCLEAR MEDICINE | Facility: HOSPITAL | Age: 65
Discharge: HOME | End: 2020-09-04

## 2020-09-04 ENCOUNTER — HOSPITAL ENCOUNTER (OUTPATIENT)
Dept: CT IMAGING | Facility: HOSPITAL | Age: 65
Discharge: HOME | End: 2020-09-04
Admitting: UROLOGY

## 2020-09-04 DIAGNOSIS — R97.20 ELEVATED PSA: ICD-10-CM

## 2020-09-04 DIAGNOSIS — C61 PROSTATE CA (HCC): ICD-10-CM

## 2020-09-04 PROCEDURE — 74176 CT ABD & PELVIS W/O CONTRAST: CPT

## 2020-09-04 PROCEDURE — 78306 BONE IMAGING WHOLE BODY: CPT

## 2020-09-04 PROCEDURE — A9503 TC99M MEDRONATE: HCPCS | Performed by: UROLOGY

## 2020-09-04 PROCEDURE — 0 TECHNETIUM MEDRONATE KIT: Performed by: UROLOGY

## 2020-09-04 RX ORDER — TC 99M MEDRONATE 20 MG/10ML
26.2 INJECTION, POWDER, LYOPHILIZED, FOR SOLUTION INTRAVENOUS
Status: COMPLETED | OUTPATIENT
Start: 2020-09-04 | End: 2020-09-04

## 2020-09-04 RX ADMIN — TC 99M MEDRONATE 26.2 MILLICURIE: 20 INJECTION, POWDER, LYOPHILIZED, FOR SOLUTION INTRAVENOUS at 09:30

## 2020-09-08 ENCOUNTER — READMISSION MANAGEMENT (OUTPATIENT)
Dept: CALL CENTER | Facility: HOSPITAL | Age: 65
End: 2020-09-08

## 2020-09-08 NOTE — OUTREACH NOTE
Total Joint Month 2 Survey      Responses   [facility] patient discharged from?  [facility]   Does the patient have one of the following disease processes/diagnoses(primary or secondary)?  Total Joint Replacement   Joint surgery performed?  Shoulder   Month 2 attempt successful?  Yes   Call start time  1650   Call end time  1651   Has the patient been back in either the hospital or Emergency Department since discharge?  Yes   Discharge diagnosis   Left reverse total shoulder replacement   Person spoke with today (if not patient) and relationship  spouse-Radha   Is the patient taking all medications as directed (includes completed medication regime)?  Yes   Has the patient kept scheduled appointments due by today?  Yes   Comments  Patient has been released by surgeon   Is the patient still receiving Home Health Services?  No   Is the patient still attending therapy sessions(either in the home or as an outpatient)?  No   Has the patient fallen since discharge?  No   What is the patient's perception of their functional status since discharge?  Improving   Is the patient/caregiver able to teach back the hierarchy of who to call/visit for symptoms/problems? PCP, Specialist, Home health nurse, Urgent Care, ED, 911  Yes   Month 2 Call Completed?  Yes   Wrap up additional comments  Spouse states he is doing well at this time.  she denies any needs during this call.           Paulina Cervantes RN

## 2020-09-09 ENCOUNTER — PROCEDURE VISIT (OUTPATIENT)
Dept: ORTHOPEDIC SURGERY | Facility: CLINIC | Age: 65
End: 2020-09-09

## 2020-09-09 DIAGNOSIS — R20.2 PARESTHESIA: ICD-10-CM

## 2020-09-09 DIAGNOSIS — G56.02 CARPAL TUNNEL SYNDROME ON LEFT: ICD-10-CM

## 2020-09-09 DIAGNOSIS — G56.22 LESION OF LEFT ULNAR NERVE: ICD-10-CM

## 2020-09-09 PROCEDURE — 95886 MUSC TEST DONE W/N TEST COMP: CPT | Performed by: PHYSICAL THERAPIST

## 2020-09-09 PROCEDURE — 95909 NRV CNDJ TST 5-6 STUDIES: CPT | Performed by: PHYSICAL THERAPIST

## 2020-09-14 ENCOUNTER — OFFICE VISIT (OUTPATIENT)
Dept: ORTHOPEDIC SURGERY | Facility: CLINIC | Age: 65
End: 2020-09-14

## 2020-09-14 VITALS — BODY MASS INDEX: 20.16 KG/M2 | WEIGHT: 144 LBS | RESPIRATION RATE: 18 BRPM | HEIGHT: 71 IN

## 2020-09-14 DIAGNOSIS — G56.22 CUBITAL TUNNEL SYNDROME ON LEFT: ICD-10-CM

## 2020-09-14 DIAGNOSIS — G54.0: ICD-10-CM

## 2020-09-14 DIAGNOSIS — G56.02 CARPAL TUNNEL SYNDROME OF LEFT WRIST: ICD-10-CM

## 2020-09-14 DIAGNOSIS — R20.2 LEFT HAND PARESTHESIA: ICD-10-CM

## 2020-09-14 DIAGNOSIS — R20.2 PARESTHESIA: Primary | ICD-10-CM

## 2020-09-14 PROCEDURE — 99213 OFFICE O/P EST LOW 20 MIN: CPT | Performed by: PHYSICIAN ASSISTANT

## 2020-09-14 PROCEDURE — 20526 THER INJECTION CARP TUNNEL: CPT | Performed by: PHYSICIAN ASSISTANT

## 2020-09-14 RX ORDER — LIDOCAINE HYDROCHLORIDE 10 MG/ML
5 INJECTION, SOLUTION INFILTRATION; PERINEURAL
Status: COMPLETED | OUTPATIENT
Start: 2020-09-14 | End: 2020-09-14

## 2020-09-14 RX ORDER — CELECOXIB 100 MG/1
CAPSULE ORAL
COMMUNITY
Start: 2020-09-10 | End: 2021-02-12

## 2020-09-14 RX ADMIN — LIDOCAINE HYDROCHLORIDE 5 MG: 10 INJECTION, SOLUTION INFILTRATION; PERINEURAL at 09:40

## 2020-09-14 NOTE — PROGRESS NOTES
Subjective   Patient ID: Elliott Dunn is a 65 y.o. left hand dominant male  Follow-up, Numbness, and Pain of the Left Wrist (EMG results)         History of Present Illness  Patient presents to review EMG results of the left upper extremity.  He is left-hand dominant and has had numbness to the left forearm, left thumb and left index finger for several months.  He did have a reverse total shoulder arthroplasty 7/9/2020.  He did experience numbness and tingling in the forearm and all 5 digits of the left hand after surgery however the numbness and tingling subsided in the fourth and fifth digits.  He has been wearing a Velcro cock-up wrist brace at night.                                                 Past Medical History:   Diagnosis Date   • Acquired absence of all teeth    • Allergic    • Anomalous coronary artery origin    • Arrhythmia    • Arthritis    • Arthritis of lumbar spine 7/29/2016   • Back pain 6/17/2016   • Cristina esophagus    • Cancer (CMS/HCC)     prostate   • Cataract     REMOVED BILATERALL   • Cervical spine disease 6/17/2016   • CHF (congestive heart failure) (CMS/HCC)    • Chronic obstructive pulmonary disease (CMS/HCC)    • Colon polyps    • Deafness in left ear    • Derangement of anterior horn of medial meniscus    • Difficulty swallowing    • Disorder of lumbar spine 6/17/2016   • Disorder of thoracic spine 6/17/2016   • Diverticulitis of colon    • DJD (degenerative joint disease)    • Elevated liver enzymes    • Erectile dysfunction    • Esophageal reflux    • Essential hypertension     PT DENIES SAYS IT RUNS LOW   • Glaucoma    • Hard of hearing     LEFT EAR NO AIDS WORN   • Heart murmur    • Hiatal hernia    • High cholesterol    • Impaired functional mobility, balance, gait, and endurance    • Inflammatory polyarthropathy (CMS/HCC)     Per Dr. Haider. Negative NEO, normal ESR, RF, anti-ccp and did not improve with prednisone per notes.   • Inguinal hernia    • Insomnia    • Lateral  meniscus derangement    • Left otitis media with spontaneous rupture of eardrum    • Locking of left knee    • Low back pain    • Meniere's disease    • MGUS (monoclonal gammopathy of unknown significance)     likely diagnosis   • Murmur    • Neuromuscular disorder (CMS/HCC)    • Neuropathic arthritis    • No natural teeth    • Perforation of left tympanic membrane    • Pulmonary emphysema (CMS/HCC)    • Recent shoulder injury     LEFT SHOULDER   • Scoliosis    • Skin cancer     skin cancer   • Skin cancer of face     squamous cell   • Spina bifida occulta 6/17/2016   • Tobacco abuse 11/9/2017   • Visual impairment    • Wears glasses    • Wears glasses     READING GLASSES ONLY        Past Surgical History:   Procedure Laterality Date   • COLONOSCOPY     • ENDOSCOPY N/A 4/10/2017    Procedure: ESOPHAGOGASTRODUODENOSCOPY WITH BIOPSY;  Surgeon: Alexander Ritchie MD;  Location: [facility] ENDOSCOPY;  Service:    • EYE SURGERY     • HERNIA REPAIR     • INGUINAL HERNIA REPAIR Right    • INGUINAL HERNIA REPAIR     • KNEE SURGERY Left    • LYMPH NODE BIOPSY     • MULTIPLE TOOTH EXTRACTIONS     • PROSTATE SURGERY  2004   • PROSTATECTOMY  06/30/2014   • PROSTATECTOMY      Prostatectomy Radical   • SHOULDER ARTHROSCOPY Left 2/6/2020    Procedure: DIAGNOSTIC SHOULDER ARTHROSCOPY LEFT WITH LABRAL DEBRIDEMENT, SUBACROMIAL BURSECTOMY, ASSESSMENT OF LARGE FRAGMENTED RETRACTED IRREPARABLE ROTATOR CUFF TEARS;  Surgeon: Rony Pandey MD;  Location: [facility] OR;  Service: Orthopedics;  Laterality: Left;   • SKIN CANCER EXCISION  2017    both sides of body/squamous cell melanoma   • TOTAL SHOULDER ARTHROPLASTY W/ DISTAL CLAVICLE EXCISION Left 7/9/2020    Procedure: Left reverse total shoulder arthroplasty;  Surgeon: Rony Pandey MD;  Location: [facility] OR;  Service: Orthopedics;  Laterality: Left;   • TYMPANOPLASTY W/ MASTOIDECTOMY     • UMBILICAL HERNIA REPAIR         Family History   Problem Relation Age of Onset   • Diabetes  Mother    • Hypertension Mother    • Obesity Mother    • Stroke Mother 65   • Arthritis Mother    • Hyperlipidemia Mother    • Vision loss Mother    • Cancer Father    • Cancer Sister    • Diabetes Brother    • Cancer Brother    • Heart attack Brother    • Alcohol abuse Brother    • Drug abuse Brother    • Hearing loss Brother    • Learning disabilities Brother        Social History     Socioeconomic History   • Marital status:      Spouse name: Not on file   • Number of children: Not on file   • Years of education: Not on file   • Highest education level: Not on file   Occupational History   • Occupation: retired    Tobacco Use   • Smoking status: Former Smoker     Packs/day: 1.00     Years: 51.00     Pack years: 51.00     Types: Cigarettes     Start date: 1963     Quit date: 2018     Years since quittin.6   • Smokeless tobacco: Never Used   Substance and Sexual Activity   • Alcohol use: No   • Drug use: No   • Sexual activity: Yes     Partners: Female     Birth control/protection: None   Social History Narrative    Left hand dominant, writes with right hand         Current Outpatient Medications:   •  albuterol (PROVENTIL HFA;VENTOLIN HFA) 108 (90 Base) MCG/ACT inhaler, Inhale 2 puffs Every 6 (Six) Hours As Needed for Wheezing or Shortness of Air., Disp: 1 inhaler, Rfl: 3  •  amitriptyline (ELAVIL) 25 MG tablet, TAKE ONE TO THREE TABLETS BY MOUTH EVERY NIGHT AT BEDTIME AS NEEDED FOR SLEEP, Disp: 63 tablet, Rfl: 2  •  aspirin 81 MG EC tablet, Take 81 mg by mouth Daily., Disp: , Rfl:   •  atorvastatin (LIPITOR) 10 MG tablet, Take 1 tablet by mouth Every Night., Disp: 90 tablet, Rfl: 3  •  bisoprolol (ZEBeta) 5 MG tablet, TAKE ONE TABLET BY MOUTH DAILY, Disp: 90 tablet, Rfl: 3  •  celecoxib (CeleBREX) 100 MG capsule, , Disp: , Rfl:   •  coenzyme Q10 100 MG capsule, Take 100 mg by mouth Daily., Disp: , Rfl:   •  diclofenac (VOLTAREN) 1 % gel gel, Apply 4 g topically to the appropriate  area as directed 3 (Three) Times a Day., Disp: 100 g, Rfl: 1  •  DULoxetine (CYMBALTA) 60 MG capsule, TAKE ONE CAPSULE BY MOUTH DAILY, Disp: 90 capsule, Rfl: 10  •  folic acid (FOLVITE) 1 MG tablet, Take 1 mg by mouth Daily., Disp: , Rfl:   •  gabapentin (NEURONTIN) 300 MG capsule, Take 1 capsule by mouth 3 (Three) Times a Day As Needed (nerve pain)., Disp: 270 capsule, Rfl: 1  •  isosorbide mononitrate (IMDUR) 30 MG 24 hr tablet, TAKE ONE TABLET BY MOUTH DAILY, Disp: 30 tablet, Rfl: 1  •  leflunomide (ARAVA) 10 MG tablet, , Disp: , Rfl:   •  methocarbamol (ROBAXIN) 750 MG tablet, Take 1 tablet by mouth 3 (Three) Times a Day As Needed for Muscle Spasms., Disp: 270 tablet, Rfl: 3  •  methotrexate 2.5 MG tablet, Take 8 tablets by mouth 1 (One) Time Per Week. (Patient taking differently: Take 20 mg by mouth 1 (One) Time Per Week. TAKES ON THURSDAY), Disp: 96 tablet, Rfl: 0  •  Multiple Vitamins-Minerals (MULTIVITAMIN WITH MINERALS) tablet tablet, Take 1 tablet by mouth Daily., Disp: , Rfl:   •  nitroglycerin (NITROSTAT) 0.4 MG SL tablet, 1 under the tongue as needed for angina, may repeat q5mins for up three doses, Disp: 100 tablet, Rfl: 11  •  pantoprazole (Protonix) 40 MG EC tablet, Take 1 tablet by mouth Daily., Disp: 90 tablet, Rfl: 4  •  predniSONE (DELTASONE) 10 MG tablet, Take 10 mg by mouth Daily As Needed. As needed, Disp: , Rfl:   •  STIOLTO RESPIMAT 2.5-2.5 MCG/ACT aerosol solution inhaler, INHALE TWO INHALATION(S) BY MOUTH DAILY, Disp: 1 inhaler, Rfl: 4  •  vitamin B-12 (CYANOCOBALAMIN) 1000 MCG tablet, TAKE ONE TABLET BY MOUTH DAILY, Disp: 30 tablet, Rfl: 8    Allergies   Allergen Reactions   • Cyclobenzaprine Other (See Comments)     Wide awake   • Plaquenil [Hydroxychloroquine Sulfate] Other (See Comments)     Black eyes   • Pravastatin Myalgia     Weakness / fatigue       Review of Systems   Musculoskeletal: Positive for arthralgias (left hand.wrist).   Neurological: Positive for numbness (LUE).   All  "other systems reviewed and are negative.      I have reviewed the medical and surgical history, family history, social history, medications, and/or allergies, and the review of systems of this report.    Objective   Resp 18   Ht 180.3 cm (71\")   Wt 65.3 kg (144 lb)   BMI 20.08 kg/m²    Physical Exam  Vitals signs and nursing note reviewed.   Constitutional:       General: He is not in acute distress.     Appearance: He is normal weight.   HENT:      Head: Normocephalic.   Pulmonary:      Effort: Pulmonary effort is normal.      Breath sounds: Normal breath sounds.   Skin:     Capillary Refill: Capillary refill takes less than 2 seconds.   Neurological:      Mental Status: He is alert and oriented to person, place, and time.   Psychiatric:         Mood and Affect: Mood normal.         Thought Content: Thought content normal.         Judgment: Judgment normal.       Left Hand Exam     Range of Motion   The patient has normal left wrist ROM.    Tests   Phalen’s sign: positive    Other   Sensation: normal  Pulse: present    Comments:  Thenar atrophy noted        Left Elbow Exam     Range of Motion   The patient has normal left elbow ROM.    Tests   Tinel's sign (cubital tunnel): positive    Other   Erythema: absent  Sensation: normal             Neurologic Exam     Mental Status   Oriented to person, place, and time.            Assessment/Plan   Independent Review of Radiographic Studies:    I did review the EMG results with the patient.  He does have focal mononeuropathy of the left median nerve that was graded as severe.  He also has evidence of moderate cubital tunnel syndrome.  There is likely an underlying sensorimotor poly-neuropathic process with probable axonal irritation of the left brachial plexus    Carpal Tunnel, Left     Date/Time: 9/14/2020 9:40 AM  Performed by: Yehuda Castañeda PA-C  Authorized by: Yehuda Castañeda PA-C   Consent: Verbal consent obtained. Written consent not " "obtained.  Risks and benefits: risks, benefits and alternatives were discussed  Consent given by: patient  Patient understanding: patient states understanding of the procedure being performed  Patient consent: the patient's understanding of the procedure matches consent given  Procedure consent: procedure consent matches procedure scheduled  Relevant documents: relevant documents present and verified  Test results: test results available and properly labeled  Site marked: the operative site was marked  Imaging studies: imaging studies available  Patient identity confirmed: verbally with patient  Time out: Immediately prior to procedure a \"time out\" was called to verify the correct patient, procedure, equipment, support staff and site/side marked as required.  Preparation: Patient was prepped and draped in the usual sterile fashion.  Local anesthesia used: yes    Anesthesia:  Local anesthesia used: yes  Local Anesthetic: lidocaine spray  Patient tolerance: patient tolerated the procedure well with no immediate complications  Comments: .5 [facility] 4290-1059-36, Lot [id number], Exp. 2/2022  Medications administered: 5 mg lidocaine 1 %             Elliott was seen today for follow-up, numbness and pain.    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  I did recommend the patient use the Velcro wrist brace day and night for 2 weeks followed by nightly use  Patient " agreeable to call or return sooner for any concerns.               EMR Dragon-transcription disclaimer:  This encounter note is an electronic transcription of spoken language to printed text.  Electronic transcription of spoken language may permit erroneous or at times nonsensical words or phrases to be inadvertently transcribed.  Although I have reviewed the note for such errors, some may still exist

## 2020-09-16 ENCOUNTER — HOSPITAL ENCOUNTER (OUTPATIENT)
Dept: RADIATION ONCOLOGY | Facility: HOSPITAL | Age: 65
Setting detail: RADIATION/ONCOLOGY SERIES
Discharge: HOME | End: 2020-09-16

## 2020-09-16 ENCOUNTER — TELEPHONE (OUTPATIENT)
Dept: RADIATION ONCOLOGY | Facility: HOSPITAL | Age: 65
End: 2020-09-16

## 2020-09-16 ENCOUNTER — OFFICE VISIT (OUTPATIENT)
Dept: RADIATION ONCOLOGY | Facility: HOSPITAL | Age: 65
End: 2020-09-16

## 2020-09-16 VITALS
SYSTOLIC BLOOD PRESSURE: 150 MMHG | TEMPERATURE: 96.9 F | BODY MASS INDEX: 19.94 KG/M2 | OXYGEN SATURATION: 96 % | HEART RATE: 60 BPM | HEIGHT: 71 IN | RESPIRATION RATE: 16 BRPM | WEIGHT: 142.4 LBS | DIASTOLIC BLOOD PRESSURE: 77 MMHG

## 2020-09-16 DIAGNOSIS — C61 PROSTATE CANCER (HCC): Primary | ICD-10-CM

## 2020-09-16 PROCEDURE — G0463 HOSPITAL OUTPT CLINIC VISIT: HCPCS

## 2020-09-16 NOTE — TELEPHONE ENCOUNTER
Called pt regarding SIM tomorrow, wife and pt verbalized understanding of partially full bladder. Pt also instructed to call Dr.. Varela's office to setup Lupron injection for this coming week. Pt verbalized understanding.

## 2020-09-17 ENCOUNTER — HOSPITAL ENCOUNTER (OUTPATIENT)
Dept: RADIATION ONCOLOGY | Facility: HOSPITAL | Age: 65
Discharge: HOME | End: 2020-09-17

## 2020-09-17 PROCEDURE — 77334 RADIATION TREATMENT AID(S): CPT | Performed by: RADIOLOGY

## 2020-09-21 RX ORDER — ISOSORBIDE MONONITRATE 30 MG/1
TABLET, EXTENDED RELEASE ORAL
Qty: 30 TABLET | Refills: 6 | Status: SHIPPED | OUTPATIENT
Start: 2020-09-21 | End: 2021-03-29

## 2020-09-29 PROCEDURE — 77338 DESIGN MLC DEVICE FOR IMRT: CPT | Performed by: RADIOLOGY

## 2020-09-29 PROCEDURE — 77301 RADIOTHERAPY DOSE PLAN IMRT: CPT | Performed by: RADIOLOGY

## 2020-09-29 PROCEDURE — 77300 RADIATION THERAPY DOSE PLAN: CPT | Performed by: RADIOLOGY

## 2020-10-01 ENCOUNTER — HOSPITAL ENCOUNTER (OUTPATIENT)
Dept: RADIATION ONCOLOGY | Facility: HOSPITAL | Age: 65
Setting detail: RADIATION/ONCOLOGY SERIES
Discharge: HOME | End: 2020-10-01

## 2020-10-07 ENCOUNTER — HOSPITAL ENCOUNTER (OUTPATIENT)
Dept: RADIATION ONCOLOGY | Facility: HOSPITAL | Age: 65
Discharge: HOME | End: 2020-10-07

## 2020-10-07 PROCEDURE — 77385: CPT | Performed by: RADIOLOGY

## 2020-10-08 ENCOUNTER — HOSPITAL ENCOUNTER (OUTPATIENT)
Dept: RADIATION ONCOLOGY | Facility: HOSPITAL | Age: 65
Discharge: HOME | End: 2020-10-08

## 2020-10-08 PROCEDURE — 77385: CPT | Performed by: RADIOLOGY

## 2020-10-09 ENCOUNTER — HOSPITAL ENCOUNTER (OUTPATIENT)
Dept: RADIATION ONCOLOGY | Facility: HOSPITAL | Age: 65
Discharge: HOME | End: 2020-10-09

## 2020-10-09 PROCEDURE — 77385: CPT | Performed by: RADIOLOGY

## 2020-10-09 PROCEDURE — 77336 RADIATION PHYSICS CONSULT: CPT | Performed by: RADIOLOGY

## 2020-10-12 ENCOUNTER — HOSPITAL ENCOUNTER (OUTPATIENT)
Dept: RADIATION ONCOLOGY | Facility: HOSPITAL | Age: 65
Discharge: HOME | End: 2020-10-12

## 2020-10-12 ENCOUNTER — READMISSION MANAGEMENT (OUTPATIENT)
Dept: CALL CENTER | Facility: HOSPITAL | Age: 65
End: 2020-10-12

## 2020-10-12 PROCEDURE — 77385: CPT | Performed by: RADIOLOGY

## 2020-10-12 NOTE — OUTREACH NOTE
Total Joint Month 3 Survey      Responses   [facility] patient discharged from?  [facility]   Does the patient have one of the following disease processes/diagnoses(primary or secondary)?  Total Joint Replacement   Joint surgery performed?  Shoulder   Month 3 attempt successful?  Yes   Call start time  1243   Call end time  1246   Is the patient taking all medications as directed (includes completed medication regime)?  Yes   Has the patient kept scheduled appointments due by today?  Yes   Graduated  Yes   Did the patient feel the follow up calls were helpful during their recovery period?  Yes   Was the number of calls appropriate?  Yes   Wrap up additional comments  Pt's spouse states he is doing much better. His tingling and numbness in his arm is getting better and is able to lift 20lb at this time.          Precious Yeung RN

## 2020-10-13 ENCOUNTER — HOSPITAL ENCOUNTER (OUTPATIENT)
Dept: RADIATION ONCOLOGY | Facility: HOSPITAL | Age: 65
Discharge: HOME | End: 2020-10-13

## 2020-10-13 VITALS — BODY MASS INDEX: 20.25 KG/M2 | WEIGHT: 145.2 LBS

## 2020-10-14 ENCOUNTER — HOSPITAL ENCOUNTER (OUTPATIENT)
Dept: RADIATION ONCOLOGY | Facility: HOSPITAL | Age: 65
Discharge: HOME | End: 2020-10-14

## 2020-10-14 PROCEDURE — 77385: CPT | Performed by: RADIOLOGY

## 2020-10-15 ENCOUNTER — HOSPITAL ENCOUNTER (OUTPATIENT)
Dept: RADIATION ONCOLOGY | Facility: HOSPITAL | Age: 65
Discharge: HOME | End: 2020-10-15

## 2020-10-15 PROCEDURE — 77385: CPT | Performed by: RADIOLOGY

## 2020-10-16 ENCOUNTER — HOSPITAL ENCOUNTER (OUTPATIENT)
Dept: RADIATION ONCOLOGY | Facility: HOSPITAL | Age: 65
Discharge: HOME | End: 2020-10-16

## 2020-10-16 PROCEDURE — 77336 RADIATION PHYSICS CONSULT: CPT | Performed by: RADIOLOGY

## 2020-10-16 PROCEDURE — 77385: CPT | Performed by: RADIOLOGY

## 2020-10-19 ENCOUNTER — HOSPITAL ENCOUNTER (OUTPATIENT)
Dept: RADIATION ONCOLOGY | Facility: HOSPITAL | Age: 65
Discharge: HOME | End: 2020-10-19

## 2020-10-19 PROCEDURE — 77385: CPT | Performed by: RADIOLOGY

## 2020-10-20 ENCOUNTER — DOCUMENTATION (OUTPATIENT)
Dept: NUTRITION | Facility: HOSPITAL | Age: 65
End: 2020-10-20

## 2020-10-20 ENCOUNTER — HOSPITAL ENCOUNTER (OUTPATIENT)
Dept: RADIATION ONCOLOGY | Facility: HOSPITAL | Age: 65
Discharge: HOME | End: 2020-10-20

## 2020-10-20 VITALS — BODY MASS INDEX: 20.43 KG/M2 | WEIGHT: 146.5 LBS

## 2020-10-20 PROCEDURE — 77385: CPT | Performed by: RADIOLOGY

## 2020-10-20 NOTE — PROGRESS NOTES
[facility] Nutrition    Diagnosis: Pathological Stage III prostate cancer / biochemical recurrence;   prostatectomy, performed by Dr. Varela on 6/30/2014  Treatment: Concurrent short course androgen ablation;  the lower pelvis including prostate bed and seminal vesicle beds will receive a dose of 70 Gray, delivered in 35 daily fractions over 7 weeks, with helical Bandar therapy IMRT technique; 9/35 fractions completed    Weight 146.5 lbs    Patient with gas issues, affecting treatment for prostate cancer.  Reviewed his current diet, with identification of gas forming foods that he is eating and provided patient education for foods to avoid and foods that should not contribute to the issue unless there are individual intolerances to some of the food items.  He has a daily BM (normal pattern was 2x per week), soft in consistency since starting treatment; he denies diarrhea stools.  Written patient education provided.  Will follow through progression of treatment.

## 2020-10-21 ENCOUNTER — HOSPITAL ENCOUNTER (OUTPATIENT)
Dept: RADIATION ONCOLOGY | Facility: HOSPITAL | Age: 65
Discharge: HOME | End: 2020-10-21

## 2020-10-21 PROCEDURE — 77385: CPT | Performed by: RADIOLOGY

## 2020-10-22 ENCOUNTER — HOSPITAL ENCOUNTER (OUTPATIENT)
Dept: RADIATION ONCOLOGY | Facility: HOSPITAL | Age: 65
Discharge: HOME | End: 2020-10-22

## 2020-10-22 PROCEDURE — 77385: CPT | Performed by: RADIOLOGY

## 2020-10-23 ENCOUNTER — HOSPITAL ENCOUNTER (OUTPATIENT)
Dept: RADIATION ONCOLOGY | Facility: HOSPITAL | Age: 65
Discharge: HOME | End: 2020-10-23

## 2020-10-23 PROCEDURE — 77385: CPT | Performed by: RADIOLOGY

## 2020-10-23 PROCEDURE — 77336 RADIATION PHYSICS CONSULT: CPT | Performed by: RADIOLOGY

## 2020-10-26 ENCOUNTER — HOSPITAL ENCOUNTER (OUTPATIENT)
Dept: RADIATION ONCOLOGY | Facility: HOSPITAL | Age: 65
Discharge: HOME | End: 2020-10-26

## 2020-10-26 PROCEDURE — 77385: CPT | Performed by: RADIOLOGY

## 2020-10-27 ENCOUNTER — HOSPITAL ENCOUNTER (OUTPATIENT)
Dept: RADIATION ONCOLOGY | Facility: HOSPITAL | Age: 65
Discharge: HOME | End: 2020-10-27

## 2020-10-27 VITALS — BODY MASS INDEX: 20.4 KG/M2 | WEIGHT: 146.3 LBS

## 2020-10-27 PROCEDURE — 77385: CPT | Performed by: RADIOLOGY

## 2020-10-28 ENCOUNTER — HOSPITAL ENCOUNTER (OUTPATIENT)
Dept: RADIATION ONCOLOGY | Facility: HOSPITAL | Age: 65
Discharge: HOME | End: 2020-10-28

## 2020-10-28 PROCEDURE — 77385: CPT | Performed by: RADIOLOGY

## 2020-10-29 ENCOUNTER — HOSPITAL ENCOUNTER (OUTPATIENT)
Dept: RADIATION ONCOLOGY | Facility: HOSPITAL | Age: 65
Discharge: HOME | End: 2020-10-29

## 2020-10-29 ENCOUNTER — EPISODE CHANGES (OUTPATIENT)
Dept: CASE MANAGEMENT | Facility: OTHER | Age: 65
End: 2020-10-29

## 2020-10-29 ENCOUNTER — OFFICE VISIT (OUTPATIENT)
Dept: PULMONOLOGY | Facility: CLINIC | Age: 65
End: 2020-10-29

## 2020-10-29 VITALS
BODY MASS INDEX: 20.3 KG/M2 | WEIGHT: 145 LBS | HEIGHT: 71 IN | TEMPERATURE: 97.3 F | HEART RATE: 76 BPM | OXYGEN SATURATION: 98 % | DIASTOLIC BLOOD PRESSURE: 84 MMHG | SYSTOLIC BLOOD PRESSURE: 124 MMHG | RESPIRATION RATE: 18 BRPM

## 2020-10-29 DIAGNOSIS — R06.02 SHORTNESS OF BREATH: Primary | ICD-10-CM

## 2020-10-29 DIAGNOSIS — J44.9 CHRONIC OBSTRUCTIVE PULMONARY DISEASE, UNSPECIFIED COPD TYPE (HCC): ICD-10-CM

## 2020-10-29 DIAGNOSIS — Z87.891 PERSONAL HISTORY OF TOBACCO USE, PRESENTING HAZARDS TO HEALTH: ICD-10-CM

## 2020-10-29 PROCEDURE — 99213 OFFICE O/P EST LOW 20 MIN: CPT | Performed by: INTERNAL MEDICINE

## 2020-10-29 PROCEDURE — 77336 RADIATION PHYSICS CONSULT: CPT | Performed by: RADIOLOGY

## 2020-10-29 PROCEDURE — 77385: CPT | Performed by: RADIOLOGY

## 2020-10-29 RX ORDER — ALBUTEROL SULFATE 90 UG/1
2 AEROSOL, METERED RESPIRATORY (INHALATION) EVERY 4 HOURS PRN
Qty: 18 G | Refills: 2 | Status: SHIPPED | OUTPATIENT
Start: 2020-10-29 | End: 2022-10-25 | Stop reason: SDUPTHER

## 2020-10-29 RX ORDER — TIOTROPIUM BROMIDE AND OLODATEROL 3.124; 2.736 UG/1; UG/1
2 SPRAY, METERED RESPIRATORY (INHALATION) DAILY
Qty: 1 INHALER | Refills: 11 | Status: SHIPPED | OUTPATIENT
Start: 2020-10-29 | End: 2021-02-02 | Stop reason: SDUPTHER

## 2020-10-29 NOTE — PROGRESS NOTES
"Chief Complaint   Patient presents with   • Follow-up   • COPD       Subjective   Elliott Dunn is a 65 y.o. male.     History of Present Illness   Patient was evaluated today for follow up of shortness of breath, and COPD.     Patient says that his symptoms have been stable since the last clinic visit. he reports no recent exacerbations.     Patient is using medications, as prescribed. Exercise tolerance has also remained stable.     Quit smoking 3 years ago.       The following portions of the patient's history were reviewed and updated as appropriate: allergies, current medications, past family history, past medical history, past social history and past surgical history.    Review of Systems   Constitutional: Negative for chills, fatigue and fever.   HENT: Negative for sinus pressure, sinus pain, sneezing and sore throat.    Respiratory: Positive for cough. Negative for shortness of breath and wheezing.    Cardiovascular: Negative for chest pain and leg swelling.   Psychiatric/Behavioral: Negative for sleep disturbance.       Objective   Visit Vitals  /84   Pulse 76   Temp 97.3 °F (36.3 °C)   Resp 18   Ht 180.3 cm (71\")   Wt 65.8 kg (145 lb)   SpO2 98%   BMI 20.22 kg/m²       Physical Exam  Vitals signs reviewed.   Constitutional:       Appearance: He is well-developed.   HENT:      Head: Normocephalic and atraumatic.   Eyes:      Extraocular Movements: Extraocular movements intact.   Neck:      Musculoskeletal: Neck supple.   Cardiovascular:      Rate and Rhythm: Normal rate.   Pulmonary:      Comments: Somewhat hyperresonant to percussion.  Somewhat decreased air entry.  No obvious wheezing noted.   Neurological:      Mental Status: He is alert.         Assessment/Plan   Diagnoses and all orders for this visit:    1. Shortness of breath (Primary)    2. Chronic obstructive pulmonary disease, unspecified COPD type (CMS/[facility])    3. Personal history of tobacco use, presenting hazards to health  -     CT Chest " Low Dose Wo; Future           Return in about 3 months (around 1/29/2021) for Recheck, Imaging, For Felecia, ....Also 11 mths w/ Dr. Dickson.    DISCUSSION (if any):  Last CT scan was reviewed in great detail with the patient. Images reviewed personally.   Results for orders placed during the hospital encounter of 10/11/19   CT Chest Without Contrast    Narrative CT CHEST WITHOUT CONTRAST     INDICATION: Emphysema     PROCEDURE:  Thin section axial images were obtained from the lung apices  to below the diaphragm without contrast administration. Coronal  reconstruction images were obtained from the axial data.     COMPARISON: None.     FINDINGS: There is no mediastinal, hilar or axillary lymphadenopathy.    There are no pleural or pericardial effusions. There is emphysema and  evidence of prior granulomatous disease. There are several nodules in  the right upper lobe. These may be related to apical scarring. For  example, a nodule on CT image 15 measures 1.2 cm. Nodule on image 10  measures 1.4 cm. A nodule on image 23 measures 0.9 cm. There are  multiple tiny nodules in the more inferior right upper lobe and right  middle lobe. A right lower lobe nodule measures 1 cm on image 50.     Limited, unenhanced imaging of the upper abdomen is without acute  abnormality. No acute osseous abnormality is identified.       Impression 1. Multiple, slightly irregular right upper lobe nodules. While these  may be related to scarring, malignancy not excluded. Given size, PET/CT  recommended.  2. Small nodules in the inferior right upper lobe and right middle lobe  which could be infectious or inflammatory. Follow-up recommended.  3. Emphysema and evidence of prior granulomatous disease.     This study was performed with techniques to keep radiation doses as low  as reasonably achievable (ALARA). Individualized dose reduction  techniques using automated exposure control or adjustment of mA and/or  kV according to the patient size were  employed.      This report was finalized on 10/12/2019 8:39 AM by Obdulia Feliciano MD.     When the last CT was compared to the CT from early 2019, there was not much difference between the 2 CTs.     Laboratory data was reviewed with him.   Lab Results   Component Value Date    AFPTM 119 09/30/2019     Lab Results   Component Value Date    PHENOTYPE MZ 09/30/2019       Last PFTs showed moderate COPD.  These were performed in Nov 2019.    We have reviewed his pulmonary medications in great detail.    Any needed adjustments to his pulmonary medications, either for clinical or insurance coverage reasons, have been made and are reflected in the orders.    Compliance with medications stressed.     Side effects of prescribed medications discussed with the patient    The patient belongs to the risk group for which lung cancer screening has been recommended. We will try to make arrangements for the same and this will be indicated soon. This has been ordered.    The patient says that he is up-to-date with influenza vaccinations.       Dictated utilizing Dragon dictation.    This document was electronically signed by Warren Dickson MD on 10/29/20 at 15:35 EDT   WDL

## 2020-10-30 ENCOUNTER — HOSPITAL ENCOUNTER (OUTPATIENT)
Dept: RADIATION ONCOLOGY | Facility: HOSPITAL | Age: 65
Discharge: HOME | End: 2020-10-30

## 2020-10-30 PROCEDURE — 77385: CPT | Performed by: RADIOLOGY

## 2020-11-02 ENCOUNTER — HOSPITAL ENCOUNTER (OUTPATIENT)
Dept: RADIATION ONCOLOGY | Facility: HOSPITAL | Age: 65
Setting detail: RADIATION/ONCOLOGY SERIES
Discharge: HOME | End: 2020-11-02

## 2020-11-02 ENCOUNTER — HOSPITAL ENCOUNTER (OUTPATIENT)
Dept: RADIATION ONCOLOGY | Facility: HOSPITAL | Age: 65
Discharge: HOME | End: 2020-11-02

## 2020-11-02 PROCEDURE — 77385: CPT | Performed by: RADIOLOGY

## 2020-11-03 ENCOUNTER — HOSPITAL ENCOUNTER (OUTPATIENT)
Dept: RADIATION ONCOLOGY | Facility: HOSPITAL | Age: 65
Discharge: HOME | End: 2020-11-03

## 2020-11-03 VITALS — BODY MASS INDEX: 20.36 KG/M2 | WEIGHT: 146 LBS

## 2020-11-03 PROCEDURE — 77385: CPT | Performed by: RADIOLOGY

## 2020-11-04 ENCOUNTER — HOSPITAL ENCOUNTER (OUTPATIENT)
Dept: RADIATION ONCOLOGY | Facility: HOSPITAL | Age: 65
Discharge: HOME | End: 2020-11-04

## 2020-11-04 DIAGNOSIS — D47.2 MGUS (MONOCLONAL GAMMOPATHY OF UNKNOWN SIGNIFICANCE): Primary | ICD-10-CM

## 2020-11-04 PROCEDURE — 77385: CPT | Performed by: RADIOLOGY

## 2020-11-05 ENCOUNTER — HOSPITAL ENCOUNTER (OUTPATIENT)
Dept: RADIATION ONCOLOGY | Facility: HOSPITAL | Age: 65
Discharge: HOME | End: 2020-11-05

## 2020-11-05 PROCEDURE — 77385: CPT | Performed by: RADIOLOGY

## 2020-11-06 ENCOUNTER — HOSPITAL ENCOUNTER (OUTPATIENT)
Dept: RADIATION ONCOLOGY | Facility: HOSPITAL | Age: 65
Discharge: HOME | End: 2020-11-06

## 2020-11-06 PROCEDURE — 77336 RADIATION PHYSICS CONSULT: CPT | Performed by: RADIOLOGY

## 2020-11-06 PROCEDURE — 77385: CPT | Performed by: RADIOLOGY

## 2020-11-09 ENCOUNTER — HOSPITAL ENCOUNTER (OUTPATIENT)
Dept: CT IMAGING | Facility: HOSPITAL | Age: 65
Discharge: HOME | End: 2020-11-09

## 2020-11-09 ENCOUNTER — HOSPITAL ENCOUNTER (OUTPATIENT)
Dept: RADIATION ONCOLOGY | Facility: HOSPITAL | Age: 65
Discharge: HOME | End: 2020-11-09

## 2020-11-09 ENCOUNTER — LAB (OUTPATIENT)
Dept: LAB | Facility: HOSPITAL | Age: 65
End: 2020-11-09

## 2020-11-09 DIAGNOSIS — D47.2 MGUS (MONOCLONAL GAMMOPATHY OF UNKNOWN SIGNIFICANCE): ICD-10-CM

## 2020-11-09 DIAGNOSIS — Z87.891 PERSONAL HISTORY OF TOBACCO USE, PRESENTING HAZARDS TO HEALTH: ICD-10-CM

## 2020-11-09 LAB
ALBUMIN SERPL-MCNC: 3.9 G/DL (ref 3.5–5.2)
ALBUMIN/GLOB SERPL: 1.2 G/DL
ALP SERPL-CCNC: 150 U/L (ref 39–117)
ALT SERPL W P-5'-P-CCNC: 65 U/L (ref 1–41)
ANION GAP SERPL CALCULATED.3IONS-SCNC: 6.3 MMOL/L (ref 5–15)
AST SERPL-CCNC: 60 U/L (ref 1–40)
BASOPHILS # BLD AUTO: 0.04 10*3/MM3 (ref 0–0.2)
BASOPHILS NFR BLD AUTO: 0.7 % (ref 0–1.5)
BILIRUB SERPL-MCNC: 0.3 MG/DL (ref 0–1.2)
BUN SERPL-MCNC: 30 MG/DL (ref 8–23)
BUN/CREAT SERPL: 30.9 (ref 7–25)
CALCIUM SPEC-SCNC: 9.5 MG/DL (ref 8.6–10.5)
CHLORIDE SERPL-SCNC: 104 MMOL/L (ref 98–107)
CO2 SERPL-SCNC: 28.7 MMOL/L (ref 22–29)
CREAT SERPL-MCNC: 0.97 MG/DL (ref 0.76–1.27)
DEPRECATED RDW RBC AUTO: 50 FL (ref 37–54)
EOSINOPHIL # BLD AUTO: 0.21 10*3/MM3 (ref 0–0.4)
EOSINOPHIL NFR BLD AUTO: 3.7 % (ref 0.3–6.2)
ERYTHROCYTE [DISTWIDTH] IN BLOOD BY AUTOMATED COUNT: 13.7 % (ref 12.3–15.4)
GFR SERPL CREATININE-BSD FRML MDRD: 78 ML/MIN/1.73
GLOBULIN UR ELPH-MCNC: 3.3 GM/DL
GLUCOSE SERPL-MCNC: 103 MG/DL (ref 65–99)
HCT VFR BLD AUTO: 40.9 % (ref 37.5–51)
HGB BLD-MCNC: 13.7 G/DL (ref 13–17.7)
IMM GRANULOCYTES # BLD AUTO: 0.02 10*3/MM3 (ref 0–0.05)
IMM GRANULOCYTES NFR BLD AUTO: 0.4 % (ref 0–0.5)
LYMPHOCYTES # BLD AUTO: 1.33 10*3/MM3 (ref 0.7–3.1)
LYMPHOCYTES NFR BLD AUTO: 23.5 % (ref 19.6–45.3)
MCH RBC QN AUTO: 33.1 PG (ref 26.6–33)
MCHC RBC AUTO-ENTMCNC: 33.5 G/DL (ref 31.5–35.7)
MCV RBC AUTO: 98.8 FL (ref 79–97)
MONOCYTES # BLD AUTO: 0.9 10*3/MM3 (ref 0.1–0.9)
MONOCYTES NFR BLD AUTO: 15.9 % (ref 5–12)
NEUTROPHILS NFR BLD AUTO: 3.17 10*3/MM3 (ref 1.7–7)
NEUTROPHILS NFR BLD AUTO: 55.8 % (ref 42.7–76)
NRBC BLD AUTO-RTO: 0 /100 WBC (ref 0–0.2)
PLATELET # BLD AUTO: 163 10*3/MM3 (ref 140–450)
PMV BLD AUTO: 9.8 FL (ref 6–12)
POTASSIUM SERPL-SCNC: 4.4 MMOL/L (ref 3.5–5.2)
PROT SERPL-MCNC: 7.2 G/DL (ref 6–8.5)
RBC # BLD AUTO: 4.14 10*6/MM3 (ref 4.14–5.8)
SODIUM SERPL-SCNC: 139 MMOL/L (ref 136–145)
WBC # BLD AUTO: 5.67 10*3/MM3 (ref 3.4–10.8)

## 2020-11-09 PROCEDURE — 36415 COLL VENOUS BLD VENIPUNCTURE: CPT

## 2020-11-09 PROCEDURE — 77385: CPT | Performed by: RADIOLOGY

## 2020-11-09 PROCEDURE — G0297 LDCT FOR LUNG CA SCREEN: HCPCS

## 2020-11-09 PROCEDURE — 82784 ASSAY IGA/IGD/IGG/IGM EACH: CPT

## 2020-11-09 PROCEDURE — 86334 IMMUNOFIX E-PHORESIS SERUM: CPT

## 2020-11-09 PROCEDURE — 85025 COMPLETE CBC W/AUTO DIFF WBC: CPT

## 2020-11-09 PROCEDURE — 84165 PROTEIN E-PHORESIS SERUM: CPT

## 2020-11-09 PROCEDURE — 80053 COMPREHEN METABOLIC PANEL: CPT

## 2020-11-09 PROCEDURE — 83883 ASSAY NEPHELOMETRY NOT SPEC: CPT

## 2020-11-10 ENCOUNTER — HOSPITAL ENCOUNTER (OUTPATIENT)
Dept: RADIATION ONCOLOGY | Facility: HOSPITAL | Age: 65
Discharge: HOME | End: 2020-11-10

## 2020-11-10 VITALS — WEIGHT: 146.8 LBS | BODY MASS INDEX: 20.47 KG/M2

## 2020-11-10 LAB
ALBUMIN SERPL ELPH-MCNC: 3.5 G/DL (ref 2.9–4.4)
ALBUMIN/GLOB SERPL: 1 {RATIO} (ref 0.7–1.7)
ALPHA1 GLOB SERPL ELPH-MCNC: 0.2 G/DL (ref 0–0.4)
ALPHA2 GLOB SERPL ELPH-MCNC: 1 G/DL (ref 0.4–1)
B-GLOBULIN SERPL ELPH-MCNC: 0.8 G/DL (ref 0.7–1.3)
GAMMA GLOB SERPL ELPH-MCNC: 1.8 G/DL (ref 0.4–1.8)
GLOBULIN SER-MCNC: 3.8 G/DL (ref 2.2–3.9)
IGA SERPL-MCNC: 69 MG/DL (ref 61–437)
IGG SERPL-MCNC: 2101 MG/DL (ref 603–1613)
IGM SERPL-MCNC: 20 MG/DL (ref 20–172)
INTERPRETATION SERPL IEP-IMP: ABNORMAL
KAPPA LC FREE SER-MCNC: 83.2 MG/L (ref 3.3–19.4)
KAPPA LC FREE/LAMBDA FREE SER: 12.24 {RATIO} (ref 0.26–1.65)
LABORATORY COMMENT REPORT: ABNORMAL
LAMBDA LC FREE SERPL-MCNC: 6.8 MG/L (ref 5.7–26.3)
M PROTEIN SERPL ELPH-MCNC: 1.5 G/DL
PROT SERPL-MCNC: 7.3 G/DL (ref 6–8.5)

## 2020-11-10 PROCEDURE — 77385: CPT | Performed by: RADIOLOGY

## 2020-11-11 ENCOUNTER — HOSPITAL ENCOUNTER (OUTPATIENT)
Dept: RADIATION ONCOLOGY | Facility: HOSPITAL | Age: 65
Discharge: HOME | End: 2020-11-11

## 2020-11-11 PROCEDURE — 77385: CPT | Performed by: RADIOLOGY

## 2020-11-12 ENCOUNTER — HOSPITAL ENCOUNTER (OUTPATIENT)
Dept: RADIATION ONCOLOGY | Facility: HOSPITAL | Age: 65
Discharge: HOME | End: 2020-11-12

## 2020-11-12 ENCOUNTER — TELEPHONE (OUTPATIENT)
Dept: INTERNAL MEDICINE | Facility: CLINIC | Age: 65
End: 2020-11-12

## 2020-11-12 PROCEDURE — 77385: CPT | Performed by: RADIOLOGY

## 2020-11-12 PROCEDURE — 77336 RADIATION PHYSICS CONSULT: CPT | Performed by: RADIOLOGY

## 2020-11-12 NOTE — TELEPHONE ENCOUNTER
Patient's wife Radha requested a call back. Radha stated the patient has an appointment with his urologist on 2/25/21 and will need a PSA level check prior to this appointment. Radha would like to know if Dr. Means could order this for the patient so he can have this checked in late January 2021.    Please call and advise. Radha's call back [phone]

## 2020-11-13 ENCOUNTER — HOSPITAL ENCOUNTER (OUTPATIENT)
Dept: RADIATION ONCOLOGY | Facility: HOSPITAL | Age: 65
Discharge: HOME | End: 2020-11-13

## 2020-11-13 PROCEDURE — 77385: CPT | Performed by: RADIOLOGY

## 2020-11-13 NOTE — TELEPHONE ENCOUNTER
Contacted Radha (on release) and notified her that order for PSA needed to come from urology; she expressed understanding and stated she would have them fax order to hospital

## 2020-11-13 NOTE — PROGRESS NOTES
Please call the patient regarding his Ct result. No change in the nodules. The nodules are stable.   Will continue to monitor.

## 2020-11-16 ENCOUNTER — HOSPITAL ENCOUNTER (OUTPATIENT)
Dept: RADIATION ONCOLOGY | Facility: HOSPITAL | Age: 65
Discharge: HOME | End: 2020-11-16

## 2020-11-16 PROCEDURE — 77385: CPT | Performed by: RADIOLOGY

## 2020-11-17 ENCOUNTER — HOSPITAL ENCOUNTER (OUTPATIENT)
Dept: RADIATION ONCOLOGY | Facility: HOSPITAL | Age: 65
Discharge: HOME | End: 2020-11-17

## 2020-11-17 VITALS — WEIGHT: 144.2 LBS | BODY MASS INDEX: 20.11 KG/M2

## 2020-11-17 PROCEDURE — 77385: CPT | Performed by: RADIOLOGY

## 2020-11-18 ENCOUNTER — HOSPITAL ENCOUNTER (OUTPATIENT)
Dept: RADIATION ONCOLOGY | Facility: HOSPITAL | Age: 65
Discharge: HOME | End: 2020-11-18

## 2020-11-18 PROCEDURE — 77385: CPT | Performed by: RADIOLOGY

## 2020-11-19 ENCOUNTER — HOSPITAL ENCOUNTER (OUTPATIENT)
Dept: RADIATION ONCOLOGY | Facility: HOSPITAL | Age: 65
Discharge: HOME | End: 2020-11-19

## 2020-11-19 PROCEDURE — 77385: CPT | Performed by: RADIOLOGY

## 2020-11-19 PROCEDURE — 77336 RADIATION PHYSICS CONSULT: CPT | Performed by: RADIOLOGY

## 2020-11-20 ENCOUNTER — HOSPITAL ENCOUNTER (OUTPATIENT)
Dept: RADIATION ONCOLOGY | Facility: HOSPITAL | Age: 65
Discharge: HOME | End: 2020-11-20

## 2020-11-20 PROCEDURE — 77385: CPT | Performed by: RADIOLOGY

## 2020-11-22 ENCOUNTER — HOSPITAL ENCOUNTER (OUTPATIENT)
Dept: RADIATION ONCOLOGY | Facility: HOSPITAL | Age: 65
Discharge: HOME | End: 2020-11-22

## 2020-11-22 PROCEDURE — 77385: CPT | Performed by: RADIOLOGY

## 2020-11-23 ENCOUNTER — HOSPITAL ENCOUNTER (OUTPATIENT)
Dept: RADIATION ONCOLOGY | Facility: HOSPITAL | Age: 65
Discharge: HOME | End: 2020-11-23

## 2020-11-23 PROCEDURE — 77385: CPT | Performed by: RADIOLOGY

## 2020-11-24 ENCOUNTER — HOSPITAL ENCOUNTER (OUTPATIENT)
Dept: RADIATION ONCOLOGY | Facility: HOSPITAL | Age: 65
Discharge: HOME | End: 2020-11-24

## 2020-11-24 VITALS — BODY MASS INDEX: 20.2 KG/M2 | WEIGHT: 144.8 LBS

## 2020-11-24 DIAGNOSIS — C61 MALIGNANT NEOPLASM OF PROSTATE (HCC): Primary | ICD-10-CM

## 2020-11-24 PROCEDURE — 77385: CPT | Performed by: RADIOLOGY

## 2020-12-12 NOTE — RADIATION COMPLETION NOTES
RADIATION ONCOLOGY COMPLETION NOTE    PATIENT:   Elliott Dunn  MEDICAL RECORD:  [id number]  :    1955  COMPLETION DATE: 2020  DIAGNOSIS:   Cancer Staging  Stage IIA (T1c, N0, M0, PSA: 10 to 19, Guillermo 7)  Stage III (T3b, N0, cM0, PSA: 10 to 19, Guillermo 7)      BRIEF HISTORY:  This 65 y.o. patient completed radiotherapy.  He has biochemical recurrence of prostate cancer, Guillermo score 4+3 = 7, clinical stage IIC (T1c, N0, M0), preoperative PSA 13.3 ng/ml.  Status post prostatectomy 2014 pathologic stage IIIB (T3b, N0, M0) with presence of extraprostatic extension on the left and seminal vesicle invasion on the right.  He now has biochemical recurrence, PSA increasing to a value of 1.16 ng/ml, with no evidence of disease outside the prostate bed.  He received salvage treatment with radiotherapy and concurrent short course androgen ablation.    TREATMENT COURSE:  The prostate bed received a dose of 70 Gray delivered in 35 daily fractions of 2 Gray using 6 MV photons with IMRT helical Bandar therapy delivery.  Concurrently, using simultaneous integrated boost technique, the seminal vesicles and periprostatic tissues received a minimum dose of 63 Gray and the lymphatics of the lower pelvis received a minimum dose of 56 Huber.    DATES OF TREATMENT: 10/7/2020 through 2020    TOLERANCE:   no unexpected difficulties     STATUS:  too early to determine response    DISPOSITION:  Follow up in Radiation Oncology in approximately 1 month.        Luis Wilson MD

## 2020-12-17 ENCOUNTER — TELEPHONE (OUTPATIENT)
Dept: ONCOLOGY | Facility: CLINIC | Age: 65
End: 2020-12-17

## 2020-12-17 NOTE — TELEPHONE ENCOUNTER
Patients wife Radha called she ask to come with the pt for his appt tomorrow 12/18 due to his deafness she helps him     Best call back number [phone]

## 2020-12-18 ENCOUNTER — OFFICE VISIT (OUTPATIENT)
Dept: ONCOLOGY | Facility: CLINIC | Age: 65
End: 2020-12-18

## 2020-12-18 VITALS
TEMPERATURE: 97.1 F | HEART RATE: 62 BPM | WEIGHT: 147 LBS | OXYGEN SATURATION: 99 % | BODY MASS INDEX: 20.5 KG/M2 | DIASTOLIC BLOOD PRESSURE: 74 MMHG | RESPIRATION RATE: 16 BRPM | SYSTOLIC BLOOD PRESSURE: 129 MMHG

## 2020-12-18 DIAGNOSIS — D47.2 MGUS (MONOCLONAL GAMMOPATHY OF UNKNOWN SIGNIFICANCE): Primary | ICD-10-CM

## 2020-12-18 PROCEDURE — 99214 OFFICE O/P EST MOD 30 MIN: CPT | Performed by: INTERNAL MEDICINE

## 2020-12-18 NOTE — PROGRESS NOTES
PROBLEM LIST:  Oncology/Hematology History Overview Note   1.  MGUS: Had been having mid back pains and was seen by neurology, was found to have 1200 mg of immunoglobulin G monoclonal protein in the serum with a normal IgA and IgM level this was in July 2016.  Workup August 2016 included a bone survey that was negative other than for degenerative joint disease with left eighth rib healed fracture that was seen on prior bone scan.  Normal creatinine, ionized calcium of 1.34, normal total protein to albumin ratio.  Normal sedimentation rate and C-reactive protein, serum monoclonal protein present at 1100 mg/dL of immunoglobulin G kappa with normal IgA and M levels.  Urine monoclonal protein was too scant to quantify.  Kappa to lambda ratio of 4.58 with elevation of At 42.85.  CBC was unremarkable with normal white count and platelet count and hemoglobin of 17 baseline.  Baseline PET/CT 9/1/2016 was negative.   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  This genetic alteration is found in approximately 15-18 percent of patients with plasma cell myeloma by FISH analysis and is associated with a favorable prognosis in the absence of poor prognostic markers.   b.)  3/6/2017 follow-up PET/CT was negative.    2.  History of prostate cancer: Status post prostatectomy 2014, under the care of Dr. Varela who he sees annually at this point.    3.  Squamous cell cancer of the skin, followed by Dr. Izaguirre.           MGUS (monoclonal gammopathy of unknown significance)   7/1/2016 Initial Diagnosis    MGUS (monoclonal gammopathy of unknown significance)     8/21/2017 -  Other Event    Myeloma panel: Urine immunoelectrophoresis monoclonal protein too small to quantify, serum immunoelectrophoresis M-spike stable at 1.3g/dl, ionized calcium 5.2, kappa to lambda ratio 4.10, beta-2 microglobulin 2.2, C-reactive protein  less than 0.50, creatinine 1.3, sedimentation rate to.  CBC WBC 9600, hemoglobin 15.6, hematocrit 47.1%, platelet count 209,000.       3/12/2018 -  Other Event    Myeloma panel: Urine immunoelectrophoresis with 122 mg/24 hour protein, monoclonal kappa free light chains 21.2%, monoclonal IgG kappa 7.5%.  Sedimentation rate 5, immunoglobulin free light chains free kappa light chains 49.8, normal lambda light chains 12.2, kappa/lambda ratio 4.08.  Serum immunoelectrophoresis M spike 1.7g/dL, C-reactive protein 1.60, CMP unremarkable with creatinine 1.10, serum calcium 9.2, ionized calcium 5.1, beta-2 microglobulin 2.5, CBC normal with a WBC of 9.07, hemoglobin 15.9, hematocrit 47.5%, platelets 226,000.     Malignant neoplasm of prostate (CMS/HCC)   5/16/2014 Cancer Staged    Staging form: Prostate, AJCC V7  - Clinical stage from 5/16/2014: Stage IIA (T1c, N0, M0, PSA: 10 to 19, Crawford 7) - Signed by Luis Wilson MD on 9/16/2020 6/30/2014 Cancer Staged    Staging form: Prostate, AJCC V7  - Pathologic stage from 6/30/2014: Stage III (T3b, N0, cM0, PSA: 10 to 19, Crawford 7) - Signed by Luis Wilson MD on 9/16/2020 9/20/2016 Initial Diagnosis    Malignant neoplasm of prostate (CMS/[facility])     10/7/2020 - 11/24/2020 Radiation    Radiation OncologyTreatment Course:  Elliott Dunn received 7000 cGy in 35 fractions to prostate bed via External Beam Radiation - EBRT.         REASON FOR VISIT: Follow-up of his MGUS    HISTORY OF PRESENT ILLNESS:   65 y.o.  male presents today for follow-up of his monoclonal gammopathy.  Since I last saw him he was diagnosed with a chemical recurrence of his prostate cancer.  He completed radiotherapy to the pelvis.  He is also receiving androgen deprivation therapy.    Past medical history, social history and family history was reviewed and unchanged from prior visit.    Review of Systems:    Review of Systems - Oncology   A comprehensive 14 point review of systems was performed  and was negative except as mentioned.      Medications:        Current Outpatient Medications:   •  albuterol (PROVENTIL HFA;VENTOLIN HFA) 108 (90 Base) MCG/ACT inhaler, Inhale 2 puffs Every 6 (Six) Hours As Needed for Wheezing or Shortness of Air., Disp: 1 inhaler, Rfl: 3  •  albuterol sulfate HFA (Ventolin HFA) 108 (90 Base) MCG/ACT inhaler, Inhale 2 puffs Every 4 (Four) Hours As Needed for Wheezing or Shortness of Air., Disp: 18 g, Rfl: 2  •  amitriptyline (ELAVIL) 25 MG tablet, TAKE ONE TO THREE TABLETS BY MOUTH EVERY NIGHT AT BEDTIME AS NEEDED FOR SLEEP, Disp: 63 tablet, Rfl: 2  •  aspirin 81 MG EC tablet, Take 81 mg by mouth Daily., Disp: , Rfl:   •  atorvastatin (LIPITOR) 10 MG tablet, Take 1 tablet by mouth Every Night., Disp: 90 tablet, Rfl: 3  •  bisoprolol (ZEBeta) 5 MG tablet, TAKE ONE TABLET BY MOUTH DAILY, Disp: 90 tablet, Rfl: 3  •  celecoxib (CeleBREX) 100 MG capsule, , Disp: , Rfl:   •  coenzyme Q10 100 MG capsule, Take 100 mg by mouth Daily., Disp: , Rfl:   •  diclofenac (VOLTAREN) 1 % gel gel, Apply 4 g topically to the appropriate area as directed 3 (Three) Times a Day., Disp: 100 g, Rfl: 1  •  DULoxetine (CYMBALTA) 60 MG capsule, TAKE ONE CAPSULE BY MOUTH DAILY, Disp: 90 capsule, Rfl: 10  •  folic acid (FOLVITE) 1 MG tablet, Take 1 mg by mouth Daily., Disp: , Rfl:   •  gabapentin (NEURONTIN) 300 MG capsule, Take 1 capsule by mouth 3 (Three) Times a Day As Needed (nerve pain)., Disp: 270 capsule, Rfl: 1  •  isosorbide mononitrate (IMDUR) 30 MG 24 hr tablet, TAKE ONE TABLET BY MOUTH DAILY, Disp: 30 tablet, Rfl: 6  •  leflunomide (ARAVA) 10 MG tablet, , Disp: , Rfl:   •  methocarbamol (ROBAXIN) 750 MG tablet, Take 1 tablet by mouth 3 (Three) Times a Day As Needed for Muscle Spasms., Disp: 270 tablet, Rfl: 3  •  methotrexate 2.5 MG tablet, Take 8 tablets by mouth 1 (One) Time Per Week. (Patient taking differently: Take 20 mg by mouth 1 (One) Time Per Week. TAKES ON THURSDAY), Disp: 96 tablet, Rfl:  0  •  Multiple Vitamins-Minerals (MULTIVITAMIN WITH MINERALS) tablet tablet, Take 1 tablet by mouth Daily., Disp: , Rfl:   •  nitroglycerin (NITROSTAT) 0.4 MG SL tablet, 1 under the tongue as needed for angina, may repeat q5mins for up three doses, Disp: 100 tablet, Rfl: 11  •  pantoprazole (Protonix) 40 MG EC tablet, Take 1 tablet by mouth Daily., Disp: 90 tablet, Rfl: 4  •  predniSONE (DELTASONE) 10 MG tablet, Take 10 mg by mouth Daily As Needed. As needed, Disp: , Rfl:   •  tiotropium bromide-olodaterol (Stiolto Respimat) 2.5-2.5 MCG/ACT aerosol solution inhaler, Inhale 2 puffs Daily., Disp: 1 inhaler, Rfl: 11  •  vitamin B-12 (CYANOCOBALAMIN) 1000 MCG tablet, TAKE ONE TABLET BY MOUTH DAILY, Disp: 30 tablet, Rfl: 8      ALLERGIES:    Allergies   Allergen Reactions   • Cyclobenzaprine Unknown - Low Severity     Wide awake   • Plaquenil [Hydroxychloroquine Sulfate] Unknown - Low Severity     Black eyes   • Pravastatin Myalgia     Weakness / fatigue         Physical Exam    VITAL SIGNS:  /74   Pulse 62   Temp 97.1 °F (36.2 °C) (Temporal)   Resp 16   Wt 66.7 kg (147 lb)   SpO2 99%   BMI 20.50 kg/m²     Wt Readings from Last 3 Encounters:   12/18/20 66.7 kg (147 lb)   11/24/20 65.7 kg (144 lb 12.8 oz)   11/17/20 65.4 kg (144 lb 3.2 oz)       Body mass index is 20.5 kg/m². Body surface area is 1.85 meters squared.         Performance Status: 1    General: thin appearing, in no acute distress  HEENT: sclera anicteric, oropharynx clear, neck is supple  Lymphatics: no cervical, supraclavicular, or axillary adenopathy  Cardiovascular: regular rate and rhythm, no murmurs, rubs or gallops  Lungs: clear to auscultation bilaterally  Abdomen: soft, nontender, nondistended.  No palpable organomegaly  Extremities: no lower extremity edema  Skin: no rashes, lesions, bruising, or petechiae  Msk:  Shows no weakness of the large muscle groups  Psych: Mood is stable        RECENT LABS:    Lab Results   Component Value Date     HGB 13.7 11/09/2020    HCT 40.9 11/09/2020    MCV 98.8 (H) 11/09/2020     11/09/2020    WBC 5.67 11/09/2020    NEUTROABS 3.17 11/09/2020    LYMPHSABS 1.33 11/09/2020    MONOSABS 0.90 11/09/2020    EOSABS 0.21 11/09/2020    BASOSABS 0.04 11/09/2020       Lab Results   Component Value Date    GLUCOSE 103 (H) 11/09/2020    BUN 30 (H) 11/09/2020    CREATININE 0.97 11/09/2020     11/09/2020    K 4.4 11/09/2020     11/09/2020    CO2 28.7 11/09/2020    CALCIUM 9.5 11/09/2020    PROTEINTOT 7.2 11/09/2020    ALBUMIN 3.90 11/09/2020    ALBUMIN 3.5 11/09/2020    BILITOT 0.3 11/09/2020    ALKPHOS 150 (H) 11/09/2020    AST 60 (H) 11/09/2020    ALT 65 (H) 11/09/2020       Lab Results   Component Value Date    MSPIKE 1.5 (H) 11/09/2020    MSPIKE 1.7 (H) 09/11/2019    MSPIKE 1.7 (H) 03/11/2019     Lab Results   Component Value Date    FREEKAPPAL 83.2 (H) 11/09/2020    FREEKAPPAL 64.2 (H) 09/11/2019    FREEKAPPAL 49.0 (H) 03/11/2019     Lab Results   Component Value Date    IGLFLC 6.8 11/09/2020    IGLFLC 8.3 09/11/2019    IGLFLC 7.9 03/11/2019         Xr Chest 2 View    Result Date: 8/17/2019  No radiographic evidence of acute cardiac or pulmonary disease. Stable chronic increased interstitial markings.      This report was finalized on 8/17/2019 8:12 AM by Obdulia Feliciano MD.          Assessment/Plan    1.  Monoclonal gammopathy of uncertain significance with the M spike of 1.5 g/dL.  His numbers have been relatively stable for a number of years.  At this point we can monitor him once a year for all of this.  No significant other testing is necessary.    2.  Severe COPD with a right upper lobe lesion.  Followed by Dr. Dickson.    3.  Chemical recurrence of his prostate cancer in  summer 2020.  Patient currently on androgen deprivation therapy with Dr. Varela and also completed pelvic radiation with Dr. Wilson in November 2020.  I recommend addition of Xtandi if he does have progressive disease in the future.              Nicholas Miller MD  [facility]         Orders Placed This Encounter   Procedures   • MARKO, PE & Free LT Chains, Ser   • Comprehensive Metabolic Panel   • CBC & Differential       12/18/2020         Please note that portions of this note may have been completed with a voice recognition program. Efforts were made to edit the dictations, but occasionally words are mistranscribed.

## 2021-01-05 ENCOUNTER — HOSPITAL ENCOUNTER (OUTPATIENT)
Dept: RADIATION ONCOLOGY | Facility: HOSPITAL | Age: 66
Setting detail: RADIATION/ONCOLOGY SERIES
Discharge: HOME | End: 2021-01-05

## 2021-01-05 ENCOUNTER — OFFICE VISIT (OUTPATIENT)
Dept: RADIATION ONCOLOGY | Facility: HOSPITAL | Age: 66
End: 2021-01-05

## 2021-01-05 DIAGNOSIS — C61 MALIGNANT NEOPLASM OF PROSTATE (HCC): Primary | ICD-10-CM

## 2021-01-05 NOTE — PROGRESS NOTES
TELEMEDICINE FOLLOW-UP NOTE    PATIENT:                                                      Elliott Dunn  MEDICAL RECORD #:                        [id number]  :                                                          1955  COMPLETION DATE:   2020  DIAGNOSIS:     Malignant neoplasm of prostate (CMS/HCC)  Clinical- Stage IIA (T1c, N0, M0, PSA: 10 to 19, [place] 7)  Pathologic- Stage III (T3b, N0, cM0, PSA: 10 to 19, [place] 7)      Time Devoted to Visit: 11 min including time to review his previous records, with 75% devoted to direct discussion.    Reason for Visit:  I personally contacted Elliott Dunn today in an effort to screen for coronavirus symptoms and also to perform his scheduled follow-up visit remotely per patient preference/consent in light of the coronavirus pandemic.  With respect to his specific risks for coronavirus, his screen is as follows:    COVID-19 RISK SCREEN     1. Has the patient had close contact without PPE with a lab confirmed COVID-19 (+) person or a person under investigation (PUI) for COVID-19 infection?  -- No     2. Has the patient had respiratory symptoms, worsened/new cough and/or SOA, unexplained fever, or sudden loss of smell and/or taste in the past 7 days? --  No    3. Does the patient have baseline higher exposure risk such as working in healthcare field or currently residing in healthcare facility?  --  No         BRIEF HISTORY:    Initial follow-up via telemedicine for biochemical recurrence of prostate cancer following definitive treatment with prostatectomy in  and subsequent detectable/rising PSA.  Restaging imaging studies showed no evidence of recurrent or metastatic disease on CT scans abdomen pelvis or nuclear medicine bone scan.  He underwent salvage treatment with radiotherapy and concurrent short course androgen ablation.  He tolerated treatment well.  Acute posttreatment fatigue continues to mateus.  He is voiding well and denies any acute  urinary concerns.  No hematuria, dysuria, or incontinence.  Bowels have remained normal.  Chronic erectile dysfunction is unchanged.  He is tolerating LHRH agonist injection without associated side effects.  No new aches or pains.  Overall, he feels well.  He has not yet had repeat PSA or urologic follow-up.  Of note, he is followed clinically for stable right upper lobe pulmonary lesion.  He also follows Dr. Thomas annually for monoclonal gammopathy.      IPSS Questionnaire (AUA-7):  Over the past month…    1)  Incomplete Emptying  How often have you had a sensation of not emptying your bladder?  0 - Not at all   2)  Frequency  How often have you had to urinate less than every two hours? 0 - Not at all   3)  Intermittency  How often have you found you stopped and started again several times when you urinated?  0 - Not at all   4) Urgency  How often have you found it difficult to postpone urination?  0 - Not at all   5) Weak Stream  How often have you had a weak urinary stream?  0 - Not at all   6) Straining  How often have you had to push or strain to begin urination?  0 - Not at all   7) Nocturia  How many times did you typically get up at night to urinate?  0 - None   Total Score:  0       Quality of life due to urinary symptoms:  If you were to spend the rest of your life with your urinary condition the way it is now, how would you feel about that? 0-Delighted   Urine Leakage (Incontinence) 0-No Leakage     Sexual Health Inventory  Current Status    1)  How do you rate your confidence that you could achieve and keep an erection? 1-Very Low   2) When you had erections with sexual stimulation, how often were your erections hard enough for penetration (entering your partner)? 0-No sexual activity   3)  During sexual intercourse, how often were you able to maintain your erection after you had penetrated (entered) into your partner? 0-Did not attempt intercourse   4) During sexual intercourse, how difficult was it to  maintain your erection to completion of intercourse? 0-Did not attempt intercourse   5) When you attempted sexual intercourse, how often was it satisfactory to you? 0-No sexual activity   Total Score: 1       Bowel Health Inventory  Current Status: 0-No problems, no rectal bleeding, no discharge, less then 5 bowel movements a day         MEDICATIONS: Medication reconciliation for the patient was reviewed and confirmed in the electronic medical record.    Review of Systems   Constitutional: Positive for fatigue.   Eyes: Positive for eye problems (glaucoma).   Musculoskeletal: Positive for arthralgias.   Hematological: Bruises/bleeds easily.   Psychiatric/Behavioral: Positive for sleep disturbance.   All other systems reviewed and are negative.      KPS 80%    Physical Exam  Pulmonary:      Respirations even, unlabored. No audible wheezing or cough.  Neurological:      A+Ox4, conversant, answers questions appropriately.  Psychiatric:     Judgement, affect, and decision-making WNL.    Limited physical exam as visit was conducted remotely via telephone in light of the COVID-19 pandemic.      The following portions of the patient's history were reviewed and updated as appropriate: allergies, current medications, past family history, past medical history, past social history, past surgical history and problem list.         Diagnoses and all orders for this visit:    1. Malignant neoplasm of prostate (CMS/HCC) (Primary)         IMPRESSION:  Prostate cancer, [place] score 4+3 = 7, clinical stage IIC (T1c, N0, M0), preoperative PSA 13.3 ng/ml.  Status post prostatectomy 6/30/2014 pathologic stage IIIB (T3b, N0, M0) with presence of extraprostatic extension on the left and seminal vesicle invasion on the right.  He now has biochemical recurrence with no evidence of disease outside the prostate bed.  1 month status post salvage treatment with radiotherapy.  He tolerated treatment well and did not develop significant  radiation-related toxicities.  He is tolerating androgen suppression well.  Continue annual low-dose CT lung screening per pulmonology.      Mr. Dunn and I have reviewed the survivorship care plan in detail.  We discussed diagnosis, follow-up intervals, PSA monitoring, and expectations for response to treatment.  A copy of the care plan was mailed to the patient.  A copy was also sent to his PCP.    RECOMMENDATIONS:  Mr. Dunn continues urologic surveillance under the care of Dr. Varela, with follow-up and repeat PSA scheduled 2/26/2021.    Return in about 6 months (around 7/5/2021) for Office Visit.    Kathryn Keith, APRN

## 2021-02-02 ENCOUNTER — OFFICE VISIT (OUTPATIENT)
Dept: PULMONOLOGY | Facility: CLINIC | Age: 66
End: 2021-02-02

## 2021-02-02 ENCOUNTER — LAB (OUTPATIENT)
Dept: LAB | Facility: HOSPITAL | Age: 66
End: 2021-02-02

## 2021-02-02 ENCOUNTER — TRANSCRIBE ORDERS (OUTPATIENT)
Dept: LAB | Facility: HOSPITAL | Age: 66
End: 2021-02-02

## 2021-02-02 VITALS
OXYGEN SATURATION: 98 % | TEMPERATURE: 96.9 F | WEIGHT: 147 LBS | BODY MASS INDEX: 20.58 KG/M2 | HEART RATE: 72 BPM | SYSTOLIC BLOOD PRESSURE: 124 MMHG | HEIGHT: 71 IN | DIASTOLIC BLOOD PRESSURE: 82 MMHG | RESPIRATION RATE: 16 BRPM

## 2021-02-02 DIAGNOSIS — C61 PROSTATE CANCER (HCC): Primary | ICD-10-CM

## 2021-02-02 DIAGNOSIS — J44.9 CHRONIC OBSTRUCTIVE PULMONARY DISEASE, UNSPECIFIED COPD TYPE (HCC): ICD-10-CM

## 2021-02-02 DIAGNOSIS — C61 PROSTATE CANCER (HCC): ICD-10-CM

## 2021-02-02 DIAGNOSIS — R91.8 MULTIPLE NODULES OF LUNG: ICD-10-CM

## 2021-02-02 DIAGNOSIS — Z87.891 PERSONAL HISTORY OF TOBACCO USE, PRESENTING HAZARDS TO HEALTH: ICD-10-CM

## 2021-02-02 DIAGNOSIS — R06.02 SHORTNESS OF BREATH: Primary | ICD-10-CM

## 2021-02-02 LAB — PSA SERPL-MCNC: <0.014 NG/ML (ref 0–4)

## 2021-02-02 PROCEDURE — 36415 COLL VENOUS BLD VENIPUNCTURE: CPT

## 2021-02-02 PROCEDURE — 84153 ASSAY OF PSA TOTAL: CPT

## 2021-02-02 PROCEDURE — 99214 OFFICE O/P EST MOD 30 MIN: CPT | Performed by: NURSE PRACTITIONER

## 2021-02-02 RX ORDER — TIOTROPIUM BROMIDE AND OLODATEROL 3.124; 2.736 UG/1; UG/1
2 SPRAY, METERED RESPIRATORY (INHALATION) DAILY
Qty: 4 G | Refills: 5 | Status: SHIPPED | OUTPATIENT
Start: 2021-02-02 | End: 2021-09-30 | Stop reason: SDUPTHER

## 2021-02-02 NOTE — PROGRESS NOTES
"Chief Complaint   Patient presents with   • Follow-up   • Shortness of Breath         Subjective   Elliott Dunn is a 65 y.o. male.     History of Present Illness   Patient comes today for follow up of shortness of breath and COPD.     Symptoms have been stable since the last clinic visit. Patient reports no recent exacerbations.     Patient is using medications, as prescribed.  He uses Stiolto daily.  He has not needed the rescue inhaler.    He takes prednisone as needed for arthritis.    Exercise tolerance has also remained stable.     Quit smoking 2-3 years ago.    He was diagnosed with prostate cancer in 2013 and had surgery.  His cancer returned and he just finished 7 weeks of radiation.  He follows with oncology regularly.    The following portions of the patient's history were reviewed and updated as appropriate: allergies, current medications, past family history, past medical history, past social history and past surgical history.    Review of Systems   Constitutional: Negative for chills and fever.   HENT: Negative for rhinorrhea, sinus pressure, sinus pain, sneezing and sore throat.    Respiratory: Positive for cough, shortness of breath and wheezing. Negative for chest tightness.    Psychiatric/Behavioral: Negative for sleep disturbance.       Objective   Visit Vitals  /82   Pulse 72   Temp 96.9 °F (36.1 °C)   Resp 16   Ht 180.3 cm (71\")   Wt 66.7 kg (147 lb)   SpO2 98%   BMI 20.50 kg/m²         Physical Exam  Vitals signs reviewed.   HENT:      Head: Atraumatic.      Mouth/Throat:      Mouth: Mucous membranes are moist.      Pharynx: Oropharynx is clear.      Comments: Crowded oropharynx. Edentulous.   Neck:      Musculoskeletal: Neck supple.   Cardiovascular:      Rate and Rhythm: Normal rate and regular rhythm.   Pulmonary:      Effort: Pulmonary effort is normal. No respiratory distress.      Comments: Somewhat decreased A/E without wheezing.   Musculoskeletal:      Comments: Gait normal. "   Skin:     General: Skin is warm.   Neurological:      Mental Status: He is alert and oriented to person, place, and time.             Assessment/Plan   Diagnoses and all orders for this visit:    1. Shortness of breath (Primary)    2. Chronic obstructive pulmonary disease, unspecified COPD type (CMS/HCC)    3. Personal history of tobacco use, presenting hazards to health    4. Multiple nodules of lung    Other orders  -     tiotropium bromide-olodaterol (Stiolto Respimat) 2.5-2.5 MCG/ACT aerosol solution inhaler; Inhale 2 puffs Daily.  Dispense: 4 g; Refill: 5           Return for keep appt in September.    DISCUSSION (if any):  No change to the current medications has been made.  His COPD symptoms seem to be stable so I have asked him to continue using Stiolto on a daily basis.  He has a rescue inhaler to use as needed for shortness of breath and wheezing.    Compliance with medications stressed.     Side effects of prescribed medications discussed with the patient.    The patient belongs to the risk group for which lung cancer screening has been recommended. He will be due in November for LDCT. His last shows severe emphysema. He has several nodules that are similar to previous CT.    CT CHEST WITHOUT CONTRAST, LOW-DOSE SCREENING PROTOCOL     HISTORY:  65-year-old former smoker who quit smoking 3 years prior with  50 pack year history of smoking.     CTDIvol (mGy):  1.47        DLP (mGy-cm):  54.03 mGy.cm         COMPARISON: 10/11/2019.     TECHNIQUE: Axial CT without IV contrast administration. Low-dose  technique was utilized.     FINDINGS:     Exam is limited due to absence of intravenous contrast. There is also  some artifact related to a left shoulder reverse arthroplasty. Scattered  vascular calcifications. Scattered calcifications, likely due to old  granulomatous disease. Heart size is normal. No pleural or pericardial  effusion. Scattered normal size lymph nodes without adenopathy by size  criteria.  Visualized portions of the upper abdomen demonstrate no acute  abnormality. No pneumothorax. Advanced emphysematous changes. Multiple  areas of scarring are again identified. Nodularity again identified  within the right upper lobe. Apical nodule on image 27 again measures up  to 1.4 cm, previously 1.4 cm. Nodule on image 41 measures 1.2 cm,  previously 1.2 cm. There are several other nodular foci also identified  in the right upper lobe. These all appear very similar to previous and  again are favored to represent areas of parenchymal scarring, especially  given the lack of significant change. Previously measured nodular focus  in the posterior right costophrenic angle is no longer well seen. No new  mass or obvious new suspicious nodule identified. No new areas of  consolidation.     IMPRESSION:  1. Severe emphysematous changes.  2. Areas of nodularity, favored to represent areas of nodular scarring  appear unchanged but can be followed on subsequent exams.  3. Other chronic appearing findings.     LUNG RADS CATEGORY:  2     RECOMMENDATION: Annual Low dose Chest CT for screening if patient remains in the high risk category for lung cancer        Dictated utilizing Dragon dictation.    This document was electronically signed by MARK Corral February 2, 2021  11:38 EST

## 2021-02-12 RX ORDER — CELECOXIB 100 MG/1
100 CAPSULE ORAL DAILY
Qty: 90 CAPSULE | Refills: 1 | Status: SHIPPED | OUTPATIENT
Start: 2021-02-12 | End: 2021-06-21

## 2021-02-26 ENCOUNTER — OFFICE VISIT (OUTPATIENT)
Dept: CARDIOLOGY | Facility: CLINIC | Age: 66
End: 2021-02-26

## 2021-02-26 VITALS
DIASTOLIC BLOOD PRESSURE: 82 MMHG | WEIGHT: 155 LBS | HEIGHT: 71 IN | SYSTOLIC BLOOD PRESSURE: 124 MMHG | OXYGEN SATURATION: 96 % | BODY MASS INDEX: 21.7 KG/M2 | HEART RATE: 89 BPM

## 2021-02-26 DIAGNOSIS — Q24.5 ANOMALOUS CORONARY ARTERY ORIGIN: ICD-10-CM

## 2021-02-26 DIAGNOSIS — J43.9 PULMONARY EMPHYSEMA, UNSPECIFIED EMPHYSEMA TYPE (HCC): ICD-10-CM

## 2021-02-26 DIAGNOSIS — I10 ESSENTIAL HYPERTENSION: Primary | ICD-10-CM

## 2021-02-26 PROBLEM — I20.89 ANGINAL EQUIVALENT: Status: RESOLVED | Noted: 2019-08-30 | Resolved: 2021-02-26

## 2021-02-26 PROBLEM — G63 NEUROPATHY ASSOCIATED WITH MGUS: Status: RESOLVED | Noted: 2020-01-13 | Resolved: 2021-02-26

## 2021-02-26 PROBLEM — I20.8 ANGINAL EQUIVALENT: Status: RESOLVED | Noted: 2019-08-30 | Resolved: 2021-02-26

## 2021-02-26 PROBLEM — D47.2 NEUROPATHY ASSOCIATED WITH MGUS: Status: RESOLVED | Noted: 2020-01-13 | Resolved: 2021-02-26

## 2021-02-26 PROCEDURE — 99213 OFFICE O/P EST LOW 20 MIN: CPT | Performed by: INTERNAL MEDICINE

## 2021-02-26 NOTE — PROGRESS NOTES
Subjective:     Encounter Date:02/26/2021      Patient ID: Elliott Dunn is a 65 y.o. male.    Chief Complaint: Hypertension  HPI  This is a 65-year-old male patient who presents to cardiology clinic for routine follow-up.  The patient indicates he has done well since his last visit without active cardiovascular symptoms, issues or hospitalizations.  He remains active with no exertional symptoms or limitations.  He reports compliance with his medications with no perceived side effects.  He indicates that he has been undergoing radiation therapy for prostate cancer and has not had any cardiovascular issues with that treatment program.  He was able to discontinue cigarette smoking with the use of Chantix.  He has not restarted.  The following portions of the patient's history were reviewed and updated as appropriate: allergies, current medications, past family history, past medical history, past social history, past surgical history and problem  Review of Systems   Constitution: Negative for chills, diaphoresis, fever, malaise/fatigue, weight gain and weight loss.   HENT: Negative for ear discharge, hearing loss, hoarse voice and nosebleeds.    Eyes: Negative for discharge, double vision, pain and photophobia.   Cardiovascular: Negative for chest pain, claudication, cyanosis, dyspnea on exertion, irregular heartbeat, leg swelling, near-syncope, orthopnea, palpitations, paroxysmal nocturnal dyspnea and syncope.   Respiratory: Negative for cough, hemoptysis, shortness of breath, sputum production and wheezing.    Endocrine: Negative for cold intolerance, heat intolerance, polydipsia, polyphagia and polyuria.   Hematologic/Lymphatic: Negative for adenopathy and bleeding problem. Does not bruise/bleed easily.   Skin: Negative for color change, flushing, itching and rash.   Musculoskeletal: Negative for muscle cramps, muscle weakness, myalgias and stiffness.   Gastrointestinal: Negative for abdominal pain, diarrhea,  hematemesis, hematochezia, nausea and vomiting.   Genitourinary: Negative for dysuria, frequency and nocturia.   Neurological: Negative for focal weakness, loss of balance, numbness, paresthesias and seizures.   Psychiatric/Behavioral: Negative for altered mental status, hallucinations and suicidal ideas.   Allergic/Immunologic: Negative for HIV exposure, hives and persistent infections.           Current Outpatient Medications:   •  albuterol sulfate HFA (Ventolin HFA) 108 (90 Base) MCG/ACT inhaler, Inhale 2 puffs Every 4 (Four) Hours As Needed for Wheezing or Shortness of Air., Disp: 18 g, Rfl: 2  •  amitriptyline (ELAVIL) 25 MG tablet, TAKE ONE TO THREE TABLETS BY MOUTH EVERY NIGHT AT BEDTIME AS NEEDED FOR SLEEP, Disp: 63 tablet, Rfl: 2  •  aspirin 81 MG EC tablet, Take 81 mg by mouth Daily., Disp: , Rfl:   •  atorvastatin (LIPITOR) 10 MG tablet, Take 1 tablet by mouth Every Night., Disp: 90 tablet, Rfl: 3  •  bisoprolol (ZEBeta) 5 MG tablet, TAKE ONE TABLET BY MOUTH DAILY, Disp: 90 tablet, Rfl: 3  •  celecoxib (CeleBREX) 100 MG capsule, Take 1 capsule by mouth Daily. With food for arthritic pain, Disp: 90 capsule, Rfl: 1  •  coenzyme Q10 100 MG capsule, Take 100 mg by mouth Daily., Disp: , Rfl:   •  DULoxetine (CYMBALTA) 60 MG capsule, TAKE ONE CAPSULE BY MOUTH DAILY, Disp: 90 capsule, Rfl: 10  •  folic acid (FOLVITE) 1 MG tablet, Take 1 mg by mouth Daily., Disp: , Rfl:   •  gabapentin (NEURONTIN) 300 MG capsule, Take 1 capsule by mouth 3 (Three) Times a Day As Needed (nerve pain)., Disp: 270 capsule, Rfl: 1  •  isosorbide mononitrate (IMDUR) 30 MG 24 hr tablet, TAKE ONE TABLET BY MOUTH DAILY, Disp: 30 tablet, Rfl: 6  •  leflunomide (ARAVA) 10 MG tablet, , Disp: , Rfl:   •  methocarbamol (ROBAXIN) 750 MG tablet, Take 1 tablet by mouth 3 (Three) Times a Day As Needed for Muscle Spasms., Disp: 270 tablet, Rfl: 3  •  methotrexate 2.5 MG tablet, Take 8 tablets by mouth 1 (One) Time Per Week. (Patient taking  "differently: Take 20 mg by mouth 1 (One) Time Per Week. TAKES ON THURSDAY), Disp: 96 tablet, Rfl: 0  •  Multiple Vitamins-Minerals (MULTIVITAMIN WITH MINERALS) tablet tablet, Take 1 tablet by mouth Daily., Disp: , Rfl:   •  nitroglycerin (NITROSTAT) 0.4 MG SL tablet, 1 under the tongue as needed for angina, may repeat q5mins for up three doses, Disp: 100 tablet, Rfl: 11  •  pantoprazole (Protonix) 40 MG EC tablet, Take 1 tablet by mouth Daily., Disp: 90 tablet, Rfl: 4  •  predniSONE (DELTASONE) 10 MG tablet, Take 10 mg by mouth Daily As Needed. As needed, Disp: , Rfl:   •  tiotropium bromide-olodaterol (Stiolto Respimat) 2.5-2.5 MCG/ACT aerosol solution inhaler, Inhale 2 puffs Daily., Disp: 4 g, Rfl: 5  •  vitamin B-12 (CYANOCOBALAMIN) 1000 MCG tablet, TAKE ONE TABLET BY MOUTH DAILY, Disp: 30 tablet, Rfl: 8    Objective:   Vitals signs and nursing note reviewed.   Constitutional:       Appearance: Healthy appearance. Not in distress.   Neck:      Vascular: No JVR. JVD normal.   Pulmonary:      Effort: Pulmonary effort is normal.      Breath sounds: Normal breath sounds. No wheezing. No rhonchi. No rales.   Chest:      Chest wall: Not tender to palpatation.   Cardiovascular:      PMI at left midclavicular line. Normal rate. Regular rhythm. Normal S1. Normal S2.      Murmurs: There is no murmur.      No gallop. No click. No rub.   Pulses:     Intact distal pulses.   Edema:     Peripheral edema absent.   Abdominal:      General: Bowel sounds are normal.      Palpations: Abdomen is soft.      Tenderness: There is no abdominal tenderness.   Musculoskeletal: Normal range of motion.         General: No tenderness.   Skin:     General: Skin is warm and dry.   Neurological:      General: No focal deficit present.      Mental Status: Alert and oriented to person, place and time.       Blood pressure 124/82, pulse 89, height 180.3 cm (71\"), weight 70.3 kg (155 lb), SpO2 96 %.   Lab Review:     Assessment:       1. Essential " hypertension  Acceptable blood pressure control.    2. Anomalous coronary artery origin  Angina free.    3. Pulmonary emphysema, unspecified emphysema type (CMS/HCC)  Fortunately the patient has been able to discontinue cigarette smoking.    Procedures    Plan:     Advance Care Planning   ACP discussion was held with the patient during this visit. Patient has an advance directive (not in EMR), copy requested.  The patient has been congratulated on his smoking cessation.  He has been cautioned to never again resume cigarette smoking.  He is encouraged to maintain his active lifestyle.

## 2021-03-29 RX ORDER — ISOSORBIDE MONONITRATE 30 MG/1
TABLET, EXTENDED RELEASE ORAL
Qty: 90 TABLET | Refills: 0 | Status: SHIPPED | OUTPATIENT
Start: 2021-03-29 | End: 2021-08-05

## 2021-06-02 DIAGNOSIS — G89.29 CHRONIC JOINT PAIN: ICD-10-CM

## 2021-06-02 DIAGNOSIS — M25.50 CHRONIC JOINT PAIN: ICD-10-CM

## 2021-06-03 ENCOUNTER — TRANSCRIBE ORDERS (OUTPATIENT)
Dept: LAB | Facility: HOSPITAL | Age: 66
End: 2021-06-03

## 2021-06-03 ENCOUNTER — LAB (OUTPATIENT)
Dept: LAB | Facility: HOSPITAL | Age: 66
End: 2021-06-03

## 2021-06-03 DIAGNOSIS — D47.2 MGUS (MONOCLONAL GAMMOPATHY OF UNKNOWN SIGNIFICANCE): ICD-10-CM

## 2021-06-03 DIAGNOSIS — Z12.5 PROSTATE CANCER SCREENING: Primary | ICD-10-CM

## 2021-06-03 DIAGNOSIS — Z12.5 PROSTATE CANCER SCREENING: ICD-10-CM

## 2021-06-03 LAB
ALBUMIN SERPL-MCNC: 3.7 G/DL (ref 3.5–5.2)
ALBUMIN/GLOB SERPL: 1.1 G/DL
ALP SERPL-CCNC: 161 U/L (ref 39–117)
ALT SERPL W P-5'-P-CCNC: 30 U/L (ref 1–41)
ANION GAP SERPL CALCULATED.3IONS-SCNC: 5.8 MMOL/L (ref 5–15)
AST SERPL-CCNC: 47 U/L (ref 1–40)
BASOPHILS # BLD AUTO: 0.05 10*3/MM3 (ref 0–0.2)
BASOPHILS NFR BLD AUTO: 0.8 % (ref 0–1.5)
BILIRUB SERPL-MCNC: 0.4 MG/DL (ref 0–1.2)
BUN SERPL-MCNC: 25 MG/DL (ref 8–23)
BUN/CREAT SERPL: 31.3 (ref 7–25)
CALCIUM SPEC-SCNC: 9.1 MG/DL (ref 8.6–10.5)
CHLORIDE SERPL-SCNC: 105 MMOL/L (ref 98–107)
CO2 SERPL-SCNC: 27.2 MMOL/L (ref 22–29)
CREAT SERPL-MCNC: 0.8 MG/DL (ref 0.76–1.27)
DEPRECATED RDW RBC AUTO: 51.6 FL (ref 37–54)
EOSINOPHIL # BLD AUTO: 0.15 10*3/MM3 (ref 0–0.4)
EOSINOPHIL NFR BLD AUTO: 2.5 % (ref 0.3–6.2)
ERYTHROCYTE [DISTWIDTH] IN BLOOD BY AUTOMATED COUNT: 14.1 % (ref 12.3–15.4)
GFR SERPL CREATININE-BSD FRML MDRD: 97 ML/MIN/1.73
GLOBULIN UR ELPH-MCNC: 3.5 GM/DL
GLUCOSE SERPL-MCNC: 93 MG/DL (ref 65–99)
HCT VFR BLD AUTO: 39.1 % (ref 37.5–51)
HGB BLD-MCNC: 12.9 G/DL (ref 13–17.7)
IMM GRANULOCYTES # BLD AUTO: 0.02 10*3/MM3 (ref 0–0.05)
IMM GRANULOCYTES NFR BLD AUTO: 0.3 % (ref 0–0.5)
LYMPHOCYTES # BLD AUTO: 1.36 10*3/MM3 (ref 0.7–3.1)
LYMPHOCYTES NFR BLD AUTO: 22.3 % (ref 19.6–45.3)
MCH RBC QN AUTO: 32.7 PG (ref 26.6–33)
MCHC RBC AUTO-ENTMCNC: 33 G/DL (ref 31.5–35.7)
MCV RBC AUTO: 99 FL (ref 79–97)
MONOCYTES # BLD AUTO: 0.83 10*3/MM3 (ref 0.1–0.9)
MONOCYTES NFR BLD AUTO: 13.6 % (ref 5–12)
NEUTROPHILS NFR BLD AUTO: 3.68 10*3/MM3 (ref 1.7–7)
NEUTROPHILS NFR BLD AUTO: 60.5 % (ref 42.7–76)
NRBC BLD AUTO-RTO: 0 /100 WBC (ref 0–0.2)
PLATELET # BLD AUTO: 213 10*3/MM3 (ref 140–450)
PMV BLD AUTO: 9.7 FL (ref 6–12)
POTASSIUM SERPL-SCNC: 4.6 MMOL/L (ref 3.5–5.2)
PROT SERPL-MCNC: 7.2 G/DL (ref 6–8.5)
PSA SERPL-MCNC: <0.014 NG/ML (ref 0–4)
RBC # BLD AUTO: 3.95 10*6/MM3 (ref 4.14–5.8)
SODIUM SERPL-SCNC: 138 MMOL/L (ref 136–145)
WBC # BLD AUTO: 6.09 10*3/MM3 (ref 3.4–10.8)

## 2021-06-03 PROCEDURE — 82784 ASSAY IGA/IGD/IGG/IGM EACH: CPT

## 2021-06-03 PROCEDURE — 84165 PROTEIN E-PHORESIS SERUM: CPT

## 2021-06-03 PROCEDURE — 85025 COMPLETE CBC W/AUTO DIFF WBC: CPT

## 2021-06-03 PROCEDURE — 80053 COMPREHEN METABOLIC PANEL: CPT

## 2021-06-03 PROCEDURE — 83883 ASSAY NEPHELOMETRY NOT SPEC: CPT

## 2021-06-03 PROCEDURE — 86334 IMMUNOFIX E-PHORESIS SERUM: CPT

## 2021-06-03 PROCEDURE — 36415 COLL VENOUS BLD VENIPUNCTURE: CPT

## 2021-06-03 PROCEDURE — G0103 PSA SCREENING: HCPCS

## 2021-06-03 RX ORDER — DULOXETIN HYDROCHLORIDE 60 MG/1
60 CAPSULE, DELAYED RELEASE ORAL DAILY
Qty: 90 CAPSULE | Refills: 1 | Status: SHIPPED | OUTPATIENT
Start: 2021-06-03 | End: 2021-12-21

## 2021-06-04 LAB
ALBUMIN SERPL ELPH-MCNC: 3.4 G/DL (ref 2.9–4.4)
ALBUMIN/GLOB SERPL: 1.1 {RATIO} (ref 0.7–1.7)
ALPHA1 GLOB SERPL ELPH-MCNC: 0.2 G/DL (ref 0–0.4)
ALPHA2 GLOB SERPL ELPH-MCNC: 0.8 G/DL (ref 0.4–1)
B-GLOBULIN SERPL ELPH-MCNC: 0.8 G/DL (ref 0.7–1.3)
GAMMA GLOB SERPL ELPH-MCNC: 1.7 G/DL (ref 0.4–1.8)
GLOBULIN SER-MCNC: 3.4 G/DL (ref 2.2–3.9)
IGA SERPL-MCNC: 69 MG/DL (ref 61–437)
IGG SERPL-MCNC: 1827 MG/DL (ref 603–1613)
IGM SERPL-MCNC: 20 MG/DL (ref 20–172)
INTERPRETATION SERPL IEP-IMP: ABNORMAL
KAPPA LC FREE SER-MCNC: 88.4 MG/L (ref 3.3–19.4)
KAPPA LC FREE/LAMBDA FREE SER: 12.63 {RATIO} (ref 0.26–1.65)
LABORATORY COMMENT REPORT: ABNORMAL
LAMBDA LC FREE SERPL-MCNC: 7 MG/L (ref 5.7–26.3)
M PROTEIN SERPL ELPH-MCNC: 1.5 G/DL
PROT SERPL-MCNC: 6.8 G/DL (ref 6–8.5)

## 2021-06-21 DIAGNOSIS — K21.00 GASTROESOPHAGEAL REFLUX DISEASE WITH ESOPHAGITIS: ICD-10-CM

## 2021-06-21 RX ORDER — CELECOXIB 100 MG/1
CAPSULE ORAL
Qty: 90 CAPSULE | Refills: 0 | Status: SHIPPED | OUTPATIENT
Start: 2021-06-21 | End: 2021-12-08

## 2021-06-21 RX ORDER — PANTOPRAZOLE SODIUM 40 MG/1
40 TABLET, DELAYED RELEASE ORAL DAILY
Qty: 90 TABLET | Refills: 0 | Status: SHIPPED | OUTPATIENT
Start: 2021-06-21 | End: 2021-09-20

## 2021-07-01 DIAGNOSIS — K21.00 GASTROESOPHAGEAL REFLUX DISEASE WITH ESOPHAGITIS: ICD-10-CM

## 2021-07-01 RX ORDER — PANTOPRAZOLE SODIUM 40 MG/1
TABLET, DELAYED RELEASE ORAL
Qty: 89 TABLET | Refills: 3 | OUTPATIENT
Start: 2021-07-01

## 2021-07-02 ENCOUNTER — OFFICE VISIT (OUTPATIENT)
Dept: RADIATION ONCOLOGY | Facility: HOSPITAL | Age: 66
End: 2021-07-02

## 2021-07-02 ENCOUNTER — HOSPITAL ENCOUNTER (OUTPATIENT)
Dept: RADIATION ONCOLOGY | Facility: HOSPITAL | Age: 66
Setting detail: RADIATION/ONCOLOGY SERIES
Discharge: HOME | End: 2021-07-02

## 2021-07-02 VITALS
RESPIRATION RATE: 17 BRPM | DIASTOLIC BLOOD PRESSURE: 62 MMHG | TEMPERATURE: 97.1 F | OXYGEN SATURATION: 96 % | SYSTOLIC BLOOD PRESSURE: 118 MMHG | BODY MASS INDEX: 20.02 KG/M2 | HEART RATE: 67 BPM | HEIGHT: 71 IN | WEIGHT: 143 LBS

## 2021-07-02 DIAGNOSIS — C61 MALIGNANT NEOPLASM OF PROSTATE (HCC): ICD-10-CM

## 2021-07-02 PROCEDURE — G0463 HOSPITAL OUTPT CLINIC VISIT: HCPCS

## 2021-07-02 NOTE — PROGRESS NOTES
FOLLOW UP NOTE    PATIENT:                                                      Elliott Dunn  MEDICAL RECORD #:                        [id number]  :                                                          1955  COMPLETION DATE:   2020  DIAGNOSIS:     Malignant neoplasm of prostate (CMS/HCC)  -Clinical: Stage IIA (T1c, N0, M0, PSA: 10 to 19, Clemons 7)  - Pathologic: Stage III (T3b, N0, cM0, PSA: 10 to 19, Clemons 7)      BRIEF HISTORY:    Routine follow-up visit.  He has a history prostatectomy in  and remained in biochemical remission for some time.  More recently, he was found to have detectable/rising PSA consistent with biochemical recurrence, without evidence of disease outside of the prostate bed.  He underwent salvage treatment with radiotherapy and concurrent short course androgen ablation.  He tolerated treatment well.  PSA now continues to remain undetectable.  From a symptom standpoint, he is voiding well and without any urinary complaints.  No hematuria, dysuria, or incontinence.  He does not require a pad.  Bowels are regular.  Chronic erectile dysfunction is unchanged.  Hot flashes related to androgen suppression have resolved.  Energy level is moderate.  He continues to remain active [place].  He describes 2 recent episodes of left-sided chest pain which resolved spontaneously.  No other or new aches/pains.  Of note, he is followed clinically for stable right upper lobe pulmonary lesion.  He also follows Dr. Thomas annually for monoclonal gammopathy.      IPSS Questionnaire (AUA-7):  Over the past month…    1)  Incomplete Emptying  How often have you had a sensation of not emptying your bladder?  0 - Not at all   2)  Frequency  How often have you had to urinate less than every two hours? 0 - Not at all   3)  Intermittency  How often have you found you stopped and started again several times when you urinated?  0 - Not at all   4) Urgency  How often have you found it difficult to  postpone urination?  0 - Not at all   5) Weak Stream  How often have you had a weak urinary stream?  0 - Not at all   6) Straining  How often have you had to push or strain to begin urination?  0 - Not at all   7) Nocturia  How many times did you typically get up at night to urinate?  0 - None   Total Score:  0       Quality of life due to urinary symptoms:  If you were to spend the rest of your life with your urinary condition the way it is now, how would you feel about that? 0-Delighted   Urine Leakage (Incontinence) 0-No Leakage     Sexual Health Inventory  Current Status    1)  How do you rate your confidence that you could achieve and keep an erection? 1-Very Low   2) When you had erections with sexual stimulation, how often were your erections hard enough for penetration (entering your partner)? 0-No sexual activity   3)  During sexual intercourse, how often were you able to maintain your erection after you had penetrated (entered) into your partner? 0-Did not attempt intercourse   4) During sexual intercourse, how difficult was it to maintain your erection to completion of intercourse? 0-Did not attempt intercourse   5) When you attempted sexual intercourse, how often was it satisfactory to you? 0-No sexual activity   Total Score: 1       Bowel Health Inventory  Current Status: 0-No problems, no rectal bleeding, no discharge, less then 5 bowel movements a day         MEDICATIONS: Medication reconciliation for the patient was reviewed and confirmed in the electronic medical record.    Review of Systems   Constitutional: Positive for fatigue.   Eyes: Positive for eye problems (glaucoma).   Cardiovascular: Positive for chest pain (x2 recent episodes).   Musculoskeletal: Positive for arthralgias.   Hematological: Bruises/bleeds easily.   All other systems reviewed and are negative.      KPS 80%    Physical Exam  Vitals and nursing note reviewed.   Constitutional:       General: He is not in acute distress.      "Appearance: He is well-developed.   HENT:      Head: Normocephalic and atraumatic.   Eyes:      Conjunctiva/sclera: Conjunctivae normal.      Pupils: Pupils are equal, round, and reactive to light.   Cardiovascular:      Rate and Rhythm: Normal rate and regular rhythm.      Heart sounds: No murmur heard.   No friction rub.   Pulmonary:      Effort: Pulmonary effort is normal.      Breath sounds: Normal breath sounds. No wheezing.   Abdominal:      General: Bowel sounds are normal. There is no distension.      Palpations: Abdomen is soft. There is no mass.      Tenderness: There is no abdominal tenderness.   Genitourinary:     Comments: BRADLY deferred  Musculoskeletal:         General: Normal range of motion.      Cervical back: Normal range of motion and neck supple.   Lymphadenopathy:      Cervical: No cervical adenopathy.   Skin:     General: Skin is warm and dry.   Neurological:      Mental Status: He is alert and oriented to person, place, and time.   Psychiatric:         Behavior: Behavior normal.         Thought Content: Thought content normal.         Judgment: Judgment normal.         VITAL SIGNS:   Vitals:    07/02/21 1425   BP: 118/62   Pulse: 67   Resp: 17   Temp: 97.1 °F (36.2 °C)   SpO2: 96%  Comment: RA   Weight: 64.9 kg (143 lb)   Height: 180.3 cm (71\")   PainSc: 0-No pain       LABORATORY:  PSA 2/2/2021 = <0.014 ng/ml  PSA 6/3/2021 = <0.014 ng/ml      The following portions of the patient's history were reviewed and updated as appropriate: allergies, current medications, past family history, past medical history, past social history, past surgical history and problem list.         Diagnoses and all orders for this visit:    1. Malignant neoplasm of prostate (CMS/HCC)         IMPRESSION:  Prostate cancer, Guillermo score 4+3 = 7, clinical stage IIC (T1c, N0, M0), preoperative PSA 13.3 ng/ml.  Status post prostatectomy 6/30/2014 pathologic stage IIIB (T3b, N0, M0) with presence of extraprostatic extension " on the left and seminal vesicle invasion on the right.  Now 7 months status post salvage treatment with short course androgen suppression and pelvic irradiation for biochemical recurrence.  He tolerated treatment well.  Subsequent PSAs have remained undetectable, again indicating biochemical remission status with no evidence of recurrent disease at this time.  BRADLY deferred in the absence of detectable PSA.  He will continue biannual PSA testing under the direction of Dr. Varela.  Continue annual low-dose CT lung screening per pulmonology.  Verbalized understanding/instruction to call EMS/present to local ED if episodic chest pain persists/worsens for further evaluation.       RECOMMENDATIONS:  Mr. Dunn continues urologic surveillance and q 6 month PSA testing under the care of Dr. Varela in [place].  Mr. Dunn travels from [place] and prefers local follow-up.  As such, we discussed that we are happy to remain available as needed, and should notify our office if questions or concerns, or if PSA again becomes detectable on 2 or more subsequent occasions.    Return if symptoms worsen or fail to improve, for Office Visit.    Kathryn Keith, APRN

## 2021-07-08 DIAGNOSIS — M47.812 SPONDYLOSIS OF CERVICAL REGION WITHOUT MYELOPATHY OR RADICULOPATHY: ICD-10-CM

## 2021-07-08 DIAGNOSIS — G89.4 CHRONIC PAIN SYNDROME: ICD-10-CM

## 2021-07-08 NOTE — TELEPHONE ENCOUNTER
Caller: Radha Dunn    Relationship: Emergency Contact    Best call back number: [phone]    Medication needed:   Requested Prescriptions     Pending Prescriptions Disp Refills   • gabapentin (NEURONTIN) 300 MG capsule 270 capsule 1     Sig: Take 1 capsule by mouth 3 (Three) Times a Day As Needed (nerve pain).       When do you need the refill by: TODAY    What additional details did the patient provide when requesting the medication: PATIENT HAS BEEN OUT OF HIS MEDICATION FOR A MONTH. PLEASE CALL WHEN THIS HAS BEEN SENT IN.    Does the patient have less than a 3 day supply:  [x] Yes  [] No    What is the patient's preferred pharmacy: [facility] [address] [place] [address] - [phone] [facility] [phone] FX

## 2021-07-09 RX ORDER — GABAPENTIN 300 MG/1
300 CAPSULE ORAL 3 TIMES DAILY PRN
Qty: 90 CAPSULE | Refills: 0 | Status: SHIPPED | OUTPATIENT
Start: 2021-07-09 | End: 2021-10-21

## 2021-07-28 ENCOUNTER — OFFICE VISIT (OUTPATIENT)
Dept: INTERNAL MEDICINE | Facility: CLINIC | Age: 66
End: 2021-07-28

## 2021-07-28 VITALS
SYSTOLIC BLOOD PRESSURE: 110 MMHG | TEMPERATURE: 97.3 F | OXYGEN SATURATION: 96 % | WEIGHT: 139.13 LBS | HEART RATE: 57 BPM | BODY MASS INDEX: 19.48 KG/M2 | HEIGHT: 71 IN | RESPIRATION RATE: 18 BRPM | DIASTOLIC BLOOD PRESSURE: 60 MMHG

## 2021-07-28 DIAGNOSIS — I10 ESSENTIAL HYPERTENSION: ICD-10-CM

## 2021-07-28 DIAGNOSIS — E78.49 OTHER HYPERLIPIDEMIA: ICD-10-CM

## 2021-07-28 DIAGNOSIS — Z23 NEED FOR PNEUMOCOCCAL VACCINATION: ICD-10-CM

## 2021-07-28 DIAGNOSIS — E53.8 COBALAMIN DEFICIENCY: ICD-10-CM

## 2021-07-28 DIAGNOSIS — Z79.899 ENCOUNTER FOR MONITORING LONG-TERM PROTON PUMP INHIBITOR THERAPY: ICD-10-CM

## 2021-07-28 DIAGNOSIS — C61 MALIGNANT NEOPLASM OF PROSTATE (HCC): ICD-10-CM

## 2021-07-28 DIAGNOSIS — M06.4 INFLAMMATORY POLYARTHROPATHY (HCC): ICD-10-CM

## 2021-07-28 DIAGNOSIS — Z51.81 ENCOUNTER FOR MONITORING LONG-TERM PROTON PUMP INHIBITOR THERAPY: ICD-10-CM

## 2021-07-28 DIAGNOSIS — G63 NEUROPATHY ASSOCIATED WITH MGUS (HCC): ICD-10-CM

## 2021-07-28 DIAGNOSIS — Z12.11 COLON CANCER SCREENING: ICD-10-CM

## 2021-07-28 DIAGNOSIS — C44.300 SKIN CANCER OF FACE: ICD-10-CM

## 2021-07-28 DIAGNOSIS — K21.00 GASTROESOPHAGEAL REFLUX DISEASE WITH ESOPHAGITIS, UNSPECIFIED WHETHER HEMORRHAGE: ICD-10-CM

## 2021-07-28 DIAGNOSIS — J43.9 PULMONARY EMPHYSEMA, UNSPECIFIED EMPHYSEMA TYPE (HCC): Chronic | ICD-10-CM

## 2021-07-28 DIAGNOSIS — Z23 NEED FOR SHINGLES VACCINE: ICD-10-CM

## 2021-07-28 DIAGNOSIS — D47.2 MGUS (MONOCLONAL GAMMOPATHY OF UNKNOWN SIGNIFICANCE): ICD-10-CM

## 2021-07-28 DIAGNOSIS — M06.09 RHEUMATOID ARTHRITIS OF MULTIPLE SITES WITH NEGATIVE RHEUMATOID FACTOR (HCC): ICD-10-CM

## 2021-07-28 DIAGNOSIS — Z00.00 MEDICARE ANNUAL WELLNESS VISIT, SUBSEQUENT: Primary | ICD-10-CM

## 2021-07-28 DIAGNOSIS — J44.9: ICD-10-CM

## 2021-07-28 DIAGNOSIS — D47.2 NEUROPATHY ASSOCIATED WITH MGUS (HCC): ICD-10-CM

## 2021-07-28 PROBLEM — M12.812 ROTATOR CUFF TEAR ARTHROPATHY OF LEFT SHOULDER: Status: RESOLVED | Noted: 2020-03-12 | Resolved: 2021-07-28

## 2021-07-28 PROBLEM — M19.012 ARTHROPATHY OF LEFT SHOULDER: Status: RESOLVED | Noted: 2020-03-12 | Resolved: 2021-07-28

## 2021-07-28 PROBLEM — M75.102 ROTATOR CUFF TEAR ARTHROPATHY OF LEFT SHOULDER: Status: RESOLVED | Noted: 2020-03-12 | Resolved: 2021-07-28

## 2021-07-28 LAB
ALBUMIN SERPL-MCNC: 3.6 G/DL (ref 3.5–5.2)
ALBUMIN/GLOB SERPL: 1 G/DL
ALP SERPL-CCNC: 188 U/L (ref 39–117)
ALT SERPL-CCNC: 33 U/L (ref 1–41)
AST SERPL-CCNC: 47 U/L (ref 1–40)
BASOPHILS # BLD AUTO: 0.04 10*3/MM3 (ref 0–0.2)
BASOPHILS NFR BLD AUTO: 0.5 % (ref 0–1.5)
BILIRUB SERPL-MCNC: 0.4 MG/DL (ref 0–1.2)
BUN SERPL-MCNC: 21 MG/DL (ref 8–23)
BUN/CREAT SERPL: 22.6 (ref 7–25)
CALCIUM SERPL-MCNC: 9.3 MG/DL (ref 8.6–10.5)
CHLORIDE SERPL-SCNC: 103 MMOL/L (ref 98–107)
CHOLEST SERPL-MCNC: 121 MG/DL (ref 0–200)
CO2 SERPL-SCNC: 28 MMOL/L (ref 22–29)
CREAT SERPL-MCNC: 0.93 MG/DL (ref 0.76–1.27)
EOSINOPHIL # BLD AUTO: 0.21 10*3/MM3 (ref 0–0.4)
EOSINOPHIL NFR BLD AUTO: 2.8 % (ref 0.3–6.2)
ERYTHROCYTE [DISTWIDTH] IN BLOOD BY AUTOMATED COUNT: 12.4 % (ref 12.3–15.4)
FOLATE SERPL-MCNC: >20 NG/ML (ref 4.78–24.2)
GLOBULIN SER CALC-MCNC: 3.6 GM/DL
GLUCOSE SERPL-MCNC: 82 MG/DL (ref 65–99)
HCT VFR BLD AUTO: 40.2 % (ref 37.5–51)
HDLC SERPL-MCNC: 39 MG/DL (ref 40–60)
HGB BLD-MCNC: 13.3 G/DL (ref 13–17.7)
IMM GRANULOCYTES # BLD AUTO: 0.04 10*3/MM3 (ref 0–0.05)
IMM GRANULOCYTES NFR BLD AUTO: 0.5 % (ref 0–0.5)
LDLC SERPL CALC-MCNC: 68 MG/DL (ref 0–100)
LYMPHOCYTES # BLD AUTO: 1.2 10*3/MM3 (ref 0.7–3.1)
LYMPHOCYTES NFR BLD AUTO: 16.3 % (ref 19.6–45.3)
MAGNESIUM SERPL-MCNC: 1.9 MG/DL (ref 1.6–2.4)
MCH RBC QN AUTO: 31.3 PG (ref 26.6–33)
MCHC RBC AUTO-ENTMCNC: 33.1 G/DL (ref 31.5–35.7)
MCV RBC AUTO: 94.6 FL (ref 79–97)
MONOCYTES # BLD AUTO: 0.9 10*3/MM3 (ref 0.1–0.9)
MONOCYTES NFR BLD AUTO: 12.2 % (ref 5–12)
NEUTROPHILS # BLD AUTO: 4.98 10*3/MM3 (ref 1.7–7)
NEUTROPHILS NFR BLD AUTO: 67.7 % (ref 42.7–76)
NRBC BLD AUTO-RTO: 0 /100 WBC (ref 0–0.2)
PLATELET # BLD AUTO: 253 10*3/MM3 (ref 140–450)
POTASSIUM SERPL-SCNC: 4.3 MMOL/L (ref 3.5–5.2)
PROT SERPL-MCNC: 7.2 G/DL (ref 6–8.5)
RBC # BLD AUTO: 4.25 10*6/MM3 (ref 4.14–5.8)
SODIUM SERPL-SCNC: 138 MMOL/L (ref 136–145)
T4 FREE SERPL-MCNC: 0.88 NG/DL (ref 0.93–1.7)
TRIGL SERPL-MCNC: 65 MG/DL (ref 0–150)
TSH SERPL DL<=0.005 MIU/L-ACNC: 0.48 UIU/ML (ref 0.27–4.2)
VIT B12 SERPL-MCNC: 986 PG/ML (ref 211–946)
VLDLC SERPL CALC-MCNC: 14 MG/DL (ref 5–40)
WBC # BLD AUTO: 7.37 10*3/MM3 (ref 3.4–10.8)

## 2021-07-28 PROCEDURE — 1159F MED LIST DOCD IN RCRD: CPT | Performed by: FAMILY MEDICINE

## 2021-07-28 PROCEDURE — G0439 PPPS, SUBSEQ VISIT: HCPCS | Performed by: FAMILY MEDICINE

## 2021-07-28 PROCEDURE — 99397 PER PM REEVAL EST PAT 65+ YR: CPT | Performed by: FAMILY MEDICINE

## 2021-07-28 PROCEDURE — 90732 PPSV23 VACC 2 YRS+ SUBQ/IM: CPT | Performed by: FAMILY MEDICINE

## 2021-07-28 PROCEDURE — G0009 ADMIN PNEUMOCOCCAL VACCINE: HCPCS | Performed by: FAMILY MEDICINE

## 2021-07-28 PROCEDURE — 1170F FXNL STATUS ASSESSED: CPT | Performed by: FAMILY MEDICINE

## 2021-07-28 PROCEDURE — 1126F AMNT PAIN NOTED NONE PRSNT: CPT | Performed by: FAMILY MEDICINE

## 2021-07-28 RX ORDER — ZOSTER VACCINE RECOMBINANT, ADJUVANTED 50 MCG/0.5
0.5 KIT INTRAMUSCULAR ONCE
Qty: 1 EACH | Refills: 1 | Status: SHIPPED | OUTPATIENT
Start: 2021-07-28 | End: 2021-07-28

## 2021-07-28 RX ORDER — DIPHENOXYLATE HYDROCHLORIDE AND ATROPINE SULFATE 2.5; .025 MG/1; MG/1
1 TABLET ORAL 4 TIMES DAILY PRN
COMMUNITY
Start: 2021-06-08 | End: 2022-08-03

## 2021-07-28 NOTE — PROGRESS NOTES
The ABCs of the Annual Wellness Visit  Subsequent Medicare Wellness Visit    Chief Complaint   Patient presents with   • Medicare Wellness-subsequent       Subjective   History of Present Illness:  Elliott Dunn is a 66 y.o. male who presents for a Subsequent Medicare Wellness Visit.    S/p radiation for prostate cancer recurrence. Was told by radiation doctor it would not come back.     Needs to go to dermatologist about facial lesion, keeps coming back.  Has seen Dr. Izaguirre multiple times w/o success in eliminating lesion.     Continues to have chronic pain, followed by rheumatology. Gabapentin helps with neuropathic pain. Last note reviewed:  RHEUMATOLOGY - SCAN - VISIT NOTE, [facility] [place], 07/19/2021 (07/19/2021)  Treated for rheumatoid arthritis with methotrexate and patient confirms he's taking folic acid.     Former smoker, quit not long after his first CT lung cancer screening. Continues Stiolto for COPD, emphysema. Hesitant to take COVID-19 vaccination.    HEALTH RISK ASSESSMENT    Recent Hospitalizations:  No hospitalization(s) within the last year.    Current Medical Providers:  Patient Care Team:  Ladonna Means MD as PCP - Clemencia Smith APRN as Referring Physician (Neurology)  Alexander Ritchie MD as Consulting Physician (General Surgery)  Cisco Varela MD as Referring Physician (Urology)  Darien Haider MD as Consulting Physician (Rheumatology)  Luis Wilson MD as Consulting Physician (Radiation Oncology)  Yehuda Gatica MD as Consulting Physician (Ophthalmology)    Smoking Status:  Social History     Tobacco Use   Smoking Status Former Smoker   • Packs/day: 1.00   • Years: 51.00   • Pack years: 51.00   • Types: Cigarettes   • Start date: 1/1/1963   • Quit date: 1/11/2018   • Years since quitting: 3.5   Smokeless Tobacco Never Used       Alcohol Consumption:  Social History     Substance and Sexual Activity   Alcohol Use No       Depression Screen:    PHQ-2/PHQ-9 Depression Screening 7/28/2021   Little interest or pleasure in doing things 0   Feeling down, depressed, or hopeless 0   Total Score 0       Fall Risk Screen:  NICOLAS Fall Risk Assessment was completed, and patient is at LOW risk for falls.Assessment completed on:7/28/2021    Health Habits and Functional and Cognitive Screening:  Functional & Cognitive Status 7/28/2021   Do you have difficulty preparing food and eating? No   Do you have difficulty bathing yourself, getting dressed or grooming yourself? No   Do you have difficulty using the toilet? No   Do you have difficulty moving around from place to place? No   Do you have trouble with steps or getting out of a bed or a chair? No   Current Diet Well Balanced Diet        Current Diet Comment -   Dental Exam Not up to date   Eye Exam Up to date   Exercise (times per week) 7 times per week   Current Exercises Include Walking   Current Exercise Activities Include -   Do you need help using the phone?  No   Are you deaf or do you have serious difficulty hearing?  Yes   Do you need help with transportation? No   Do you need help shopping? No   Do you need help preparing meals?  No   Do you need help with housework?  No   Do you need help with laundry? No   Do you need help taking your medications? No   Do you need help managing money? No   Do you ever drive or ride in a car without wearing a seat belt? No   Have you felt unusual stress, anger or loneliness in the last month? No   Who do you live with? Spouse   If you need help, do you have trouble finding someone available to you? No   Have you been bothered in the last four weeks by sexual problems? No   Do you have difficulty concentrating, remembering or making decisions? Yes         Does the patient have evidence of cognitive impairment? No    Asprin use counseling:Taking ASA appropriately as indicated    Age-appropriate Screening Schedule:  Refer to the list below for future screening  recommendations based on patient's age, sex and/or medical conditions. Orders for these recommended tests are listed in the plan section. The patient has been provided with a written plan.    Health Maintenance   Topic Date Due   • ZOSTER VACCINE (2 of 2) 08/06/2022 (Originally 5/23/2017)   • INFLUENZA VACCINE  10/01/2021   • LIPID PANEL  07/28/2022   • TDAP/TD VACCINES (2 - Td or Tdap) 01/30/2025          The following portions of the patient's history were reviewed and updated as appropriate: allergies, current medications, past family history, past medical history, past social history, past surgical history and problem list.    Outpatient Medications Prior to Visit   Medication Sig Dispense Refill   • albuterol sulfate HFA (Ventolin HFA) 108 (90 Base) MCG/ACT inhaler Inhale 2 puffs Every 4 (Four) Hours As Needed for Wheezing or Shortness of Air. 18 g 2   • amitriptyline (ELAVIL) 25 MG tablet TAKE ONE TO THREE TABLETS BY MOUTH EVERY NIGHT AT BEDTIME AS NEEDED FOR SLEEP 63 tablet 2   • aspirin 81 MG EC tablet Take 81 mg by mouth Daily.     • atorvastatin (LIPITOR) 10 MG tablet Take 1 tablet by mouth Every Night. 90 tablet 3   • bisoprolol (ZEBeta) 5 MG tablet TAKE ONE TABLET BY MOUTH DAILY 90 tablet 3   • celecoxib (CeleBREX) 100 MG capsule TAKE ONE CAPSULE BY MOUTH DAILY WITH FOOD FOR ARTHRITIC PAIN 90 capsule 0   • coenzyme Q10 100 MG capsule Take 100 mg by mouth Daily.     • diphenoxylate-atropine (LOMOTIL) 2.5-0.025 MG per tablet      • DULoxetine (CYMBALTA) 60 MG capsule Take 1 capsule by mouth Daily. 90 capsule 1   • folic acid (FOLVITE) 1 MG tablet Take 1 mg by mouth Daily.     • gabapentin (NEURONTIN) 300 MG capsule Take 1 capsule by mouth 3 (Three) Times a Day As Needed (nerve pain). 90 capsule 0   • isosorbide mononitrate (IMDUR) 30 MG 24 hr tablet TAKE ONE TABLET BY MOUTH DAILY 90 tablet 0   • leflunomide (ARAVA) 10 MG tablet      • methocarbamol (ROBAXIN) 750 MG tablet Take 1 tablet by mouth 3 (Three)  Times a Day As Needed for Muscle Spasms. 270 tablet 3   • methotrexate 2.5 MG tablet Take 8 tablets by mouth 1 (One) Time Per Week. (Patient taking differently: Take 20 mg by mouth 1 (One) Time Per Week. TAKES ON THURSDAY) 96 tablet 0   • Multiple Vitamins-Minerals (MULTIVITAMIN WITH MINERALS) tablet tablet Take 1 tablet by mouth Daily.     • nitroglycerin (NITROSTAT) 0.4 MG SL tablet 1 under the tongue as needed for angina, may repeat q5mins for up three doses 100 tablet 11   • pantoprazole (PROTONIX) 40 MG EC tablet Take 1 tablet by mouth Daily. Indications: Gastroesophageal Reflux Disease 90 tablet 0   • predniSONE (DELTASONE) 10 MG tablet Take 10 mg by mouth Daily As Needed. As needed     • tiotropium bromide-olodaterol (Stiolto Respimat) 2.5-2.5 MCG/ACT aerosol solution inhaler Inhale 2 puffs Daily. 4 g 5   • vitamin B-12 (CYANOCOBALAMIN) 1000 MCG tablet TAKE ONE TABLET BY MOUTH DAILY 30 tablet 8     No facility-administered medications prior to visit.       Patient Active Problem List   Diagnosis   • Cobalamin deficiency   • Hyperreflexia of lower extremity   • Abnormal gait   • IgG monoclonal protein disorder   • Spondylosis of cervical region without myelopathy or radiculopathy   • Dextroscoliosis   • MGUS (monoclonal gammopathy of unknown significance)   • Allergic rhinitis   • Other hyperlipidemia   • Malignant neoplasm of prostate (CMS/HCC)   • Chronic joint pain   • Primary insomnia   • Left-sided tinnitus   • Night sweats   • Unstable angina (CMS/HCC)   • Essential hypertension   • Anomalous coronary artery origin   • Chronic obstructive pulmonary disease (CMS/HCC)   • Pharyngoesophageal dysphagia   • Chronic pain syndrome   • Inflammatory polyarthropathy (CMS/HCC)   • Former smoker   • Bucket handle tear of medial meniscus of knee   • Primary open angle glaucoma (POAG) of both eyes   • Arteriosclerotic vascular disease   • Bilateral pseudophakia   • Corneal guttata   • Excess skin of both eyelids   •  "Posterior vitreous detachment of both eyes   • Vitreous floaters   • Bronchitis, obstructive, chronic (CMS/HCC)   • Neuropathy associated with MGUS (CMS/HCC)   • Rotator cuff arthropathy of left shoulder   • Status post reverse arthroplasty of shoulder, left   • Rheumatoid arthritis of multiple sites with negative rheumatoid factor (CMS/HCC)       Advanced Care Planning:  ACP discussion was held with the patient during this visit. Patient does not have an advance directive, declines further assistance.    Review of Systems    Compared to one year ago, the patient feels his physical health is the same.  Compared to one year ago, the patient feels his mental health is the same.    Reviewed chart for potential of high risk medication in the elderly: yes  Reviewed chart for potential of harmful drug interactions in the elderly:yes    Objective         Vitals:    07/28/21 0935   BP: 110/60   Pulse: 57   Resp: 18   Temp: 97.3 °F (36.3 °C)   TempSrc: Temporal   SpO2: 96%   Weight: 63.1 kg (139 lb 2 oz)   Height: 180.3 cm (70.98\")   PainSc:   2       Body mass index is 19.41 kg/m².  Discussed the patient's BMI with him. The BMI is in the acceptable range.    Physical Exam  Vitals and nursing note reviewed.   Constitutional:       General: He is not in acute distress.     Appearance: Normal appearance. He is well-developed, well-groomed and normal weight. obeseHe is not ill-appearing, toxic-appearing or diaphoretic.      Interventions: Face mask in place.   HENT:      Head: Normocephalic and atraumatic.        Right Ear: Hearing, tympanic membrane, ear canal and external ear normal.      Left Ear: Hearing, tympanic membrane, ear canal and external ear normal.   Eyes:      General: Lids are normal. Gaze aligned appropriately. No scleral icterus.        Right eye: No discharge.         Left eye: No discharge.      Extraocular Movements: Extraocular movements intact.      Conjunctiva/sclera: Conjunctivae normal.      Pupils: " Pupils are equal, round, and reactive to light.   Neck:      Thyroid: No thyromegaly.      Trachea: Phonation normal.   Cardiovascular:      Rate and Rhythm: Regular rhythm. Bradycardia present.      Heart sounds: Heart sounds are distant.   Pulmonary:      Effort: Pulmonary effort is normal.      Breath sounds: Normal breath sounds and air entry.   Abdominal:      General: Bowel sounds are normal. There is no distension.      Palpations: Abdomen is soft. Abdomen is not rigid.      Tenderness: There is no abdominal tenderness.   Musculoskeletal:         General: No deformity or signs of injury.      Right hand: Swelling (MCP joints, PIP joints ) present.      Left hand: Swelling ( MCP joints, PIP joints ) present.      Cervical back: Neck supple.   Skin:     General: Skin is warm.      Capillary Refill: Capillary refill takes less than 2 seconds.      Coloration: Skin is not cyanotic, jaundiced or pale.      Findings: No rash.   Neurological:      General: No focal deficit present.      Mental Status: He is alert and oriented to person, place, and time. Mental status is at baseline.      Cranial Nerves: No dysarthria.      Motor: No tremor, abnormal muscle tone or seizure activity.      Gait: Gait is intact.   Psychiatric:         Attention and Perception: Attention and perception normal.         Mood and Affect: Mood and affect normal.         Speech: Speech normal.         Behavior: Behavior normal. Behavior is cooperative.         Thought Content: Thought content normal.         Cognition and Memory: Cognition and memory normal.         Judgment: Judgment normal.         Lab Results   Component Value Date    GLU 82 07/28/2021    CHLPL 121 07/28/2021    TRIG 65 07/28/2021    HDL 39 (L) 07/28/2021    LDL 68 07/28/2021    VLDL 14 07/28/2021        Assessment/Plan   Medicare Risks and Personalized Health Plan  CMS Preventative Services Quick Reference  Advance Directive Discussion  Chronic Pain   Colon Cancer  Screening  Fall Risk  Immunizations Discussed/Encouraged (specific immunizations; Shingrix and COVID19 )  Polypharmacy    The above risks/problems have been discussed with the patient.  Pertinent information has been shared with the patient in the After Visit Summary.  Follow up plans and orders are seen below in the Assessment/Plan Section.    Diagnoses and all orders for this visit:    1. Medicare annual wellness visit, subsequent (Primary)    2. Malignant neoplasm of prostate (CMS/HCC)  Comments:  s/p recurrence tx with radiation, follow up with urology    3. Pulmonary emphysema, unspecified emphysema type (CMS/HCC)  Comments:  continue stiolto  Orders:  -     CBC & Differential    4. Bronchitis, obstructive, chronic (CMS/HCC)  Comments:  continue stiolto    5. Rheumatoid arthritis of multiple sites with negative rheumatoid factor (CMS/[facility])  Comments:  followup with rheumatology, continue mtx + folic acid, nsaid (with food), prednisone (per rheum)    6. Inflammatory polyarthropathy (CMS/[facility])  Comments:  follow up with rheumatology. on cymbalta for chronic joint pain    7. Neuropathy associated with MGUS (CMS/[facility])  Comments:  continue gabapentin as needed.    8. MGUS (monoclonal gammopathy of unknown significance)  Comments:  follow up with hematology/oncology    9. Skin cancer of face  -     Ambulatory Referral to Dermatology    10. Other hyperlipidemia  Comments:  continue statin  Orders:  -     Lipid Panel  -     Comprehensive Metabolic Panel    11. Essential hypertension  -     TSH+Free T4    12. Cobalamin deficiency  -     Vitamin B12 & Folate    13. Gastroesophageal reflux disease with esophagitis, unspecified whether hemorrhage  Comments:  continue PPI. assoc with b12 deficiency, on supplement. Discussed need for updated EGD with GI.  Orders:  -     Ambulatory Referral to Gastroenterology  -     Vitamin B12 & Folate  -     Comprehensive Metabolic Panel  -     Magnesium    14. Colon cancer screening  -      Ambulatory Referral to Gastroenterology    15. Need for pneumococcal vaccination  -     Pneumococcal Polysaccharide Vaccine 23-Valent Greater Than or Equal To 1yo Subcutaneous / IM    16. Need for shingles vaccine  Comments:  Rx sent to pharmacy  Orders:  -     Zoster Vac Recomb Adjuvanted (Shingrix) 50 MCG/0.5ML reconstituted suspension; Inject 0.5 mL into the appropriate muscle as directed by prescriber 1 (One) Time for 1 dose.  Dispense: 1 each; Refill: 1    17. Encounter for monitoring long-term proton pump inhibitor therapy  -     Vitamin B12 & Folate  -     Comprehensive Metabolic Panel  -     Magnesium      Follow Up:  Return in about 6 months (around 1/28/2022) for Controlled Rx Follow Up.     An After Visit Summary and PPPS were given to the patient.

## 2021-07-28 NOTE — PATIENT INSTRUCTIONS
Chronic Obstructive Pulmonary Disease    Chronic obstructive pulmonary disease (COPD) is a long-term (chronic) condition that affects the lungs. COPD is a general term that can be used to describe many different lung problems that cause lung swelling (inflammation) and limit airflow, including chronic bronchitis and emphysema. If you have COPD, your lung function will probably never return to normal. In most cases, it gets worse over time. However, there are steps you can take to slow the progression of the disease and improve your quality of life.  What are the causes?  This condition may be caused by:  · Smoking. This is the most common cause.  · Certain genes passed down through families.  What increases the risk?  The following factors may make you more likely to develop this condition:  · Secondhand smoke from cigarettes, pipes, or cigars.  · Exposure to chemicals and other irritants such as fumes and dust in the work environment.  · Chronic lung conditions or infections.  What are the signs or symptoms?  Symptoms of this condition include:  · Shortness of breath, especially during physical activity.  · Chronic cough with a large amount of thick mucus. Sometimes the cough may not have any mucus (dry cough).  · Wheezing.  · Rapid breaths.  · Gray or bluish discoloration (cyanosis) of the skin, especially in your fingers, toes, or lips.  · Feeling tired (fatigue).  · Weight loss.  · Chest tightness.  · Frequent infections.  · Episodes when breathing symptoms become much worse (exacerbations).  · Swelling in the ankles, feet, or legs. This may occur in later stages of the disease.  How is this diagnosed?  This condition is diagnosed based on:  · Your medical history.  · A physical exam.  You may also have tests, including:  · Lung (pulmonary) function tests. This may include a spirometry test, which measures your ability to exhale properly.  · Chest X-ray.  · CT scan.  · Blood tests.  How is this  treated?  This condition may be treated with:  · Medicines. These may include inhaled rescue medicines to treat acute exacerbations as well as long-term, or maintenance, medicines to prevent flare-ups of COPD.  ? Bronchodilators help treat COPD by dilating the airways to allow increased airflow and make your breathing more comfortable.  ? Steroids can reduce airway inflammation and help prevent exacerbations.  · Smoking cessation. If you smoke, your health care provider may ask you to quit, and may also recommend therapy or replacement products to help you quit.  · Pulmonary rehabilitation. This may involve working with a team of health care providers and specialists, such as respiratory, occupational, and physical therapists.  · Exercise and physical activity. These are beneficial for nearly all people with COPD.  · Nutrition therapy to gain weight, if you are underweight.  · Oxygen. Supplemental oxygen therapy is only helpful if you have a low oxygen level in your blood (hypoxemia).  · Lung surgery or transplant.  · Palliative care. This is to help people with COPD feel comfortable when treatment is no longer working.  Follow these instructions at home:  Medicines  · Take over-the-counter and prescription medicines (inhaled or pills) only as told by your health care provider.  · Talk to your health care provider before taking any cough or allergy medicines. You may need to avoid certain medicines that dry out your airways.  Lifestyle  · If you are a smoker, the most important thing that you can do is to stop smoking. Do not use any products that contain nicotine or tobacco, such as cigarettes and e-cigarettes. If you need help quitting, ask your health care provider. Continuing to smoke will cause the disease to progress faster.  · Avoid exposure to things that irritate your lungs, such as smoke, chemicals, and fumes.  · Stay active, but balance activity with periods of rest. Exercise and physical activity will  help you maintain your ability to do things you want to do.  · Learn and use relaxation techniques to manage stress and to control your breathing.  · Get the right amount of sleep and get quality sleep. Most adults need 7 or more hours per night.  · Eat healthy foods. Eating smaller, more frequent meals and resting before meals may help you maintain your strength.  Controlled breathing  Learn and use controlled breathing techniques as directed by your health care provider. Controlled breathing techniques include:  · Pursed lip breathing. Start by breathing in (inhaling) through your nose for 1 second. Then, purse your lips as if you were going to whistle and breathe out (exhale) through the pursed lips for 2 seconds.  · Diaphragmatic breathing. Start by putting one hand on your abdomen just above your waist. Inhale slowly through your nose. The hand on your abdomen should move out. Then purse your lips and exhale slowly. You should be able to feel the hand on your abdomen moving in as you exhale.  Controlled coughing  Learn and use controlled coughing to clear mucus from your lungs. Controlled coughing is a series of short, progressive coughs. The steps of controlled coughing are:  1. Lean your head slightly forward.  2. Breathe in deeply using diaphragmatic breathing.  3. Try to hold your breath for 3 seconds.  4. Keep your mouth slightly open while coughing twice.  5. Spit any mucus out into a tissue.  6. Rest and repeat the steps once or twice as needed.  General instructions  · Make sure you receive all the vaccines that your health care provider recommends, especially the pneumococcal and influenza vaccines. Preventing infection and hospitalization is very important when you have COPD.  · Use oxygen therapy and pulmonary rehabilitation if directed to by your health care provider. If you require home oxygen therapy, ask your health care provider whether you should purchase a pulse oximeter to measure your oxygen  level at home.  · Work with your health care provider to develop a COPD action plan. This will help you know what steps to take if your condition gets worse.  · Keep other chronic health conditions under control as told by your health care provider.  · Avoid extreme temperature and humidity changes.  · Avoid contact with people who have an illness that spreads from person to person (is contagious), such as viral infections or pneumonia.  · Keep all follow-up visits as told by your health care provider. This is important.  Contact a health care provider if:  · You are coughing up more mucus than usual.  · There is a change in the color or thickness of your mucus.  · Your breathing is more labored than usual.  · Your breathing is faster than usual.  · You have difficulty sleeping.  · You need to use your rescue medicines or inhalers more often than expected.  · You have trouble doing routine activities such as getting dressed or walking around the house.  Get help right away if:  · You have shortness of breath while you are resting.  · You have shortness of breath that prevents you from:  ? Being able to talk.  ? Performing your usual physical activities.  · You have chest pain lasting longer than 5 minutes.  · Your skin color is more blue (cyanotic) than usual.  · You measure low oxygen saturations for longer than 5 minutes with a pulse oximeter.  · You have a fever.  · You feel too tired to breathe normally.  Summary  · Chronic obstructive pulmonary disease (COPD) is a long-term (chronic) condition that affects the lungs.  · Your lung function will probably never return to normal. In most cases, it gets worse over time. However, there are steps you can take to slow the progression of the disease and improve your quality of life.  · Treatment for COPD may include taking medicines, quitting smoking, pulmonary rehabilitation, and changes to diet and exercise. As the disease progresses, you may need oxygen therapy, a  lung transplant, or palliative care.  · To help manage your condition, do not smoke, avoid exposure to things that irritate your lungs, stay up to date on all vaccines, and follow your health care provider's instructions for taking medicines.  This information is not intended to replace advice given to you by your health care provider. Make sure you discuss any questions you have with your health care provider.  Document Revised: 2018 Document Reviewed: 2018  [employer] Patient Education ©  [employer]      Medicare Wellness  Personal Prevention Plan of Service     Date of Office Visit:  2021  Encounter Provider:  Ladonna Means MD  Place of Service:  [facility]  Patient Name: Elliott Dunn  :  1955    As part of the Medicare Wellness portion of your visit today, we are providing you with this personalized preventive plan of services (PPPS). This plan is based upon recommendations of the [place] Preventive Services Task Force (USPSTF) and the Advisory Committee on Immunization Practices (ACIP).    This lists the preventive care services that should be considered, and provides dates of when you are due. Items listed as completed are up-to-date and do not require any further intervention.    Health Maintenance   Topic Date Due   • ANNUAL WELLNESS VISIT  2021   • COVID-19 Vaccine (1) 2021 (Originally 1967)   • ZOSTER VACCINE (2 of 2) 2022 (Originally 2017)   • LIPID PANEL  2021   • INFLUENZA VACCINE  10/01/2021   • LUNG CANCER SCREENING  2021   • TDAP/TD VACCINES (2 - Td or Tdap) 2025   • COLORECTAL CANCER SCREENING  2028   • HEPATITIS C SCREENING  Completed   • Pneumococcal Vaccine 65+  Completed   • AAA SCREEN (ONE-TIME)  Completed       Orders Placed This Encounter   Procedures   • Pneumococcal Polysaccharide Vaccine 23-Valent Greater Than or Equal To 1yo Subcutaneous / IM   • Lipid Panel      Order Specific Question:   [facility] Has the patient fasted?     Answer:   Yes   • Vitamin B12 & Folate     Order Specific Question:   Release to patient     Answer:   Immediate   • Comprehensive Metabolic Panel     Order Specific Question:   Release to patient     Answer:   Immediate   • Magnesium     Order Specific Question:   Release to patient     Answer:   Immediate   • TSH+Free T4     Order Specific Question:   Release to patient     Answer:   Immediate   • Ambulatory Referral to Dermatology     Referral Priority:   Routine     Referral Type:   Consultation     Referral Reason:   Specialty Services Required     Requested Specialty:   Dermatology     Number of Visits Requested:   1   • Ambulatory Referral to Gastroenterology     Referral Priority:   Routine     Referral Type:   Consultation     Referral Reason:   Specialty Services Required     Referred to Provider:   Luis Callahan MD     Requested Specialty:   Gastroenterology     Number of Visits Requested:   1   • CBC & Differential     Order Specific Question:   Manual Differential     Answer:   No       Return in about 6 months (around 1/28/2022) for Controlled Rx Follow Up.

## 2021-08-05 RX ORDER — ISOSORBIDE MONONITRATE 30 MG/1
TABLET, EXTENDED RELEASE ORAL
Qty: 30 TABLET | Refills: 0 | Status: SHIPPED | OUTPATIENT
Start: 2021-08-05 | End: 2022-01-11 | Stop reason: SDUPTHER

## 2021-08-13 RX ORDER — BISOPROLOL FUMARATE 5 MG/1
TABLET, FILM COATED ORAL
Qty: 30 TABLET | OUTPATIENT
Start: 2021-08-13

## 2021-08-18 ENCOUNTER — PATIENT MESSAGE (OUTPATIENT)
Dept: INTERNAL MEDICINE | Facility: CLINIC | Age: 66
End: 2021-08-18

## 2021-08-18 RX ORDER — BISOPROLOL FUMARATE 5 MG/1
5 TABLET, FILM COATED ORAL DAILY
Qty: 90 TABLET | Refills: 3 | Status: SHIPPED | OUTPATIENT
Start: 2021-08-18 | End: 2022-08-19

## 2021-08-18 NOTE — TELEPHONE ENCOUNTER
From: Elliott Dunn  To: Ladonna Means MD  Sent: 8/18/2021 2:15 PM EDT  Subject: Prescription Question    Could you send a prescription to the drug store for bisoprolol fumarate 5 mg.? He is out and the insurance company says he's PRN and to contact his PCP. Dr Arreola wrote the initial prescription. Thank you.

## 2021-08-28 ENCOUNTER — HOSPITAL ENCOUNTER (EMERGENCY)
Facility: HOSPITAL | Age: 66
Discharge: HOME | End: 2021-08-28
Attending: EMERGENCY MEDICINE | Admitting: EMERGENCY MEDICINE

## 2021-08-28 ENCOUNTER — APPOINTMENT (OUTPATIENT)
Dept: CT IMAGING | Facility: HOSPITAL | Age: 66
End: 2021-08-28

## 2021-08-28 ENCOUNTER — APPOINTMENT (OUTPATIENT)
Dept: GENERAL RADIOLOGY | Facility: HOSPITAL | Age: 66
End: 2021-08-28

## 2021-08-28 VITALS
RESPIRATION RATE: 16 BRPM | DIASTOLIC BLOOD PRESSURE: 72 MMHG | BODY MASS INDEX: 17.5 KG/M2 | HEART RATE: 85 BPM | SYSTOLIC BLOOD PRESSURE: 107 MMHG | HEIGHT: 71 IN | OXYGEN SATURATION: 97 % | TEMPERATURE: 98.3 F | WEIGHT: 125 LBS

## 2021-08-28 DIAGNOSIS — E86.0 DEHYDRATION: Primary | ICD-10-CM

## 2021-08-28 DIAGNOSIS — J12.82 PNEUMONIA DUE TO COVID-19 VIRUS: ICD-10-CM

## 2021-08-28 DIAGNOSIS — K52.9 COLITIS: ICD-10-CM

## 2021-08-28 DIAGNOSIS — U07.1 PNEUMONIA DUE TO COVID-19 VIRUS: ICD-10-CM

## 2021-08-28 LAB
ALBUMIN SERPL-MCNC: 3.4 G/DL (ref 3.5–5.2)
ALBUMIN/GLOB SERPL: 0.8 G/DL
ALP SERPL-CCNC: 206 U/L (ref 39–117)
ALT SERPL W P-5'-P-CCNC: 65 U/L (ref 1–41)
ANION GAP SERPL CALCULATED.3IONS-SCNC: 11.6 MMOL/L (ref 5–15)
AST SERPL-CCNC: 133 U/L (ref 1–40)
ATMOSPHERIC PRESS: 740 MMHG
BASE EXCESS BLDV CALC-SCNC: 0.4 MMOL/L (ref 0–2)
BASOPHILS # BLD AUTO: 0.02 10*3/MM3 (ref 0–0.2)
BASOPHILS NFR BLD AUTO: 0.2 % (ref 0–1.5)
BDY SITE: ABNORMAL
BILIRUB SERPL-MCNC: 0.8 MG/DL (ref 0–1.2)
BUN SERPL-MCNC: 28 MG/DL (ref 8–23)
BUN/CREAT SERPL: 30.8 (ref 7–25)
CALCIUM SPEC-SCNC: 8.8 MG/DL (ref 8.6–10.5)
CHLORIDE SERPL-SCNC: 94 MMOL/L (ref 98–107)
CO2 SERPL-SCNC: 24.4 MMOL/L (ref 22–29)
COHGB MFR BLD: 1.1 % (ref 0–5)
CREAT SERPL-MCNC: 0.91 MG/DL (ref 0.76–1.27)
D-LACTATE SERPL-SCNC: 1.7 MMOL/L (ref 0.5–2)
DEPRECATED RDW RBC AUTO: 48.7 FL (ref 37–54)
EOSINOPHIL # BLD AUTO: 0 10*3/MM3 (ref 0–0.4)
EOSINOPHIL NFR BLD AUTO: 0 % (ref 0.3–6.2)
ERYTHROCYTE [DISTWIDTH] IN BLOOD BY AUTOMATED COUNT: 14.3 % (ref 12.3–15.4)
GFR SERPL CREATININE-BSD FRML MDRD: 83 ML/MIN/1.73
GLOBULIN UR ELPH-MCNC: 4.2 GM/DL
GLUCOSE SERPL-MCNC: 97 MG/DL (ref 65–99)
HCO3 BLDV-SCNC: 25.5 MMOL/L (ref 22–28)
HCT VFR BLD AUTO: 44.6 % (ref 37.5–51)
HGB BLD-MCNC: 15.2 G/DL (ref 13–17.7)
IMM GRANULOCYTES # BLD AUTO: 0.09 10*3/MM3 (ref 0–0.05)
IMM GRANULOCYTES NFR BLD AUTO: 1.1 % (ref 0–0.5)
INHALED O2 CONCENTRATION: 21 %
LIPASE SERPL-CCNC: 11 U/L (ref 13–60)
LYMPHOCYTES # BLD AUTO: 1.03 10*3/MM3 (ref 0.7–3.1)
LYMPHOCYTES NFR BLD AUTO: 12.5 % (ref 19.6–45.3)
Lab: ABNORMAL
MAGNESIUM SERPL-MCNC: 1.9 MG/DL (ref 1.6–2.4)
MCH RBC QN AUTO: 31.7 PG (ref 26.6–33)
MCHC RBC AUTO-ENTMCNC: 34.1 G/DL (ref 31.5–35.7)
MCV RBC AUTO: 93.1 FL (ref 79–97)
METHGB BLD QL: 0.5 % (ref 0–3)
MODALITY: ABNORMAL
MONOCYTES # BLD AUTO: 0.57 10*3/MM3 (ref 0.1–0.9)
MONOCYTES NFR BLD AUTO: 6.9 % (ref 5–12)
NEUTROPHILS NFR BLD AUTO: 6.54 10*3/MM3 (ref 1.7–7)
NEUTROPHILS NFR BLD AUTO: 79.3 % (ref 42.7–76)
NOTE: ABNORMAL
NRBC BLD AUTO-RTO: 0 /100 WBC (ref 0–0.2)
NT-PROBNP SERPL-MCNC: 573.6 PG/ML (ref 0–900)
OXYHGB MFR BLDV: 44.9 % (ref 40–70)
PCO2 BLDV: 41.7 MM HG (ref 40–50)
PH BLDV: 7.39 PH UNITS (ref 7.32–7.42)
PLATELET # BLD AUTO: 135 10*3/MM3 (ref 140–450)
PMV BLD AUTO: 9.5 FL (ref 6–12)
PO2 BLDV: 25.8 MM HG (ref 30–50)
POTASSIUM SERPL-SCNC: 3.9 MMOL/L (ref 3.5–5.2)
PROCALCITONIN SERPL-MCNC: 0.92 NG/ML (ref 0–0.25)
PROT SERPL-MCNC: 7.6 G/DL (ref 6–8.5)
RBC # BLD AUTO: 4.79 10*6/MM3 (ref 4.14–5.8)
SAO2 % BLDCOV: 45.6 % (ref 45–75)
SARS-COV-2 RNA PNL SPEC NAA+PROBE: DETECTED
SODIUM SERPL-SCNC: 130 MMOL/L (ref 136–145)
TROPONIN T SERPL-MCNC: <0.01 NG/ML (ref 0–0.03)
VENTILATOR MODE: ABNORMAL
WBC # BLD AUTO: 8.25 10*3/MM3 (ref 3.4–10.8)

## 2021-08-28 PROCEDURE — 25010000002 IOPAMIDOL 61 % SOLUTION: Performed by: EMERGENCY MEDICINE

## 2021-08-28 PROCEDURE — 87635 SARS-COV-2 COVID-19 AMP PRB: CPT | Performed by: NURSE PRACTITIONER

## 2021-08-28 PROCEDURE — 96375 TX/PRO/DX INJ NEW DRUG ADDON: CPT

## 2021-08-28 PROCEDURE — 84484 ASSAY OF TROPONIN QUANT: CPT | Performed by: NURSE PRACTITIONER

## 2021-08-28 PROCEDURE — 83605 ASSAY OF LACTIC ACID: CPT | Performed by: NURSE PRACTITIONER

## 2021-08-28 PROCEDURE — 82820 HEMOGLOBIN-OXYGEN AFFINITY: CPT

## 2021-08-28 PROCEDURE — 83690 ASSAY OF LIPASE: CPT | Performed by: NURSE PRACTITIONER

## 2021-08-28 PROCEDURE — 84145 PROCALCITONIN (PCT): CPT | Performed by: NURSE PRACTITIONER

## 2021-08-28 PROCEDURE — 96374 THER/PROPH/DIAG INJ IV PUSH: CPT

## 2021-08-28 PROCEDURE — 82805 BLOOD GASES W/O2 SATURATION: CPT

## 2021-08-28 PROCEDURE — 94640 AIRWAY INHALATION TREATMENT: CPT

## 2021-08-28 PROCEDURE — 96361 HYDRATE IV INFUSION ADD-ON: CPT

## 2021-08-28 PROCEDURE — 74177 CT ABD & PELVIS W/CONTRAST: CPT

## 2021-08-28 PROCEDURE — 87040 BLOOD CULTURE FOR BACTERIA: CPT | Performed by: NURSE PRACTITIONER

## 2021-08-28 PROCEDURE — 71045 X-RAY EXAM CHEST 1 VIEW: CPT

## 2021-08-28 PROCEDURE — 25010000002 ONDANSETRON PER 1 MG: Performed by: NURSE PRACTITIONER

## 2021-08-28 PROCEDURE — 80053 COMPREHEN METABOLIC PANEL: CPT | Performed by: NURSE PRACTITIONER

## 2021-08-28 PROCEDURE — 93005 ELECTROCARDIOGRAM TRACING: CPT | Performed by: NURSE PRACTITIONER

## 2021-08-28 PROCEDURE — 83735 ASSAY OF MAGNESIUM: CPT | Performed by: NURSE PRACTITIONER

## 2021-08-28 PROCEDURE — 25010000002 DEXAMETHASONE SODIUM PHOSPHATE 10 MG/ML SOLUTION: Performed by: NURSE PRACTITIONER

## 2021-08-28 PROCEDURE — 85025 COMPLETE CBC W/AUTO DIFF WBC: CPT | Performed by: NURSE PRACTITIONER

## 2021-08-28 PROCEDURE — 83880 ASSAY OF NATRIURETIC PEPTIDE: CPT | Performed by: NURSE PRACTITIONER

## 2021-08-28 PROCEDURE — 99284 EMERGENCY DEPT VISIT MOD MDM: CPT

## 2021-08-28 RX ORDER — AMOXICILLIN AND CLAVULANATE POTASSIUM 875; 125 MG/1; MG/1
1 TABLET, FILM COATED ORAL 2 TIMES DAILY
Qty: 14 TABLET | Refills: 0 | Status: SHIPPED | OUTPATIENT
Start: 2021-08-28 | End: 2021-09-20

## 2021-08-28 RX ORDER — DEXAMETHASONE SODIUM PHOSPHATE 10 MG/ML
10 INJECTION, SOLUTION INTRAMUSCULAR; INTRAVENOUS ONCE
Status: COMPLETED | OUTPATIENT
Start: 2021-08-28 | End: 2021-08-28

## 2021-08-28 RX ORDER — SODIUM CHLORIDE 0.9 % (FLUSH) 0.9 %
10 SYRINGE (ML) INJECTION AS NEEDED
Status: DISCONTINUED | OUTPATIENT
Start: 2021-08-28 | End: 2021-08-28 | Stop reason: HOSPADM

## 2021-08-28 RX ORDER — ALBUTEROL SULFATE 90 UG/1
2 AEROSOL, METERED RESPIRATORY (INHALATION) ONCE
Status: COMPLETED | OUTPATIENT
Start: 2021-08-28 | End: 2021-08-28

## 2021-08-28 RX ORDER — DEXAMETHASONE 6 MG/1
6 TABLET ORAL
Qty: 5 TABLET | Refills: 0 | Status: SHIPPED | OUTPATIENT
Start: 2021-08-28 | End: 2021-09-02

## 2021-08-28 RX ORDER — ONDANSETRON 2 MG/ML
4 INJECTION INTRAMUSCULAR; INTRAVENOUS ONCE
Status: COMPLETED | OUTPATIENT
Start: 2021-08-28 | End: 2021-08-28

## 2021-08-28 RX ORDER — ALBUTEROL SULFATE 90 UG/1
2 AEROSOL, METERED RESPIRATORY (INHALATION) EVERY 4 HOURS PRN
Qty: 17 G | Refills: 0 | Status: SHIPPED | OUTPATIENT
Start: 2021-08-28 | End: 2022-01-21 | Stop reason: DRUGHIGH

## 2021-08-28 RX ORDER — ONDANSETRON 4 MG/1
4 TABLET, ORALLY DISINTEGRATING ORAL EVERY 6 HOURS PRN
Qty: 20 TABLET | Refills: 0 | Status: SHIPPED | OUTPATIENT
Start: 2021-08-28 | End: 2022-02-01

## 2021-08-28 RX ADMIN — ALBUTEROL SULFATE 2 PUFF: 90 AEROSOL, METERED RESPIRATORY (INHALATION) at 11:19

## 2021-08-28 RX ADMIN — ONDANSETRON 4 MG: 2 INJECTION INTRAMUSCULAR; INTRAVENOUS at 11:19

## 2021-08-28 RX ADMIN — DEXAMETHASONE SODIUM PHOSPHATE 10 MG: 10 INJECTION, SOLUTION INTRAMUSCULAR; INTRAVENOUS at 11:19

## 2021-08-28 RX ADMIN — IOPAMIDOL 100 ML: 612 INJECTION, SOLUTION INTRAVENOUS at 13:24

## 2021-08-28 RX ADMIN — SODIUM CHLORIDE 1701 ML: 9 INJECTION, SOLUTION INTRAVENOUS at 11:13

## 2021-08-28 RX ADMIN — SODIUM CHLORIDE 1000 ML: 9 INJECTION, SOLUTION INTRAVENOUS at 15:35

## 2021-08-30 ENCOUNTER — PATIENT OUTREACH (OUTPATIENT)
Dept: CASE MANAGEMENT | Facility: OTHER | Age: 66
End: 2021-08-30

## 2021-08-30 NOTE — OUTREACH NOTE
Ambulatory Case Management Note    Patient Outreach    ED Potential Covid Discharge Follow-up    RN-ACM outreach to patient.  Conversation this date with spouse, Radha.  Patient had an ED visit at [facility] 08/28/21.  Clinical impression noted as dehydration, colitis, and pneumonia due to COVID-19 virus.  Patient was treated and discharged to home.  Medication changes include the addition of amoxicillin, dexamethasone, ondansetron, and albuterol inhaler.  PCP f/u recommended.    First dose COVID vaccination noted in [facility] as Moderna 08/18/21.  Spouse is ill as well.  Per her report, patient is not experiencing concerning shortness of breath at this time.  They do not have a pulse oximeter in the home.      Care Evaluation    Questions/Answers      Most Recent Value   Suggested Appointments  -- [Follow up with PCP as recommended.]   Annual Wellness Visit:   Patient Has Completed   Care Gaps Addressed  Colon Cancer Screening, Flu Shot, Pneumonia Vaccine   Colon Cancer Screening Type  Colonoscopy   Colonoscopy Status  Up to Date (< 10 yrs)   Flu Shot Status  Up to Date   Pneumonia Vaccine Status  Up to Date   Other Patient Education/Resources   24/7 [facility] Nurse Call Line [AVS, education, recommended quarantine/isolation, and discharge medications reviewed  ]   Healthy Lifestyle (Self-Efficacy)  self-reports important symptoms to medical professional, recognizes when to contact medical assistance, recognizes when to stop activity          Wilma Edmond RN  Ambulatory Case Management    8/30/2021, 11:56 EDT

## 2021-09-02 LAB
BACTERIA SPEC AEROBE CULT: NORMAL
BACTERIA SPEC AEROBE CULT: NORMAL

## 2021-09-03 ENCOUNTER — APPOINTMENT (OUTPATIENT)
Dept: CT IMAGING | Facility: HOSPITAL | Age: 66
End: 2021-09-03

## 2021-09-03 ENCOUNTER — APPOINTMENT (OUTPATIENT)
Dept: GENERAL RADIOLOGY | Facility: HOSPITAL | Age: 66
End: 2021-09-03

## 2021-09-03 ENCOUNTER — HOSPITAL ENCOUNTER (EMERGENCY)
Facility: HOSPITAL | Age: 66
Discharge: HOME | End: 2021-09-03
Attending: EMERGENCY MEDICINE | Admitting: EMERGENCY MEDICINE

## 2021-09-03 VITALS
RESPIRATION RATE: 18 BRPM | HEIGHT: 71 IN | OXYGEN SATURATION: 94 % | BODY MASS INDEX: 18.2 KG/M2 | HEART RATE: 56 BPM | TEMPERATURE: 99.1 F | SYSTOLIC BLOOD PRESSURE: 94 MMHG | DIASTOLIC BLOOD PRESSURE: 62 MMHG | WEIGHT: 130 LBS

## 2021-09-03 DIAGNOSIS — R05.9 COUGH: ICD-10-CM

## 2021-09-03 DIAGNOSIS — U07.1 PNEUMONIA DUE TO COVID-19 VIRUS: Primary | ICD-10-CM

## 2021-09-03 DIAGNOSIS — J12.82 PNEUMONIA DUE TO COVID-19 VIRUS: Primary | ICD-10-CM

## 2021-09-03 LAB
ALBUMIN SERPL-MCNC: 3.4 G/DL (ref 3.5–5.2)
ALBUMIN/GLOB SERPL: 0.9 G/DL
ALP SERPL-CCNC: 203 U/L (ref 39–117)
ALT SERPL W P-5'-P-CCNC: 120 U/L (ref 1–41)
ANION GAP SERPL CALCULATED.3IONS-SCNC: 11.1 MMOL/L (ref 5–15)
AST SERPL-CCNC: 104 U/L (ref 1–40)
BASOPHILS # BLD AUTO: 0.03 10*3/MM3 (ref 0–0.2)
BASOPHILS NFR BLD AUTO: 0.5 % (ref 0–1.5)
BILIRUB SERPL-MCNC: 0.9 MG/DL (ref 0–1.2)
BUN SERPL-MCNC: 24 MG/DL (ref 8–23)
BUN/CREAT SERPL: 32 (ref 7–25)
CALCIUM SPEC-SCNC: 8.6 MG/DL (ref 8.6–10.5)
CHLORIDE SERPL-SCNC: 93 MMOL/L (ref 98–107)
CO2 SERPL-SCNC: 23.9 MMOL/L (ref 22–29)
CREAT SERPL-MCNC: 0.75 MG/DL (ref 0.76–1.27)
D DIMER PPP FEU-MCNC: 1.09 MCGFEU/ML (ref 0–0.57)
DEPRECATED RDW RBC AUTO: 46.1 FL (ref 37–54)
EOSINOPHIL # BLD AUTO: 0.01 10*3/MM3 (ref 0–0.4)
EOSINOPHIL NFR BLD AUTO: 0.2 % (ref 0.3–6.2)
ERYTHROCYTE [DISTWIDTH] IN BLOOD BY AUTOMATED COUNT: 14 % (ref 12.3–15.4)
GFR SERPL CREATININE-BSD FRML MDRD: 104 ML/MIN/1.73
GLOBULIN UR ELPH-MCNC: 3.8 GM/DL
GLUCOSE BLDC GLUCOMTR-MCNC: 83 MG/DL (ref 70–130)
GLUCOSE SERPL-MCNC: 81 MG/DL (ref 65–99)
HCT VFR BLD AUTO: 45.3 % (ref 37.5–51)
HGB BLD-MCNC: 15.7 G/DL (ref 13–17.7)
IMM GRANULOCYTES # BLD AUTO: 0.19 10*3/MM3 (ref 0–0.05)
IMM GRANULOCYTES NFR BLD AUTO: 2.9 % (ref 0–0.5)
LYMPHOCYTES # BLD AUTO: 0.78 10*3/MM3 (ref 0.7–3.1)
LYMPHOCYTES NFR BLD AUTO: 11.8 % (ref 19.6–45.3)
MCH RBC QN AUTO: 31.3 PG (ref 26.6–33)
MCHC RBC AUTO-ENTMCNC: 34.7 G/DL (ref 31.5–35.7)
MCV RBC AUTO: 90.4 FL (ref 79–97)
MONOCYTES # BLD AUTO: 0.99 10*3/MM3 (ref 0.1–0.9)
MONOCYTES NFR BLD AUTO: 15 % (ref 5–12)
NEUTROPHILS NFR BLD AUTO: 4.59 10*3/MM3 (ref 1.7–7)
NEUTROPHILS NFR BLD AUTO: 69.6 % (ref 42.7–76)
NRBC BLD AUTO-RTO: 0 /100 WBC (ref 0–0.2)
NT-PROBNP SERPL-MCNC: 490 PG/ML (ref 0–900)
PLATELET # BLD AUTO: 190 10*3/MM3 (ref 140–450)
PMV BLD AUTO: 9.5 FL (ref 6–12)
POTASSIUM SERPL-SCNC: 4.2 MMOL/L (ref 3.5–5.2)
PROT SERPL-MCNC: 7.2 G/DL (ref 6–8.5)
RBC # BLD AUTO: 5.01 10*6/MM3 (ref 4.14–5.8)
SODIUM SERPL-SCNC: 128 MMOL/L (ref 136–145)
TROPONIN T SERPL-MCNC: <0.01 NG/ML (ref 0–0.03)
WBC # BLD AUTO: 6.59 10*3/MM3 (ref 3.4–10.8)

## 2021-09-03 PROCEDURE — 80053 COMPREHEN METABOLIC PANEL: CPT | Performed by: PHYSICIAN ASSISTANT

## 2021-09-03 PROCEDURE — 83880 ASSAY OF NATRIURETIC PEPTIDE: CPT | Performed by: PHYSICIAN ASSISTANT

## 2021-09-03 PROCEDURE — 93005 ELECTROCARDIOGRAM TRACING: CPT | Performed by: PHYSICIAN ASSISTANT

## 2021-09-03 PROCEDURE — 71045 X-RAY EXAM CHEST 1 VIEW: CPT

## 2021-09-03 PROCEDURE — 96374 THER/PROPH/DIAG INJ IV PUSH: CPT

## 2021-09-03 PROCEDURE — 99284 EMERGENCY DEPT VISIT MOD MDM: CPT

## 2021-09-03 PROCEDURE — 25010000002 IOPAMIDOL 61 % SOLUTION: Performed by: EMERGENCY MEDICINE

## 2021-09-03 PROCEDURE — 84484 ASSAY OF TROPONIN QUANT: CPT | Performed by: PHYSICIAN ASSISTANT

## 2021-09-03 PROCEDURE — 85379 FIBRIN DEGRADATION QUANT: CPT | Performed by: PHYSICIAN ASSISTANT

## 2021-09-03 PROCEDURE — 71275 CT ANGIOGRAPHY CHEST: CPT

## 2021-09-03 PROCEDURE — 25010000002 DEXAMETHASONE SODIUM PHOSPHATE 10 MG/ML SOLUTION: Performed by: PHYSICIAN ASSISTANT

## 2021-09-03 PROCEDURE — 85025 COMPLETE CBC W/AUTO DIFF WBC: CPT | Performed by: PHYSICIAN ASSISTANT

## 2021-09-03 PROCEDURE — 82962 GLUCOSE BLOOD TEST: CPT

## 2021-09-03 RX ORDER — DEXAMETHASONE SODIUM PHOSPHATE 10 MG/ML
10 INJECTION, SOLUTION INTRAMUSCULAR; INTRAVENOUS ONCE
Status: COMPLETED | OUTPATIENT
Start: 2021-09-03 | End: 2021-09-03

## 2021-09-03 RX ORDER — ACETAMINOPHEN 325 MG/1
975 TABLET ORAL ONCE
Status: COMPLETED | OUTPATIENT
Start: 2021-09-03 | End: 2021-09-03

## 2021-09-03 RX ORDER — FLUTICASONE PROPIONATE 50 MCG
2 SPRAY, SUSPENSION (ML) NASAL DAILY
Qty: 9.9 ML | Refills: 0 | Status: SHIPPED | OUTPATIENT
Start: 2021-09-03 | End: 2022-02-01

## 2021-09-03 RX ORDER — SODIUM CHLORIDE 0.9 % (FLUSH) 0.9 %
10 SYRINGE (ML) INJECTION AS NEEDED
Status: DISCONTINUED | OUTPATIENT
Start: 2021-09-03 | End: 2021-09-03 | Stop reason: HOSPADM

## 2021-09-03 RX ORDER — GUAIFENESIN 600 MG/1
1200 TABLET, EXTENDED RELEASE ORAL 2 TIMES DAILY
Qty: 12 TABLET | Refills: 0 | Status: SHIPPED | OUTPATIENT
Start: 2021-09-03 | End: 2021-09-06

## 2021-09-03 RX ADMIN — ACETAMINOPHEN 975 MG: 325 TABLET ORAL at 17:36

## 2021-09-03 RX ADMIN — SODIUM CHLORIDE 500 ML: 9 INJECTION, SOLUTION INTRAVENOUS at 17:36

## 2021-09-03 RX ADMIN — IOPAMIDOL 100 ML: 612 INJECTION, SOLUTION INTRAVENOUS at 19:34

## 2021-09-03 RX ADMIN — DEXAMETHASONE SODIUM PHOSPHATE 10 MG: 10 INJECTION, SOLUTION INTRAMUSCULAR; INTRAVENOUS at 20:27

## 2021-09-03 NOTE — ED PROVIDER NOTES
"Subjective   Patient is a 66-year-old male with a history of arrhythmia, arthritis, Cristina's esophagus, prostate cancer, CHF, COPD, degenerative disc disease, heart murmur, high cholesterol, insomnia, Ménière's disease, previous tobacco use presenting to the ER for Covid symptoms.  Patient states he tested positive on 08/28/21.  He states he took the medicines that were prescribed to him.  He states he feels like he just cannot \"shake it\".  He states he still has some tightness and pain in his chest, headache and body aches.  He has not been taking any ibuprofen, Tylenol or other medications at home to help with his pain.  He does not wear oxygen at home.  He denies any syncopal episodes, dizziness, severe abdominal pain, tractable vomiting, diarrhea, or any other symptoms.          Review of Systems   Constitutional: Negative.    HENT: Negative.    Respiratory: Positive for cough and chest tightness.    Cardiovascular: Negative.    Gastrointestinal: Negative.    Genitourinary: Negative.    Musculoskeletal: Positive for myalgias.   Skin: Negative.    Allergic/Immunologic: Negative for immunocompromised state.   Neurological: Negative.    Psychiatric/Behavioral: Negative.        Past Medical History:   Diagnosis Date   • Acquired absence of all teeth    • Allergic    • Anomalous coronary artery origin    • Arrhythmia    • Arthritis    • Arthritis of lumbar spine 7/29/2016   • Back pain 6/17/2016   • Cristina esophagus    • Cancer (CMS/HCC)     prostate   • Cataract     REMOVED BILATERALL   • Cervical spine disease 6/17/2016   • CHF (congestive heart failure) (CMS/HCC)    • Chronic obstructive pulmonary disease (CMS/HCC)    • Colon polyps    • Deafness in left ear    • Derangement of anterior horn of medial meniscus    • Difficulty swallowing    • Disorder of lumbar spine 6/17/2016   • Disorder of thoracic spine 6/17/2016   • Diverticulitis of colon    • DJD (degenerative joint disease)    • Elevated liver enzymes  "   • Erectile dysfunction    • Esophageal reflux    • Essential hypertension     PT DENIES SAYS IT RUNS LOW   • Glaucoma    • Hard of hearing     LEFT EAR NO AIDS WORN   • Heart murmur    • Hiatal hernia    • High cholesterol    • History of radiation therapy 11/24/2020    salvage pelvic XRT   • Impaired functional mobility, balance, gait, and endurance    • Inflammatory polyarthropathy (CMS/HCC)     Per Dr. Haider. Negative NEO, normal ESR, RF, anti-ccp and did not improve with prednisone per notes.   • Inguinal hernia    • Insomnia    • Lateral meniscus derangement    • Left otitis media with spontaneous rupture of eardrum    • Locking of left knee    • Low back pain    • Meniere's disease    • MGUS (monoclonal gammopathy of unknown significance)     likely diagnosis   • Murmur    • Neuromuscular disorder (CMS/HCC)    • Neuropathic arthritis    • No natural teeth    • Perforation of left tympanic membrane    • Prostate cancer (CMS/HCC)     previous CA in 2013 with prostatectomy   • Pulmonary emphysema (CMS/HCC)    • Recent shoulder injury     LEFT SHOULDER   • Rotator cuff tear arthropathy of left shoulder 3/12/2020    Added automatically from request for surgery [id number]   • Scoliosis    • Skin cancer     skin cancer   • Skin cancer of face     squamous cell   • Spina bifida occulta 6/17/2016   • Tobacco abuse 11/9/2017   • Visual impairment    • Wears glasses    • Wears glasses     READING GLASSES ONLY       Allergies   Allergen Reactions   • Cyclobenzaprine Unknown - Low Severity     Wide awake   • Plaquenil [Hydroxychloroquine Sulfate] Unknown - Low Severity     Black eyes   • Pravastatin Myalgia     Weakness / fatigue       Past Surgical History:   Procedure Laterality Date   • COLONOSCOPY     • ENDOSCOPY N/A 4/10/2017    Procedure: ESOPHAGOGASTRODUODENOSCOPY WITH BIOPSY;  Surgeon: Alexander Ritchie MD;  Location: [facility] ENDOSCOPY;  Service:    • EYE SURGERY     • HERNIA REPAIR     • INGUINAL HERNIA REPAIR Right     • INGUINAL HERNIA REPAIR     • KNEE SURGERY Left    • LYMPH NODE BIOPSY     • MULTIPLE TOOTH EXTRACTIONS     • PROSTATE SURGERY  2004   • PROSTATECTOMY  06/30/2014   • PROSTATECTOMY      Prostatectomy Radical   • SHOULDER ARTHROSCOPY Left 2/6/2020    Procedure: DIAGNOSTIC SHOULDER ARTHROSCOPY LEFT WITH LABRAL DEBRIDEMENT, SUBACROMIAL BURSECTOMY, ASSESSMENT OF LARGE FRAGMENTED RETRACTED IRREPARABLE ROTATOR CUFF TEARS;  Surgeon: Rony Pandey MD;  Location: [facility];  Service: Orthopedics;  Laterality: Left;   • SKIN CANCER EXCISION  2017    both sides of body/squamous cell melanoma   • TOTAL SHOULDER ARTHROPLASTY W/ DISTAL CLAVICLE EXCISION Left 7/9/2020    Procedure: Left reverse total shoulder arthroplasty;  Surgeon: Rony Pandey MD;  Location: [facility];  Service: Orthopedics;  Laterality: Left;   • TYMPANOPLASTY W/ MASTOIDECTOMY     • UMBILICAL HERNIA REPAIR         Family History   Problem Relation Age of Onset   • Diabetes Mother    • Hypertension Mother    • Obesity Mother    • Stroke Mother 65   • Arthritis Mother    • Hyperlipidemia Mother    • Vision loss Mother    • Cancer Father    • Cancer Sister    • Diabetes Brother    • Cancer Brother    • Heart attack Brother    • Alcohol abuse Brother    • Drug abuse Brother    • Hearing loss Brother    • Learning disabilities Brother        Social History     Socioeconomic History   • Marital status:      Spouse name: Not on file   • Number of children: Not on file   • Years of education: Not on file   • Highest education level: Not on file   Tobacco Use   • Smoking status: Former Smoker     Packs/day: 1.00     Years: 51.00     Pack years: 51.00     Types: Cigarettes     Start date: 1/1/1963     Quit date: 1/11/2018     Years since quitting: 3.6   • Smokeless tobacco: Never Used   Substance and Sexual Activity   • Alcohol use: No   • Drug use: No   • Sexual activity: Yes     Partners: Female     Birth control/protection: None  "          Objective   Physical Exam  Vitals and nursing note reviewed.     BP 94/62   Pulse 56   Temp 99.1 °F (37.3 °C) (Oral)   Resp 18   Ht 180.3 cm (71\")   Wt 59 kg (130 lb)   SpO2 94%   BMI 18.13 kg/m²     GEN: No acute distress, sitting upright in stretcher.  He is awake and alert.  He is answering questions appropriately.  He does not appear septic or toxic.  Head: Normocephalic, atraumatic  Eyes: EOM intact  ENT: Mask in place per protocol  Chest: Nontender to palpation  Cardiovascular: Regular rate and rhythm  Lungs: Breathing even and unlabored.  No significant wheezing or stridor  Abdomen: Soft, nontender, nondistended, no peritoneal signs, no guarding  Extremities: No edema, normal appearance  Neuro: GCS 15  Psych: Mood and affect are appropriate    Procedures           ED Course  ED Course as of Sep 03 2138   Fri Sep 03, 2021   1743 Patient is 93% on room air.    [LA]   1803 Troponin T: <0.010 [LA]   1803 Glucose: 81 [LA]   1803 BUN(!): 24 [LA]   1803 Creatinine(!): 0.75 [LA]   1803 Sodium(!): 128 [LA]   1803 Potassium: 4.2 [LA]   1803 Chloride(!): 93 [LA]   1803 CO2: 23.9 [LA]   1803 Calcium: 8.6 [LA]   1803 Total Protein: 7.2 [LA]   1803 Albumin(!): 3.40 [LA]   1803 ALT (SGPT)(!): 120 [LA]   1803 AST (SGOT)(!): 104 [LA]   1803 Alkaline Phosphatase(!): 203 [LA]   1803 Total Bilirubin: 0.9 [LA]   1803 eGFR Non  Am: 104 [LA]   1803 Globulin: 3.8 [LA]   1803 A/G Ratio: 0.9 [LA]   1803 BUN/Creatinine Ratio(!): 32.0 [LA]   1803 Anion Gap: 11.1 [LA]   1803 Liver enzymes are bit elevated but similar comparison to previous.  Bilirubin is normal    [LA]   1803 D-Dimer, Quant(!!): 1.09 [LA]   1821 EKG interpreted by me reveals sinus rhythm with rate of 64 bpm.  There are no acute ST segment or T wave changes.  This is a normal-appearing EKG.    [TB]   1906 Glucose: 83 [LA]   2052 Patient was given Augmentin yesterday, will have him continue this.  They do not see any signs of pulmonary embolism.  He " remained stable here with no hypoxia.  We will have him continue inhalers, give pulse oximeter    [LA]   2055 Discussed with patient and family.  Discussed follow-up and strict return precautions.  Did recommend Mucinex, Flonase, ibuprofen and Tylenol.  He declined nausea medicine to have at home.    [LA]      ED Course User Index  [LA] Svetlana Padron PA-C  [TB] Luz Elena Ross MD                                           MDM  Number of Diagnoses or Management Options  Cough  Pneumonia due to COVID-19 virus  Diagnosis management comments: On arrival, patient is stable.  He is normotensive.  He is 90% on room air at time of triage.  Differential could include worsening Covid, pneumonia, pulmonary embolism, pulmonary edema, ACS, and other concerns.  Will obtain basic labs, troponin, proBNP, D-dimer, chest x-ray, EKG.  Will give small fluid bolus here.    Patient's labs are stable here overall in comparison to previous.  He did have transaminitis with normal bilirubin but this appears similar to previous lab work.  There is no elevation of troponin or proBNP.  D-dimer was elevated.  Chest x-ray looks like a Covid pneumonia, not much different than previous x-ray.  We did obtain CT of the chest which showed emphysema and viral pneumonia with no pulmonary embolism.  Patient remained stable here.  He was not hypoxic.  Discussed with Dr. Yang, believe he can be discharged with close follow-up.  Discussed other symptomatic treatment for the patient to use at home.  We will give pulse oximeter for him to use at home to ensure his oxygen levels are appropriate.  We discussed very strict return precautions.  He verbalized understanding and was in agreement with this plan of care.       Amount and/or Complexity of Data Reviewed  Clinical lab tests: reviewed and ordered  Tests in the radiology section of CPT®: ordered and reviewed  Decide to obtain previous medical records or to obtain history from someone other than  the patient: yes  Review and summarize past medical records: yes  Discuss the patient with other providers: yes    Risk of Complications, Morbidity, and/or Mortality  Presenting problems: moderate  Diagnostic procedures: moderate  Management options: low    Patient Progress  Patient progress: stable      Final diagnoses:   Pneumonia due to COVID-19 virus   Cough       ED Disposition  ED Disposition     ED Disposition Condition Comment    Discharge Stable           Ladonna Means MD  [address] [facility] [address]  [facility] [address] [phone]    Schedule an appointment as soon as possible for a visit            Medication List      New Prescriptions    fluticasone 50 MCG/ACT nasal spray  Commonly known as: FLONASE  2 sprays into the nostril(s) as directed by provider Daily.     guaiFENesin 600 MG 12 hr tablet  Commonly known as: MUCINEX  Take 2 tablets by mouth 2 (Two) Times a Day for 3 days.        Changed    methotrexate 2.5 MG tablet  Take 8 tablets by mouth 1 (One) Time Per Week.  What changed: additional instructions        Stop    dexamethasone 6 MG tablet  Commonly known as: DECADRON           Where to Get Your Medications      These medications were sent to [facility] [address] [place] [address] - [address] [facility] - [phone]  - [phone] FX  [address] [facility] [address]    Phone: [phone]   · fluticasone 50 MCG/ACT nasal spray  · guaiFENesin 600 MG 12 hr tablet          Svetlana Padron PA-C  09/03/21 [id number]

## 2021-09-04 ENCOUNTER — READMISSION MANAGEMENT (OUTPATIENT)
Dept: CALL CENTER | Facility: HOSPITAL | Age: 66
End: 2021-09-04

## 2021-09-04 NOTE — DISCHARGE INSTRUCTIONS
You need to continue to quarantine per health department recommendations.  You are probably going to continue to have a cough, body aches and fatigue for the next few days.  Alternate ibuprofen and Tylenol to help with body aches and fatigue.  Use Mucinex to help with chest congestion, take any other medications at home that you are supposed to be taking.  Drink plenty of fluids to stay well-hydrated.  May use Flonase as directed to help with congestion.  Use your inhalers as directed.  You can use the pulse oximeter to help monitor your oxygen levels.  If it stays persistently below below 88%, return to the ER because you will need oxygen therapy.  Follow-up with your primary care provider when possible.  Return to the ER for any change, worsening of symptoms, or any additional concerns.

## 2021-09-04 NOTE — OUTREACH NOTE
Prep Survey      Responses   [facility]?  [facility]   Is LACE score < 7 ?  No   Emergency Room discharge w/ pulse ox?  Yes   Eligibility  Readm Mgmt   Discharge diagnosis  Pneumonia due to COVID-19 virus   Does the patient have one of the following disease processes/diagnoses(primary or secondary)?  COVID-19   Does the patient have Home health ordered?  No   Is there a DME ordered?  Yes   What DME was ordered?  sent home with pulse oximeter   General alerts for this patient  ED visit-sent home with pulse oximeter   Prep survey completed?  Yes          Elvira Flynn RN

## 2021-09-04 NOTE — OUTREACH NOTE
COVID-19 Week 1 Survey      Responses   [facility] patient discharged from?  [facility]   Does the patient have one of the following disease processes/diagnoses(primary or secondary)?  COVID-19   COVID-19 underlying condition?  None   Call Number  Call 1   Week 1 Call successful?  Yes   Call start time  1724   Call end time  1731   Discharge diagnosis  Pneumonia due to COVID-19 virus   Meds reviewed with patient/caregiver?  Yes   Is the patient having any side effects they believe may be caused by any medication additions or changes?  No   Does the patient have all medications ordered at discharge?  Yes   Is the patient taking all medications as directed (includes completed medication regime)?  Yes   Does the patient have a primary care provider?   Yes   Does the patient have an appointment with their PCP or specialist within 7 days of discharge?  No   What is preventing the patient from scheduling follow up appointments within 7 days of discharge?  Haven't had time   Nursing Interventions  Advised patient to make appointment   Has the patient kept scheduled appointments due by today?  N/A   What DME was ordered?  sent home with pulse oximeter   Has all DME been delivered?  Yes   Psychosocial issues?  No   Did the patient receive a copy of their discharge instructions?  Yes   Did the patient receive a copy of COVID-19 specific instructions?  Yes   Nursing interventions  Reviewed instructions with patient   What is the patient's perception of their health status since discharge?  Improving   Does the patient have any of the following symptoms?  Fever/chills, Cough, Shortness of breath, Loss of taste/smell   Nursing Interventions  Nurse provided patient education   Pulse Ox monitoring  Intermittent   O2 Sat comments  has not set up yet   O2 Sat: education provided  Sat levels, Monitoring frequency, When to seek care   O2 Sat education comments  to ED for levels <90%   Is the patient/caregiver able to teach back  steps to recovery at home?  Rest and rebuild strength, gradually increase activity, Set small, achievable goals for return to baseline health, Practice good oral hygiene, Eat a well-balance diet [instructed to replace toothbrush]   Is the patient/caregiver able to teach back the hierarchy of who to call/visit for symptoms/problems? PCP, Specialist, Home health nurse, Urgent Care, ED, 911  Yes   COVID-19 call completed?  Yes          Akilah Shepherd RN

## 2021-09-06 ENCOUNTER — READMISSION MANAGEMENT (OUTPATIENT)
Dept: CALL CENTER | Facility: HOSPITAL | Age: 66
End: 2021-09-06

## 2021-09-06 NOTE — OUTREACH NOTE
COVID-19 Week 1 Survey      Responses   [facility] patient discharged from?  [facility]   Does the patient have one of the following disease processes/diagnoses(primary or secondary)?  COVID-19   COVID-19 underlying condition?  None   Call Number  Call 2   Week 1 Call successful?  No   Discharge diagnosis  Pneumonia due to COVID-19 virus          Suly Thibodeaux RN

## 2021-09-07 ENCOUNTER — READMISSION MANAGEMENT (OUTPATIENT)
Dept: CALL CENTER | Facility: HOSPITAL | Age: 66
End: 2021-09-07

## 2021-09-07 NOTE — OUTREACH NOTE
COVID-19 Week 1 Survey      Responses   [facility] patient discharged from?  [facility]   Does the patient have one of the following disease processes/diagnoses(primary or secondary)?  COVID-19   COVID-19 underlying condition?  None   Call Number  Call 3   Week 1 Call successful?  No   Discharge diagnosis  Pneumonia due to COVID-19 virus          Brissa Brumfield RN

## 2021-09-08 ENCOUNTER — PATIENT OUTREACH (OUTPATIENT)
Dept: CASE MANAGEMENT | Facility: OTHER | Age: 66
End: 2021-09-08

## 2021-09-08 NOTE — OUTREACH NOTE
Ambulatory Case Management Note    Patient Outreach    TCM unsuccessful X3, returned to [facility] for additional attempt for ED f/u.  RN-ACM attempt was unsuccessful as well.  HRCM d/c for this episode.         Wilma Edmond RN  Ambulatory Case Management    9/8/2021, 09:46 EDT

## 2021-09-10 RX ORDER — ISOSORBIDE MONONITRATE 30 MG/1
TABLET, EXTENDED RELEASE ORAL
Qty: 30 TABLET | Refills: 0 | OUTPATIENT
Start: 2021-09-10

## 2021-09-13 ENCOUNTER — PATIENT MESSAGE (OUTPATIENT)
Dept: INTERNAL MEDICINE | Facility: CLINIC | Age: 66
End: 2021-09-13

## 2021-09-13 DIAGNOSIS — F51.01 PRIMARY INSOMNIA: ICD-10-CM

## 2021-09-13 DIAGNOSIS — G89.4 CHRONIC PAIN SYNDROME: ICD-10-CM

## 2021-09-13 DIAGNOSIS — M06.4 INFLAMMATORY POLYARTHROPATHY (HCC): ICD-10-CM

## 2021-09-13 RX ORDER — AMITRIPTYLINE HYDROCHLORIDE 25 MG/1
TABLET, FILM COATED ORAL
Qty: 63 TABLET | Refills: 2 | Status: SHIPPED | OUTPATIENT
Start: 2021-09-13 | End: 2022-04-22

## 2021-09-13 NOTE — TELEPHONE ENCOUNTER
From: Elliott Dunn  To: Ladonna Means MD  Sent: 9/13/2021 4:33 AM EDT  Subject: Non-Urgent Medical Question    Could you send me a prescription for amitryptline to [facility] pharmacy in [place]. I have been out for a while now and I'm having trouble sleeping.

## 2021-09-19 DIAGNOSIS — K21.00 GASTROESOPHAGEAL REFLUX DISEASE WITH ESOPHAGITIS: ICD-10-CM

## 2021-09-19 DIAGNOSIS — E78.49 OTHER HYPERLIPIDEMIA: ICD-10-CM

## 2021-09-20 ENCOUNTER — OFFICE VISIT (OUTPATIENT)
Dept: GASTROENTEROLOGY | Facility: CLINIC | Age: 66
End: 2021-09-20

## 2021-09-20 VITALS
BODY MASS INDEX: 18.15 KG/M2 | DIASTOLIC BLOOD PRESSURE: 70 MMHG | SYSTOLIC BLOOD PRESSURE: 108 MMHG | WEIGHT: 129.6 LBS | HEIGHT: 71 IN | TEMPERATURE: 98 F

## 2021-09-20 DIAGNOSIS — K62.7 RADIATION PROCTITIS: ICD-10-CM

## 2021-09-20 DIAGNOSIS — R79.89 ELEVATED LFTS: ICD-10-CM

## 2021-09-20 DIAGNOSIS — R93.5 ABNORMAL CT OF THE ABDOMEN: ICD-10-CM

## 2021-09-20 DIAGNOSIS — K21.9 GASTROESOPHAGEAL REFLUX DISEASE WITHOUT ESOPHAGITIS: ICD-10-CM

## 2021-09-20 DIAGNOSIS — K73.2 CHRONIC ACTIVE HEPATITIS, NOT ELSEWHERE CLASSIFIED (HCC): ICD-10-CM

## 2021-09-20 DIAGNOSIS — Z12.11 ENCOUNTER FOR SCREENING FOR MALIGNANT NEOPLASM OF COLON: Primary | ICD-10-CM

## 2021-09-20 DIAGNOSIS — Z11.59 ENCOUNTER FOR SCREENING FOR OTHER VIRAL DISEASES: ICD-10-CM

## 2021-09-20 PROCEDURE — 99204 OFFICE O/P NEW MOD 45 MIN: CPT | Performed by: INTERNAL MEDICINE

## 2021-09-20 RX ORDER — PANTOPRAZOLE SODIUM 40 MG/1
TABLET, DELAYED RELEASE ORAL
Qty: 90 TABLET | Refills: 0 | Status: SHIPPED | OUTPATIENT
Start: 2021-09-20 | End: 2021-12-18

## 2021-09-20 RX ORDER — ATORVASTATIN CALCIUM 10 MG/1
TABLET, FILM COATED ORAL
Qty: 90 TABLET | Refills: 3 | Status: SHIPPED | OUTPATIENT
Start: 2021-09-20 | End: 2022-08-10 | Stop reason: SDUPTHER

## 2021-09-20 RX ORDER — SODIUM CHLORIDE 9 MG/ML
70 INJECTION, SOLUTION INTRAVENOUS CONTINUOUS PRN
Status: CANCELLED | OUTPATIENT
Start: 2021-09-20

## 2021-09-20 RX ORDER — SODIUM, POTASSIUM,MAG SULFATES 17.5-3.13G
2 SOLUTION, RECONSTITUTED, ORAL ORAL ONCE
Qty: 354 ML | Refills: 0 | Status: SHIPPED | OUTPATIENT
Start: 2021-09-20 | End: 2021-09-20

## 2021-09-20 NOTE — PROGRESS NOTES
New Patient Consult      Date: 2021   Patient Name: Elliott Dunn  MRN: [id number]  : 1955     Referring Physician: Ladonna Means MD    Chief Complaint   Patient presents with   • Colon Cancer Screening       History of Present Illness: Elliott Dunn is a 66 y.o. male who is here today to establish care with Gastroenterology for colon cancer screening.    This patient deny any abdominal pain, change in bowel habit, hematochezia or melena.  Weight is stable except recently he lost about 5-10 pounds  after COVID infection. His CTAP done in Aug also showed sigmoid colon thickening suspiciously for colitis. Pt denies nausea vomiting or odynophagia or dysphagia. There is a history of acid reflux and also on cerebrex wit occasional reflux symptoms despite on PPI. There is no history of anemia. Prior history of EGD in  Dr. Curiel and reported as having possible Cristina's and biopsies biopsies negative for Cristina's esophagus.  Colonoscopy done  by Dr Elizondo and revealed diverticulosis and possible radiation proctitis other details unknown. No family history of colon cancer or any GI malignancy.Dad and brother had clung CA.  Denies alcohol abuse or cigarette smoking (ex smoker).   He had prostate CA and had radiation and prostatectomy. He also has MGUS  He also has RA on medication.     Subjective      Past Medical History:   Past Medical History:   Diagnosis Date   • Acquired absence of all teeth    • Allergic    • Anomalous coronary artery origin    • Arrhythmia    • Arthritis    • Arthritis of lumbar spine 2016   • Back pain 2016   • Cristina esophagus    • Cancer (CMS/HCC)     prostate   • Cataract     REMOVED BILATERALL   • Cervical spine disease 2016   • CHF (congestive heart failure) (CMS/HCC)    • Chronic obstructive pulmonary disease (CMS/HCC)    • Colon polyps    • Deafness in left ear    • Derangement of anterior horn of medial meniscus    • Difficulty swallowing     • Disorder of lumbar spine 6/17/2016   • Disorder of thoracic spine 6/17/2016   • Diverticulitis of colon    • DJD (degenerative joint disease)    • Elevated liver enzymes    • Erectile dysfunction    • Esophageal reflux    • Essential hypertension     PT DENIES SAYS IT RUNS LOW   • Glaucoma    • Hard of hearing     LEFT EAR NO AIDS WORN   • Heart murmur    • Hiatal hernia    • High cholesterol    • History of radiation therapy 11/24/2020    salvage pelvic XRT   • Impaired functional mobility, balance, gait, and endurance    • Inflammatory polyarthropathy (CMS/HCC)     Per Dr. Haider. Negative NEO, normal ESR, RF, anti-ccp and did not improve with prednisone per notes.   • Inguinal hernia    • Insomnia    • Lateral meniscus derangement    • Left otitis media with spontaneous rupture of eardrum    • Locking of left knee    • Low back pain    • Meniere's disease    • MGUS (monoclonal gammopathy of unknown significance)     likely diagnosis   • Murmur    • Neuromuscular disorder (CMS/HCC)    • Neuropathic arthritis    • No natural teeth    • Perforation of left tympanic membrane    • Prostate cancer (CMS/HCC)     previous CA in 2013 with prostatectomy   • Pulmonary emphysema (CMS/HCC)    • Recent shoulder injury     LEFT SHOULDER   • Rotator cuff tear arthropathy of left shoulder 3/12/2020    Added automatically from request for surgery [id number]   • Scoliosis    • Skin cancer     skin cancer   • Skin cancer of face     squamous cell   • Spina bifida occulta 6/17/2016   • Tobacco abuse 11/9/2017   • Visual impairment    • Wears glasses    • Wears glasses     READING GLASSES ONLY       Past Surgical History:   Past Surgical History:   Procedure Laterality Date   • COLONOSCOPY     • ENDOSCOPY N/A 4/10/2017    Procedure: ESOPHAGOGASTRODUODENOSCOPY WITH BIOPSY;  Surgeon: Alexander Ritchie MD;  Location: [facility] END[facility];  Service:    • EYE SURGERY     • HERNIA REPAIR     • INGUINAL HERNIA REPAIR Right    • INGUINAL HERNIA  REPAIR     • KNEE SURGERY Left    • LYMPH NODE BIOPSY     • MULTIPLE TOOTH EXTRACTIONS     • PROSTATE SURGERY  2004   • PROSTATECTOMY  06/30/2014   • PROSTATECTOMY      Prostatectomy Radical   • SHOULDER ARTHROSCOPY Left 2/6/2020    Procedure: DIAGNOSTIC SHOULDER ARTHROSCOPY LEFT WITH LABRAL DEBRIDEMENT, SUBACROMIAL BURSECTOMY, ASSESSMENT OF LARGE FRAGMENTED RETRACTED IRREPARABLE ROTATOR CUFF TEARS;  Surgeon: Rony Pandey MD;  Location: [facility];  Service: Orthopedics;  Laterality: Left;   • SKIN CANCER EXCISION  2017    both sides of body/squamous cell melanoma   • TOTAL SHOULDER ARTHROPLASTY W/ DISTAL CLAVICLE EXCISION Left 7/9/2020    Procedure: Left reverse total shoulder arthroplasty;  Surgeon: Rony Pandey MD;  Location: [facility];  Service: Orthopedics;  Laterality: Left;   • TYMPANOPLASTY W/ MASTOIDECTOMY     • UMBILICAL HERNIA REPAIR         Family History:   Family History   Problem Relation Age of Onset   • Diabetes Mother    • Hypertension Mother    • Obesity Mother    • Stroke Mother 65   • Arthritis Mother    • Hyperlipidemia Mother    • Vision loss Mother    • Cancer Father    • Cancer Sister    • Diabetes Brother    • Cancer Brother    • Heart attack Brother    • Alcohol abuse Brother    • Drug abuse Brother    • Hearing loss Brother    • Learning disabilities Brother        Social History:   Social History     Socioeconomic History   • Marital status:      Spouse name: Not on file   • Number of children: Not on file   • Years of education: Not on file   • Highest education level: Not on file   Tobacco Use   • Smoking status: Former Smoker     Packs/day: 1.00     Years: 51.00     Pack years: 51.00     Types: Cigarettes     Start date: 1/1/1963     Quit date: 1/11/2018     Years since quitting: 3.6   • Smokeless tobacco: Never Used   Substance and Sexual Activity   • Alcohol use: No   • Drug use: No   • Sexual activity: Yes     Partners: Female     Birth control/protection: None          Current Outpatient Medications:   •  albuterol sulfate HFA (Ventolin HFA) 108 (90 Base) MCG/ACT inhaler, Inhale 2 puffs Every 4 (Four) Hours As Needed for Wheezing or Shortness of Air., Disp: 18 g, Rfl: 2  •  albuterol sulfate  (90 Base) MCG/ACT inhaler, Inhale 2 puffs Every 4 (Four) Hours As Needed for Shortness of Air., Disp: 17 g, Rfl: 0  •  amitriptyline (ELAVIL) 25 MG tablet, Take 1-3 tablets by mouth every night at bedtime as needed for sleep., Disp: 63 tablet, Rfl: 2  •  aspirin 81 MG EC tablet, Take 81 mg by mouth Daily., Disp: , Rfl:   •  atorvastatin (LIPITOR) 10 MG tablet, TAKE ONE TABLET BY MOUTH ONCE NIGHTLY, Disp: 90 tablet, Rfl: 3  •  bisoprolol (ZEBeta) 5 MG tablet, Take 1 tablet by mouth Daily., Disp: 90 tablet, Rfl: 3  •  DULoxetine (CYMBALTA) 60 MG capsule, Take 1 capsule by mouth Daily., Disp: 90 capsule, Rfl: 1  •  folic acid (FOLVITE) 1 MG tablet, Take 1 mg by mouth Daily., Disp: , Rfl:   •  gabapentin (NEURONTIN) 300 MG capsule, Take 1 capsule by mouth 3 (Three) Times a Day As Needed (nerve pain)., Disp: 90 capsule, Rfl: 0  •  isosorbide mononitrate (IMDUR) 30 MG 24 hr tablet, TAKE ONE TABLET BY MOUTH DAILY, Disp: 30 tablet, Rfl: 0  •  leflunomide (ARAVA) 10 MG tablet, , Disp: , Rfl:   •  methocarbamol (ROBAXIN) 750 MG tablet, Take 1 tablet by mouth 3 (Three) Times a Day As Needed for Muscle Spasms., Disp: 270 tablet, Rfl: 3  •  methotrexate 2.5 MG tablet, Take 8 tablets by mouth 1 (One) Time Per Week. (Patient taking differently: Take 20 mg by mouth 1 (One) Time Per Week. TAKES ON THURSDAY), Disp: 96 tablet, Rfl: 0  •  Multiple Vitamins-Minerals (MULTIVITAMIN WITH MINERALS) tablet tablet, Take 1 tablet by mouth Daily., Disp: , Rfl:   •  nitroglycerin (NITROSTAT) 0.4 MG SL tablet, 1 under the tongue as needed for angina, may repeat q5mins for up three doses, Disp: 100 tablet, Rfl: 11  •  pantoprazole (PROTONIX) 40 MG EC tablet, TAKE ONE TABLET BY MOUTH DAILY, Disp: 90 tablet,  Rfl: 0  •  predniSONE (DELTASONE) 10 MG tablet, Take 10 mg by mouth Daily As Needed. As needed, Disp: , Rfl:   •  tiotropium bromide-olodaterol (Stiolto Respimat) 2.5-2.5 MCG/ACT aerosol solution inhaler, Inhale 2 puffs Daily., Disp: 4 g, Rfl: 5  •  vitamin B-12 (CYANOCOBALAMIN) 1000 MCG tablet, TAKE ONE TABLET BY MOUTH DAILY, Disp: 30 tablet, Rfl: 8  •  celecoxib (CeleBREX) 100 MG capsule, TAKE ONE CAPSULE BY MOUTH DAILY WITH FOOD FOR ARTHRITIC PAIN, Disp: 90 capsule, Rfl: 0  •  coenzyme Q10 100 MG capsule, Take 100 mg by mouth Daily., Disp: , Rfl:   •  diphenoxylate-atropine (LOMOTIL) 2.5-0.025 MG per tablet, , Disp: , Rfl:   •  fluticasone (FLONASE) 50 MCG/ACT nasal spray, 2 sprays into the nostril(s) as directed by provider Daily., Disp: 9.9 mL, Rfl: 0  •  ondansetron ODT (ZOFRAN-ODT) 4 MG disintegrating tablet, Place 1 tablet on the tongue Every 6 (Six) Hours As Needed for Nausea., Disp: 20 tablet, Rfl: 0  •  sodium-potassium-magnesium sulfates (Suprep Bowel Prep Kit) 17.5-3.13-1.6 GM/177ML solution oral solution, Take 2 bottles by mouth 1 (One) Time for 1 dose. Please see prep instructions from office., Disp: 354 mL, Rfl: 0    Allergies   Allergen Reactions   • Cyclobenzaprine Unknown - Low Severity     Wide awake   • Plaquenil [Hydroxychloroquine Sulfate] Unknown - Low Severity     Black eyes   • Pravastatin Myalgia     Weakness / fatigue       Review of Systems:   Review of Systems   Constitutional: Negative for appetite change, fatigue, fever and unexpected weight loss.   HENT: Negative for trouble swallowing.    Gastrointestinal: Positive for GERD. Negative for abdominal distention, abdominal pain, anal bleeding, blood in stool, constipation, diarrhea, nausea, rectal pain, vomiting and indigestion.       The following portions of the patient's history were reviewed and updated as appropriate: allergies, current medications, past family history, past medical history, past social history, past surgical  "history and problem list.    Objective     Physical Exam:  Vital Signs:   Vitals:    09/20/21 1046   BP: 108/70   Temp: 98 °F (36.7 °C)   TempSrc: Infrared   Weight: 58.8 kg (129 lb 9.6 oz)   Height: 180.3 cm (71\")       Physical Exam  Vitals and nursing note reviewed.   Constitutional:       Comments: He is poorly built and nourished   HENT:      Head: Normocephalic and atraumatic.      Right Ear: External ear normal.      Left Ear: External ear normal.   Eyes:      Conjunctiva/sclera: Conjunctivae normal.   Neck:      Thyroid: No thyromegaly.      Trachea: No tracheal deviation.   Cardiovascular:      Rate and Rhythm: Normal rate and regular rhythm.      Heart sounds: No murmur heard.     Pulmonary:      Effort: Pulmonary effort is normal. No respiratory distress.      Breath sounds: Normal breath sounds.   Abdominal:      General: Bowel sounds are normal. There is no distension.      Palpations: Abdomen is soft. There is no mass.      Tenderness: There is no abdominal tenderness.      Hernia: No hernia is present.   Musculoskeletal:         General: Normal range of motion.      Cervical back: Normal range of motion.   Skin:     General: Skin is warm and dry.   Neurological:      Mental Status: He is alert and oriented to person, place, and time.      Cranial Nerves: No cranial nerve deficit.      Sensory: No sensory deficit.   Psychiatric:         Behavior: Behavior normal.         Thought Content: Thought content normal.         Judgment: Judgment normal.           Results Review:   I have reviewed the patient's new clinical and imaging results and agree with the interpretation.     Admission on 09/03/2021, Discharged on 09/03/2021   Component Date Value Ref Range Status   • Glucose 09/03/2021 81  65 - 99 mg/dL Final   • BUN 09/03/2021 24* 8 - 23 mg/dL Final   • Creatinine 09/03/2021 0.75* 0.76 - 1.27 mg/dL Final   • Sodium 09/03/2021 128* 136 - 145 mmol/L Final   • Potassium 09/03/2021 4.2  3.5 - 5.2 mmol/L " Final   • Chloride 09/03/2021 93* 98 - 107 mmol/L Final   • CO2 09/03/2021 23.9  22.0 - 29.0 mmol/L Final   • Calcium 09/03/2021 8.6  8.6 - 10.5 mg/dL Final   • Total Protein 09/03/2021 7.2  6.0 - 8.5 g/dL Final   • Albumin 09/03/2021 3.40* 3.50 - 5.20 g/dL Final   • ALT (SGPT) 09/03/2021 120* 1 - 41 U/L Final   • AST (SGOT) 09/03/2021 104* 1 - 40 U/L Final   • Alkaline Phosphatase 09/03/2021 203* 39 - 117 U/L Final   • Total Bilirubin 09/03/2021 0.9  0.0 - 1.2 mg/dL Final   • eGFR Non African Amer 09/03/2021 104  >60 mL/min/1.73 Final   • Globulin 09/03/2021 3.8  gm/dL Final   • A/G Ratio 09/03/2021 0.9  g/dL Final   • BUN/Creatinine Ratio 09/03/2021 32.0* 7.0 - 25.0 Final   • Anion Gap 09/03/2021 11.1  5.0 - 15.0 mmol/L Final   • proBNP 09/03/2021 490.0  0.0 - 900.0 pg/mL Final   • Troponin T 09/03/2021 <0.010  0.000 - 0.030 ng/mL Final   • WBC 09/03/2021 6.59  3.40 - 10.80 10*3/mm3 Final   • RBC 09/03/2021 5.01  4.14 - 5.80 10*6/mm3 Final   • Hemoglobin 09/03/2021 15.7  13.0 - 17.7 g/dL Final   • Hematocrit 09/03/2021 45.3  37.5 - 51.0 % Final   • MCV 09/03/2021 90.4  79.0 - 97.0 fL Final   • MCH 09/03/2021 31.3  26.6 - 33.0 pg Final   • MCHC 09/03/2021 34.7  31.5 - 35.7 g/dL Final   • RDW 09/03/2021 14.0  12.3 - 15.4 % Final   • RDW-SD 09/03/2021 46.1  37.0 - 54.0 fl Final   • MPV 09/03/2021 9.5  6.0 - 12.0 fL Final   • Platelets 09/03/2021 190  140 - 450 10*3/mm3 Final   • Neutrophil % 09/03/2021 69.6  42.7 - 76.0 % Final   • Lymphocyte % 09/03/2021 11.8* 19.6 - 45.3 % Final   • Monocyte % 09/03/2021 15.0* 5.0 - 12.0 % Final   • Eosinophil % 09/03/2021 0.2* 0.3 - 6.2 % Final   • Basophil % 09/03/2021 0.5  0.0 - 1.5 % Final   • Immature Grans % 09/03/2021 2.9* 0.0 - 0.5 % Final   • Neutrophils, Absolute 09/03/2021 4.59  1.70 - 7.00 10*3/mm3 Final   • Lymphocytes, Absolute 09/03/2021 0.78  0.70 - 3.10 10*3/mm3 Final   • Monocytes, Absolute 09/03/2021 0.99* 0.10 - 0.90 10*3/mm3 Final   • Eosinophils, Absolute  09/03/2021 0.01  0.00 - 0.40 10*3/mm3 Final   • Basophils, Absolute 09/03/2021 0.03  0.00 - 0.20 10*3/mm3 Final   • Immature Grans, Absolute 09/03/2021 0.19* 0.00 - 0.05 10*3/mm3 Final   • nRBC 09/03/2021 0.0  0.0 - 0.2 /100 WBC Final   • D-Dimer, Quantitative 09/03/2021 1.09* 0.00 - 0.57 MCGFEU/mL Final   • Glucose 09/03/2021 83  70 - 130 mg/dL Final    Serial Number: [id number]  [id number]   Admission on 08/28/2021, Discharged on 08/28/2021   Component Date Value Ref Range Status   • Glucose 08/28/2021 97  65 - 99 mg/dL Final   • BUN 08/28/2021 28* 8 - 23 mg/dL Final   • Creatinine 08/28/2021 0.91  0.76 - 1.27 mg/dL Final   • Sodium 08/28/2021 130* 136 - 145 mmol/L Final   • Potassium 08/28/2021 3.9  3.5 - 5.2 mmol/L Final   • Chloride 08/28/2021 94* 98 - 107 mmol/L Final   • CO2 08/28/2021 24.4  22.0 - 29.0 mmol/L Final   • Calcium 08/28/2021 8.8  8.6 - 10.5 mg/dL Final   • Total Protein 08/28/2021 7.6  6.0 - 8.5 g/dL Final   • Albumin 08/28/2021 3.40* 3.50 - 5.20 g/dL Final   • ALT (SGPT) 08/28/2021 65* 1 - 41 U/L Final   • AST (SGOT) 08/28/2021 133* 1 - 40 U/L Final   • Alkaline Phosphatase 08/28/2021 206* 39 - 117 U/L Final   • Total Bilirubin 08/28/2021 0.8  0.0 - 1.2 mg/dL Final   • eGFR Non  Amer 08/28/2021 83  >60 mL/min/1.73 Final   • Globulin 08/28/2021 4.2  gm/dL Final   • A/G Ratio 08/28/2021 0.8  g/dL Final   • BUN/Creatinine Ratio 08/28/2021 30.8* 7.0 - 25.0 Final   • Anion Gap 08/28/2021 11.6  5.0 - 15.0 mmol/L Final   • Lipase 08/28/2021 11* 13 - 60 U/L Final   • proBNP 08/28/2021 573.6  0.0 - 900.0 pg/mL Final   • Troponin T 08/28/2021 <0.010  0.000 - 0.030 ng/mL Final   • Blood Culture 08/28/2021 No growth at 5 days   Final   • Blood Culture 08/28/2021 No growth at 5 days   Final   • Lactate 08/28/2021 1.7  0.5 - 2.0 mmol/L Final   • Procalcitonin 08/28/2021 0.92* 0.00 - 0.25 ng/mL Final   • COVID19 08/28/2021 Detected* Not Detected - Ref. Range Final   • Magnesium 08/28/2021 1.9  1.6  - 2.4 mg/dL Final   • WBC 08/28/2021 8.25  3.40 - 10.80 10*3/mm3 Final   • RBC 08/28/2021 4.79  4.14 - 5.80 10*6/mm3 Final   • Hemoglobin 08/28/2021 15.2  13.0 - 17.7 g/dL Final   • Hematocrit 08/28/2021 44.6  37.5 - 51.0 % Final   • MCV 08/28/2021 93.1  79.0 - 97.0 fL Final   • MCH 08/28/2021 31.7  26.6 - 33.0 pg Final   • MCHC 08/28/2021 34.1  31.5 - 35.7 g/dL Final   • RDW 08/28/2021 14.3  12.3 - 15.4 % Final   • RDW-SD 08/28/2021 48.7  37.0 - 54.0 fl Final   • MPV 08/28/2021 9.5  6.0 - 12.0 fL Final   • Platelets 08/28/2021 135* 140 - 450 10*3/mm3 Final   • Neutrophil % 08/28/2021 79.3* 42.7 - 76.0 % Final   • Lymphocyte % 08/28/2021 12.5* 19.6 - 45.3 % Final   • Monocyte % 08/28/2021 6.9  5.0 - 12.0 % Final   • Eosinophil % 08/28/2021 0.0* 0.3 - 6.2 % Final   • Basophil % 08/28/2021 0.2  0.0 - 1.5 % Final   • Immature Grans % 08/28/2021 1.1* 0.0 - 0.5 % Final   • Neutrophils, Absolute 08/28/2021 6.54  1.70 - 7.00 10*3/mm3 Final   • Lymphocytes, Absolute 08/28/2021 1.03  0.70 - 3.10 10*3/mm3 Final   • Monocytes, Absolute 08/28/2021 0.57  0.10 - 0.90 10*3/mm3 Final   • Eosinophils, Absolute 08/28/2021 0.00  0.00 - 0.40 10*3/mm3 Final   • Basophils, Absolute 08/28/2021 0.02  0.00 - 0.20 10*3/mm3 Final   • Immature Grans, Absolute 08/28/2021 0.09* 0.00 - 0.05 10*3/mm3 Final   • nRBC 08/28/2021 0.0  0.0 - 0.2 /100 WBC Final   • Site 08/28/2021 OTHER   Final   • pH, Venous 08/28/2021 7.394  7.320 - 7.420 pH Units Final   • pCO2, Venous 08/28/2021 41.7  40.0 - 50.0 mm Hg Final   • pO2, Venous 08/28/2021 25.8* 30.0 - 50.0 mm Hg Final    84 Value below reference range   • HCO3, Venous 08/28/2021 25.5  22.0 - 28.0 mmol/L Final   • Base Excess, Venous 08/28/2021 0.4  0.0 - 2.0 mmol/L Final   • O2 Saturation, Venous 08/28/2021 45.6  45.0 - 75.0 % Final   • Oxyhemoglobin Venous 08/28/2021 44.9  40.0 - 70.0 % Final    85 Value below critical limit   • Methemoglobin Venous 08/28/2021 0.5  0.0 - 3.0 % Final   •  Carboxyhemoglobin Venous 08/28/2021 1.1  0.0 - 5.0 % Final   • Barometric Pressure for Blood Gas 08/28/2021 740  mmHg Final   • Modality 08/28/2021 Room Air   Final   • FIO2 08/28/2021 21  % Final   • Ventilator Mode 08/28/2021 NA   Final   • Collected by 08/28/2021 MARGARET CLAROS   Final    Meter: [id number]     :  [id number]   Office Visit on 07/28/2021   Component Date Value Ref Range Status   • Total Cholesterol 07/28/2021 121  0 - 200 mg/dL Final    Comment: Cholesterol Reference Ranges  (U.S. Department of Health and Human Services ATP III  Classifications)  Desirable          <200 mg/dL  Borderline High    200-239 mg/dL  High Risk          >240 mg/dL  Triglyceride Reference Ranges  (U.S. Department of Health and Human Services ATP III  Classifications)  Normal           <150 mg/dL  Borderline High  150-199 mg/dL  High             200-499 mg/dL  Very High        >500 mg/dL  HDL Reference Ranges  (U.S. Department of Health and Human Services ATP III  Classifcations)  Low     <40 mg/dl (major risk factor for CHD)  High    >60 mg/dl ('negative' risk factor for CHD)  LDL Reference Ranges  (U.S. Department of Health and Human Services ATP III  Classifcations)  Optimal          <100 mg/dL  Near Optimal     100-129 mg/dL  Borderline High  130-159 mg/dL  High             160-189 mg/dL  Very High        >189 mg/dL     • Triglycerides 07/28/2021 65  0 - 150 mg/dL Final   • HDL Cholesterol 07/28/2021 39* 40 - 60 mg/dL Final   • VLDL Cholesterol Jairon 07/28/2021 14  5 - 40 mg/dL Final   • LDL Chol Calc (RUST) 07/28/2021 68  0 - 100 mg/dL Final   • Vitamin B-12 07/28/2021 986* 211 - 946 pg/mL Final    Results may be falsely increased if patient taking Biotin.   • Folate 07/28/2021 >20.00  4.78 - 24.20 ng/mL Final    Results may be falsely increased if patient taking Biotin.   • WBC 07/28/2021 7.37  3.40 - 10.80 10*3/mm3 Final   • RBC 07/28/2021 4.25  4.14 - 5.80 10*6/mm3 Final   • Hemoglobin 07/28/2021 13.3  13.0 - 17.7  g/dL Final   • Hematocrit 07/28/2021 40.2  37.5 - 51.0 % Final   • MCV 07/28/2021 94.6  79.0 - 97.0 fL Final   • MCH 07/28/2021 31.3  26.6 - 33.0 pg Final   • MCHC 07/28/2021 33.1  31.5 - 35.7 g/dL Final   • RDW 07/28/2021 12.4  12.3 - 15.4 % Final   • Platelets 07/28/2021 253  140 - 450 10*3/mm3 Final   • Neutrophil Rel % 07/28/2021 67.7  42.7 - 76.0 % Final   • Lymphocyte Rel % 07/28/2021 16.3* 19.6 - 45.3 % Final   • Monocyte Rel % 07/28/2021 12.2* 5.0 - 12.0 % Final   • Eosinophil Rel % 07/28/2021 2.8  0.3 - 6.2 % Final   • Basophil Rel % 07/28/2021 0.5  0.0 - 1.5 % Final   • Neutrophils Absolute 07/28/2021 4.98  1.70 - 7.00 10*3/mm3 Final   • Lymphocytes Absolute 07/28/2021 1.20  0.70 - 3.10 10*3/mm3 Final   • Monocytes Absolute 07/28/2021 0.90  0.10 - 0.90 10*3/mm3 Final   • Eosinophils Absolute 07/28/2021 0.21  0.00 - 0.40 10*3/mm3 Final   • Basophils Absolute 07/28/2021 0.04  0.00 - 0.20 10*3/mm3 Final   • Immature Granulocyte Rel % 07/28/2021 0.5  0.0 - 0.5 % Final   • Immature Grans Absolute 07/28/2021 0.04  0.00 - 0.05 10*3/mm3 Final   • nRBC 07/28/2021 0.0  0.0 - 0.2 /100 WBC Final   • Glucose 07/28/2021 82  65 - 99 mg/dL Final   • BUN 07/28/2021 21  8 - 23 mg/dL Final   • Creatinine 07/28/2021 0.93  0.76 - 1.27 mg/dL Final   • eGFR Non  Am 07/28/2021 81  >60 mL/min/1.73 Final    Comment: GFR Normal >60  Chronic Kidney Disease <60  Kidney Failure <15     • eGFR  Am 07/28/2021 99  >60 mL/min/1.73 Final   • BUN/Creatinine Ratio 07/28/2021 22.6  7.0 - 25.0 Final   • Sodium 07/28/2021 138  136 - 145 mmol/L Final   • Potassium 07/28/2021 4.3  3.5 - 5.2 mmol/L Final   • Chloride 07/28/2021 103  98 - 107 mmol/L Final   • Total CO2 07/28/2021 28.0  22.0 - 29.0 mmol/L Final   • Calcium 07/28/2021 9.3  8.6 - 10.5 mg/dL Final   • Total Protein 07/28/2021 7.2  6.0 - 8.5 g/dL Final   • Albumin 07/28/2021 3.60  3.50 - 5.20 g/dL Final   • Globulin 07/28/2021 3.6  gm/dL Final   • A/G Ratio 07/28/2021 1.0   g/dL Final   • Total Bilirubin 07/28/2021 0.4  0.0 - 1.2 mg/dL Final   • Alkaline Phosphatase 07/28/2021 188* 39 - 117 U/L Final   • AST (SGOT) 07/28/2021 47* 1 - 40 U/L Final   • ALT (SGPT) 07/28/2021 33  1 - 41 U/L Final   • Magnesium 07/28/2021 1.9  1.6 - 2.4 mg/dL Final   • TSH 07/28/2021 0.480  0.270 - 4.200 uIU/mL Final   • Free T4 07/28/2021 0.88* 0.93 - 1.70 ng/dL Final    Results may be falsely increased if patient taking Biotin.      CT Abdomen Pelvis With Contrast    Result Date: 8/28/2021  1. Bilateral lower lobe consolidation consistent with pneumonia. 2. Scattered mild wall prominence of colon, particularly the sigmoid. A very mild colitis is suspected, likely infectious.  This report was finalized on 8/28/2021 1:35 PM by Dr Antoine Feng DO.    XR Chest 1 View    Result Date: 9/4/2021  Persistent bilateral pulmonary opacities consistent with bilateral pneumonia.    This report was finalized on 9/4/2021 5:46 AM by Mendez Strong MD.    XR Chest 1 View    Result Date: 8/28/2021  Severe emphysema with questionable bibasilar pneumonia.  This report was finalized on 8/28/2021 11:39 AM by Dr Antoine Feng DO.    CT Chest Pulmonary Embolism    Result Date: 9/3/2021  Emphysema and pneumonia.  No pulmonary embolism. Authenticated by Doug Galindo M.D. on 09/03/2021 09:21:58 PM      Assessment / Plan      Assessment & Plan:  1. Encounter for screening for malignant neoplasm of colon  2. Radiation proctitis  3. Abnormal CT of the abdomen  His last colonoscopy was in 2015 full report not available.  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  His recent CT scan of the abdomen pelvis done in August 2021 revealed mild sigmoid wall thickening suspicious for colitis.  No current signs of any GI bleed or change in bowel habit.   He needs a colonoscopy for evaluation of these findings.  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    The indications, technique, alternatives and  potential risk and complications were discussed with the patient including but not limited to bleeding, bowel perforations, missing lesions and anesthetic complications. The patient understands and wishes to proceed with the procedure and has given their verbal consent. Written patient education information was given to the patient.   The patient will call if they have further questions before procedure.     - Case Request; Standing  - Implement Anesthesia Orders Day of Procedure; Standing  - Obtain Informed Consent; Standing  - Verify Bowel Prep Was Successful; Standing  - Oxygen Therapy- Nasal Cannula; 2 LPM; Titrate for SPO2: equal to or greater than, 90%; Standing  - sodium chloride 0.9 % infusion  - Case Request    4. Gastroesophageal reflux disease without esophagitis  Patient has a longstanding history of reflux disease.  He is currently also takes Celebrex.  He still gets intermittent reflux symptoms despite on a PPI daily dose.  His last EGD was in 2017 by Dr. Curiel and was reported as having possible Cristina's.  Biopsies negative for any Cristina's esophagus  We will consider EGD in the near future if he has persistent symptoms    5. Elevated LFTs  Suspected DILI    He has a combined hepatocellular and cholestatic pattern of elevated liver enzymes since about 3 to 4 years now.  His liver numbers gradually worsening.  He does not have any metabolic syndrome.  No history suggestive any chronic hepatitis.  Patient is on Arava for the last few years.  I suspected this could be drug-induced liver injury from Arava use.   We will get an ultrasound abdomen and basic liver work-up  If other etiology ruled out, we may have to consider reducing the dose of Arava.  He may also need a liver biopsy.    - US Abdomen Complete; Future  - Alpha - 1 - Antitrypsin; Future  - NEO; Future  - Anti-Smooth Muscle Antibody Titer; Future  - Hepatitis A Antibody, Total; Future  - Hepatitis B Core Antibody, Total; Future  - Hepatitis  B Surface Antibody; Future  - Hepatitis B Surface Antigen; Future  - Mitochondrial Antibodies, M2; Future  - Iron Profile; Future  - Ferritin; Future  - Protime-INR; Future    6.  Personal history prostate cancer and MGUS  Followed at [facility]      Follow Up:   Return for Follow Up after procedure.    Luis Callahan MD  [facility] [place]  9/20/2021  12:14 EDT    Please note that portions of this note may have been completed with a voice recognition program.

## 2021-09-30 ENCOUNTER — LAB (OUTPATIENT)
Dept: LAB | Facility: HOSPITAL | Age: 66
End: 2021-09-30

## 2021-09-30 ENCOUNTER — OFFICE VISIT (OUTPATIENT)
Dept: PULMONOLOGY | Facility: CLINIC | Age: 66
End: 2021-09-30

## 2021-09-30 ENCOUNTER — HOSPITAL ENCOUNTER (OUTPATIENT)
Dept: GENERAL RADIOLOGY | Facility: HOSPITAL | Age: 66
Discharge: HOME | End: 2021-09-30

## 2021-09-30 VITALS
WEIGHT: 132 LBS | HEIGHT: 71 IN | RESPIRATION RATE: 18 BRPM | OXYGEN SATURATION: 93 % | HEART RATE: 94 BPM | DIASTOLIC BLOOD PRESSURE: 68 MMHG | BODY MASS INDEX: 18.48 KG/M2 | SYSTOLIC BLOOD PRESSURE: 100 MMHG

## 2021-09-30 DIAGNOSIS — R79.89 ELEVATED LFTS: ICD-10-CM

## 2021-09-30 DIAGNOSIS — Z87.891 PERSONAL HISTORY OF TOBACCO USE, PRESENTING HAZARDS TO HEALTH: ICD-10-CM

## 2021-09-30 DIAGNOSIS — R06.02 SHORTNESS OF BREATH: ICD-10-CM

## 2021-09-30 DIAGNOSIS — J30.89 OTHER ALLERGIC RHINITIS: ICD-10-CM

## 2021-09-30 DIAGNOSIS — Z86.16 HISTORY OF COVID-19: ICD-10-CM

## 2021-09-30 DIAGNOSIS — R93.89 ABNORMAL CT OF THE CHEST: ICD-10-CM

## 2021-09-30 DIAGNOSIS — J44.9 CHRONIC OBSTRUCTIVE PULMONARY DISEASE, UNSPECIFIED COPD TYPE (HCC): ICD-10-CM

## 2021-09-30 DIAGNOSIS — Z11.59 ENCOUNTER FOR SCREENING FOR OTHER VIRAL DISEASES: ICD-10-CM

## 2021-09-30 DIAGNOSIS — K73.2 CHRONIC ACTIVE HEPATITIS, NOT ELSEWHERE CLASSIFIED (HCC): ICD-10-CM

## 2021-09-30 DIAGNOSIS — R06.02 SHORTNESS OF BREATH: Primary | ICD-10-CM

## 2021-09-30 LAB
ALPHA1 GLOB MFR UR ELPH: 144 MG/DL (ref 90–200)
FERRITIN SERPL-MCNC: 748 NG/ML (ref 30–400)
HBV SURFACE AB SER RIA-ACNC: NORMAL
HBV SURFACE AG SERPL QL IA: NORMAL
INR PPP: 0.91 (ref 0.9–1.1)
IRON 24H UR-MRATE: 85 MCG/DL (ref 59–158)
IRON SATN MFR SERPL: 34 % (ref 20–50)
PROTHROMBIN TIME: 12.8 SECONDS (ref 12–15.1)
TIBC SERPL-MCNC: 249 MCG/DL (ref 298–536)
TRANSFERRIN SERPL-MCNC: 167 MG/DL (ref 200–360)

## 2021-09-30 PROCEDURE — 84466 ASSAY OF TRANSFERRIN: CPT

## 2021-09-30 PROCEDURE — 36415 COLL VENOUS BLD VENIPUNCTURE: CPT

## 2021-09-30 PROCEDURE — 99214 OFFICE O/P EST MOD 30 MIN: CPT | Performed by: INTERNAL MEDICINE

## 2021-09-30 PROCEDURE — 86038 ANTINUCLEAR ANTIBODIES: CPT

## 2021-09-30 PROCEDURE — 86708 HEPATITIS A ANTIBODY: CPT

## 2021-09-30 PROCEDURE — 86704 HEP B CORE ANTIBODY TOTAL: CPT

## 2021-09-30 PROCEDURE — 87340 HEPATITIS B SURFACE AG IA: CPT

## 2021-09-30 PROCEDURE — 83516 IMMUNOASSAY NONANTIBODY: CPT

## 2021-09-30 PROCEDURE — 83540 ASSAY OF IRON: CPT

## 2021-09-30 PROCEDURE — 71046 X-RAY EXAM CHEST 2 VIEWS: CPT

## 2021-09-30 PROCEDURE — 82728 ASSAY OF FERRITIN: CPT

## 2021-09-30 PROCEDURE — 85610 PROTHROMBIN TIME: CPT

## 2021-09-30 PROCEDURE — 82103 ALPHA-1-ANTITRYPSIN TOTAL: CPT

## 2021-09-30 PROCEDURE — 86706 HEP B SURFACE ANTIBODY: CPT

## 2021-09-30 RX ORDER — TIOTROPIUM BROMIDE AND OLODATEROL 3.124; 2.736 UG/1; UG/1
2 SPRAY, METERED RESPIRATORY (INHALATION) DAILY
Qty: 4 G | Refills: 5 | Status: SHIPPED | OUTPATIENT
Start: 2021-09-30 | End: 2022-01-03 | Stop reason: SDUPTHER

## 2021-10-01 LAB
ACTIN IGG SERPL-ACNC: 3 UNITS (ref 0–19)
ANA SER QL: NEGATIVE
HAV AB SER QL: POSITIVE
HBV CORE AB SERPL QL IA: NEGATIVE
MITOCHONDRIA M2 IGG SER-ACNC: <20 UNITS (ref 0–20)

## 2021-10-19 ENCOUNTER — HOSPITAL ENCOUNTER (OUTPATIENT)
Dept: ULTRASOUND IMAGING | Facility: HOSPITAL | Age: 66
Discharge: HOME | End: 2021-10-19
Admitting: INTERNAL MEDICINE

## 2021-10-19 DIAGNOSIS — R79.89 ELEVATED LFTS: ICD-10-CM

## 2021-10-19 PROCEDURE — 76700 US EXAM ABDOM COMPLETE: CPT

## 2021-10-21 DIAGNOSIS — M47.812 SPONDYLOSIS OF CERVICAL REGION WITHOUT MYELOPATHY OR RADICULOPATHY: ICD-10-CM

## 2021-10-21 DIAGNOSIS — G89.4 CHRONIC PAIN SYNDROME: ICD-10-CM

## 2021-10-21 RX ORDER — GABAPENTIN 300 MG/1
CAPSULE ORAL
Qty: 90 CAPSULE | Refills: 4 | Status: SHIPPED | OUTPATIENT
Start: 2021-10-21 | End: 2022-02-01 | Stop reason: SDUPTHER

## 2021-10-21 NOTE — TELEPHONE ENCOUNTER
Rx Refill Note  Requested Prescriptions     Pending Prescriptions Disp Refills   • gabapentin (NEURONTIN) 300 MG capsule [Pharmacy Med Name: GABAPENTIN 300 MG CAPSULE] 90 capsule      Sig: TAKE ONE CAPSULE BY MOUTH THREE TIMES A DAY AS NEEDED FOR NERVE PAIN      Last office visit with prescribing clinician: 7/28/2021      Next office visit with prescribing clinician: 2/1/2022            JULIET JIMENEZ MA  10/21/21, 14:45 EDT     UDS: N/A  CSA: 1/9/2020

## 2021-11-01 RX ORDER — ISOSORBIDE MONONITRATE 30 MG/1
TABLET, EXTENDED RELEASE ORAL
Qty: 30 TABLET | Refills: 0 | OUTPATIENT
Start: 2021-11-01

## 2021-11-12 ENCOUNTER — TELEPHONE (OUTPATIENT)
Dept: GASTROENTEROLOGY | Facility: CLINIC | Age: 66
End: 2021-11-12

## 2021-11-12 NOTE — TELEPHONE ENCOUNTER
CALLED PATIENT TO SCHEDULE COLONOSCOPY. SPOKE TO PATIENT'S WIFE. HE HAS BEEN SCHEDULED FOR 01/04/2022 AT 10:30 AM

## 2021-11-17 PROBLEM — R93.5 ABNORMAL CT OF THE ABDOMEN: Status: ACTIVE | Noted: 2021-11-17

## 2021-11-17 PROBLEM — K62.7 RADIATION PROCTITIS: Status: ACTIVE | Noted: 2021-11-17

## 2021-11-17 PROBLEM — Z12.11 ENCOUNTER FOR SCREENING FOR MALIGNANT NEOPLASM OF COLON: Status: ACTIVE | Noted: 2021-11-17

## 2021-12-08 RX ORDER — CELECOXIB 100 MG/1
CAPSULE ORAL
Qty: 37 CAPSULE | Refills: 2 | Status: SHIPPED | OUTPATIENT
Start: 2021-12-08 | End: 2021-12-18

## 2021-12-08 NOTE — TELEPHONE ENCOUNTER
Rx Refill Note  Requested Prescriptions     Pending Prescriptions Disp Refills   • celecoxib (CeleBREX) 100 MG capsule [Pharmacy Med Name: CELECOXIB 100 MG CAPSULE] 37 capsule 2     Sig: TAKE ONE CAPSULE BY MOUTH DAILY WITH FOOD FOR ARTHRITIC PAIN      Last office visit with prescribing clinician: 7/28/2021      Next office visit with prescribing clinician: 2/1/2022            Elif Biggs MA  12/08/21, 13:10 EST

## 2021-12-18 DIAGNOSIS — K21.00 GASTROESOPHAGEAL REFLUX DISEASE WITH ESOPHAGITIS: ICD-10-CM

## 2021-12-18 RX ORDER — PANTOPRAZOLE SODIUM 40 MG/1
TABLET, DELAYED RELEASE ORAL
Qty: 90 TABLET | Refills: 0 | Status: SHIPPED | OUTPATIENT
Start: 2021-12-18 | End: 2022-03-18

## 2021-12-18 RX ORDER — CELECOXIB 100 MG/1
CAPSULE ORAL
Qty: 30 CAPSULE | Refills: 2 | Status: SHIPPED | OUTPATIENT
Start: 2021-12-18 | End: 2022-05-06

## 2021-12-21 DIAGNOSIS — G89.29 CHRONIC JOINT PAIN: ICD-10-CM

## 2021-12-21 DIAGNOSIS — M25.50 CHRONIC JOINT PAIN: ICD-10-CM

## 2021-12-21 RX ORDER — DULOXETIN HYDROCHLORIDE 60 MG/1
CAPSULE, DELAYED RELEASE ORAL
Qty: 90 CAPSULE | Refills: 1 | Status: SHIPPED | OUTPATIENT
Start: 2021-12-21 | End: 2022-06-16

## 2022-01-03 ENCOUNTER — LAB (OUTPATIENT)
Dept: LAB | Facility: HOSPITAL | Age: 67
End: 2022-01-03

## 2022-01-03 ENCOUNTER — TRANSCRIBE ORDERS (OUTPATIENT)
Dept: LAB | Facility: HOSPITAL | Age: 67
End: 2022-01-03

## 2022-01-03 ENCOUNTER — OFFICE VISIT (OUTPATIENT)
Dept: PULMONOLOGY | Facility: CLINIC | Age: 67
End: 2022-01-03

## 2022-01-03 VITALS
WEIGHT: 149 LBS | DIASTOLIC BLOOD PRESSURE: 68 MMHG | OXYGEN SATURATION: 97 % | RESPIRATION RATE: 18 BRPM | HEIGHT: 71 IN | BODY MASS INDEX: 20.86 KG/M2 | HEART RATE: 76 BPM | SYSTOLIC BLOOD PRESSURE: 120 MMHG

## 2022-01-03 DIAGNOSIS — J44.9 CHRONIC OBSTRUCTIVE PULMONARY DISEASE, UNSPECIFIED COPD TYPE: ICD-10-CM

## 2022-01-03 DIAGNOSIS — C61 PROSTATE CANCER: ICD-10-CM

## 2022-01-03 DIAGNOSIS — Z87.891 PERSONAL HISTORY OF TOBACCO USE, PRESENTING HAZARDS TO HEALTH: ICD-10-CM

## 2022-01-03 DIAGNOSIS — R06.02 SHORTNESS OF BREATH: Primary | ICD-10-CM

## 2022-01-03 DIAGNOSIS — R91.8 MULTIPLE NODULES OF LUNG: ICD-10-CM

## 2022-01-03 DIAGNOSIS — C61 PROSTATE CANCER: Primary | ICD-10-CM

## 2022-01-03 LAB — PSA SERPL-MCNC: <0.014 NG/ML (ref 0–4)

## 2022-01-03 PROCEDURE — 36415 COLL VENOUS BLD VENIPUNCTURE: CPT

## 2022-01-03 PROCEDURE — 84153 ASSAY OF PSA TOTAL: CPT

## 2022-01-03 PROCEDURE — 99214 OFFICE O/P EST MOD 30 MIN: CPT | Performed by: NURSE PRACTITIONER

## 2022-01-03 RX ORDER — TIOTROPIUM BROMIDE AND OLODATEROL 3.124; 2.736 UG/1; UG/1
2 SPRAY, METERED RESPIRATORY (INHALATION) DAILY
Qty: 4 G | Refills: 5 | Status: SHIPPED | OUTPATIENT
Start: 2022-01-03 | End: 2022-07-05 | Stop reason: SDUPTHER

## 2022-01-03 NOTE — PROGRESS NOTES
"Chief Complaint   Patient presents with   • Follow-up   • Shortness of Breath         Subjective   Elliott Dunn is a 66 y.o. male.     History of Present Illness   Patient comes today for follow up of shortness of breath and COPD.     Symptoms have been stable since the last clinic visit. Patient reports no recent exacerbations.     Patient is using Stiolto 2-3 days a week. He has not needed the rescue inhaler at all.    Exercise tolerance has also remained stable.     He is using oxygen at night. He feels this has helped him breath better in the morning upon awakening.    Quit smoking 3 years ago.    He states his appetite is good and he is actually gaining weight.    The following portions of the patient's history were reviewed and updated as appropriate: allergies, current medications, past family history, past medical history, past social history and past surgical history.      Review of Systems   Constitutional: Negative for chills and fever.   HENT: Negative for rhinorrhea, sinus pressure, sinus pain, sneezing and sore throat.    Respiratory: Negative for cough, shortness of breath and wheezing.    Psychiatric/Behavioral: Negative for sleep disturbance.       Objective   Visit Vitals  /68   Pulse 76   Resp 18   Ht 180.3 cm (71\")   Wt 67.6 kg (149 lb)   SpO2 97% Comment: on room air   BMI 20.78 kg/m²       Physical Exam  Vitals reviewed.   HENT:      Head: Atraumatic.      Mouth/Throat:      Mouth: Mucous membranes are moist.      Comments: Crowded oropharynx. Edentulous.  Eyes:      Extraocular Movements: Extraocular movements intact.   Cardiovascular:      Rate and Rhythm: Normal rate and regular rhythm.   Pulmonary:      Effort: Pulmonary effort is normal. No respiratory distress.      Comments: Somewhat decreased A/E without wheezing.   Musculoskeletal:      Cervical back: Neck supple.      Comments: Gait normal.   Skin:     General: Skin is warm.   Neurological:      Mental Status: He is alert and " oriented to person, place, and time.             Assessment/Plan   Diagnoses and all orders for this visit:    1. Shortness of breath (Primary)    2. Chronic obstructive pulmonary disease, unspecified COPD type (HCC)    3. Personal history of tobacco use, presenting hazards to health  -      CT Chest Low Dose Cancer Screening WO; Future    4. Multiple nodules of lung    Other orders  -     tiotropium bromide-olodaterol (Stiolto Respimat) 2.5-2.5 MCG/ACT aerosol solution inhaler; Inhale 2 puffs Daily.  Dispense: 4 g; Refill: 5           Return in about 6 months (around 7/3/2022) for Recheck, For Dr. Dickson, Imaging studies.    DISCUSSION (if any):  I reviewed his overnight pulse oximetry and discussed the results with him.  The overnight revealed an oxygen saturation of 88% or less for 11 minutes during the night.  2 L of oxygen was recommended for nighttime use and this was ordered.    No change to the current medications has been made. I have asked him to use Stiolto every day not just a few days a week. He verbalizes understanding.    He will definitely need to continue using oxygen at night.  He has noticed some benefit since he began using the oxygen.    Compliance with medications stressed.     Side effects of prescribed medications discussed with the patient.    His last chest x-ray showed no acute cardiopulmonary process.  This was completed September 2021.  There is diffuse interstitial scarring noted.  Scarring greater in the right upper lobe.  Emphysema noted.    His last low-dose CT scan was in November 2020.  That CT showed severe emphysematous changes.  Areas of nodularity favored to represent nodular scarring and appear unchanged.    His last CT of the chest was September 2021 and showed pneumonia.    He will need to continue annual low-dose CT screening.  Unfortunately his insurance will likely not cover an annual low-dose CT screening before September 2021 since that was his last CT.    Since his last  low-dose CT was November 2020, I will order a low-dose CT today.    He is UTD on flu, pneumonia, and covid vaccinations.     Dictated utilizing Dragon dictation.    This document was electronically signed by MARK Corral January 3, 2022  13:38 EST

## 2022-01-04 ENCOUNTER — ANESTHESIA EVENT (OUTPATIENT)
Dept: GASTROENTEROLOGY | Facility: HOSPITAL | Age: 67
End: 2022-01-04

## 2022-01-04 ENCOUNTER — HOSPITAL ENCOUNTER (OUTPATIENT)
Facility: HOSPITAL | Age: 67
Setting detail: HOSPITAL OUTPATIENT SURGERY
Discharge: HOME | End: 2022-01-04
Attending: INTERNAL MEDICINE | Admitting: INTERNAL MEDICINE

## 2022-01-04 ENCOUNTER — ANESTHESIA (OUTPATIENT)
Dept: GASTROENTEROLOGY | Facility: HOSPITAL | Age: 67
End: 2022-01-04

## 2022-01-04 VITALS
SYSTOLIC BLOOD PRESSURE: 141 MMHG | WEIGHT: 150 LBS | RESPIRATION RATE: 18 BRPM | BODY MASS INDEX: 21 KG/M2 | DIASTOLIC BLOOD PRESSURE: 84 MMHG | HEART RATE: 54 BPM | HEIGHT: 71 IN | TEMPERATURE: 98.9 F | OXYGEN SATURATION: 100 %

## 2022-01-04 DIAGNOSIS — Z12.11 ENCOUNTER FOR SCREENING FOR MALIGNANT NEOPLASM OF COLON: ICD-10-CM

## 2022-01-04 DIAGNOSIS — R93.5 ABNORMAL CT OF THE ABDOMEN: ICD-10-CM

## 2022-01-04 DIAGNOSIS — K62.7 RADIATION PROCTITIS: ICD-10-CM

## 2022-01-04 PROCEDURE — 88305 TISSUE EXAM BY PATHOLOGIST: CPT | Performed by: INTERNAL MEDICINE

## 2022-01-04 PROCEDURE — 25010000002 PROPOFOL 200 MG/20ML EMULSION: Performed by: NURSE ANESTHETIST, CERTIFIED REGISTERED

## 2022-01-04 PROCEDURE — 45385 COLONOSCOPY W/LESION REMOVAL: CPT | Performed by: INTERNAL MEDICINE

## 2022-01-04 RX ORDER — LIDOCAINE HYDROCHLORIDE 20 MG/ML
INJECTION, SOLUTION INTRAVENOUS AS NEEDED
Status: DISCONTINUED | OUTPATIENT
Start: 2022-01-04 | End: 2022-01-04 | Stop reason: SURG

## 2022-01-04 RX ORDER — PROPOFOL 10 MG/ML
INJECTION, EMULSION INTRAVENOUS AS NEEDED
Status: DISCONTINUED | OUTPATIENT
Start: 2022-01-04 | End: 2022-01-04 | Stop reason: SURG

## 2022-01-04 RX ORDER — SODIUM CHLORIDE 9 MG/ML
70 INJECTION, SOLUTION INTRAVENOUS CONTINUOUS PRN
Status: DISCONTINUED | OUTPATIENT
Start: 2022-01-04 | End: 2022-01-04 | Stop reason: HOSPADM

## 2022-01-04 RX ADMIN — PROPOFOL 50 MG: 10 INJECTION, EMULSION INTRAVENOUS at 12:56

## 2022-01-04 RX ADMIN — SODIUM CHLORIDE 70 ML/HR: 9 INJECTION, SOLUTION INTRAVENOUS at 11:03

## 2022-01-04 RX ADMIN — PROPOFOL 50 MG: 10 INJECTION, EMULSION INTRAVENOUS at 12:39

## 2022-01-04 RX ADMIN — PROPOFOL 50 MG: 10 INJECTION, EMULSION INTRAVENOUS at 13:05

## 2022-01-04 RX ADMIN — PROPOFOL 50 MG: 10 INJECTION, EMULSION INTRAVENOUS at 12:48

## 2022-01-04 RX ADMIN — LIDOCAINE HYDROCHLORIDE 60 MG: 20 INJECTION, SOLUTION INTRAVENOUS at 12:39

## 2022-01-04 NOTE — ANESTHESIA PREPROCEDURE EVALUATION
Anesthesia Evaluation     Patient summary reviewed and Nursing notes reviewed                Airway   Mallampati: I  TM distance: >3 FB  Neck ROM: full  Possible difficult intubation  Dental    (+) edentulous    Pulmonary - normal exam    breath sounds clear to auscultation  (+) a smoker (used tobacco today) Former Abstained day of surgery, COPD, shortness of breath,   Cardiovascular - normal exam    Rhythm: regular  Rate: normal    (+) hypertension well controlled, valvular problems/murmurs murmur, dysrhythmias Tachycardia,   (-) CAD      Neuro/Psych  (+) dizziness/light headedness, numbness,     GI/Hepatic/Renal/Endo    (+)  hiatal hernia, GERD well controlled,      Musculoskeletal     (+) back pain,   Abdominal    Substance History      OB/GYN          Other   arthritis,                        Anesthesia Plan    ASA 3     MAC     intravenous induction     Anesthetic plan, all risks, benefits, and alternatives have been provided, discussed and informed consent has been obtained with: patient.

## 2022-01-04 NOTE — DISCHARGE INSTRUCTIONS
Rest today    No pushing,pulling,tugging,heavy lifting, or strenuous activity   No major decision making,driving,or drinking alcoholic beverages for 24 hours due to the sedation you received  Always use good hand hygiene/washing technique  No driving on pain medication.    To assist you in voiding:  Drink plenty of fluids  Listen to running water while attempting to void.    If you are unable to urinate and you have an uncomfortable urge to void or it has been   6 hours since you were discharged, return to the Emergency Room.    - Discharge patient to home (ambulatory).   - High fiber diet.   - Continue present medications.   - Await pathology results.   - Repeat colonoscopy in 6 months because the bowel preparation was poor (Pt does not appears to have followed the prep instructions).   - Return to GI office in 8 weeks

## 2022-01-04 NOTE — H&P
[facility]  HISTORY AND PHYSICAL    Patient Name: Elliott Dunn  : 1955  MRN: [id number]    Chief Complaint:   For screening colonoscopy    History Of Presenting Illness:    Abnormal CTAP - colitis  Rectal angioectasia    Past Medical History:   Diagnosis Date   • Acquired absence of all teeth    • Allergic    • Anomalous coronary artery origin    • Arrhythmia    • Arthritis    • Arthritis of lumbar spine 2016   • Back pain 2016   • Cristina esophagus    • Cancer (HCC)     prostate   • Cataract     REMOVED BILATERALL   • Cervical spine disease 2016   • CHF (congestive heart failure) (HCC)    • Chronic obstructive pulmonary disease (HCC)    • Colon polyps    • Deafness in left ear    • Derangement of anterior horn of medial meniscus    • Difficulty swallowing    • Disorder of lumbar spine 2016   • Disorder of thoracic spine 2016   • Diverticulitis of colon    • DJD (degenerative joint disease)    • Elevated liver enzymes    • Erectile dysfunction    • Esophageal reflux    • Essential hypertension     PT DENIES SAYS IT RUNS LOW   • Glaucoma    • Hard of hearing     LEFT EAR NO AIDS WORN   • Heart murmur    • Hiatal hernia    • High cholesterol    • History of radiation therapy 2020    salvage pelvic XRT   • Impaired functional mobility, balance, gait, and endurance    • Inflammatory polyarthropathy (HCC)     Per Dr. Haider. Negative NEO, normal ESR, RF, anti-ccp and did not improve with prednisone per notes.   • Inguinal hernia    • Insomnia    • Lateral meniscus derangement    • Left otitis media with spontaneous rupture of eardrum    • Locking of left knee    • Low back pain    • Meniere's disease    • MGUS (monoclonal gammopathy of unknown significance)     likely diagnosis   • Murmur    • Neuromuscular disorder (HCC)    • Neuropathic arthritis    • No natural teeth    • Perforation of left tympanic membrane    • Prostate cancer (HCC)     previous CA in   with prostatectomy   • Pulmonary emphysema (HCC)    • Recent shoulder injury     LEFT SHOULDER   • Rotator cuff tear arthropathy of left shoulder 3/12/2020    Added automatically from request for surgery [id number]   • Scoliosis    • Skin cancer     skin cancer   • Skin cancer of face     squamous cell   • Spina bifida occulta 6/17/2016   • Tobacco abuse 11/9/2017   • Visual impairment    • Wears glasses    • Wears glasses     READING GLASSES ONLY       Past Surgical History:   Procedure Laterality Date   • COLONOSCOPY     • ENDOSCOPY N/A 4/10/2017    Procedure: ESOPHAGOGASTRODUODENOSCOPY WITH BIOPSY;  Surgeon: Alexander Ritchie MD;  Location: [facility] ENDOSCOPY;  Service:    • EYE SURGERY     • HERNIA REPAIR     • INGUINAL HERNIA REPAIR Right    • INGUINAL HERNIA REPAIR     • KNEE SURGERY Left    • LYMPH NODE BIOPSY     • MULTIPLE TOOTH EXTRACTIONS     • PROSTATE SURGERY  2004   • PROSTATECTOMY  06/30/2014   • PROSTATECTOMY      Prostatectomy Radical   • SHOULDER ARTHROSCOPY Left 2/6/2020    Procedure: DIAGNOSTIC SHOULDER ARTHROSCOPY LEFT WITH LABRAL DEBRIDEMENT, SUBACROMIAL BURSECTOMY, ASSESSMENT OF LARGE FRAGMENTED RETRACTED IRREPARABLE ROTATOR CUFF TEARS;  Surgeon: Rony Pandey MD;  Location: [facility] OR;  Service: Orthopedics;  Laterality: Left;   • SKIN CANCER EXCISION  2017    both sides of body/squamous cell melanoma   • TOTAL SHOULDER ARTHROPLASTY W/ DISTAL CLAVICLE EXCISION Left 7/9/2020    Procedure: Left reverse total shoulder arthroplasty;  Surgeon: Rony Pandey MD;  Location: [facility] OR;  Service: Orthopedics;  Laterality: Left;   • TYMPANOPLASTY W/ MASTOIDECTOMY     • UMBILICAL HERNIA REPAIR         Social History     Socioeconomic History   • Marital status:    Tobacco Use   • Smoking status: Former Smoker     Packs/day: 1.00     Years: 51.00     Pack years: 51.00     Types: Cigarettes     Start date: 1/1/1963     Quit date: 1/11/2018     Years since quitting: 3.9   • Smokeless tobacco:  Never Used   Substance and Sexual Activity   • Alcohol use: No   • Drug use: No   • Sexual activity: Yes     Partners: Female     Birth control/protection: None       Family History   Problem Relation Age of Onset   • Diabetes Mother    • Hypertension Mother    • Obesity Mother    • Stroke Mother 65   • Arthritis Mother    • Hyperlipidemia Mother    • Vision loss Mother    • Cancer Father    • Cancer Sister    • Diabetes Brother    • Cancer Brother    • Heart attack Brother    • Alcohol abuse Brother    • Drug abuse Brother    • Hearing loss Brother    • Learning disabilities Brother        Prior to Admission Medications:  Medications Prior to Admission   Medication Sig Dispense Refill Last Dose   • albuterol sulfate  (90 Base) MCG/ACT inhaler Inhale 2 puffs Every 4 (Four) Hours As Needed for Shortness of Air. 17 g 0 1/4/2022 at 0600   • amitriptyline (ELAVIL) 25 MG tablet Take 1-3 tablets by mouth every night at bedtime as needed for sleep. 63 tablet 2 1/3/2022 at 1800   • aspirin 81 MG EC tablet Take 81 mg by mouth Daily.   Past Month at Unknown time   • atorvastatin (LIPITOR) 10 MG tablet TAKE ONE TABLET BY MOUTH ONCE NIGHTLY 90 tablet 3 1/3/2022 at 1800   • bisoprolol (ZEBeta) 5 MG tablet Take 1 tablet by mouth Daily. 90 tablet 3 1/3/2022 at 1800   • celecoxib (CeleBREX) 100 MG capsule TAKE ONE CAPSULE BY MOUTH DAILY WITH FOOD FOR ARTHRITIC PAIN 30 capsule 2 1/3/2022 at 1800   • coenzyme Q10 100 MG capsule Take 100 mg by mouth Daily.   Past Month at Unknown time   • diphenoxylate-atropine (LOMOTIL) 2.5-0.025 MG per tablet    Past Month at Unknown time   • DULoxetine (CYMBALTA) 60 MG capsule TAKE ONE CAPSULE BY MOUTH DAILY 90 capsule 1 1/3/2022 at 1800   • folic acid (FOLVITE) 1 MG tablet Take 1 mg by mouth Daily.   1/3/2022 at 1800   • gabapentin (NEURONTIN) 300 MG capsule TAKE ONE CAPSULE BY MOUTH THREE TIMES A DAY AS NEEDED FOR NERVE PAIN 90 capsule 4 1/3/2022 at 1800   • isosorbide mononitrate (IMDUR)  30 MG 24 hr tablet TAKE ONE TABLET BY MOUTH DAILY 30 tablet 0 1/3/2022 at 1800   • leflunomide (ARAVA) 10 MG tablet    1/3/2022 at 1800   • methocarbamol (ROBAXIN) 750 MG tablet Take 1 tablet by mouth 3 (Three) Times a Day As Needed for Muscle Spasms. 270 tablet 3 1/3/2022 at 1800   • Multiple Vitamins-Minerals (MULTIVITAMIN WITH MINERALS) tablet tablet Take 1 tablet by mouth Daily.   1/3/2022 at 1800   • pantoprazole (PROTONIX) 40 MG EC tablet TAKE ONE TABLET BY MOUTH DAILY 90 tablet 0 1/3/2022 at 1800   • vitamin B-12 (CYANOCOBALAMIN) 1000 MCG tablet TAKE ONE TABLET BY MOUTH DAILY 30 tablet 8 1/3/2022 at 1800   • albuterol sulfate HFA (Ventolin HFA) 108 (90 Base) MCG/ACT inhaler Inhale 2 puffs Every 4 (Four) Hours As Needed for Wheezing or Shortness of Air. 18 g 2 More than a month at Unknown time   • fluticasone (FLONASE) 50 MCG/ACT nasal spray 2 sprays into the nostril(s) as directed by provider Daily. 9.9 mL 0 More than a month at Unknown time   • methotrexate 2.5 MG tablet Take 8 tablets by mouth 1 (One) Time Per Week. (Patient taking differently: Take 20 mg by mouth 1 (One) Time Per Week. TAKES ON THURSDAY) 96 tablet 0 12/30/2021   • nitroglycerin (NITROSTAT) 0.4 MG SL tablet 1 under the tongue as needed for angina, may repeat q5mins for up three doses 100 tablet 11 Unknown at Unknown time   • ondansetron ODT (ZOFRAN-ODT) 4 MG disintegrating tablet Place 1 tablet on the tongue Every 6 (Six) Hours As Needed for Nausea. 20 tablet 0 Unknown at Unknown time   • predniSONE (DELTASONE) 10 MG tablet Take 10 mg by mouth Daily As Needed. As needed   More than a month at Unknown time   • tiotropium bromide-olodaterol (Stiolto Respimat) 2.5-2.5 MCG/ACT aerosol solution inhaler Inhale 2 puffs Daily. 4 g 5 Unknown at Unknown time       Allergies:  Allergies   Allergen Reactions   • Cyclobenzaprine Unknown - Low Severity     Wide awake   • Plaquenil [Hydroxychloroquine Sulfate] Unknown - Low Severity     Black eyes   •  Pravastatin Myalgia     Weakness / fatigue        Vitals: Temp:  [97 °F (36.1 °C)] 97 °F (36.1 °C)  Heart Rate:  [71-76] 71  Resp:  [18] 18  BP: (120-152)/(68-86) 152/86    Review Of Systems:  Constitutional:  Negative for chills, fever, and unexpected weight change.  Respiratory:  Negative for cough, chest tightness, shortness of breath, and wheezing.  Cardiovascular:  Negative for chest pain, palpitations, and leg swelling.  Gastrointestinal:  Negative for abdominal distention, abdominal pain, Nausea, vomiting.  Neurological:  Negative for Weakness, numbness, and headaches.     Physical Exam:    General Appearance:  Alert, cooperative, in no acute distress.   Lungs:   Clear to auscultation, respirations regular, even and                 unlabored.   Heart:  Regular rhythm and normal rate.   Abdomen:   Normal bowel sounds, no masses, no organomegaly. Soft, non-tender, non-distended   Neurologic: Alert and oriented x 3. Moves all four limbs equally       Plan: COLONOSCOPY (N/A)     Luis Callahan MD  1/4/2022

## 2022-01-04 NOTE — ANESTHESIA POSTPROCEDURE EVALUATION
Patient: lEliott Dunn    Procedure Summary     Date: 01/04/22 Room / Location: [facility] 2 / [facility]    Anesthesia Start: 1239 Anesthesia Stop: 1309    Procedure: COLONOSCOPY with polypectomy x1 (N/A Anus) Diagnosis:       Encounter for screening for malignant neoplasm of colon      Radiation proctitis      Abnormal CT of the abdomen      (Encounter for screening for malignant neoplasm of colon [Z12.11])      (Radiation proctitis [K62.7])      (Abnormal CT of the abdomen [R93.5])    Surgeons: Luis Callahan MD Provider: Devaughn Carbajal CRNA    Anesthesia Type: MAC ASA Status: 3          Anesthesia Type: MAC    Vitals  Vitals Value Taken Time   /84 01/04/22 1343   Temp 98.9 °F (37.2 °C) 01/04/22 1313   Pulse 54 01/04/22 1343   Resp 18 01/04/22 1343   SpO2 100 % 01/04/22 1343           Post Anesthesia Care and Evaluation    Patient location during evaluation: bedside  Patient participation: complete - patient participated  Level of consciousness: awake  Pain score: 0  Pain management: adequate  Airway patency: patent  Anesthetic complications: No anesthetic complications  PONV Status: controlled  Cardiovascular status: acceptable and stable  Respiratory status: acceptable and room air  Hydration status: acceptable

## 2022-01-10 ENCOUNTER — PATIENT MESSAGE (OUTPATIENT)
Dept: INTERNAL MEDICINE | Facility: CLINIC | Age: 67
End: 2022-01-10

## 2022-01-11 LAB
LAB AP CASE REPORT: NORMAL
PATH REPORT.FINAL DX SPEC: NORMAL

## 2022-01-11 RX ORDER — ISOSORBIDE MONONITRATE 30 MG/1
30 TABLET, EXTENDED RELEASE ORAL DAILY
Qty: 90 TABLET | Refills: 0 | Status: SHIPPED | OUTPATIENT
Start: 2022-01-11 | End: 2022-04-19

## 2022-01-11 NOTE — TELEPHONE ENCOUNTER
From: Elliott Dunn  To: Ladonna Means MD  Sent: 1/10/2022 5:44 PM EST  Subject: Prescription Refill    Could you’ll please send a prescription to my pharmacy for Imdur 30mg. My insurance requires my PCP to write it now. My pharmacy is [id number] in [place], [place]. The phone number is [phone].   Thank you,   Elliott Dunn

## 2022-01-18 ENCOUNTER — HOSPITAL ENCOUNTER (OUTPATIENT)
Dept: CT IMAGING | Facility: HOSPITAL | Age: 67
Discharge: HOME | End: 2022-01-18
Admitting: NURSE PRACTITIONER

## 2022-01-18 ENCOUNTER — NURSE NAVIGATOR (OUTPATIENT)
Dept: ONCOLOGY | Facility: HOSPITAL | Age: 67
End: 2022-01-18

## 2022-01-18 DIAGNOSIS — Z87.891 PERSONAL HISTORY OF TOBACCO USE, PRESENTING HAZARDS TO HEALTH: ICD-10-CM

## 2022-01-18 DIAGNOSIS — IMO0001 LUNG NODULE < 6CM ON CT: Primary | ICD-10-CM

## 2022-01-18 PROCEDURE — 71271 CT THORAX LUNG CANCER SCR C-: CPT

## 2022-01-18 NOTE — PROGRESS NOTES
Please call the patient regarding his Ct result. There is a nodule which is concerning given its size and therefore I have ordered a PET scan to see if the nodule is possibly cancerous. If he has further questions that you are not able to answer, I will be happy to call him.

## 2022-01-18 NOTE — PROGRESS NOTES
Met with patient prior to LDCT to introduce nurse navigator. Educational material given. Pt. Verbalizes understanding.

## 2022-01-21 ENCOUNTER — OFFICE VISIT (OUTPATIENT)
Dept: ONCOLOGY | Facility: CLINIC | Age: 67
End: 2022-01-21

## 2022-01-21 ENCOUNTER — LAB (OUTPATIENT)
Dept: LAB | Facility: HOSPITAL | Age: 67
End: 2022-01-21

## 2022-01-21 VITALS
HEART RATE: 75 BPM | BODY MASS INDEX: 21.28 KG/M2 | WEIGHT: 152 LBS | TEMPERATURE: 97.3 F | RESPIRATION RATE: 16 BRPM | OXYGEN SATURATION: 98 % | HEIGHT: 71 IN | DIASTOLIC BLOOD PRESSURE: 67 MMHG | SYSTOLIC BLOOD PRESSURE: 132 MMHG

## 2022-01-21 DIAGNOSIS — D47.2 MGUS (MONOCLONAL GAMMOPATHY OF UNKNOWN SIGNIFICANCE): ICD-10-CM

## 2022-01-21 DIAGNOSIS — D47.2 IGG MONOCLONAL PROTEIN DISORDER: Primary | ICD-10-CM

## 2022-01-21 DIAGNOSIS — D47.2 IGG MONOCLONAL PROTEIN DISORDER: ICD-10-CM

## 2022-01-21 LAB
ALBUMIN SERPL-MCNC: 4 G/DL (ref 3.5–5.2)
ALBUMIN/GLOB SERPL: 1 G/DL
ALP SERPL-CCNC: 162 U/L (ref 39–117)
ALT SERPL W P-5'-P-CCNC: 36 U/L (ref 1–41)
ANION GAP SERPL CALCULATED.3IONS-SCNC: 7.6 MMOL/L (ref 5–15)
AST SERPL-CCNC: 42 U/L (ref 1–40)
BASOPHILS # BLD AUTO: 0.04 10*3/MM3 (ref 0–0.2)
BASOPHILS NFR BLD AUTO: 0.7 % (ref 0–1.5)
BILIRUB SERPL-MCNC: 0.3 MG/DL (ref 0–1.2)
BUN SERPL-MCNC: 21 MG/DL (ref 8–23)
BUN/CREAT SERPL: 25 (ref 7–25)
CALCIUM SPEC-SCNC: 9.4 MG/DL (ref 8.6–10.5)
CHLORIDE SERPL-SCNC: 105 MMOL/L (ref 98–107)
CO2 SERPL-SCNC: 26.4 MMOL/L (ref 22–29)
CREAT SERPL-MCNC: 0.84 MG/DL (ref 0.76–1.27)
DEPRECATED RDW RBC AUTO: 46.5 FL (ref 37–54)
EOSINOPHIL # BLD AUTO: 0.16 10*3/MM3 (ref 0–0.4)
EOSINOPHIL NFR BLD AUTO: 2.7 % (ref 0.3–6.2)
ERYTHROCYTE [DISTWIDTH] IN BLOOD BY AUTOMATED COUNT: 13.2 % (ref 12.3–15.4)
GFR SERPL CREATININE-BSD FRML MDRD: 91 ML/MIN/1.73
GLOBULIN UR ELPH-MCNC: 3.9 GM/DL
GLUCOSE SERPL-MCNC: 90 MG/DL (ref 65–99)
HCT VFR BLD AUTO: 42 % (ref 37.5–51)
HGB BLD-MCNC: 14 G/DL (ref 13–17.7)
IMM GRANULOCYTES # BLD AUTO: 0.01 10*3/MM3 (ref 0–0.05)
IMM GRANULOCYTES NFR BLD AUTO: 0.2 % (ref 0–0.5)
LYMPHOCYTES # BLD AUTO: 1.41 10*3/MM3 (ref 0.7–3.1)
LYMPHOCYTES NFR BLD AUTO: 23.4 % (ref 19.6–45.3)
MCH RBC QN AUTO: 31.9 PG (ref 26.6–33)
MCHC RBC AUTO-ENTMCNC: 33.3 G/DL (ref 31.5–35.7)
MCV RBC AUTO: 95.7 FL (ref 79–97)
MONOCYTES # BLD AUTO: 1.1 10*3/MM3 (ref 0.1–0.9)
MONOCYTES NFR BLD AUTO: 18.2 % (ref 5–12)
NEUTROPHILS NFR BLD AUTO: 3.31 10*3/MM3 (ref 1.7–7)
NEUTROPHILS NFR BLD AUTO: 54.8 % (ref 42.7–76)
NRBC BLD AUTO-RTO: 0 /100 WBC (ref 0–0.2)
PLATELET # BLD AUTO: 174 10*3/MM3 (ref 140–450)
PMV BLD AUTO: 8.9 FL (ref 6–12)
POTASSIUM SERPL-SCNC: 5.4 MMOL/L (ref 3.5–5.2)
PROT SERPL-MCNC: 7.9 G/DL (ref 6–8.5)
RBC # BLD AUTO: 4.39 10*6/MM3 (ref 4.14–5.8)
SODIUM SERPL-SCNC: 139 MMOL/L (ref 136–145)
WBC NRBC COR # BLD: 6.03 10*3/MM3 (ref 3.4–10.8)

## 2022-01-21 PROCEDURE — 82784 ASSAY IGA/IGD/IGG/IGM EACH: CPT

## 2022-01-21 PROCEDURE — 84165 PROTEIN E-PHORESIS SERUM: CPT

## 2022-01-21 PROCEDURE — 83521 IG LIGHT CHAINS FREE EACH: CPT

## 2022-01-21 PROCEDURE — 36415 COLL VENOUS BLD VENIPUNCTURE: CPT

## 2022-01-21 PROCEDURE — 80053 COMPREHEN METABOLIC PANEL: CPT

## 2022-01-21 PROCEDURE — 85025 COMPLETE CBC W/AUTO DIFF WBC: CPT

## 2022-01-21 PROCEDURE — 86334 IMMUNOFIX E-PHORESIS SERUM: CPT

## 2022-01-21 PROCEDURE — 99214 OFFICE O/P EST MOD 30 MIN: CPT | Performed by: NURSE PRACTITIONER

## 2022-01-21 NOTE — PROGRESS NOTES
PROBLEM LIST:  Oncology/Hematology History Overview Note   1.  MGUS: Had been having mid back pains and was seen by neurology, was found to have 1200 mg of immunoglobulin G monoclonal protein in the serum with a normal IgA and IgM level this was in July 2016.  Workup August 2016 included a bone survey that was negative other than for degenerative joint disease with left eighth rib healed fracture that was seen on prior bone scan.  Normal creatinine, ionized calcium of 1.34, normal total protein to albumin ratio.  Normal sedimentation rate and C-reactive protein, serum monoclonal protein present at 1100 mg/dL of immunoglobulin G kappa with normal IgA and M levels.  Urine monoclonal protein was too scant to quantify.  Kappa to lambda ratio of 4.58 with elevation of At 42.85.  CBC was unremarkable with normal white count and platelet count and hemoglobin of 17 baseline.  Baseline PET/CT 9/1/2016 was negative.   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  This genetic alteration is found in approximately 15-18 percent of patients with plasma cell myeloma by FISH analysis and is associated with a favorable prognosis in the absence of poor prognostic markers.   b.)  3/6/2017 follow-up PET/CT was negative.    2.  Prostate cancer: Status post prostatectomy 2014.  He had chemical recurrence in 2020.  He just completed involved field radiotherapy to the pelvis in November 2020 with Dr. Billy Wilson.  He will be followed by Dr. Varela and Dr. Wilson.  If he has progressive disease then I would recommend initiating Xtandi along with his androgen deprivation therapy.    3.  Squamous cell cancer of the skin, followed by Dr. Izaguirre.           MGUS (monoclonal gammopathy of unknown significance)   7/1/2016 Initial Diagnosis    MGUS (monoclonal gammopathy of unknown significance)     8/21/2017 -  Other Event     Myeloma panel: Urine immunoelectrophoresis monoclonal protein too small to quantify, serum immunoelectrophoresis M-spike stable at 1.3g/dl, ionized calcium 5.2, kappa to lambda ratio 4.10, beta-2 microglobulin 2.2, C-reactive protein less than 0.50, creatinine 1.3, sedimentation rate to.  CBC WBC 9600, hemoglobin 15.6, hematocrit 47.1%, platelet count 209,000.       3/12/2018 -  Other Event    Myeloma panel: Urine immunoelectrophoresis with 122 mg/24 hour protein, monoclonal kappa free light chains 21.2%, monoclonal IgG kappa 7.5%.  Sedimentation rate 5, immunoglobulin free light chains free kappa light chains 49.8, normal lambda light chains 12.2, kappa/lambda ratio 4.08.  Serum immunoelectrophoresis M spike 1.7g/dL, C-reactive protein 1.60, CMP unremarkable with creatinine 1.10, serum calcium 9.2, ionized calcium 5.1, beta-2 microglobulin 2.5, CBC normal with a WBC of 9.07, hemoglobin 15.9, hematocrit 47.5%, platelets 226,000.     Malignant neoplasm of prostate (HCC)   5/16/2014 Cancer Staged    Staging form: Prostate, AJCC V7  - Clinical stage from 5/16/2014: Stage IIA (T1c, N0, M0, PSA: 10 to 19, [place] 7) - Signed by Luis Wilson MD on 9/16/2020 6/30/2014 Cancer Staged    Staging form: Prostate, AJCC V7  - Pathologic stage from 6/30/2014: Stage III (T3b, N0, cM0, PSA: 10 to 19, [place] 7) - Signed by Luis Wilson MD on 9/16/2020 9/20/2016 Initial Diagnosis    Malignant neoplasm of prostate (CMS/HCC)     10/7/2020 - 11/24/2020 Radiation    Radiation OncologyTreatment Course:  Elliott Dunn received 7000 cGy in 35 fractions to prostate bed via External Beam Radiation - EBRT.         REASON FOR VISIT: Follow-up of his MGUS    HISTORY OF PRESENT ILLNESS:   66 y.o.  male presents today for follow-up of his monoclonal gammopathy.  Since I last saw him he was diagnosed with a chemical recurrence of his prostate cancer.  He completed radiotherapy to the pelvis.  He is also receiving androgen  deprivation therapy. Also, on his last screening lung CT it showed an enlarging lung nodule. He is scheduled for PET and follow up with pulmonary. He is very anxious about his results and upcoming PET.     Past medical history, social history and family history was reviewed and unchanged from prior visit.    Review of Systems:    Review of Systems - Oncology   A comprehensive 14 point review of systems was performed and was negative except as mentioned.      Medications:        Current Outpatient Medications:   •  albuterol sulfate HFA (Ventolin HFA) 108 (90 Base) MCG/ACT inhaler, Inhale 2 puffs Every 4 (Four) Hours As Needed for Wheezing or Shortness of Air., Disp: 18 g, Rfl: 2  •  amitriptyline (ELAVIL) 25 MG tablet, Take 1-3 tablets by mouth every night at bedtime as needed for sleep., Disp: 63 tablet, Rfl: 2  •  aspirin 81 MG EC tablet, Take 81 mg by mouth Daily., Disp: , Rfl:   •  atorvastatin (LIPITOR) 10 MG tablet, TAKE ONE TABLET BY MOUTH ONCE NIGHTLY, Disp: 90 tablet, Rfl: 3  •  bisoprolol (ZEBeta) 5 MG tablet, Take 1 tablet by mouth Daily., Disp: 90 tablet, Rfl: 3  •  celecoxib (CeleBREX) 100 MG capsule, TAKE ONE CAPSULE BY MOUTH DAILY WITH FOOD FOR ARTHRITIC PAIN, Disp: 30 capsule, Rfl: 2  •  coenzyme Q10 100 MG capsule, Take 100 mg by mouth Daily., Disp: , Rfl:   •  diphenoxylate-atropine (LOMOTIL) 2.5-0.025 MG per tablet, , Disp: , Rfl:   •  DULoxetine (CYMBALTA) 60 MG capsule, TAKE ONE CAPSULE BY MOUTH DAILY, Disp: 90 capsule, Rfl: 1  •  fluticasone (FLONASE) 50 MCG/ACT nasal spray, 2 sprays into the nostril(s) as directed by provider Daily., Disp: 9.9 mL, Rfl: 0  •  folic acid (FOLVITE) 1 MG tablet, Take 1 mg by mouth Daily., Disp: , Rfl:   •  gabapentin (NEURONTIN) 300 MG capsule, TAKE ONE CAPSULE BY MOUTH THREE TIMES A DAY AS NEEDED FOR NERVE PAIN, Disp: 90 capsule, Rfl: 4  •  isosorbide mononitrate (IMDUR) 30 MG 24 hr tablet, Take 1 tablet by mouth Daily., Disp: 90 tablet, Rfl: 0  •  leflunomide  "(ARAVA) 10 MG tablet, , Disp: , Rfl:   •  methocarbamol (ROBAXIN) 750 MG tablet, Take 1 tablet by mouth 3 (Three) Times a Day As Needed for Muscle Spasms., Disp: 270 tablet, Rfl: 3  •  methotrexate 2.5 MG tablet, Take 8 tablets by mouth 1 (One) Time Per Week. (Patient taking differently: Take 20 mg by mouth 1 (One) Time Per Week. TAKES ON THURSDAY), Disp: 96 tablet, Rfl: 0  •  Multiple Vitamins-Minerals (MULTIVITAMIN WITH MINERALS) tablet tablet, Take 1 tablet by mouth Daily., Disp: , Rfl:   •  nitroglycerin (NITROSTAT) 0.4 MG SL tablet, 1 under the tongue as needed for angina, may repeat q5mins for up three doses, Disp: 100 tablet, Rfl: 11  •  ondansetron ODT (ZOFRAN-ODT) 4 MG disintegrating tablet, Place 1 tablet on the tongue Every 6 (Six) Hours As Needed for Nausea., Disp: 20 tablet, Rfl: 0  •  pantoprazole (PROTONIX) 40 MG EC tablet, TAKE ONE TABLET BY MOUTH DAILY, Disp: 90 tablet, Rfl: 0  •  predniSONE (DELTASONE) 10 MG tablet, Take 10 mg by mouth Daily As Needed. As needed, Disp: , Rfl:   •  tiotropium bromide-olodaterol (Stiolto Respimat) 2.5-2.5 MCG/ACT aerosol solution inhaler, Inhale 2 puffs Daily., Disp: 4 g, Rfl: 5  •  vitamin B-12 (CYANOCOBALAMIN) 1000 MCG tablet, TAKE ONE TABLET BY MOUTH DAILY, Disp: 30 tablet, Rfl: 8      ALLERGIES:    Allergies   Allergen Reactions   • Cyclobenzaprine Unknown - Low Severity     Wide awake   • Plaquenil [Hydroxychloroquine Sulfate] Unknown - Low Severity     Black eyes   • Pravastatin Myalgia     Weakness / fatigue         Physical Exam    VITAL SIGNS:  /67   Pulse 75   Temp 97.3 °F (36.3 °C) (Temporal)   Resp 16   Ht 180.3 cm (71\")   Wt 68.9 kg (152 lb)   SpO2 98%   BMI 21.20 kg/m²     Wt Readings from Last 3 Encounters:   01/21/22 68.9 kg (152 lb)   12/28/21 68 kg (150 lb)   01/03/22 67.6 kg (149 lb)       Body mass index is 21.2 kg/m². Body surface area is 1.88 meters squared.         Performance Status: 1    General: thin appearing, in no acute " distress  HEENT: sclera anicteric, oropharynx clear, neck is supple  Lymphatics: no cervical, supraclavicular, or axillary adenopathy  Cardiovascular: regular rate and rhythm, no murmurs, rubs or gallops  Lungs: clear to auscultation bilaterally  Abdomen: soft, nontender, nondistended.  No palpable organomegaly  Extremities: no lower extremity edema  Skin: no rashes, lesions, bruising, or petechiae  Msk:  Shows no weakness of the large muscle groups  Psych: Mood is stable        RECENT LABS:    Lab Results   Component Value Date    HGB 15.7 09/03/2021    HCT 45.3 09/03/2021    MCV 90.4 09/03/2021     09/03/2021    WBC 6.59 09/03/2021    NEUTROABS 4.59 09/03/2021    LYMPHSABS 0.78 09/03/2021    MONOSABS 0.99 (H) 09/03/2021    EOSABS 0.01 09/03/2021    BASOSABS 0.03 09/03/2021       Lab Results   Component Value Date    GLUCOSE 81 09/03/2021    BUN 24 (H) 09/03/2021    CREATININE 0.75 (L) 09/03/2021     (L) 09/03/2021    K 4.2 09/03/2021    CL 93 (L) 09/03/2021    CO2 23.9 09/03/2021    CALCIUM 8.6 09/03/2021    PROTEINTOT 7.2 09/03/2021    ALBUMIN 3.40 (L) 09/03/2021    BILITOT 0.9 09/03/2021    ALKPHOS 203 (H) 09/03/2021     (H) 09/03/2021     (H) 09/03/2021       Lab Results   Component Value Date    MSPIKE 1.5 (H) 06/03/2021    MSPIKE 1.5 (H) 11/09/2020    MSPIKE 1.7 (H) 09/11/2019     Lab Results   Component Value Date    FREEKAPPAL 88.4 (H) 06/03/2021    FREEKAPPAL 83.2 (H) 11/09/2020    FREEKAPPAL 64.2 (H) 09/11/2019     Lab Results   Component Value Date    IGLFLC 7.0 06/03/2021    IGLFLC 6.8 11/09/2020    [facility] 09/11/[facility]         Xr Chest 2 View    Result Date: 8/17/2019  No radiographic evidence of acute cardiac or pulmonary disease. Stable chronic increased interstitial markings.      This report was finalized on 8/17/2019 8:12 AM by Obdulia Feliciano MD.          Assessment/Plan    1.  Monoclonal gammopathy of uncertain significance with the M spike of 1.5 g/dL.  His numbers  have been relatively stable for a number of years. No recent labs. We will check labs today and I will call him with results.  For his MGUS  we can monitor him once a year.  No significant other testing is necessary at this time.    2.  Severe COPD with a right upper lobe lesion.  Followed by Dr. Dickson. Recent CT scan was concerning for enlarging nodule. He has scheduled PET and follow up with pulmonary. We would be happy to assist if we are needed.    3.  Chemical recurrence of his prostate cancer in  summer 2020.  Patient currently on androgen deprivation therapy with Dr. Varela and also completed pelvic radiation with Dr. Wilson in November 2020.  I recommend addition of Xtandi if he does have progressive disease in the future.     4. Abnormal lung CT. Ct showed spiculated nodule measuring 2.5 cm in the area of scarring in the right upper lobe appears more pronounced than on the prior exam.  He will keep scheduled PET and appointment with pulmonology for results. We would be happy to assist if we are needed.         MARK Zhang  [facility] Hematology and Oncology    Return in (Approximately): 1 year    Orders Placed This Encounter   Procedures   • Comprehensive Metabolic Panel   • MARKO, PE & Free LT Chains, Ser   • MARKO, PE & Free LT Chains, Ser   • Comprehensive Metabolic Panel   • CBC & Differential   • CBC & Differential       1/21/2022

## 2022-01-24 LAB
ALBUMIN SERPL ELPH-MCNC: 3.6 G/DL (ref 2.9–4.4)
ALBUMIN/GLOB SERPL: 1 {RATIO} (ref 0.7–1.7)
ALPHA1 GLOB SERPL ELPH-MCNC: 0.2 G/DL (ref 0–0.4)
ALPHA2 GLOB SERPL ELPH-MCNC: 0.9 G/DL (ref 0.4–1)
B-GLOBULIN SERPL ELPH-MCNC: 0.9 G/DL (ref 0.7–1.3)
GAMMA GLOB SERPL ELPH-MCNC: 2 G/DL (ref 0.4–1.8)
GLOBULIN SER-MCNC: 4 G/DL (ref 2.2–3.9)
IGA SERPL-MCNC: 62 MG/DL (ref 61–437)
IGG SERPL-MCNC: 2297 MG/DL (ref 603–1613)
IGM SERPL-MCNC: 19 MG/DL (ref 20–172)
INTERPRETATION SERPL IEP-IMP: ABNORMAL
KAPPA LC FREE SER-MCNC: 155.9 MG/L (ref 3.3–19.4)
KAPPA LC FREE/LAMBDA FREE SER: 19.99 {RATIO} (ref 0.26–1.65)
LABORATORY COMMENT REPORT: ABNORMAL
LAMBDA LC FREE SERPL-MCNC: 7.8 MG/L (ref 5.7–26.3)
M PROTEIN SERPL ELPH-MCNC: 1.8 G/DL
PROT SERPL-MCNC: 7.6 G/DL (ref 6–8.5)

## 2022-02-01 ENCOUNTER — OFFICE VISIT (OUTPATIENT)
Dept: INTERNAL MEDICINE | Facility: CLINIC | Age: 67
End: 2022-02-01

## 2022-02-01 VITALS
RESPIRATION RATE: 16 BRPM | DIASTOLIC BLOOD PRESSURE: 72 MMHG | WEIGHT: 154 LBS | TEMPERATURE: 97.3 F | HEART RATE: 60 BPM | BODY MASS INDEX: 21.56 KG/M2 | HEIGHT: 71 IN | OXYGEN SATURATION: 95 % | SYSTOLIC BLOOD PRESSURE: 120 MMHG

## 2022-02-01 DIAGNOSIS — G89.4 CHRONIC PAIN SYNDROME: ICD-10-CM

## 2022-02-01 DIAGNOSIS — M47.812 SPONDYLOSIS OF CERVICAL REGION WITHOUT MYELOPATHY OR RADICULOPATHY: ICD-10-CM

## 2022-02-01 DIAGNOSIS — R91.1 SOLITARY PULMONARY NODULE ON LUNG CT: Primary | ICD-10-CM

## 2022-02-01 DIAGNOSIS — Z23 NEED FOR INFLUENZA VACCINATION: ICD-10-CM

## 2022-02-01 PROCEDURE — G0008 ADMIN INFLUENZA VIRUS VAC: HCPCS | Performed by: FAMILY MEDICINE

## 2022-02-01 PROCEDURE — 90662 IIV NO PRSV INCREASED AG IM: CPT | Performed by: FAMILY MEDICINE

## 2022-02-01 PROCEDURE — 99213 OFFICE O/P EST LOW 20 MIN: CPT | Performed by: FAMILY MEDICINE

## 2022-02-01 RX ORDER — GABAPENTIN 300 MG/1
300 CAPSULE ORAL 3 TIMES DAILY
Qty: 90 CAPSULE | Refills: 4 | Status: SHIPPED | OUTPATIENT
Start: 2022-02-01 | End: 2022-08-04 | Stop reason: SDUPTHER

## 2022-02-01 NOTE — PROGRESS NOTES
"Subjective    Elliott Dunn is a 66 y.o. male here for:  Chief Complaint   Patient presents with   • Med Refill     6 month follow up on all medications        History per MA reviewed.    Lung nodule, has pet scan tomorrow  S/p radiation for prostate cancer  No changes with neurology, rheumatology  No new issues with hematology         The following portions of the patient's history were reviewed and updated as appropriate: allergies, current medications, past family history, past medical history, past social history, past surgical history and problem list.    Review of Systems   Constitutional: Negative for fever.   Musculoskeletal: Positive for arthralgias.         Objective   Visit Vitals  /72 (BP Location: Right arm, Patient Position: Sitting, Cuff Size: Adult)   Pulse 60   Temp 97.3 °F (36.3 °C) (Temporal)   Resp 16   Ht 180.3 cm (71\")   Wt 69.9 kg (154 lb)   SpO2 95%   BMI 21.48 kg/m²       Physical Exam  Vitals and nursing note reviewed.   Constitutional:       General: He is not in acute distress.     Appearance: Normal appearance. He is well-developed and well-groomed. He is not ill-appearing, toxic-appearing or diaphoretic.      Interventions: Face mask in place.   HENT:      Head: Normocephalic and atraumatic.      Right Ear: Hearing normal.      Left Ear: Hearing normal.   Eyes:      General: Lids are normal. No scleral icterus.     Extraocular Movements: Extraocular movements intact.   Cardiovascular:      Rate and Rhythm: Normal rate and regular rhythm.   Pulmonary:      Effort: Pulmonary effort is normal.      Breath sounds: Normal breath sounds.   Musculoskeletal:      Cervical back: Neck supple.   Skin:     Coloration: Skin is not jaundiced or pale.   Neurological:      General: No focal deficit present.      Mental Status: He is alert and oriented to person, place, and time.   Psychiatric:         Attention and Perception: Attention and perception normal.         Mood and Affect: Mood and " affect normal.         Speech: Speech normal.         Behavior: Behavior normal. Behavior is cooperative.         Thought Content: Thought content normal.         Cognition and Memory: Cognition and memory normal.         Judgment: Judgment normal.         For medical decision making review of the following was required:  Lab Results   Component Value Date    WBC 6.03 01/21/2022    HGB 14.0 01/21/2022    HCT 42.0 01/21/2022    MCV 95.7 01/21/2022     01/21/2022     Lab Results   Component Value Date    GLUCOSE 90 01/21/2022    BUN 21 01/21/2022    CREATININE 0.84 01/21/2022    EGFRIFNONA 91 01/21/2022    EGFRIFAFRI 99 07/28/2021    BCR 25.0 01/21/2022    K 5.4 (H) 01/21/2022    CO2 26.4 01/21/2022    CALCIUM 9.4 01/21/2022    PROTENTOTREF 7.6 01/21/2022    ALBUMIN 4.00 01/21/2022    ALBUMIN 3.6 01/21/2022    LABIL2 1.0 01/21/2022    AST 42 (H) 01/21/2022    ALT 36 01/21/2022     CT Chest Low Dose Cancer Screening WO (01/18/2022 12:34)      Assessment/Plan     Problem List Items Addressed This Visit        Neuro    Chronic pain syndrome (Chronic)    Relevant Medications    gabapentin (NEURONTIN) 300 MG capsule    Spondylosis of cervical region without myelopathy or radiculopathy    Relevant Medications    gabapentin (NEURONTIN) 300 MG capsule      Other Visit Diagnoses     Solitary pulmonary nodule on lung CT    -  Primary    Need for influenza vaccination        Relevant Orders    Fluzone High-Dose 65+yrs (3420-1220) (Completed)          · Reviewed lung imaging with patient and wife. Nodule is spiculated, this along with past heavy tobacco use is a bit worrisome. Has PET tomorrow. Follow up with all specialists (prostate provider will monitor PSA).     Return in about 6 months (around 8/1/2022) for Medicare Wellness (366 days from last AWV).     Ladonna Means MD

## 2022-02-02 ENCOUNTER — HOSPITAL ENCOUNTER (OUTPATIENT)
Dept: PET IMAGING | Facility: HOSPITAL | Age: 67
Discharge: HOME | End: 2022-02-02

## 2022-02-02 DIAGNOSIS — IMO0001 LUNG NODULE < 6CM ON CT: ICD-10-CM

## 2022-02-02 LAB — GLUCOSE BLDC GLUCOMTR-MCNC: 94 MG/DL (ref 70–130)

## 2022-02-02 PROCEDURE — 78815 PET IMAGE W/CT SKULL-THIGH: CPT

## 2022-02-02 PROCEDURE — 0 FLUDEOXYGLUCOSE F18 SOLUTION: Performed by: NURSE PRACTITIONER

## 2022-02-02 PROCEDURE — A9552 F18 FDG: HCPCS | Performed by: NURSE PRACTITIONER

## 2022-02-02 PROCEDURE — 82962 GLUCOSE BLOOD TEST: CPT

## 2022-02-02 RX ADMIN — FLUDEOXYGLUCOSE F18 1 DOSE: 300 INJECTION INTRAVENOUS at 09:11

## 2022-02-08 DIAGNOSIS — R93.89 ABNORMAL CT OF THE CHEST: Primary | ICD-10-CM

## 2022-02-08 NOTE — PROGRESS NOTES
Please call the patient regarding his CT result.  The PET scan is not positive for cancer. We will plan to repeat a regular CT in 3 to 4 months.

## 2022-03-02 ENCOUNTER — OFFICE VISIT (OUTPATIENT)
Dept: GASTROENTEROLOGY | Facility: CLINIC | Age: 67
End: 2022-03-02

## 2022-03-02 VITALS
DIASTOLIC BLOOD PRESSURE: 68 MMHG | BODY MASS INDEX: 21.98 KG/M2 | TEMPERATURE: 98 F | HEART RATE: 70 BPM | WEIGHT: 157 LBS | RESPIRATION RATE: 20 BRPM | HEIGHT: 71 IN | SYSTOLIC BLOOD PRESSURE: 143 MMHG

## 2022-03-02 DIAGNOSIS — K57.90 DIVERTICULOSIS: ICD-10-CM

## 2022-03-02 DIAGNOSIS — K62.7 RADIATION PROCTITIS: ICD-10-CM

## 2022-03-02 DIAGNOSIS — K21.9 GASTROESOPHAGEAL REFLUX DISEASE WITHOUT ESOPHAGITIS: ICD-10-CM

## 2022-03-02 DIAGNOSIS — R93.5 ABNORMAL CT OF THE ABDOMEN: ICD-10-CM

## 2022-03-02 DIAGNOSIS — R79.89 ELEVATED LFTS: ICD-10-CM

## 2022-03-02 DIAGNOSIS — Z87.19 HISTORY OF BARRETT'S ESOPHAGUS: ICD-10-CM

## 2022-03-02 DIAGNOSIS — Z12.11 COLON CANCER SCREENING: Primary | ICD-10-CM

## 2022-03-02 PROCEDURE — 99214 OFFICE O/P EST MOD 30 MIN: CPT | Performed by: PHYSICIAN ASSISTANT

## 2022-03-02 NOTE — PATIENT INSTRUCTIONS
Colonoscopy again in July- follow bowel preparation instructions  Will complete EGD at that time  High fiber diet    Fiber Foods  It is recommended that you consume 25-45 grams daily.    Fresh & Dried Fruit  Serving Size Fiber (g)    Apples with skin  1 medium 5.0    Apricot  3 medium 1.0    Apricots, dried  4 pieces 2.9    Banana  1 medium 3.9    Blueberries  1 cup 4.2    Cantaloupe, cubes  1 cup 1.3    Figs, dried  2 medium 3.7    Grapefruit  1/2 medium 3.1    Orange, navel  1 medium 3.4    Peach  1 medium 2.0    Peaches, dried  3 pieces 3.2    Pear  1 medium 5.1    Plum  1 medium 1.1    Raisins  1.5 oz box 1.6    Raspberries  1 cup 6.4    Strawberries  1 cup 4.4      Grains, Beans, Nuts & Seeds  Serving Size Fiber (g)    Almonds  1 oz 4.2    Black beans, cooked  1 cup 13.9    Bran cereal  1 cup 19.9    Bread, whole wheat  1 slice 2.0    Brown rice, dry  1 cup 7.9    Cashews  1 oz 1.0    Flax seeds  3 Tbsp. 6.9    Garbanzo beans, cooked  1 cup 5.8    Kidney beans, cooked  1 cup 11.6    Lentils, red cooked  1 cup 13.6    Lima beans, cooked  1 cup 8.6    Oats, rolled dry  1 cup 12.0    Quinoa (seeds) dry  1/4 cup 6.2    Quinoa, cooked  1 cup 8.4    Pasta, whole wheat  1 cup 6.3    Peanuts  1 oz 2.3    Pistachio nuts  1 oz 3.1    Pumpkin seeds  1/4 cup 4.1    Soybeans, cooked  1 cup 8.6    Sunflower seeds  1/4 cup 3.0    Walnuts  1 oz 3.1            Vegetables  Serving Size Fiber (g)    Avocado (fruit)  1 medium 11.8    Beets, cooked  1 cup 2.8    Beet greens  1 cup 4.2    Bok satish, cooked  1 cup 2.8    Broccoli, cooked  1 cup 4.5    [place] sprouts, cooked  1 cup 3.6    Cabbage, cooked  1 cup 4.2    Carrot  1 medium 2.6    Carrot, cooked  1 cup 5.2    Cauliflower, cooked  1 cup 3.4    Gage slaw  1 cup 4.0    Erin greens, cooked  1 cup 2.6    Corn, sweet  1 cup 4.6    Green beans  1 cup 4.0    Celery  1 stalk 1.1    Kale, cooked  1 cup 7.2    Onions, raw  1 cup 2.9    Peas, cooked  1 cup 8.8    Peppers, sweet  1  cup 2.6    Pop corn, air-popped  3 cups 3.6    Potato, baked w/ skin  1 medium 4.8    Spinach, cooked  1 cup 4.3    Summer squash, cooked  1 cup 2.5    Sweet potato, cooked  1 medium 4.9    Swiss chard, cooked  1 cup 3.7    Tomato  1 medium 1.0    Winter squash, cooked  1 cup 6.2    Zucchini, cooked  1 cup 2.6

## 2022-03-02 NOTE — PROGRESS NOTES
Follow Up Note     Date: 2022   Patient Name: Elliott Dunn  MRN: [id number]  : 1955     Primary Care Provider: Ladonna Means MD     Chief Complaint   Patient presents with   • Follow-up   • Heartburn   • Elevated Hepatic Enzymes     History of present illness:   3/2/2022  Elliott Dunn is a 66 y.o. male who is here today for follow up regarding recent colonoscopy, heartburn, and Elevated Hepatic Enzymes.    He would like to discuss results of his colonoscopy.  He also had ultrasound of the abdomen and labs completed since last visit due to elevated liver enzymes.  He continues to take leflunomide, just last week he was asked to increase this to 20 mg daily from 10 mg once daily for treatment of his arthritis.  He denies any GI complaints today, no current abdominal pain.  He does state that prior to the colonoscopy he ate supper the night before.  No current rectal bleeding.  He has history of prostate radiation due to prostate cancer.  He takes Protonix 40 mg once daily with good control of reflux symptoms.  He states he has past history of Cristina's esophagus, last EGD was completed in  by Dr. Ritchie.  He would like to have repeat EGD arranged.      Interval History:  2021  This patient deny any abdominal pain, change in bowel habit, hematochezia or melena.  Weight is stable except recently he lost about 5-10 pounds  after COVID infection. His CTAP done in Aug also showed sigmoid colon thickening suspiciously for colitis. Pt denies nausea vomiting or odynophagia or dysphagia. There is a history of acid reflux and also on cerebrex wit occasional reflux symptoms despite on PPI. There is no history of anemia. Prior history of EGD in 2017 Dr. Curiel and reported as having possible Cristina's and biopsies biopsies negative for Cristina's esophagus.  Colonoscopy done  by Dr Elizondo and revealed diverticulosis and possible radiation proctitis other details unknown. No family history  of colon cancer or any GI malignancy.Dad and brother had clung CA.  Denies alcohol abuse or cigarette smoking (ex smoker).   He had prostate CA and had radiation and prostatectomy. He also has MGUS  He also has RA on medication.     Subjective     Past Medical History:   Diagnosis Date   • Acquired absence of all teeth    • Allergic    • Anomalous coronary artery origin    • Arrhythmia    • Arthritis    • Arthritis of lumbar spine 7/29/2016   • Back pain 6/17/2016   • Cristina esophagus    • Cataract     REMOVED BILATERALL   • Cervical spine disease 6/17/2016   • CHF (congestive heart failure) (HCC)    • Chronic obstructive pulmonary disease (HCC)    • Colon polyps    • Deafness in left ear    • Derangement of anterior horn of medial meniscus    • Difficulty swallowing    • Disorder of lumbar spine 6/17/2016   • Disorder of thoracic spine 6/17/2016   • Diverticulitis of colon    • DJD (degenerative joint disease)    • Elevated liver enzymes    • Erectile dysfunction    • Esophageal reflux    • Essential hypertension     PT DENIES SAYS IT RUNS LOW   • Glaucoma    • Hard of hearing     LEFT EAR NO AIDS WORN   • Heart murmur    • Hiatal hernia    • High cholesterol    • History of radiation therapy 11/24/2020    salvage pelvic XRT   • Impaired functional mobility, balance, gait, and endurance    • Inflammatory polyarthropathy (HCC)     Per Dr. Haider. Negative NEO, normal ESR, RF, anti-ccp and did not improve with prednisone per notes.   • Inguinal hernia    • Insomnia    • Lateral meniscus derangement    • Left otitis media with spontaneous rupture of eardrum    • Locking of left knee    • Low back pain    • Meniere's disease    • MGUS (monoclonal gammopathy of unknown significance)     likely diagnosis   • Murmur    • Neuromuscular disorder (HCC)    • Neuropathic arthritis    • No natural teeth    • Perforation of left tympanic membrane    • Prostate cancer (HCC)     previous CA in 2013 with prostatectomy   • Pulmonary  emphysema (HCC)    • Recent shoulder injury     LEFT SHOULDER   • Rotator cuff tear arthropathy of left shoulder 3/12/2020    Added automatically from request for surgery [id number]   • Scoliosis    • Skin cancer     skin cancer   • Skin cancer of face     squamous cell   • Spina bifida occulta 6/17/2016   • Tobacco abuse 11/9/2017   • Visual impairment    • Wears glasses    • Wears glasses     READING GLASSES ONLY     Past Surgical History:   Procedure Laterality Date   • COLONOSCOPY     • COLONOSCOPY N/A 1/4/2022    Procedure: COLONOSCOPY with polypectomy x1;  Surgeon: Luis Callahan MD;  Location: [facility] ENDOSCOPY;  Service: Gastroenterology;  Laterality: N/A;   • ENDOSCOPY N/A 4/10/2017    Procedure: ESOPHAGOGASTRODUODENOSCOPY WITH BIOPSY;  Surgeon: Alexander Ritchie MD;  Location: [facility] ENDOSCOPY;  Service:    • EYE SURGERY     • HERNIA REPAIR     • INGUINAL HERNIA REPAIR Right    • INGUINAL HERNIA REPAIR     • KNEE SURGERY Left    • LYMPH NODE BIOPSY     • MULTIPLE TOOTH EXTRACTIONS     • PROSTATE SURGERY  2004   • PROSTATECTOMY  06/30/2014   • PROSTATECTOMY      Prostatectomy Radical   • SHOULDER ARTHROSCOPY Left 2/6/2020    Procedure: DIAGNOSTIC SHOULDER ARTHROSCOPY LEFT WITH LABRAL DEBRIDEMENT, SUBACROMIAL BURSECTOMY, ASSESSMENT OF LARGE FRAGMENTED RETRACTED IRREPARABLE ROTATOR CUFF TEARS;  Surgeon: Rony Pandey MD;  Location: [facility] OR;  Service: Orthopedics;  Laterality: Left;   • SKIN CANCER EXCISION  2017    both sides of body/squamous cell melanoma   • TOTAL SHOULDER ARTHROPLASTY W/ DISTAL CLAVICLE EXCISION Left 7/9/2020    Procedure: Left reverse total shoulder arthroplasty;  Surgeon: Rony Pandey MD;  Location: [facility] OR;  Service: Orthopedics;  Laterality: Left;   • TYMPANOPLASTY W/ MASTOIDECTOMY     • UMBILICAL HERNIA REPAIR       Family History   Problem Relation Age of Onset   • Diabetes Mother    • Hypertension Mother    • Obesity Mother    • Stroke Mother 65   • Arthritis  Mother    • Hyperlipidemia Mother    • Vision loss Mother    • Cancer Father    • Cancer Sister    • Diabetes Brother    • Cancer Brother    • Heart attack Brother    • Alcohol abuse Brother    • Drug abuse Brother    • Hearing loss Brother    • Learning disabilities Brother    • Colon cancer Neg Hx      Social History     Socioeconomic History   • Marital status:    Tobacco Use   • Smoking status: Former Smoker     Packs/day: 1.00     Years: 51.00     Pack years: 51.00     Types: Cigarettes     Start date: 1963     Quit date: 2018     Years since quittin.1   • Smokeless tobacco: Never Used   Vaping Use   • Vaping Use: Never used   Substance and Sexual Activity   • Alcohol use: Never   • Drug use: Never   • Sexual activity: Defer       Current Outpatient Medications:   •  albuterol sulfate HFA (Ventolin HFA) 108 (90 Base) MCG/ACT inhaler, Inhale 2 puffs Every 4 (Four) Hours As Needed for Wheezing or Shortness of Air., Disp: 18 g, Rfl: 2  •  amitriptyline (ELAVIL) 25 MG tablet, Take 1-3 tablets by mouth every night at bedtime as needed for sleep., Disp: 63 tablet, Rfl: 2  •  aspirin 81 MG EC tablet, Take 81 mg by mouth Daily., Disp: , Rfl:   •  atorvastatin (LIPITOR) 10 MG tablet, TAKE ONE TABLET BY MOUTH ONCE NIGHTLY, Disp: 90 tablet, Rfl: 3  •  bisoprolol (ZEBeta) 5 MG tablet, Take 1 tablet by mouth Daily., Disp: 90 tablet, Rfl: 3  •  celecoxib (CeleBREX) 100 MG capsule, TAKE ONE CAPSULE BY MOUTH DAILY WITH FOOD FOR ARTHRITIC PAIN, Disp: 30 capsule, Rfl: 2  •  coenzyme Q10 100 MG capsule, Take 100 mg by mouth Daily., Disp: , Rfl:   •  diphenoxylate-atropine (LOMOTIL) 2.5-0.025 MG per tablet, , Disp: , Rfl:   •  DULoxetine (CYMBALTA) 60 MG capsule, TAKE ONE CAPSULE BY MOUTH DAILY, Disp: 90 capsule, Rfl: 1  •  folic acid (FOLVITE) 1 MG tablet, Take 1 mg by mouth Daily., Disp: , Rfl:   •  gabapentin (NEURONTIN) 300 MG capsule, Take 1 capsule by mouth 3 (Three) Times a Day., Disp: 90 capsule, Rfl:  4  •  isosorbide mononitrate (IMDUR) 30 MG 24 hr tablet, Take 1 tablet by mouth Daily., Disp: 90 tablet, Rfl: 0  •  leflunomide (ARAVA) 10 MG tablet, Take 20 mg by mouth Daily., Disp: , Rfl:   •  methocarbamol (ROBAXIN) 750 MG tablet, Take 1 tablet by mouth 3 (Three) Times a Day As Needed for Muscle Spasms., Disp: 270 tablet, Rfl: 3  •  methotrexate 2.5 MG tablet, Take 8 tablets by mouth 1 (One) Time Per Week. (Patient taking differently: Take 20 mg by mouth 1 (One) Time Per Week. TAKES ON THURSDAY), Disp: 96 tablet, Rfl: 0  •  Multiple Vitamins-Minerals (MULTIVITAMIN WITH MINERALS) tablet tablet, Take 1 tablet by mouth Daily., Disp: , Rfl:   •  pantoprazole (PROTONIX) 40 MG EC tablet, TAKE ONE TABLET BY MOUTH DAILY, Disp: 90 tablet, Rfl: 0  •  predniSONE (DELTASONE) 10 MG tablet, Take 10 mg by mouth Daily As Needed. As needed, Disp: , Rfl:   •  tiotropium bromide-olodaterol (Stiolto Respimat) 2.5-2.5 MCG/ACT aerosol solution inhaler, Inhale 2 puffs Daily., Disp: 4 g, Rfl: 5  •  vitamin B-12 (CYANOCOBALAMIN) 1000 MCG tablet, TAKE ONE TABLET BY MOUTH DAILY, Disp: 30 tablet, Rfl: 8    Allergies   Allergen Reactions   • Cyclobenzaprine Unknown - Low Severity     Wide awake   • Plaquenil [Hydroxychloroquine Sulfate] Unknown - Low Severity     Black eyes   • Pravastatin Myalgia     Weakness / fatigue       The following portions of the patient's history were reviewed and updated as appropriate: allergies, current medications, past family history, past medical history, past social history, past surgical history and problem list.    Objective     Physical Exam  Constitutional:       General: He is not in acute distress.     Appearance: Normal appearance. He is well-developed. He is not diaphoretic.   HENT:      Head: Normocephalic and atraumatic.      Right Ear: External ear normal.      Left Ear: External ear normal.      Nose: Nose normal.      Mouth/Throat:      Comments: Wearing a mask  Eyes:      General: No scleral  "icterus.        Right eye: No discharge.         Left eye: No discharge.      Conjunctiva/sclera: Conjunctivae normal.   Neck:      Trachea: No tracheal deviation.   Pulmonary:      Effort: Pulmonary effort is normal. No respiratory distress.   Musculoskeletal:         General: Normal range of motion.      Cervical back: Normal range of motion.   Skin:     Coloration: Skin is not pale.      Findings: No erythema or rash.   Neurological:      Mental Status: He is alert and oriented to person, place, and time.      Coordination: Coordination normal.   Psychiatric:         Mood and Affect: Mood normal.         Behavior: Behavior normal.         Thought Content: Thought content normal.         Judgment: Judgment normal.       Vitals:    03/02/22 1259   BP: 143/68   Pulse: 70   Resp: 20   Temp: 98 °F (36.7 °C)   TempSrc: Infrared   Weight: 71.2 kg (157 lb)   Height: 180.3 cm (71\")       Results Review:   I reviewed the patient's new clinical results.    Lab on 01/21/2022   Component Date Value Ref Range Status   • Glucose 01/21/2022 90  65 - 99 mg/dL Final   • BUN 01/21/2022 21  8 - 23 mg/dL Final   • Creatinine 01/21/2022 0.84  0.76 - 1.27 mg/dL Final   • Sodium 01/21/2022 139  136 - 145 mmol/L Final   • Potassium 01/21/2022 5.4* 3.5 - 5.2 mmol/L Final   • Chloride 01/21/2022 105  98 - 107 mmol/L Final   • CO2 01/21/2022 26.4  22.0 - 29.0 mmol/L Final   • Calcium 01/21/2022 9.4  8.6 - 10.5 mg/dL Final   • Total Protein 01/21/2022 7.9  6.0 - 8.5 g/dL Final   • Albumin 01/21/2022 4.00  3.50 - 5.20 g/dL Final   • ALT (SGPT) 01/21/2022 36  1 - 41 U/L Final   • AST (SGOT) 01/21/2022 42* 1 - 40 U/L Final   • Alkaline Phosphatase 01/21/2022 162* 39 - 117 U/L Final   • Total Bilirubin 01/21/2022 0.3  0.0 - 1.2 mg/dL Final   • eGFR Non African Amer 01/21/2022 91  >60 mL/min/1.73 Final   • Globulin 01/21/2022 3.9  gm/dL Final   • A/G Ratio 01/21/2022 1.0  g/dL Final   • BUN/Creatinine Ratio 01/21/2022 25.0  7.0 - 25.0 Final   • " Anion Gap 01/21/2022 7.6  5.0 - 15.0 mmol/L Final   • IgG 01/21/2022 2297* 603 - 1613 mg/dL Final   • IgA 01/21/2022 62  61 - 437 mg/dL Final   • IgM 01/21/2022 19* 20 - 172 mg/dL Final    Result confirmed on concentration.   • Total Protein 01/21/2022 7.6  6.0 - 8.5 g/dL Final   • Albumin 01/21/2022 3.6  2.9 - 4.4 g/dL Final   • Alpha-1-Globulin 01/21/2022 0.2  0.0 - 0.4 g/dL Final   • Alpha-2-Globulin 01/21/2022 0.9  0.4 - 1.0 g/dL Final   • Beta Globulin 01/21/2022 0.9  0.7 - 1.3 g/dL Final   • Gamma Globulin 01/21/2022 2.0* 0.4 - 1.8 g/dL Final   • M-Larry 01/21/2022 1.8* Not Observed g/dL Final   • Globulin 01/21/2022 4.0* 2.2 - 3.9 g/dL Final   • A/G Ratio 01/21/2022 1.0  0.7 - 1.7 Final   • Immunofixation Reflex, Serum 01/21/2022 Comment*  Final   • Please note 01/21/2022 Comment   Final    Protein electrophoresis scan will follow via computer, mail, or   delivery.   • Free Light Chain, [place] 01/21/2022 155.9* 3.3 - 19.4 mg/L Final   • Free Lambda Light Chains 01/21/2022 7.8  5.7 - 26.3 mg/L Final   • Kappa/Lambda Ratio 01/21/2022 19.99* 0.26 - 1.65 Final   • WBC 01/21/2022 6.03  3.40 - 10.80 10*3/mm3 Final   • RBC 01/21/2022 4.39  4.14 - 5.80 10*6/mm3 Final   • Hemoglobin 01/21/2022 14.0  13.0 - 17.7 g/dL Final   • Hematocrit 01/21/2022 42.0  37.5 - 51.0 % Final   • MCV 01/21/2022 95.7  79.0 - 97.0 fL Final   • MCH 01/21/2022 31.9  26.6 - 33.0 pg Final   • MCHC 01/21/2022 33.3  31.5 - 35.7 g/dL Final   • RDW 01/21/2022 13.2  12.3 - 15.4 % Final   • RDW-SD 01/21/2022 46.5  37.0 - 54.0 fl Final   • MPV 01/21/2022 8.9  6.0 - 12.0 fL Final   • Platelets 01/21/2022 174  140 - 450 10*3/mm3 Final   Component Date Value Ref Range Status   • PSA 01/03/2022 <0.014  0.000 - 4.000 ng/mL Final      NM PET/CT Skull Base to Mid Thigh  Result Date: 2/2/2022  Negative PET/CT. The lungs demonstrate extensive emphysematous change with some areas of fibronodular density present, not demonstrating significant FDG  hypermetabolism.      Ultrasound abdomen 10/19/2021:  FINDINGS: The visualized pancreas is unremarkable. The liver is  unremarkable. The gallbladder is unremarkable with no shadowing stones  or wall thickening.  The common duct measures 3.50 mm. The right kidney  measures 9.1 cm in length and is normal in echogenicity without  hydronephrosis. The left kidney measures 10.2 cm in length and is normal  in echogenicity without hydronephrosis. Spleen is unremarkable. The  aorta is normal in caliber. The vena cava is unremarkable.  IMPRESSION:  Normal ultrasound of the abdomen.     Colonoscopy was completed by Dr. Callahan on 1/4/2022:  - The perianal and digital rectal examinations were normal.  - A 4 mm polyp was found in the sigmoid colon. The polyp was flat. The polyp was removed with a cold snare.  Resection and retrieval were complete.  - Multiple small and large-mouthed diverticula were found in the sigmoid colon and descending colon.  - Extensive amounts of semi-liquid semi-solid solid stool was found in the entire colon, making visualization difficult.  Lavage of the area was performed using a moderate amount of sterile water, resulting in incomplete clearance with  continued poor visualization.  - The patchy increase mucosa vascular pattern in the distal rectum was developing angioectasis more in favor or  radiation proctopathy.  Pathology showed colon polyp to be lymphoid aggregate.    Assessment / Plan      1. Colon cancer screening  2. Radiation proctitis  3. Abnormal CT of the abdomen  4. Diverticulosis of the colon  Colonoscopy 1/4/2022 showed one small polyp in the sigmoid colon removed and benign, diverticulosis of the sigmoid and descending colon, prep was inadequate.  He will need a repeat colonoscopy in 6 months, due July 2022.  He will be called with a date for this procedure.  Plenvu prep was given, previous prep Suprep he did not like.  He understands to follow clear liquid diet instructions today  for the repeat colonoscopy.  Previous colonoscopy was in 2015, full report not available. Last CT scan of the abdomen pelvis done in August 2021 revealed mild sigmoid wall thickening suspicious for colitis.  No current signs of any GI bleed or change in bowel habit.   Recommend high fiber diet, information given.    He will have an EGD and colonoscopy performed with monitored anesthesia care. The indications, technique, alternatives and potential risk and complications were discussed with the patient including but not limited to bleeding, bowel perforations, missing lesions and anesthetic complications. The patient understands and wishes to proceed with the procedure and has given their verbal consent. Written patient education information was given to the patient. He should follow up in the office after this procedure to discuss the results and further recommendations can be made at that time. The patient will call if they have further questions before procedure.  - Case Request will be placed closer to date  - Plenvue sample given     5. Gastroesophageal reflux disease without esophagitis  6.  History of Cristina's esophagus?  Patient has a longstanding history of reflux disease.  He is currently also takes Celebrex.  He still gets intermittent reflux symptoms despite on a PPI daily dose.  His last EGD was in 2017 by Dr. Curiel and was reported as having possible Cristina's.  Biopsies negative for any Cristina's esophagus.  Patient would like to go ahead with repeat EGD, will arrange with his repeat colonoscopy at his request.      7. Elevated LFTs (suspected DILI)  He has a combined hepatocellular and cholestatic pattern of elevated liver enzymes since about 3 to 4 years now on chart review.  His liver numbers gradually worsening until most recent labs in Jan 2022 in which they improved some. AST now normal. ALT mildly increased.  He does not have any metabolic syndrome.  No history suggestive any chronic hepatitis.   US abd 10/2021 showed normal liver and spleen. Labs 10/2021 showed alpha-1 antitrypsin normal, NEO negative, anti-smooth muscle antibody normal, antimitochondrial antibodies normal, serum iron normal, ferritin elevated, INR normal, hepatitis B surface antigen negative, hepatitis B surface antibody negative, hepatitis B core total antibody negative, hepatitis A total antibody positive.    He is immune to both hepatitis A and B, no vaccinations recommended.  Patient is on Arava (leflunomide) for the last few years.  I suspected this could be drug-induced liver injury from Arava use.   He did recently have some improvement of elevated liver enzymes, just last week his Arava was increased to double the dose (now 20 mg once daily).   May have to consider reducing the dose of Arava.  He may also need a liver biopsy. Will discuss further with Dr. Callahan before further recommendations are made.      Prior history:   Personal history prostate cancer and MGUS  Followed at [facility]          Follow Up:   Return for follow up after procedure to discuss results.      Argentina Lane PA-C  [facility]  3/2/2022  13:22 EST    Please note that portions of this note may have been completed with a voice recognition program. Efforts were made to edit the dictations, but occasionally words are mistranscribed.

## 2022-03-11 ENCOUNTER — TELEPHONE (OUTPATIENT)
Dept: GASTROENTEROLOGY | Facility: CLINIC | Age: 67
End: 2022-03-11

## 2022-03-11 DIAGNOSIS — R79.89 ELEVATED LFTS: Primary | ICD-10-CM

## 2022-03-11 NOTE — TELEPHONE ENCOUNTER
I tried calling pt- no answer, left voicemail.     He needs to repeat CMP to re-check liver enzymes, if still elevated, we will consider ordering a liver biopsy.

## 2022-03-18 DIAGNOSIS — K21.00 GASTROESOPHAGEAL REFLUX DISEASE WITH ESOPHAGITIS: ICD-10-CM

## 2022-03-18 RX ORDER — PANTOPRAZOLE SODIUM 40 MG/1
TABLET, DELAYED RELEASE ORAL
Qty: 90 TABLET | Refills: 0 | Status: SHIPPED | OUTPATIENT
Start: 2022-03-18 | End: 2022-04-29

## 2022-03-18 NOTE — TELEPHONE ENCOUNTER
Rx Refill Note  Requested Prescriptions     Pending Prescriptions Disp Refills   • pantoprazole (PROTONIX) 40 MG EC tablet [Pharmacy Med Name: PANTOPRAZOLE SOD DR 40 MG TAB] 90 tablet 0     Sig: TAKE ONE TABLET BY MOUTH DAILY      Last office visit with prescribing clinician: 2/1/2022      Next office visit with prescribing clinician: 8/3/2022            Isabella Cunningham LPN  03/18/22, 11:07 EDT

## 2022-04-19 RX ORDER — ISOSORBIDE MONONITRATE 30 MG/1
TABLET, EXTENDED RELEASE ORAL
Qty: 90 TABLET | Refills: 0 | Status: SHIPPED | OUTPATIENT
Start: 2022-04-19 | End: 2022-07-27

## 2022-04-21 DIAGNOSIS — G89.4 CHRONIC PAIN SYNDROME: ICD-10-CM

## 2022-04-21 DIAGNOSIS — F51.01 PRIMARY INSOMNIA: ICD-10-CM

## 2022-04-21 DIAGNOSIS — M06.4 INFLAMMATORY POLYARTHROPATHY: ICD-10-CM

## 2022-04-22 RX ORDER — AMITRIPTYLINE HYDROCHLORIDE 25 MG/1
TABLET, FILM COATED ORAL
Qty: 270 TABLET | Refills: 2 | Status: SHIPPED | OUTPATIENT
Start: 2022-04-22 | End: 2023-02-06 | Stop reason: SDUPTHER

## 2022-04-22 NOTE — TELEPHONE ENCOUNTER
Rx Refill Note  Requested Prescriptions     Pending Prescriptions Disp Refills   • amitriptyline (ELAVIL) 25 MG tablet [Pharmacy Med Name: AMITRIPTYLINE HCL 25 MG TAB] 63 tablet 2     Sig: TAKE ONE TO THREE TABLET BY MOUTH EVERY NIGHT AT BEDTIME AS NEEDED FOR SLEEP      Last office visit with prescribing clinician: 2/1/2022      Next office visit with prescribing clinician: 8/3/2022            Isabella Cunningham LPN  04/22/22, 08:13 EDT

## 2022-04-28 DIAGNOSIS — K21.00 GASTROESOPHAGEAL REFLUX DISEASE WITH ESOPHAGITIS: ICD-10-CM

## 2022-04-29 RX ORDER — PANTOPRAZOLE SODIUM 40 MG/1
TABLET, DELAYED RELEASE ORAL
Qty: 90 TABLET | Refills: 0 | Status: SHIPPED | OUTPATIENT
Start: 2022-04-29 | End: 2022-09-14

## 2022-04-29 NOTE — TELEPHONE ENCOUNTER
Rx Refill Note  Requested Prescriptions     Pending Prescriptions Disp Refills   • pantoprazole (PROTONIX) 40 MG EC tablet [Pharmacy Med Name: PANTOPRAZOLE SOD DR 40 MG TAB] 90 tablet 0     Sig: TAKE ONE TABLET BY MOUTH DAILY      Last office visit with prescribing clinician: 2/1/2022      Next office visit with prescribing clinician: 8/3/2022            JIM THOMAS MA  04/29/22, 08:28 EDT

## 2022-05-06 RX ORDER — CELECOXIB 100 MG/1
CAPSULE ORAL
Qty: 30 CAPSULE | Refills: 2 | Status: SHIPPED | OUTPATIENT
Start: 2022-05-06 | End: 2022-08-03

## 2022-05-06 NOTE — TELEPHONE ENCOUNTER
Rx Refill Note  Requested Prescriptions     Pending Prescriptions Disp Refills   • celecoxib (CeleBREX) 100 MG capsule [Pharmacy Med Name: CELECOXIB 100 MG CAPSULE] 30 capsule 2     Sig: TAKE ONE CAPSULE BY MOUTH DAILY WITH FOOD FOR ARTHRITIC PAIN      Last office visit with prescribing clinician: 2/1/2022      Next office visit with prescribing clinician: 8/3/2022            Lara Owen LPN  05/06/22, 14:52 EDT

## 2022-05-18 ENCOUNTER — PATIENT MESSAGE (OUTPATIENT)
Dept: INTERNAL MEDICINE | Facility: CLINIC | Age: 67
End: 2022-05-18

## 2022-05-18 DIAGNOSIS — M06.4 INFLAMMATORY POLYARTHROPATHY: Primary | ICD-10-CM

## 2022-05-19 NOTE — TELEPHONE ENCOUNTER
From: Elliott Dunn  To: Ladonna Means MD  Sent: 5/18/2022 11:31 AM EDT  Subject: Arthritis Dr    Does [facility] have an arthritis doctor you would recommend? I need a new arthritis doctor.

## 2022-05-25 ENCOUNTER — HOSPITAL ENCOUNTER (OUTPATIENT)
Dept: CT IMAGING | Facility: HOSPITAL | Age: 67
Discharge: HOME | End: 2022-05-25
Admitting: NURSE PRACTITIONER

## 2022-05-25 DIAGNOSIS — R93.89 ABNORMAL CT OF THE CHEST: ICD-10-CM

## 2022-05-25 PROCEDURE — 71250 CT THORAX DX C-: CPT

## 2022-06-06 DIAGNOSIS — K21.9 GASTROESOPHAGEAL REFLUX DISEASE WITHOUT ESOPHAGITIS: ICD-10-CM

## 2022-06-06 DIAGNOSIS — R93.5 ABNORMAL CT OF THE ABDOMEN: ICD-10-CM

## 2022-06-06 DIAGNOSIS — Z12.11 COLON CANCER SCREENING: Primary | ICD-10-CM

## 2022-06-06 DIAGNOSIS — Z87.19 HISTORY OF BARRETT'S ESOPHAGUS: ICD-10-CM

## 2022-06-06 RX ORDER — SODIUM CHLORIDE 9 MG/ML
30 INJECTION, SOLUTION INTRAVENOUS CONTINUOUS PRN
Status: CANCELLED | OUTPATIENT
Start: 2022-06-06

## 2022-06-07 DIAGNOSIS — IMO0001 LUNG NODULE < 6CM ON CT: ICD-10-CM

## 2022-06-07 DIAGNOSIS — R93.89 ABNORMAL CT OF THE CHEST: Primary | ICD-10-CM

## 2022-06-13 ENCOUNTER — PATIENT MESSAGE (OUTPATIENT)
Dept: INTERNAL MEDICINE | Facility: CLINIC | Age: 67
End: 2022-06-13

## 2022-06-13 RX ORDER — LEFLUNOMIDE 10 MG/1
20 TABLET ORAL DAILY
Qty: 30 TABLET | Refills: 0 | Status: SHIPPED | OUTPATIENT
Start: 2022-06-13 | End: 2022-07-19

## 2022-06-13 NOTE — TELEPHONE ENCOUNTER
From: Elliott Dunn  To: Ladonna Means MD  Sent: 6/13/2022 11:59 AM EDT  Subject: Leflunomide    Could you send me a prescription for leflunomide to the [facility] pharmacy in [place] ([phone]) while I am waiting on an appointment with the new ? I am completely out.     Thanks  Elliott

## 2022-06-16 DIAGNOSIS — G89.29 CHRONIC JOINT PAIN: ICD-10-CM

## 2022-06-16 DIAGNOSIS — M25.50 CHRONIC JOINT PAIN: ICD-10-CM

## 2022-06-16 RX ORDER — DULOXETIN HYDROCHLORIDE 60 MG/1
CAPSULE, DELAYED RELEASE ORAL
Qty: 90 CAPSULE | Refills: 1 | Status: SHIPPED | OUTPATIENT
Start: 2022-06-16 | End: 2022-12-14

## 2022-06-17 ENCOUNTER — TELEPHONE (OUTPATIENT)
Dept: SURGERY | Facility: CLINIC | Age: 67
End: 2022-06-17

## 2022-06-17 NOTE — TELEPHONE ENCOUNTER
CALLED PT IN REGARDS TO 4 YEAR COLONOSCOPY RECALL. NOT SURE WHEN LAST COLON WAS COMPLETED. MAILING RECALL LETTER.

## 2022-07-05 ENCOUNTER — OFFICE VISIT (OUTPATIENT)
Dept: PULMONOLOGY | Facility: CLINIC | Age: 67
End: 2022-07-05

## 2022-07-05 VITALS
DIASTOLIC BLOOD PRESSURE: 76 MMHG | WEIGHT: 158 LBS | OXYGEN SATURATION: 96 % | BODY MASS INDEX: 22.12 KG/M2 | HEART RATE: 70 BPM | SYSTOLIC BLOOD PRESSURE: 120 MMHG | HEIGHT: 71 IN | RESPIRATION RATE: 16 BRPM

## 2022-07-05 DIAGNOSIS — R93.89 ABNORMAL CT OF THE CHEST: ICD-10-CM

## 2022-07-05 DIAGNOSIS — Z87.891 PERSONAL HISTORY OF TOBACCO USE, PRESENTING HAZARDS TO HEALTH: ICD-10-CM

## 2022-07-05 DIAGNOSIS — Z14.8 ALPHA-1-ANTITRYPSIN DEFICIENCY CARRIER: ICD-10-CM

## 2022-07-05 DIAGNOSIS — J30.89 OTHER ALLERGIC RHINITIS: ICD-10-CM

## 2022-07-05 DIAGNOSIS — J44.9 CHRONIC OBSTRUCTIVE PULMONARY DISEASE, UNSPECIFIED COPD TYPE: Primary | ICD-10-CM

## 2022-07-05 PROBLEM — Z87.19 HISTORY OF BARRETT'S ESOPHAGUS: Status: ACTIVE | Noted: 2022-07-05

## 2022-07-05 PROBLEM — K21.9 GASTROESOPHAGEAL REFLUX DISEASE WITHOUT ESOPHAGITIS: Status: ACTIVE | Noted: 2022-07-05

## 2022-07-05 PROCEDURE — 99214 OFFICE O/P EST MOD 30 MIN: CPT | Performed by: INTERNAL MEDICINE

## 2022-07-05 RX ORDER — TIOTROPIUM BROMIDE AND OLODATEROL 3.124; 2.736 UG/1; UG/1
2 SPRAY, METERED RESPIRATORY (INHALATION) DAILY
Qty: 4 G | Refills: 5 | Status: SHIPPED | OUTPATIENT
Start: 2022-07-05 | End: 2022-10-25 | Stop reason: SDUPTHER

## 2022-07-05 RX ORDER — AZELASTINE 1 MG/ML
1 SPRAY, METERED NASAL 2 TIMES DAILY PRN
Qty: 1 EACH | Refills: 5 | Status: SHIPPED | OUTPATIENT
Start: 2022-07-05 | End: 2022-10-25 | Stop reason: SDUPTHER

## 2022-07-05 RX ORDER — LEFLUNOMIDE 20 MG/1
20 TABLET ORAL DAILY
COMMUNITY

## 2022-07-05 NOTE — PROGRESS NOTES
"  Chief Complaint   Patient presents with   • Follow-up   • Shortness of Breath       Subjective   Elliott Dunn is a 67 y.o. male.     History of Present Illness   Patient was evaluated today for follow up of shortness of breath, abnormal CT and COPD.     Patient says that his symptoms have been stable since the last clinic visit. he reports no recent exacerbations.     Patient is using Stiolto, as prescribed. Exercise tolerance has also remained stable.     Quit smoking 6 years ago.     Patient says that he has not been using his nasal sprays on a seasonal basis and is noticing worsening nasal congestion as well as occasional runny nose.    The following portions of the patient's history were reviewed and updated as appropriate: allergies, current medications, past family history, past medical history, past social history and past surgical history.    Review of Systems   HENT: Negative for sinus pressure.    Respiratory: Positive for shortness of breath.    Cardiovascular: Negative for palpitations.   Psychiatric/Behavioral: Negative for sleep disturbance.       Objective   Visit Vitals  /76   Pulse 70   Resp 16   Ht 180.3 cm (71\")   Wt 71.7 kg (158 lb)   SpO2 96%   BMI 22.04 kg/m²       Physical Exam  Vitals reviewed.   Constitutional:       Appearance: He is well-developed.   HENT:      Head: Normocephalic and atraumatic.   Eyes:      Extraocular Movements: Extraocular movements intact.   Cardiovascular:      Rate and Rhythm: Normal rate.   Pulmonary:      Comments: Somewhat hyperresonant to percussion.  Somewhat decreased air entry.  No obvious wheezing noted.   Musculoskeletal:      Cervical back: Neck supple.   Neurological:      Mental Status: He is alert.         Assessment & Plan   Diagnoses and all orders for this visit:    1. Chronic obstructive pulmonary disease, unspecified COPD type (HCC) (Primary)  -     Pulmonary Function Test; Future    2. Abnormal CT of the chest    3. Personal history of " tobacco use, presenting hazards to health    4. Other allergic rhinitis    5. Alpha-1-antitrypsin deficiency carrier    Other orders  -     azelastine (ASTELIN) 0.1 % nasal spray; 1 spray into the nostril(s) as directed by provider 2 (Two) Times a Day As Needed for Rhinitis or Allergies. Use in each nostril as directed  Dispense: 1 each; Refill: 5  -     tiotropium bromide-olodaterol (Stiolto Respimat) 2.5-2.5 MCG/ACT aerosol solution inhaler; Inhale 2 puffs Daily.  Dispense: 4 g; Refill: 5           Return in about 14 weeks (around 10/11/2022) for Recheck, PFT F/U, Imaging, For Izquierdo ([place]).    DISCUSSION (if any):  Last CT scan was reviewed in great detail with the patient. Images reviewed personally.   Results for orders placed during the hospital encounter of 05/25/22    CT Chest Without Contrast Diagnostic    Narrative  PROCEDURE: CT CHEST WO CONTRAST DIAGNOSTIC-    HISTORY: abnormal CT; R93.89-Abnormal findings on diagnostic imaging of  other specified body structures    COMPARISON: Chest CT dated 01/18/2022. PET Report dated 02/02/2022.    PROCEDURE:  Multiple axial CT images were obtained from the thoracic  inlet through the upper abdomen without the use of contrast.    FINDINGS:  Soft tissue windows reveal no axillary, hilar or mediastinal adenopathy.  Heart size is normal. The aorta is normal in caliber. There are no  pleural or pericardial effusions. There is evidence of old calcified  granulomatous disease. There are moderate emphysematous changes. There  is a stable 2.5 cm spiculated lesion in the right upper lobe. This was  PET negative. The visualized upper abdomen shows vascular  calcifications. Bone windows reveal no acute osseous abnormalities.    Impression  Stable spiculated lesion in the right upper lobe. This was not hypermetabolic on PET/CT. A four month follow-up CT scan is  recommended.    287.51 mGy.cm      This study was performed with techniques to keep radiation doses as low  as  reasonably achievable (ALARA). Individualized dose reduction  techniques using automated exposure control or adjustment of mA and/or  kV according to the patient size were employed.        Images were reviewed, interpreted, and dictated by Dr. Svetlana Galvan MD  Transcribed by Calli Cifuentes PA-C.    This report was signed and finalized on 5/25/2022 2:19 PM by Svetlana Galvan MD.      PET scan from Feb 2022, was negative.     Laboratory data was reviewed with him.   Lab Results   Component Value Date    AFPTM 144 09/30/2021     Lab Results   Component Value Date    PHENOTYPE MZ 09/30/2019     he is aware of alpha 1 antitrypsin carrier state and the need to possibly discuss this with his children and siblings. The patient's own levels are not significantly abnormal at this time.     Latest available PFTs were reviewed.  Consistent with moderate obstruction. Performed in Nov 2019.    PFTs will be ordered to be done upon follow up.    We have reviewed his pulmonary medications in great detail.    Compliance with medications stressed.     Side effects of prescribed medications discussed with the patient    Patient will be started on nasal spray for symptoms which are definitely consistent with allergic rhinitis.     If his symptoms do not improve, then we will consider ordering IgE/RAST panel.    The CT scan shows that the lung abnormality has remained stable. There was no acute change/process identified    Repeat CT will be scheduled for late August.        Dictated utilizing Dragon dictation.    This document was electronically signed by Warren Dickson MD on 07/05/22 at 10:23 EDT

## 2022-07-18 ENCOUNTER — LAB (OUTPATIENT)
Dept: LAB | Facility: HOSPITAL | Age: 67
End: 2022-07-18

## 2022-07-18 DIAGNOSIS — Z01.812 PRE-PROCEDURE LAB EXAM: Primary | ICD-10-CM

## 2022-07-18 DIAGNOSIS — Z01.812 PRE-PROCEDURE LAB EXAM: ICD-10-CM

## 2022-07-18 PROCEDURE — C9803 HOPD COVID-19 SPEC COLLECT: HCPCS

## 2022-07-18 PROCEDURE — U0004 COV-19 TEST NON-CDC HGH THRU: HCPCS

## 2022-07-19 LAB — SARS-COV-2 RNA NOSE QL NAA+PROBE: NOT DETECTED

## 2022-07-19 NOTE — PRE-PROCEDURE INSTRUCTIONS
PAT phone history completed with pt for upcoming procedure on  7/20/22 with Dr. Callahan.    PAT PASS GIVEN/REVIEWED WITH PT.  VERBALIZED UNDERSTANDING OF THE FOLLOWING:  DO NOT EAT, DRINK, SMOKE, USE SMOKELESS TOBACCO OR CHEW GUM AFTER MIDNIGHT THE NIGHT BEFORE SURGERY.  THIS ALSO INCLUDES HARD CANDIES AND MINTS.    DO NOT SHAVE THE AREA TO BE OPERATED ON AT LEAST 48 HOURS PRIOR TO THE PROCEDURE.  DO NOT WEAR MAKE UP OR NAIL POLISH.  DO NOT LEAVE IN ANY PIERCING OR WEAR JEWELRY THE DAY OF SURGERY.      DO NOT USE ADHESIVES IF YOU WEAR DENTURES.    DO NOT WEAR EYE CONTACTS; BRING IN YOUR GLASSES.    ONLY TAKE MEDICATION THE MORNING OF YOUR PROCEDURE IF INSTRUCTED BY YOUR SURGEON WITH ENOUGH WATER TO SWALLOW THE MEDICATION.  IF YOUR SURGEON DID NOT SPECIFY WHICH MEDICATIONS TO TAKE, YOU WILL NEED TO CALL THEIR OFFICE FOR FURTHER INSTRUCTIONS AND DO AS THEY INSTRUCT.    LEAVE ANYTHING YOU CONSIDER VALUABLE AT HOME.    YOU WILL NEED TO ARRANGE FOR SOMEONE TO DRIVE YOU HOME AFTER SURGERY.  IT IS RECOMMENDED THAT YOU DO NOT DRIVE, WORK, DRINK ALCOHOL OR MAKE MAJOR DECISIONS FOR AT LEAST 24 HOURS AFTER YOUR PROCEDURE IS COMPLETE.      THE DAY OF YOUR PROCEDURE, BRING IN THE FOLLOWING IF APPLICABLE:   PICTURE ID AND INSURANCE/MEDICARE OR MEDICAID CARDS/ANY CO-PAY THAT MAY BE DUE   COPY OF ADVANCED DIRECTIVE/LIVING WILL/POWER OR    CPAP/BIPAP/INHALERS   SKIN PREP SHEET   YOUR PREADMISSION TESTING PASS (IF NOT A PHONE HISTORY)           COVID self-quarantine instructions reviewed with the pt.  Verbalized understanding.

## 2022-07-20 ENCOUNTER — ANESTHESIA EVENT (OUTPATIENT)
Dept: GASTROENTEROLOGY | Facility: HOSPITAL | Age: 67
End: 2022-07-20

## 2022-07-20 ENCOUNTER — ANESTHESIA (OUTPATIENT)
Dept: GASTROENTEROLOGY | Facility: HOSPITAL | Age: 67
End: 2022-07-20

## 2022-07-20 ENCOUNTER — HOSPITAL ENCOUNTER (OUTPATIENT)
Facility: HOSPITAL | Age: 67
Setting detail: HOSPITAL OUTPATIENT SURGERY
Discharge: HOME | End: 2022-07-20
Attending: INTERNAL MEDICINE | Admitting: INTERNAL MEDICINE

## 2022-07-20 VITALS
DIASTOLIC BLOOD PRESSURE: 79 MMHG | HEIGHT: 72 IN | HEART RATE: 65 BPM | BODY MASS INDEX: 21.4 KG/M2 | TEMPERATURE: 97.1 F | OXYGEN SATURATION: 99 % | WEIGHT: 158 LBS | SYSTOLIC BLOOD PRESSURE: 141 MMHG | RESPIRATION RATE: 16 BRPM

## 2022-07-20 DIAGNOSIS — R93.5 ABNORMAL CT OF THE ABDOMEN: ICD-10-CM

## 2022-07-20 DIAGNOSIS — K21.9 GASTROESOPHAGEAL REFLUX DISEASE WITHOUT ESOPHAGITIS: ICD-10-CM

## 2022-07-20 DIAGNOSIS — Z87.19 HISTORY OF BARRETT'S ESOPHAGUS: ICD-10-CM

## 2022-07-20 DIAGNOSIS — Z12.11 COLON CANCER SCREENING: ICD-10-CM

## 2022-07-20 PROCEDURE — 25010000002 PROPOFOL 10 MG/ML EMULSION: Performed by: NURSE ANESTHETIST, CERTIFIED REGISTERED

## 2022-07-20 PROCEDURE — 88305 TISSUE EXAM BY PATHOLOGIST: CPT

## 2022-07-20 PROCEDURE — 43239 EGD BIOPSY SINGLE/MULTIPLE: CPT | Performed by: INTERNAL MEDICINE

## 2022-07-20 RX ORDER — ONDANSETRON 2 MG/ML
4 INJECTION INTRAMUSCULAR; INTRAVENOUS ONCE
Status: CANCELLED | OUTPATIENT
Start: 2022-07-20 | End: 2022-07-20

## 2022-07-20 RX ORDER — SODIUM CHLORIDE 9 MG/ML
30 INJECTION, SOLUTION INTRAVENOUS CONTINUOUS PRN
Status: DISCONTINUED | OUTPATIENT
Start: 2022-07-20 | End: 2022-07-20 | Stop reason: HOSPADM

## 2022-07-20 RX ORDER — PROPOFOL 10 MG/ML
VIAL (ML) INTRAVENOUS AS NEEDED
Status: DISCONTINUED | OUTPATIENT
Start: 2022-07-20 | End: 2022-07-20 | Stop reason: SURG

## 2022-07-20 RX ORDER — LIDOCAINE HYDROCHLORIDE 20 MG/ML
INJECTION, SOLUTION INTRAVENOUS AS NEEDED
Status: DISCONTINUED | OUTPATIENT
Start: 2022-07-20 | End: 2022-07-20 | Stop reason: SURG

## 2022-07-20 RX ADMIN — PROPOFOL 70 MG: 10 INJECTION, EMULSION INTRAVENOUS at 11:31

## 2022-07-20 RX ADMIN — LIDOCAINE HYDROCHLORIDE 60 MG: 20 INJECTION, SOLUTION INTRAVENOUS at 11:32

## 2022-07-20 RX ADMIN — SODIUM CHLORIDE 30 ML/HR: 9 INJECTION, SOLUTION INTRAVENOUS at 10:22

## 2022-07-20 NOTE — ANESTHESIA PREPROCEDURE EVALUATION
Anesthesia Evaluation     Patient summary reviewed and Nursing notes reviewed   no history of anesthetic complications:  NPO Solid Status: > 8 hours  NPO Liquid Status: > 8 hours           Airway   Mallampati: I  TM distance: >3 FB  Neck ROM: full  no difficulty expected  Dental - normal exam     Pulmonary - normal exam   (+) COPD,   Cardiovascular - normal exam    (+) hypertension, valvular problems/murmurs, CAD, angina, CHF , hyperlipidemia,       Neuro/Psych  (+) numbness,    GI/Hepatic/Renal/Endo    (+)  hiatal hernia, GERD,      Musculoskeletal     (+) back pain,       ROS comment: Spondylosis of cervical region without myelopathy or radiculopathy  Abdominal    Substance History - negative use     OB/GYN negative ob/gyn ROS         Other   arthritis,    history of cancer      Other Comment: Primary open angle glaucoma (POAG) of both eyes  ROS/Med Hx Other: Here for EGD only. Pt did not take the bowel prep.     Pt had a cup of black coffee at 0630.                Anesthesia Plan    ASA 3     MAC     intravenous induction     Anesthetic plan, risks, benefits, and alternatives have been provided, discussed and informed consent has been obtained with: patient.        CODE STATUS:

## 2022-07-20 NOTE — H&P
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   GERD    Past Medical History:   Diagnosis Date   • Acquired absence of all teeth    • Allergic    • Anomalous coronary artery origin    • Arrhythmia    • Arthritis    • Arthritis of lumbar spine 2016   • Back pain 2016   • Cristina esophagus    • Cataract     REMOVED BILATERALL   • Cervical spine disease 2016   • CHF (congestive heart failure) ([facility])    • Chronic obstructive pulmonary disease ([facility])    • Colon polyps    • COVID-19 vaccine series completed    • Deafness in left ear    • Derangement of anterior horn of medial meniscus    • Difficulty swallowing    • Disorder of lumbar spine 2016   • Disorder of thoracic spine 2016   • Diverticulitis of colon    • DJD (degenerative joint disease)    • Elevated liver enzymes    • Erectile dysfunction    • Esophageal reflux    • Essential hypertension     PT DENIES SAYS IT RUNS LOW   • Glaucoma    • Hard of hearing     DEAF IN LEFT EAR NO AIDS WORN   • Heart murmur    • Hiatal hernia    • High cholesterol    • History of radiation therapy 2020    salvage pelvic XRT   • Impaired functional mobility, balance, gait, and endurance    • Inflammatory polyarthropathy ([facility])     Per Dr. Haider. Negative NEO, normal ESR, RF, anti-ccp and did not improve with prednisone per notes.   • Inguinal hernia    • Insomnia    • Lateral meniscus derangement    • Left otitis media with spontaneous rupture of eardrum    • Locking of left knee    • Low back pain    • Meniere's disease    • MGUS (monoclonal gammopathy of unknown significance)     likely diagnosis   • Murmur    • Neuromuscular disorder (HCC)    • Neuropathic arthritis    • No natural teeth    • Perforation of left tympanic membrane    • Prostate cancer (HCC)     previous CA in  with  prostatectomy   • Pulmonary emphysema (HCC)    • Recent shoulder injury     LEFT SHOULDER   • Rotator cuff tear arthropathy of left shoulder 03/12/2020    Added automatically from request for surgery [id number]   • Scoliosis    • Skin cancer of face     squamous cell basal   • Spina bifida occulta 06/17/2016   • Tobacco abuse 11/09/2017   • Visual impairment    • Wears glasses     READING GLASSES ONLY       Past Surgical History:   Procedure Laterality Date   • CARDIAC CATHETERIZATION      no stent   • COLONOSCOPY     • COLONOSCOPY N/A 01/04/2022    Procedure: COLONOSCOPY with polypectomy x1;  Surgeon: Luis Callahan MD;  Location: [facility] ENDOSCOPY;  Service: Gastroenterology;  Laterality: N/A;   • ENDOSCOPY N/A 04/10/2017    Procedure: ESOPHAGOGASTRODUODENOSCOPY WITH BIOPSY;  Surgeon: Alexander Ritchie MD;  Location: [facility] ENDOSCOPY;  Service:    • EYE SURGERY     • HERNIA REPAIR     • INGUINAL HERNIA REPAIR Right    • INGUINAL HERNIA REPAIR     • KNEE SURGERY Left    • LYMPH NODE BIOPSY     • MULTIPLE TOOTH EXTRACTIONS     • PROSTATE SURGERY  2004   • PROSTATECTOMY  06/30/2014   • PROSTATECTOMY      Prostatectomy Radical   • SHOULDER ARTHROSCOPY Left 02/06/2020    Procedure: DIAGNOSTIC SHOULDER ARTHROSCOPY LEFT WITH LABRAL DEBRIDEMENT, SUBACROMIAL BURSECTOMY, ASSESSMENT OF LARGE FRAGMENTED RETRACTED IRREPARABLE ROTATOR CUFF TEARS;  Surgeon: Rony Pandey MD;  Location: [facility] OR;  Service: Orthopedics;  Laterality: Left;   • SKIN CANCER EXCISION  2017    both sides of body/squamous cell melanoma   • TOTAL SHOULDER ARTHROPLASTY W/ DISTAL CLAVICLE EXCISION Left 07/09/2020    Procedure: Left reverse total shoulder arthroplasty;  Surgeon: Rony Pandey MD;  Location: [facility] OR;  Service: Orthopedics;  Laterality: Left;   • TYMPANOPLASTY W/ MASTOIDECTOMY     • UMBILICAL HERNIA REPAIR         Social History     Socioeconomic History   • Marital status:    Tobacco Use   • Smoking status: Former  Smoker     Packs/day: 1.00     Years: 51.00     Pack years: 51.00     Types: Cigarettes     Start date: 1963     Quit date: 2018     Years since quittin.5   • Smokeless tobacco: Never Used   Vaping Use   • Vaping Use: Never used   Substance and Sexual Activity   • Alcohol use: Never   • Drug use: Never   • Sexual activity: Defer       Family History   Problem Relation Age of Onset   • Diabetes Mother    • Hypertension Mother    • Obesity Mother    • Stroke Mother 65   • Arthritis Mother    • Hyperlipidemia Mother    • Vision loss Mother    • Cancer Father    • Cancer Sister    • Diabetes Brother    • Cancer Brother    • Heart attack Brother    • Alcohol abuse Brother    • Drug abuse Brother    • Hearing loss Brother    • Learning disabilities Brother    • Colon cancer Neg Hx        Prior to Admission Medications:  Medications Prior to Admission   Medication Sig Dispense Refill Last Dose   • albuterol sulfate HFA (Ventolin HFA) 108 (90 Base) MCG/ACT inhaler Inhale 2 puffs Every 4 (Four) Hours As Needed for Wheezing or Shortness of Air. 18 g 2 2022 at 0700   • amitriptyline (ELAVIL) 25 MG tablet TAKE ONE TO THREE TABLET BY MOUTH EVERY NIGHT AT BEDTIME AS NEEDED FOR SLEEP 270 tablet 2 2022 at 2100   • aspirin 81 MG EC tablet Take 81 mg by mouth Daily.   2022 at 2100   • atorvastatin (LIPITOR) 10 MG tablet TAKE ONE TABLET BY MOUTH ONCE NIGHTLY 90 tablet 3 2022 at 2100   • azelastine (ASTELIN) 0.1 % nasal spray 1 spray into the nostril(s) as directed by provider 2 (Two) Times a Day As Needed for Rhinitis or Allergies. Use in each nostril as directed 1 each 5 2022 at 2100   • bisoprolol (ZEBeta) 5 MG tablet Take 1 tablet by mouth Daily. 90 tablet 3 2022 at 2100   • celecoxib (CeleBREX) 100 MG capsule TAKE ONE CAPSULE BY MOUTH DAILY WITH FOOD FOR ARTHRITIC PAIN 30 capsule 2 2022 at Unknown time   • diphenoxylate-atropine (LOMOTIL) 2.5-0.025 MG per tablet Take 1 tablet by  mouth 4 (Four) Times a Day As Needed.      • DULoxetine (CYMBALTA) 60 MG capsule TAKE ONE CAPSULE BY MOUTH DAILY 90 capsule 1 7/19/2022 at 2100   • folic acid (FOLVITE) 1 MG tablet Take 1 tablet by mouth Daily. 30 tablet 0 7/19/2022 at 2100   • gabapentin (NEURONTIN) 300 MG capsule Take 1 capsule by mouth 3 (Three) Times a Day. 90 capsule 4 7/19/2022 at 2100   • isosorbide mononitrate (IMDUR) 30 MG 24 hr tablet TAKE ONE TABLET BY MOUTH DAILY 90 tablet 0 7/19/2022 at 2100   • leflunomide (ARAVA) 20 MG tablet Take 20 mg by mouth Daily.   7/19/2022 at 2100   • methocarbamol (ROBAXIN) 750 MG tablet Take 1 tablet by mouth 3 (Three) Times a Day As Needed for Muscle Spasms. 270 tablet 3 7/19/2022 at 2100   • Multiple Vitamins-Minerals (MULTIVITAMIN WITH MINERALS) tablet tablet Take 1 tablet by mouth Daily.   7/19/2022 at 2100   • pantoprazole (PROTONIX) 40 MG EC tablet TAKE ONE TABLET BY MOUTH DAILY 90 tablet 0 7/19/2022 at 2100   • PEG-KCl-NaCl-NaSulf-Na Asc-C (PLENVU) 140 g reconstituted solution solution Take 140 g by mouth Take As Directed. 1 each 0 7/19/2022 at Unknown time   • predniSONE (DELTASONE) 10 MG tablet Take 10 mg by mouth Daily As Needed. As needed   Past Month at Unknown time   • tiotropium bromide-olodaterol (Stiolto Respimat) 2.5-2.5 MCG/ACT aerosol solution inhaler Inhale 2 puffs Daily. 4 g 5 7/19/2022 at 2100   • vitamin B-12 (CYANOCOBALAMIN) 1000 MCG tablet TAKE ONE TABLET BY MOUTH DAILY 30 tablet 8 7/19/2022 at 2100   • methotrexate 2.5 MG tablet Take 8 tablets by mouth 1 (One) Time Per Week. TAKES ON THURSDAY (Patient taking differently: Take 20 mg by mouth 1 (One) Time Per Week. TAKES ON Sunday ONLY TAKES 7 PILSS) 32 tablet 0 7/17/2022       Allergies:  Allergies   Allergen Reactions   • Cyclobenzaprine Unknown - Low Severity     Wide awake   • Plaquenil [Hydroxychloroquine Sulfate] Unknown - Low Severity     Black eyes   • Pravastatin Myalgia     Weakness / fatigue        Vitals: Temp:  [98.1 °F  (36.7 °C)] 98.1 °F (36.7 °C)  Heart Rate:  [65] 65  Resp:  [18] 18  BP: (141)/(85) 141/85    Review Of Systems:  Constitutional:  Negative for chills, fever, and unexpected weight change.  Respiratory:  Negative for cough, chest tightness, shortness of breath, and wheezing.  Cardiovascular:  Negative for chest pain, palpitations, and leg swelling.  Gastrointestinal:  Negative for abdominal distention, abdominal pain, Nausea, vomiting.  Neurological:  Negative for Weakness, numbness, and headaches.     Physical Exam:    General Appearance:  Alert, cooperative, in no acute distress.   Lungs:   Clear to auscultation, respirations regular, even and                 unlabored.   Heart:  Regular rhythm and normal rate.   Abdomen:   Normal bowel sounds, no masses, no organomegaly. Soft, non-tender, non-distended   Neurologic: Alert and oriented x 3. Moves all four limbs equally       Plan: ESOPHAGOGASTRODUODENOSCOPY WITH BIOPSY CPT CODE: 47099 (N/A), COLONOSCOPY CPT CODE: 67070 (N/A)     Luis Callahan MD  7/20/2022

## 2022-07-20 NOTE — DISCHARGE INSTRUCTIONS
Rest today    Please follow all post op instructions and follow up appointment time from your physician's office included in your discharge packet.     No pushing,pulling,tugging,heavy lifting, or strenuous activity   No major decision making,driving,or drinking alcoholic beverages for 24 hours due to the sedation you received  Always use good hand hygiene/washing technique  No driving on pain medication.     To assist you in voiding:  Drink plenty of fluids  Listen to running water while attempting to void.    If you are unable to urinate and you have an uncomfortable urge to void or it has been   6 hours since you were discharged, return to the Emergency Room.      - Discharge patient to home (ambulatory).   - Resume previous diet.   - Continue present medications.   - low dose ppi daily  - Consider discontuing the celebrex to prevent PUD ( currently has multiple erosions)  - Consider tylenol 3  - Await pathology results.   - Return to my office in 8 weeks.

## 2022-07-21 LAB — REF LAB TEST METHOD: NORMAL

## 2022-07-27 RX ORDER — ISOSORBIDE MONONITRATE 30 MG/1
TABLET, EXTENDED RELEASE ORAL
Qty: 90 TABLET | Refills: 1 | Status: SHIPPED | OUTPATIENT
Start: 2022-07-27 | End: 2023-01-04

## 2022-07-27 RX ORDER — LEFLUNOMIDE 10 MG/1
TABLET ORAL
Qty: 30 TABLET | Refills: 0 | OUTPATIENT
Start: 2022-07-27

## 2022-07-27 NOTE — TELEPHONE ENCOUNTER
Rx Refill Note  Requested Prescriptions     Pending Prescriptions Disp Refills   • isosorbide mononitrate (IMDUR) 30 MG 24 hr tablet [Pharmacy Med Name: ISOSORBIDE MONONIT ER 30 MG TB] 90 tablet 0     Sig: TAKE ONE TABLET BY MOUTH DAILY   • leflunomide (ARAVA) 10 MG tablet [Pharmacy Med Name: LEFLUNOMIDE 10 MG TABLET] 30 tablet 0     Sig: TAKE TWO TABLETS BY MOUTH DAILY      Last office visit with prescribing clinician: 2/1/2022      Next office visit with prescribing clinician: 8/3/2022            Ana Ta LPN  07/27/22, 17:17 EDT

## 2022-08-03 ENCOUNTER — OFFICE VISIT (OUTPATIENT)
Dept: INTERNAL MEDICINE | Facility: CLINIC | Age: 67
End: 2022-08-03

## 2022-08-03 VITALS
HEIGHT: 71 IN | HEART RATE: 68 BPM | TEMPERATURE: 97.5 F | RESPIRATION RATE: 16 BRPM | DIASTOLIC BLOOD PRESSURE: 60 MMHG | OXYGEN SATURATION: 97 % | BODY MASS INDEX: 21.06 KG/M2 | WEIGHT: 150.4 LBS | SYSTOLIC BLOOD PRESSURE: 118 MMHG

## 2022-08-03 DIAGNOSIS — M06.4 INFLAMMATORY POLYARTHROPATHY: ICD-10-CM

## 2022-08-03 DIAGNOSIS — E78.49 OTHER HYPERLIPIDEMIA: ICD-10-CM

## 2022-08-03 DIAGNOSIS — C61 MALIGNANT NEOPLASM OF PROSTATE: ICD-10-CM

## 2022-08-03 DIAGNOSIS — D47.2 NEUROPATHY ASSOCIATED WITH MGUS: ICD-10-CM

## 2022-08-03 DIAGNOSIS — G89.4 CHRONIC PAIN SYNDROME: ICD-10-CM

## 2022-08-03 DIAGNOSIS — R09.89 DECREASED PEDAL PULSES: ICD-10-CM

## 2022-08-03 DIAGNOSIS — D47.2 MGUS (MONOCLONAL GAMMOPATHY OF UNKNOWN SIGNIFICANCE): ICD-10-CM

## 2022-08-03 DIAGNOSIS — E53.8 COBALAMIN DEFICIENCY: ICD-10-CM

## 2022-08-03 DIAGNOSIS — R79.89 ABNORMAL THYROID BLOOD TEST: ICD-10-CM

## 2022-08-03 DIAGNOSIS — M47.812 SPONDYLOSIS OF CERVICAL REGION WITHOUT MYELOPATHY OR RADICULOPATHY: ICD-10-CM

## 2022-08-03 DIAGNOSIS — Z51.81 MEDICATION MONITORING ENCOUNTER: ICD-10-CM

## 2022-08-03 DIAGNOSIS — J43.9 PULMONARY EMPHYSEMA, UNSPECIFIED EMPHYSEMA TYPE: ICD-10-CM

## 2022-08-03 DIAGNOSIS — G63 NEUROPATHY ASSOCIATED WITH MGUS: ICD-10-CM

## 2022-08-03 DIAGNOSIS — J44.9 BRONCHITIS, OBSTRUCTIVE, CHRONIC: ICD-10-CM

## 2022-08-03 DIAGNOSIS — I10 ESSENTIAL HYPERTENSION: ICD-10-CM

## 2022-08-03 DIAGNOSIS — Z00.00 MEDICARE ANNUAL WELLNESS VISIT, SUBSEQUENT: Primary | ICD-10-CM

## 2022-08-03 DIAGNOSIS — Z23 NEED FOR PNEUMOCOCCAL VACCINATION: ICD-10-CM

## 2022-08-03 DIAGNOSIS — R20.9 BILATERAL COLD FEET: ICD-10-CM

## 2022-08-03 DIAGNOSIS — M06.09 RHEUMATOID ARTHRITIS OF MULTIPLE SITES WITH NEGATIVE RHEUMATOID FACTOR: ICD-10-CM

## 2022-08-03 DIAGNOSIS — L60.8 TOENAIL DEFORMITY: ICD-10-CM

## 2022-08-03 PROCEDURE — 1170F FXNL STATUS ASSESSED: CPT | Performed by: FAMILY MEDICINE

## 2022-08-03 PROCEDURE — G0009 ADMIN PNEUMOCOCCAL VACCINE: HCPCS | Performed by: FAMILY MEDICINE

## 2022-08-03 PROCEDURE — G0439 PPPS, SUBSEQ VISIT: HCPCS | Performed by: FAMILY MEDICINE

## 2022-08-03 PROCEDURE — 1159F MED LIST DOCD IN RCRD: CPT | Performed by: FAMILY MEDICINE

## 2022-08-03 PROCEDURE — 90677 PCV20 VACCINE IM: CPT | Performed by: FAMILY MEDICINE

## 2022-08-03 PROCEDURE — 99214 OFFICE O/P EST MOD 30 MIN: CPT | Performed by: FAMILY MEDICINE

## 2022-08-03 PROCEDURE — 99397 PER PM REEVAL EST PAT 65+ YR: CPT | Performed by: FAMILY MEDICINE

## 2022-08-03 NOTE — PROGRESS NOTES
The ABCs of the Annual Wellness Visit  Subsequent Medicare Wellness Visit    Chief Complaint   Patient presents with   • Medicare Wellness-subsequent     AWV and preventive care      Subjective    History of Present Illness:  Elliott Dunn is a 67 y.o. male who presents for a Subsequent Medicare Wellness Visit.  He is also here to follow-up on chronic health conditions and for follow-up on his controlled substance.  Patient has taken gabapentin for years for his neuropathic pain.  He is followed by rheumatology due to arthritis, recently established with new rheumatologist and is very pleased with this change.Wife points out feet are going cold. He says hands stay cold as well.  This seemed to predate medications.  She also says he has black toenail, has discoloration to the great toenail on the right.  Does not recall any injuries but patient recently had a building fall on him and he did not complain much from that.      The following portions of the patient's history were reviewed and   updated as appropriate: allergies, current medications, past family history, past medical history, past social history, past surgical history and problem list.    Compared to one year ago, the patient feels his physical   health is the same.    Compared to one year ago, the patient feels his mental   health is the same.    Recent Hospitalizations:  He was not admitted to the hospital during the last year.       Current Medical Providers:  Patient Care Team:  Ladonna Means MD as PCP - General (Family Medicine)  Clemencia Rosales APRN as Referring Physician (Neurology)  Alexander Ritchie MD as Consulting Physician (General Surgery)  Cisco Varela MD as Referring Physician (Urology)  Luis Wilson MD as Consulting Physician (Radiation Oncology)  Yehuda Gatica MD as Consulting Physician (Ophthalmology)  Ayan العلي MD as Consulting Physician (Rheumatology)    Outpatient Medications Prior to Visit    Medication Sig Dispense Refill   • albuterol sulfate HFA (Ventolin HFA) 108 (90 Base) MCG/ACT inhaler Inhale 2 puffs Every 4 (Four) Hours As Needed for Wheezing or Shortness of Air. 18 g 2   • amitriptyline (ELAVIL) 25 MG tablet TAKE ONE TO THREE TABLET BY MOUTH EVERY NIGHT AT BEDTIME AS NEEDED FOR SLEEP 270 tablet 2   • aspirin 81 MG EC tablet Take 81 mg by mouth Daily.     • atorvastatin (LIPITOR) 10 MG tablet TAKE ONE TABLET BY MOUTH ONCE NIGHTLY 90 tablet 3   • azelastine (ASTELIN) 0.1 % nasal spray 1 spray into the nostril(s) as directed by provider 2 (Two) Times a Day As Needed for Rhinitis or Allergies. Use in each nostril as directed 1 each 5   • bisoprolol (ZEBeta) 5 MG tablet Take 1 tablet by mouth Daily. 90 tablet 3   • DULoxetine (CYMBALTA) 60 MG capsule TAKE ONE CAPSULE BY MOUTH DAILY 90 capsule 1   • folic acid (FOLVITE) 1 MG tablet Take 1 tablet by mouth Daily. 30 tablet 0   • isosorbide mononitrate (IMDUR) 30 MG 24 hr tablet TAKE ONE TABLET BY MOUTH DAILY 90 tablet 1   • leflunomide (ARAVA) 20 MG tablet Take 20 mg by mouth Daily.     • methocarbamol (ROBAXIN) 750 MG tablet Take 1 tablet by mouth 3 (Three) Times a Day As Needed for Muscle Spasms. 270 tablet 3   • methotrexate 2.5 MG tablet Take 8 tablets by mouth 1 (One) Time Per Week. TAKES ON THURSDAY (Patient taking differently: Take 20 mg by mouth 1 (One) Time Per Week. TAKES ON Sunday ONLY TAKES 7 PILSS) 32 tablet 0   • Multiple Vitamins-Minerals (MULTIVITAMIN WITH MINERALS) tablet tablet Take 1 tablet by mouth Daily.     • pantoprazole (PROTONIX) 40 MG EC tablet TAKE ONE TABLET BY MOUTH DAILY 90 tablet 0   • predniSONE (DELTASONE) 10 MG tablet Take 10 mg by mouth Daily As Needed. As needed     • tiotropium bromide-olodaterol (Stiolto Respimat) 2.5-2.5 MCG/ACT aerosol solution inhaler Inhale 2 puffs Daily. 4 g 5   • vitamin B-12 (CYANOCOBALAMIN) 1000 MCG tablet TAKE ONE TABLET BY MOUTH DAILY 30 tablet 8   • gabapentin (NEURONTIN) 300 MG capsule  Take 1 capsule by mouth 3 (Three) Times a Day. 90 capsule 4   • celecoxib (CeleBREX) 100 MG capsule TAKE ONE CAPSULE BY MOUTH DAILY WITH FOOD FOR ARTHRITIC PAIN 30 capsule 2   • diphenoxylate-atropine (LOMOTIL) 2.5-0.025 MG per tablet Take 1 tablet by mouth 4 (Four) Times a Day As Needed.       No facility-administered medications prior to visit.       No opioid medication identified on active medication list. I have reviewed chart for other potential  high risk medication/s and harmful drug interactions in the elderly.          Aspirin is on active medication list. Aspirin use is indicated based on review of current medical condition/s. Pros and cons of this therapy have been discussed today. Benefits of this medication outweigh potential harm.  Patient has been encouraged to continue taking this medication.  .      Patient Active Problem List   Diagnosis   • Cobalamin deficiency   • Hyperreflexia of lower extremity   • Abnormal gait   • IgG monoclonal protein disorder   • Spondylosis of cervical region without myelopathy or radiculopathy   • Dextroscoliosis   • MGUS (monoclonal gammopathy of unknown significance)   • Allergic rhinitis   • Other hyperlipidemia   • Malignant neoplasm of prostate ([facility])   • Chronic joint pain   • Primary insomnia   • Left-sided tinnitus   • Night sweats   • Unstable angina ([facility])   • Essential hypertension   • Anomalous coronary artery origin   • Chronic obstructive pulmonary disease ([facility])   • Pharyngoesophageal dysphagia   • Chronic pain syndrome   • Inflammatory polyarthropathy ([facility])   • Former smoker   • Bucket handle tear of medial meniscus of knee   • Primary open angle glaucoma (POAG) of both eyes   • Arteriosclerotic vascular disease   • Bilateral pseudophakia   • Corneal guttata   • Excess skin of both eyelids   • Posterior vitreous detachment of both eyes   • Vitreous floaters   • Bronchitis, obstructive, chronic ([facility])   • Neuropathy associated with MGUS ([facility])   • Rotator cuff  "arthropathy of left shoulder   • Status post reverse arthroplasty of shoulder, left   • Rheumatoid arthritis of multiple sites with negative rheumatoid factor (HCC)   • Encounter for screening for malignant neoplasm of colon   • Radiation proctitis   • Abnormal CT of the abdomen   • Gastroesophageal reflux disease without esophagitis   • History of Cristina's esophagus     Advance Care Planning  Advance Directive is not on file.  ACP discussion was held with the patient during this visit. Patient has an advance directive (not in EMR), copy requested.          Objective    Vitals:    08/03/22 1244   BP: 118/60   BP Location: Right arm   Patient Position: Sitting   Cuff Size: Adult   Pulse: 68   Resp: 16   Temp: 97.5 °F (36.4 °C)   TempSrc: Temporal   SpO2: 97%   Weight: 68.2 kg (150 lb 6.4 oz)   Height: 180.3 cm (71\")   PainSc: 0-No pain     Estimated body mass index is 20.98 kg/m² as calculated from the following:    Height as of this encounter: 180.3 cm (71\").    Weight as of this encounter: 68.2 kg (150 lb 6.4 oz).    BMI is within normal parameters. No other follow-up for BMI required.      Does the patient have evidence of cognitive impairment? No    Physical Exam  Vitals and nursing note reviewed.   Constitutional:       General: He is not in acute distress.     Appearance: Normal appearance. He is well-developed, well-groomed and normal weight. obeseHe is not ill-appearing, toxic-appearing or diaphoretic.      Interventions: Face mask in place.   HENT:      Head: Normocephalic and atraumatic.      Right Ear: Hearing, tympanic membrane, ear canal and external ear normal.      Left Ear: Hearing, tympanic membrane, ear canal and external ear normal.   Eyes:      General: Lids are normal. Gaze aligned appropriately. No scleral icterus.        Right eye: No discharge.         Left eye: No discharge.      Extraocular Movements: Extraocular movements intact.      Conjunctiva/sclera: Conjunctivae normal.      Pupils: " Pupils are equal, round, and reactive to light.   Neck:      Thyroid: No thyromegaly.      Trachea: Phonation normal.   Cardiovascular:      Rate and Rhythm: Normal rate and regular rhythm.      Heart sounds: Normal heart sounds.      Comments: Posterior tibial pulses difficult to palpate bilaterally   Pulmonary:      Effort: Pulmonary effort is normal.      Breath sounds: Normal breath sounds and air entry.   Musculoskeletal:         General: No deformity or signs of injury.      Cervical back: Neck supple.   Skin:     General: Skin is warm.      Capillary Refill: Capillary refill takes less than 2 seconds.      Coloration: Skin is not cyanotic, jaundiced or pale.      Findings: Abrasion (left temple) present. No rash.      Comments: Discoloration to great toenail right see photo below   Neurological:      General: No focal deficit present.      Mental Status: He is alert and oriented to person, place, and time. Mental status is at baseline.      Cranial Nerves: No dysarthria.      Motor: No tremor, abnormal muscle tone or seizure activity.      Gait: Gait is intact.   Psychiatric:         Attention and Perception: Attention and perception normal.         Mood and Affect: Mood and affect normal.         Speech: Speech normal.         Behavior: Behavior normal. Behavior is cooperative.         Thought Content: Thought content normal.         Cognition and Memory: Cognition and memory normal.         Judgment: Judgment normal.     CLINICAL PHOTOGRAPHS OTHER - Left great toenail discoloration (08/03/2022)    Lab Results   Component Value Date    CHLPL 122 08/03/2022    TRIG 91 08/03/2022    HDL 38 (L) 08/03/2022    LDL 66 08/03/2022    VLDL 18 08/03/2022     RHEUMATOLOGY - SCAN - RHEUMATOID ARTHRITIS IMANI [facility], 08/02/2022 (08/02/2022)           HEALTH RISK ASSESSMENT    Smoking Status:  Social History     Tobacco Use   Smoking Status Former Smoker   • Packs/day: 1.00   • Years: 51.00   • Pack years: 51.00    • Types: Cigarettes   • Start date: 1963   • Quit date: 2018   • Years since quittin.5   Smokeless Tobacco Never Used     Alcohol Consumption:  Social History     Substance and Sexual Activity   Alcohol Use Never     Fall Risk Screen:    NICOLAS Fall Risk Assessment was completed, and patient is at LOW risk for falls.Assessment completed on:8/3/2022    Depression Screening:  PHQ-2/PHQ-9 Depression Screening 8/3/2022   Retired PHQ-9 Total Score -   Retired Total Score -   Little Interest or Pleasure in Doing Things 0-->not at all   Feeling Down, Depressed or Hopeless 0-->not at all   PHQ-9: Brief Depression Severity Measure Score 0       Health Habits and Functional and Cognitive Screening:  Functional & Cognitive Status 8/3/2022   Do you have difficulty preparing food and eating? No   Do you have difficulty bathing yourself, getting dressed or grooming yourself? No   Do you have difficulty using the toilet? No   Do you have difficulty moving around from place to place? No   Do you have trouble with steps or getting out of a bed or a chair? No   Current Diet Well Balanced Diet        Current Diet Comment -   Dental Exam Up to date   Eye Exam Up to date   Exercise (times per week) 7 times per week   Current Exercises Include Yard Work;Walking;Other   Current Exercise Activities Include -   Do you need help using the phone?  No   Are you deaf or do you have serious difficulty hearing?  Yes   Do you need help with transportation? No   Do you need help shopping? No   Do you need help preparing meals?  No   Do you need help with housework?  No   Do you need help with laundry? No   Do you need help taking your medications? No   Do you need help managing money? No   Do you ever drive or ride in a car without wearing a seat belt? No   Have you felt unusual stress, anger or loneliness in the last month? No   Who do you live with? Spouse   If you need help, do you have trouble finding someone available to you? No    Have you been bothered in the last four weeks by sexual problems? Yes   Do you have difficulty concentrating, remembering or making decisions? Yes       Age-appropriate Screening Schedule:  Refer to the list below for future screening recommendations based on patient's age, sex and/or medical conditions. Orders for these recommended tests are listed in the plan section. The patient has been provided with a written plan.    Health Maintenance   Topic Date Due   • ZOSTER VACCINE (2 of 2) 08/06/2022 (Originally 5/23/2017)   • INFLUENZA VACCINE  10/01/2022   • LIPID PANEL  08/03/2023   • TDAP/TD VACCINES (2 - Td or Tdap) 01/30/2025              Assessment & Plan   CMS Preventative Services Quick Reference  Risk Factors Identified During Encounter  Cardiovascular Disease  Chronic Pain   Immunizations Discussed/Encouraged (specific Immunizations; Tdap, Prevnar 20 (Pneumococcal 20-valent conjugate) and COVID19  Polypharmacy  The above risks/problems have been discussed with the patient.  Follow up actions/plans if indicated are seen below in the Assessment/Plan Section.  Pertinent information has been shared with the patient in the After Visit Summary.    Diagnoses and all orders for this visit:    1. Medicare annual wellness visit, subsequent (Primary)    2. Malignant neoplasm of prostate (HCC)  Comments:  follow up with urology    3. Pulmonary emphysema, unspecified emphysema type (HCC)  Comments:  follow up with pulmonary, former smoker who quit following his first lung cancer screening.    4. Bronchitis, obstructive, chronic (HCC)  Comments:  continue albuterol as needed, follow up with pulmonary    5. Inflammatory polyarthropathy (HCC)  Comments:  pleased with new rheumatologist, note reviewed.   Orders:  -     CBC (No Diff)  -     Comprehensive Metabolic Panel    6. Rheumatoid arthritis of multiple sites with negative rheumatoid factor (HCC)  Comments:  follow up with rheumatology  Orders:  -     CBC (No Diff)  -      Comprehensive Metabolic Panel    7. Neuropathy associated with MGUS (HCC)  Comments:  follow up with hematology re MGUS    8. Spondylosis of cervical region without myelopathy or radiculopathy  -     gabapentin (NEURONTIN) 300 MG capsule; Take 1 capsule by mouth 3 (Three) Times a Day.  Dispense: 90 capsule; Refill: 5    9. Chronic pain syndrome  -     gabapentin (NEURONTIN) 300 MG capsule; Take 1 capsule by mouth 3 (Three) Times a Day.  Dispense: 90 capsule; Refill: 5    10. MGUS (monoclonal gammopathy of unknown significance)  Comments:  follow up with hematology    11. Bilateral cold feet  Comments:  with his former smoking history circulation needs to be further evaluated  Orders:  -     US Arterial Doppler Lower Extremity Bilateral; Future    12. Decreased pedal pulses  -     US Arterial Doppler Lower Extremity Bilateral; Future    13. Toenail deformity  Comments:  appears to be traumatic, monitor    14. Other hyperlipidemia  -     Lipid Panel    15. Essential hypertension  -     TSH+Free T4    16. Cobalamin deficiency  -     CBC (No Diff)  -     Vitamin B12 & Folate    17. Abnormal thyroid blood test  -     TSH+Free T4    18. Medication monitoring encounter  -     CBC (No Diff)  -     Comprehensive Metabolic Panel  -     Lipid Panel  -     TSH+Free T4  -     Vitamin B12 & Folate    19. Need for pneumococcal vaccination  -     Pneumococcal Conjugate Vaccine 20-Valent All    BALDEV reviewed and appropriate.      Follow Up:   Return in about 6 months (around 2/3/2023) for Controlled Rx Follow Up.     An After Visit Summary and PPPS were made available to the patient.          I spent 40 minutes caring for Elliott on this date of service. This time includes time spent by me in the following activities:preparing for the visit, reviewing tests, obtaining and/or reviewing a separately obtained history, performing a medically appropriate examination and/or evaluation , counseling and educating the  patient/family/caregiver, ordering medications, tests, or procedures and documenting information in the medical record    I spent 30 minutes on the separately reported service of [id number]. This time is not included in the time used to support the E/M service also reported today.

## 2022-08-03 NOTE — PATIENT INSTRUCTIONS
Medicare Wellness  Personal Prevention Plan of Service     Date of Office Visit:    Encounter Provider:  Ladonna Means MD  Place of Service:  [facility] PRIMARY [facility]  Patient Name: Elliott Dunn  :  1955    As part of the Medicare Wellness portion of your visit today, we are providing you with this personalized preventive plan of services (PPPS). This plan is based upon recommendations of the [place] Preventive Services Task Force (USPSTF) and the Advisory Committee on Immunization Practices (ACIP).    This lists the preventive care services that should be considered, and provides dates of when you are due. Items listed as completed are up-to-date and do not require any further intervention.    Health Maintenance   Topic Date Due    Hepatitis B (1 of 3 - Risk 3-dose series) Never done    ANNUAL WELLNESS VISIT  2022    Pneumococcal Vaccine 65+ (2 - PCV) 2022    LIPID PANEL  2022    ZOSTER VACCINE (2 of 2) 2022 (Originally 2017)    COVID-19 Vaccine (3 - Moderna risk series) 2022 (Originally 10/13/2021)    INFLUENZA VACCINE  10/01/2022    LUNG CANCER SCREENING  2023    TDAP/TD VACCINES (2 - Td or Tdap) 2025    COLORECTAL CANCER SCREENING  2032    HEPATITIS C SCREENING  Completed    AAA SCREEN (ONE-TIME)  Completed       Orders Placed This Encounter   Procedures    US Arterial Doppler Lower Extremity Bilateral     Standing Status:   Future     Standing Expiration Date:   8/3/2023     Order Specific Question:   Reason for Exam:     Answer:   cold feet, decreased circulation    Pneumococcal Conjugate Vaccine 20-Valent All    CBC (No Diff)     Order Specific Question:   Release to patient     Answer:   Immediate    Comprehensive Metabolic Panel     Order Specific Question:   Release to patient     Answer:   Immediate    Lipid Panel     Order Specific Question:   [facility] Has the patient fasted?     Answer:   Yes    TSH+Free T4      Order Specific Question:   Release to patient     Answer:   Immediate    Vitamin B12 & Folate     Order Specific Question:   Release to patient     Answer:   Immediate       Return in about 6 months (around 2/3/2023) for Controlled Rx Follow Up.

## 2022-08-04 LAB
ALBUMIN SERPL-MCNC: 3.9 G/DL (ref 3.5–5.2)
ALBUMIN/GLOB SERPL: 1.2 G/DL
ALP SERPL-CCNC: 140 U/L (ref 39–117)
ALT SERPL-CCNC: 36 U/L (ref 1–41)
AST SERPL-CCNC: 52 U/L (ref 1–40)
BILIRUB SERPL-MCNC: 0.3 MG/DL (ref 0–1.2)
BUN SERPL-MCNC: 21 MG/DL (ref 8–23)
BUN/CREAT SERPL: 21.4 (ref 7–25)
CALCIUM SERPL-MCNC: 9.1 MG/DL (ref 8.6–10.5)
CHLORIDE SERPL-SCNC: 103 MMOL/L (ref 98–107)
CHOLEST SERPL-MCNC: 122 MG/DL (ref 0–200)
CO2 SERPL-SCNC: 25.9 MMOL/L (ref 22–29)
CREAT SERPL-MCNC: 0.98 MG/DL (ref 0.76–1.27)
EGFRCR SERPLBLD CKD-EPI 2021: 84.5 ML/MIN/1.73
ERYTHROCYTE [DISTWIDTH] IN BLOOD BY AUTOMATED COUNT: 13.6 % (ref 12.3–15.4)
FOLATE SERPL-MCNC: 17.7 NG/ML (ref 4.78–24.2)
GLOBULIN SER CALC-MCNC: 3.2 GM/DL
GLUCOSE SERPL-MCNC: 79 MG/DL (ref 65–99)
HCT VFR BLD AUTO: 35.3 % (ref 37.5–51)
HDLC SERPL-MCNC: 38 MG/DL (ref 40–60)
HGB BLD-MCNC: 12 G/DL (ref 13–17.7)
LDLC SERPL CALC-MCNC: 66 MG/DL (ref 0–100)
MCH RBC QN AUTO: 32.2 PG (ref 26.6–33)
MCHC RBC AUTO-ENTMCNC: 34 G/DL (ref 31.5–35.7)
MCV RBC AUTO: 94.6 FL (ref 79–97)
PLATELET # BLD AUTO: 225 10*3/MM3 (ref 140–450)
POTASSIUM SERPL-SCNC: 4.7 MMOL/L (ref 3.5–5.2)
PROT SERPL-MCNC: 7.1 G/DL (ref 6–8.5)
RBC # BLD AUTO: 3.73 10*6/MM3 (ref 4.14–5.8)
SODIUM SERPL-SCNC: 136 MMOL/L (ref 136–145)
T4 FREE SERPL-MCNC: 0.99 NG/DL (ref 0.93–1.7)
TRIGL SERPL-MCNC: 91 MG/DL (ref 0–150)
TSH SERPL DL<=0.005 MIU/L-ACNC: 0.99 UIU/ML (ref 0.27–4.2)
VIT B12 SERPL-MCNC: 703 PG/ML (ref 211–946)
VLDLC SERPL CALC-MCNC: 18 MG/DL (ref 5–40)
WBC # BLD AUTO: 6.67 10*3/MM3 (ref 3.4–10.8)

## 2022-08-04 RX ORDER — GABAPENTIN 300 MG/1
300 CAPSULE ORAL 3 TIMES DAILY
Qty: 90 CAPSULE | Refills: 5 | Status: SHIPPED | OUTPATIENT
Start: 2022-08-04 | End: 2023-02-06 | Stop reason: SDUPTHER

## 2022-08-05 ENCOUNTER — HOSPITAL ENCOUNTER (OUTPATIENT)
Dept: PULMONOLOGY | Facility: HOSPITAL | Age: 67
Discharge: HOME | End: 2022-08-05
Admitting: INTERNAL MEDICINE

## 2022-08-05 DIAGNOSIS — J44.9 CHRONIC OBSTRUCTIVE PULMONARY DISEASE, UNSPECIFIED COPD TYPE: ICD-10-CM

## 2022-08-05 PROCEDURE — 94726 PLETHYSMOGRAPHY LUNG VOLUMES: CPT

## 2022-08-05 PROCEDURE — 94729 DIFFUSING CAPACITY: CPT | Performed by: INTERNAL MEDICINE

## 2022-08-05 PROCEDURE — 94726 PLETHYSMOGRAPHY LUNG VOLUMES: CPT | Performed by: INTERNAL MEDICINE

## 2022-08-05 PROCEDURE — 94060 EVALUATION OF WHEEZING: CPT | Performed by: INTERNAL MEDICINE

## 2022-08-05 PROCEDURE — 94060 EVALUATION OF WHEEZING: CPT

## 2022-08-05 PROCEDURE — 94729 DIFFUSING CAPACITY: CPT

## 2022-08-05 PROCEDURE — 94640 AIRWAY INHALATION TREATMENT: CPT

## 2022-08-05 RX ORDER — ALBUTEROL SULFATE 90 UG/1
2 AEROSOL, METERED RESPIRATORY (INHALATION)
Status: DISCONTINUED | OUTPATIENT
Start: 2022-08-05 | End: 2022-08-06 | Stop reason: HOSPADM

## 2022-08-05 RX ADMIN — ALBUTEROL SULFATE 2 PUFF: 90 AEROSOL, METERED RESPIRATORY (INHALATION) at 09:53

## 2022-08-08 ENCOUNTER — HOSPITAL ENCOUNTER (OUTPATIENT)
Dept: CT IMAGING | Facility: HOSPITAL | Age: 67
Discharge: HOME | End: 2022-08-08
Admitting: NURSE PRACTITIONER

## 2022-08-08 DIAGNOSIS — IMO0001 LUNG NODULE < 6CM ON CT: ICD-10-CM

## 2022-08-08 PROCEDURE — 71250 CT THORAX DX C-: CPT

## 2022-08-09 ENCOUNTER — HOSPITAL ENCOUNTER (OUTPATIENT)
Dept: ULTRASOUND IMAGING | Facility: HOSPITAL | Age: 67
Discharge: HOME | End: 2022-08-09
Admitting: FAMILY MEDICINE

## 2022-08-09 DIAGNOSIS — R09.89 DECREASED PEDAL PULSES: ICD-10-CM

## 2022-08-09 DIAGNOSIS — R20.9 BILATERAL COLD FEET: ICD-10-CM

## 2022-08-09 PROCEDURE — 93923 UPR/LXTR ART STDY 3+ LVLS: CPT

## 2022-08-10 DIAGNOSIS — R20.9 BILATERAL COLD FEET: ICD-10-CM

## 2022-08-10 DIAGNOSIS — I73.9 PAD (PERIPHERAL ARTERY DISEASE): Primary | ICD-10-CM

## 2022-08-10 DIAGNOSIS — R09.89 DECREASED PEDAL PULSES: ICD-10-CM

## 2022-08-10 DIAGNOSIS — E78.49 OTHER HYPERLIPIDEMIA: ICD-10-CM

## 2022-08-10 RX ORDER — ATORVASTATIN CALCIUM 10 MG/1
10 TABLET, FILM COATED ORAL NIGHTLY
Qty: 90 TABLET | Refills: 3 | Status: SHIPPED | OUTPATIENT
Start: 2022-08-10 | End: 2023-02-06 | Stop reason: SDUPTHER

## 2022-08-10 NOTE — PROGRESS NOTES
Moderate peripheral arterial disease bilateral legs. Stay on aspirin, statin (refill sent). Remain smoke-free. Referral placed for vascular surgery consult to ensure there's nothing else we need to be doing (to lower the chance of the circulation getting worse). Stay physically active as much as possible walking.

## 2022-08-19 RX ORDER — BISOPROLOL FUMARATE 5 MG/1
TABLET, FILM COATED ORAL
Qty: 90 TABLET | Refills: 3 | Status: SHIPPED | OUTPATIENT
Start: 2022-08-19 | End: 2023-02-06 | Stop reason: SDUPTHER

## 2022-09-14 DIAGNOSIS — K21.00 GASTROESOPHAGEAL REFLUX DISEASE WITH ESOPHAGITIS: ICD-10-CM

## 2022-09-14 RX ORDER — PANTOPRAZOLE SODIUM 40 MG/1
TABLET, DELAYED RELEASE ORAL
Qty: 90 TABLET | Refills: 0 | Status: SHIPPED | OUTPATIENT
Start: 2022-09-14 | End: 2022-12-14

## 2022-09-15 ENCOUNTER — OFFICE VISIT (OUTPATIENT)
Dept: GASTROENTEROLOGY | Facility: CLINIC | Age: 67
End: 2022-09-15

## 2022-09-15 VITALS
SYSTOLIC BLOOD PRESSURE: 122 MMHG | WEIGHT: 152 LBS | HEIGHT: 71 IN | TEMPERATURE: 97.8 F | DIASTOLIC BLOOD PRESSURE: 60 MMHG | BODY MASS INDEX: 21.28 KG/M2

## 2022-09-15 DIAGNOSIS — K21.9 GASTROESOPHAGEAL REFLUX DISEASE WITHOUT ESOPHAGITIS: ICD-10-CM

## 2022-09-15 DIAGNOSIS — K76.9 LIVER DISEASE, UNSPECIFIED: ICD-10-CM

## 2022-09-15 DIAGNOSIS — K62.7 RADIATION PROCTITIS: ICD-10-CM

## 2022-09-15 DIAGNOSIS — R79.89 ELEVATED LFTS: ICD-10-CM

## 2022-09-15 DIAGNOSIS — K25.9 GASTRIC EROSION, UNSPECIFIED CHRONICITY: ICD-10-CM

## 2022-09-15 DIAGNOSIS — R93.5 ABNORMAL CT OF THE ABDOMEN: Primary | ICD-10-CM

## 2022-09-15 DIAGNOSIS — Z12.11 COLON CANCER SCREENING: ICD-10-CM

## 2022-09-15 PROCEDURE — 99214 OFFICE O/P EST MOD 30 MIN: CPT | Performed by: PHYSICIAN ASSISTANT

## 2022-09-15 RX ORDER — POLYETHYLENE GLYCOL 3350 17 G/17G
POWDER, FOR SOLUTION ORAL
Qty: 238 G | Refills: 0 | Status: SHIPPED | OUTPATIENT
Start: 2022-09-15 | End: 2023-01-20

## 2022-09-15 RX ORDER — BISACODYL 5 MG
TABLET, DELAYED RELEASE (ENTERIC COATED) ORAL
Qty: 4 TABLET | Refills: 0 | Status: SHIPPED | OUTPATIENT
Start: 2022-09-15 | End: 2022-11-16 | Stop reason: HOSPADM

## 2022-09-15 RX ORDER — SODIUM CHLORIDE 9 MG/ML
30 INJECTION, SOLUTION INTRAVENOUS CONTINUOUS PRN
Status: CANCELLED | OUTPATIENT
Start: 2022-09-15

## 2022-09-15 NOTE — PROGRESS NOTES
Follow Up Note     Date: 09/15/2022   Patient Name: Elliott Dunn  MRN: [id number]  : 1955     Primary Care Provider: Ladonna Means MD     Chief Complaint   Patient presents with   • Follow-up   • Gastroesophageal reflux disease     History of present illness:   9/15/2022  Elliott Dunn is a 67 y.o. male who is here today for follow up regarding recent EGD and Gastroesophageal reflux disease.    He is feeling well today without complaints. He had EGD since last visit and would like to discuss those results. He was not able to have colonoscopy, plenvue prep also induced severe vomiting. He was not able to finish the prep and colonoscopy was canceled. He stopped taking celebrex after EGD was completed. He continues to take Arava.     Interval History:  2021  This patient deny any abdominal pain, change in bowel habit, hematochezia or melena.  Weight is stable except recently he lost about 5-10 pounds  after COVID infection. His CTAP done in Aug also showed sigmoid colon thickening suspiciously for colitis. Pt denies nausea vomiting or odynophagia or dysphagia. There is a history of acid reflux and also on cerebrex wit occasional reflux symptoms despite on PPI. There is no history of anemia. Prior history of EGD in  Dr. Curiel and reported as having possible Cristina's and biopsies biopsies negative for Cristina's esophagus.  Colonoscopy done  by Dr Elizondo and revealed diverticulosis and possible radiation proctitis other details unknown. No family history of colon cancer or any GI malignancy.Dad and brother had clung CA.  Denies alcohol abuse or cigarette smoking (ex smoker).   He had prostate CA and had radiation and prostatectomy. He also has MGUS  He also has RA on medication.     Subjective     Past Medical History:   Diagnosis Date   • Acquired absence of all teeth    • Allergic    • Anomalous coronary artery origin    • Arrhythmia    • Arthritis    • Arthritis of lumbar spine  07/29/2016   • Back pain 06/17/2016   • Cristina esophagus    • Cataract     REMOVED BILATERALL   • Cervical spine disease 06/17/2016   • CHF (congestive heart failure) (HCC)    • Chronic obstructive pulmonary disease (HCC)    • Colon polyps    • COVID-19 vaccine series completed    • Deafness in left ear    • Derangement of anterior horn of medial meniscus    • Difficulty swallowing    • Disorder of lumbar spine 06/17/2016   • Disorder of thoracic spine 06/17/2016   • Diverticulitis of colon    • DJD (degenerative joint disease)    • Elevated liver enzymes    • Erectile dysfunction    • Esophageal reflux    • Essential hypertension     PT DENIES SAYS IT RUNS LOW   • Glaucoma    • Hard of hearing     DEAF IN LEFT EAR NO AIDS WORN   • Heart murmur    • Hiatal hernia    • High cholesterol    • History of radiation therapy 11/24/2020    salvage pelvic XRT   • Impaired functional mobility, balance, gait, and endurance    • Inflammatory polyarthropathy (HCC)     Per Dr. Haider. Negative NEO, normal ESR, RF, anti-ccp and did not improve with prednisone per notes.   • Inguinal hernia    • Insomnia    • Lateral meniscus derangement    • Left otitis media with spontaneous rupture of eardrum    • Locking of left knee    • Low back pain    • Meniere's disease    • MGUS (monoclonal gammopathy of unknown significance)     likely diagnosis   • Murmur    • Neuromuscular disorder (HCC)    • Neuropathic arthritis    • No natural teeth    • Perforation of left tympanic membrane    • Prostate cancer (HCC)     previous CA in 2013 with prostatectomy   • Pulmonary emphysema (HCC)    • Recent shoulder injury     LEFT SHOULDER   • Rotator cuff tear arthropathy of left shoulder 03/12/2020    Added automatically from request for surgery [id number]   • Scoliosis    • Skin cancer of face     squamous cell basal   • Spina bifida occulta 06/17/2016   • Tobacco abuse 11/09/2017   • Visual impairment    • Wears glasses     READING GLASSES ONLY     Past  Surgical History:   Procedure Laterality Date   • CARDIAC CATHETERIZATION      no stent   • COLONOSCOPY     • COLONOSCOPY N/A 01/04/2022    Procedure: COLONOSCOPY with polypectomy x1;  Surgeon: Luis Callahan MD;  Location: [facility] ENDOSCOPY;  Service: Gastroenterology;  Laterality: N/A;   • ENDOSCOPY N/A 04/10/2017    Procedure: ESOPHAGOGASTRODUODENOSCOPY WITH BIOPSY;  Surgeon: Alexander Ritchie MD;  Location: [facility] ENDOSCOPY;  Service:    • ENDOSCOPY N/A 7/20/2022    Procedure: ESOPHAGOGASTRODUODENOSCOPY WITH BIOPSY ;  Surgeon: Luis aCllahan MD;  Location: [facility] ENDOSCOPY;  Service: Gastroenterology;  Laterality: N/A;   • EYE SURGERY     • HERNIA REPAIR     • INGUINAL HERNIA REPAIR Right    • INGUINAL HERNIA REPAIR     • KNEE SURGERY Left    • LYMPH NODE BIOPSY     • MULTIPLE TOOTH EXTRACTIONS     • PROSTATE SURGERY  2004   • PROSTATECTOMY  06/30/2014   • PROSTATECTOMY      Prostatectomy Radical   • SHOULDER ARTHROSCOPY Left 02/06/2020    Procedure: DIAGNOSTIC SHOULDER ARTHROSCOPY LEFT WITH LABRAL DEBRIDEMENT, SUBACROMIAL BURSECTOMY, ASSESSMENT OF LARGE FRAGMENTED RETRACTED IRREPARABLE ROTATOR CUFF TEARS;  Surgeon: Rony Pandey MD;  Location: [facility] OR;  Service: Orthopedics;  Laterality: Left;   • SKIN CANCER EXCISION  2017    both sides of body/squamous cell melanoma   • TOTAL SHOULDER ARTHROPLASTY W/ DISTAL CLAVICLE EXCISION Left 07/09/2020    Procedure: Left reverse total shoulder arthroplasty;  Surgeon: oRny Pandey MD;  Location: [facility] OR;  Service: Orthopedics;  Laterality: Left;   • TYMPANOPLASTY W/ MASTOIDECTOMY     • UMBILICAL HERNIA REPAIR       Family History   Problem Relation Age of Onset   • Diabetes Mother    • Hypertension Mother    • Obesity Mother    • Stroke Mother 65   • Arthritis Mother    • Hyperlipidemia Mother    • Vision loss Mother    • Cancer Father    • Cancer Sister    • Diabetes Brother    • Cancer Brother    • Heart attack Brother    • Alcohol abuse  Brother    • Drug abuse Brother    • Hearing loss Brother    • Learning disabilities Brother    • Colon cancer Neg Hx      Social History     Socioeconomic History   • Marital status:    Tobacco Use   • Smoking status: Former Smoker     Packs/day: 1.00     Years: 51.00     Pack years: 51.00     Types: Cigarettes     Start date: 1963     Quit date: 2018     Years since quittin.6   • Smokeless tobacco: Never Used   Vaping Use   • Vaping Use: Never used   Substance and Sexual Activity   • Alcohol use: Never   • Drug use: Never   • Sexual activity: Defer       Current Outpatient Medications:   •  albuterol sulfate HFA (Ventolin HFA) 108 (90 Base) MCG/ACT inhaler, Inhale 2 puffs Every 4 (Four) Hours As Needed for Wheezing or Shortness of Air., Disp: 18 g, Rfl: 2  •  amitriptyline (ELAVIL) 25 MG tablet, TAKE ONE TO THREE TABLET BY MOUTH EVERY NIGHT AT BEDTIME AS NEEDED FOR SLEEP, Disp: 270 tablet, Rfl: 2  •  aspirin 81 MG EC tablet, Take 81 mg by mouth Daily., Disp: , Rfl:   •  atorvastatin (LIPITOR) 10 MG tablet, Take 1 tablet by mouth Every Night. To lower cholesterol and risk of stroke (also to help with PAD), Disp: 90 tablet, Rfl: 3  •  azelastine (ASTELIN) 0.1 % nasal spray, 1 spray into the nostril(s) as directed by provider 2 (Two) Times a Day As Needed for Rhinitis or Allergies. Use in each nostril as directed, Disp: 1 each, Rfl: 5  •  bisoprolol (ZEBeta) 5 MG tablet, TAKE ONE TABLET BY MOUTH DAILY, Disp: 90 tablet, Rfl: 3  •  DULoxetine (CYMBALTA) 60 MG capsule, TAKE ONE CAPSULE BY MOUTH DAILY, Disp: 90 capsule, Rfl: 1  •  folic acid (FOLVITE) 1 MG tablet, Take 1 tablet by mouth Daily., Disp: 30 tablet, Rfl: 0  •  gabapentin (NEURONTIN) 300 MG capsule, Take 1 capsule by mouth 3 (Three) Times a Day., Disp: 90 capsule, Rfl: 5  •  isosorbide mononitrate (IMDUR) 30 MG 24 hr tablet, TAKE ONE TABLET BY MOUTH DAILY, Disp: 90 tablet, Rfl: 1  •  leflunomide (ARAVA) 20 MG tablet, Take 20 mg by mouth  Daily., Disp: , Rfl:   •  methocarbamol (ROBAXIN) 750 MG tablet, Take 1 tablet by mouth 3 (Three) Times a Day As Needed for Muscle Spasms., Disp: 270 tablet, Rfl: 3  •  methotrexate 2.5 MG tablet, Take 8 tablets by mouth 1 (One) Time Per Week. TAKES ON THURSDAY (Patient taking differently: Take 20 mg by mouth 1 (One) Time Per Week. TAKES ON Sunday ONLY TAKES 7 PILSS), Disp: 32 tablet, Rfl: 0  •  Multiple Vitamins-Minerals (MULTIVITAMIN WITH MINERALS) tablet tablet, Take 1 tablet by mouth Daily., Disp: , Rfl:   •  pantoprazole (PROTONIX) 40 MG EC tablet, TAKE ONE TABLET BY MOUTH DAILY, Disp: 90 tablet, Rfl: 0  •  predniSONE (DELTASONE) 10 MG tablet, Take 10 mg by mouth Daily As Needed. As needed, Disp: , Rfl:   •  tiotropium bromide-olodaterol (Stiolto Respimat) 2.5-2.5 MCG/ACT aerosol solution inhaler, Inhale 2 puffs Daily., Disp: 4 g, Rfl: 5  •  vitamin B-12 (CYANOCOBALAMIN) 1000 MCG tablet, TAKE ONE TABLET BY MOUTH DAILY, Disp: 30 tablet, Rfl: 8    Allergies   Allergen Reactions   • Cyclobenzaprine Unknown - Low Severity     Wide awake   • Plaquenil [Hydroxychloroquine Sulfate] Unknown - Low Severity     Black eyes   • Pravastatin Myalgia     Weakness / fatigue       The following portions of the patient's history were reviewed and updated as appropriate: allergies, current medications, past family history, past medical history, past social history, past surgical history and problem list.    Objective     Physical Exam  Constitutional:       General: He is not in acute distress.     Appearance: Normal appearance. He is well-developed. He is not diaphoretic.      Comments: pleasant   HENT:      Head: Normocephalic and atraumatic.      Right Ear: External ear normal.      Left Ear: External ear normal.      Nose: Nose normal.      Mouth/Throat:      Comments: Wearing a mask  Eyes:      General: No scleral icterus.        Right eye: No discharge.         Left eye: No discharge.      Conjunctiva/sclera: Conjunctivae  "normal.   Neck:      Trachea: No tracheal deviation.   Pulmonary:      Effort: Pulmonary effort is normal. No respiratory distress.   Musculoskeletal:         General: Normal range of motion.      Cervical back: Normal range of motion.   Skin:     Coloration: Skin is not pale.      Findings: No erythema or rash.   Neurological:      Mental Status: He is alert and oriented to person, place, and time.      Coordination: Coordination normal.   Psychiatric:         Mood and Affect: Mood normal.         Behavior: Behavior normal.         Thought Content: Thought content normal.         Judgment: Judgment normal.       Vitals:    09/15/22 0959   BP: 122/60   Temp: 97.8 °F (36.6 °C)   Weight: 68.9 kg (152 lb)   Height: 180.3 cm (71\")       Results Review:   I reviewed the patient's new clinical results.    Office Visit on 08/03/2022   Component Date Value Ref Range Status   • WBC 08/03/2022 6.67  3.40 - 10.80 10*3/mm3 Final   • RBC 08/03/2022 3.73 (A) 4.14 - 5.80 10*6/mm3 Final   • Hemoglobin 08/03/2022 12.0 (A) 13.0 - 17.7 g/dL Final   • Hematocrit 08/03/2022 35.3 (A) 37.5 - 51.0 % Final   • MCV 08/03/2022 94.6  79.0 - 97.0 fL Final   • MCH 08/03/2022 32.2  26.6 - 33.0 pg Final   • MCHC 08/03/2022 34.0  31.5 - 35.7 g/dL Final   • RDW 08/03/2022 13.6  12.3 - 15.4 % Final   • Platelets 08/03/2022 225  140 - 450 10*3/mm3 Final   • Glucose 08/03/2022 79  65 - 99 mg/dL Final   • BUN 08/03/2022 21  8 - 23 mg/dL Final   • Creatinine 08/03/2022 0.98  0.76 - 1.27 mg/dL Final   • EGFR Result 08/03/2022 84.5  >60.0 mL/min/1.73 Final   • BUN/Creatinine Ratio 08/03/2022 21.4  7.0 - 25.0 Final   • Sodium 08/03/2022 136  136 - 145 mmol/L Final   • Potassium 08/03/2022 4.7  3.5 - 5.2 mmol/L Final   • Chloride 08/03/2022 103  98 - 107 mmol/L Final   • Total CO2 08/03/2022 25.9  22.0 - 29.0 mmol/L Final   • Calcium 08/03/2022 9.1  8.6 - 10.5 mg/dL Final   • Total Protein 08/03/2022 7.1  6.0 - 8.5 g/dL Final   • Albumin 08/03/2022 3.90  " 3.50 - 5.20 g/dL Final   • Globulin 08/03/2022 3.2  gm/dL Final   • A/G Ratio 08/03/2022 1.2  g/dL Final   • Total Bilirubin 08/03/2022 0.3  0.0 - 1.2 mg/dL Final   • Alkaline Phosphatase 08/03/2022 140 (A) 39 - 117 U/L Final   • AST (SGOT) 08/03/2022 52 (A) 1 - 40 U/L Final   • ALT (SGPT) 08/03/2022 36  1 - 41 U/L Final   • Total Cholesterol 08/03/2022 122  0 - 200 mg/dL Final   • Triglycerides 08/03/2022 91  0 - 150 mg/dL Final   • HDL Cholesterol 08/03/2022 38 (A) 40 - 60 mg/dL Final   • VLDL Cholesterol Jairon 08/03/2022 18  5 - 40 mg/dL Final   • LDL Chol Calc (NIH) 08/03/2022 66  0 - 100 mg/dL Final   • TSH 08/03/2022 0.992  0.270 - 4.200 uIU/mL Final   • Free T4 08/03/2022 0.99  0.93 - 1.70 ng/dL Final    Results may be falsely increased if patient taking Biotin.   • Vitamin B-12 08/03/2022 703  211 - 946 pg/mL Final    Results may be falsely increased if patient taking Biotin.   • Folate 08/03/2022 17.70  4.78 - 24.20 ng/mL Final    Results may be falsely increased if patient taking Biotin.       CTAP 8/2021:  FINDINGS:   ABDOMEN: Bibasilar consolidation suspicious for pneumonia.  Liver is unremarkable. Gallbladder is present. Granulomas calcification  of the spleen. Both kidneys are unremarkable. Moderate aortoiliac plaque without aneurysm.  Mild fluid in the distal colon. Scattered wall prominence throughout the  colon particularly of the sigmoid. No suspect a very mild diffuse  colitis. There are diverticula but no focal changes of acute diverticulitis. No free air or adenopathy.  PELVIS: Normal appendix. Nondistended bladder. No free fluid or  adenopathy. Surgical clips related to prostatectomy with node dissection.  No evidence of osseous metastatic disease.  IMPRESSION:  1. Bilateral lower lobe consolidation consistent with pneumonia.  2. Scattered mild wall prominence of colon, particularly the sigmoid. A  very mild colitis is suspected, likely infectious.    NM PET/CT Skull Base to Mid Thigh  Result  Date: 2/2/2022  Negative PET/CT. The lungs demonstrate extensive emphysematous change with some areas of fibronodular density present, not demonstrating significant FDG hypermetabolism.       Ultrasound abdomen 10/19/2021:  FINDINGS: The visualized pancreas is unremarkable. The liver is  unremarkable. The gallbladder is unremarkable with no shadowing stones or wall thickening.  The common duct measures 3.50 mm. The right kidney measures 9.1 cm in length and is normal in echogenicity without hydronephrosis. The left kidney measures 10.2 cm in length and is normal in echogenicity without hydronephrosis. Spleen is unremarkable. The aorta is normal in caliber. The vena cava is unremarkable.  IMPRESSION:  Normal ultrasound of the abdomen.      Colonoscopy was completed by Dr. Callahan on 1/4/2022:  - The perianal and digital rectal examinations were normal.  - A 4 mm polyp was found in the sigmoid colon. The polyp was flat. The polyp was removed with a cold snare. Resection and retrieval were complete.  - Multiple small and large-mouthed diverticula were found in the sigmoid colon and descending colon.  - Extensive amounts of semi-liquid semi-solid solid stool was found in the entire colon, making visualization difficult. Lavage of the area was performed using a moderate amount of sterile water, resulting in incomplete clearance with continued poor visualization.  - The patchy increase mucosa vascular pattern in the distal rectum was developing angioectasis more in favor or radiation proctopathy.  Pathology showed colon polyp to be lymphoid aggregate.    EGD was completed by Dr. Callahan on 7/20/2022:  - The oropharynx was normal.  - The Z-line was variable and was found 40 cm from the incisors. Biopsies were taken with a cold forceps for histology, to rule out short segment Cristina's  - No gross lesions were noted in the entire esophagus. Biopsies were taken with a cold forceps for histology.  - A few dispersed small  erosions with no stigmata of recent bleeding were found on the posterior wall of the stomach, at the incisura, pre pyloric and in the gastric antrum. Biopsies were taken with a cold forceps for Helicobacter pylori testing.  - The duodenal bulb, first portion of the duodenum, second portion of the duodenum and third portion of the duodenum were normal.  Pathology DIAGNOSIS:   A.   GASTRIC ANTRUM, BIOPSY:   Mild reactive gastropathy   No H. pylori organisms identified on routine stains   Negative for intestinal metaplasia, dysplasia, or carcinoma  B.   GE JUNCTION:   Columnar mucosa with mild nonspecific chronic inflammation   Negative for intestinal phase, dysplasia, carcinoma   C.   ESOPHAGUS, BIOPSY, DISTAL:   Unremarkable squamous mucosa   Negative for dysplasia, carcinoma, or increased intraepithelial eosinophils    Assessment / Plan      1. Abnormal CT of the abdomen  2. Radiation proctitis  3. Colon cancer screening  Colonoscopy 1/4/2022 showed one small polyp in the sigmoid colon removed and benign, diverticulosis of the sigmoid and descending colon, prep was inadequate.  It was recommended after his last colonoscopy had inadequate prep that he repeat within 6 onths, this has been delayed because he has not been able to tolerate prep. So far, he has not been able to tolerate Suprep or Plenvue.     Previous colonoscopy was in 2015, full report not available.   CT scan of the abdomen pelvis done in August 2021 revealed mild sigmoid wall thickening suspicious for colitis but thought to be related to radiation proctitis and signs of this was seen on colonoscopy 1/2022.      Repeat colonoscopy will be arranged, this time with miralax prep.     He will have a colonoscopy performed with monitored anesthesia care. The indications, technique, alternatives and potential risk and complications were discussed with the patient including but not limited to bleeding, bowel perforations, missing lesions and anesthetic  complications. The patient understands and wishes to proceed with the procedure and has given their verbal consent. Written patient education information was given to the patient. He should follow up in the office after this procedure to discuss the results and further recommendations can be made at that time. The patient will call if they have further questions before procedure.  - Case Request  - polyethylene glycol (MIRALAX) 17 GM/SCOOP powder; Follow directions given at office  Dispense: 238 g; Refill: 0  - bisacodyl (Dulcolax) 5 MG EC tablet; Follow instructions given at office  Dispense: 4 tablet; Refill: 0    4. Gastroesophageal reflux disease without esophagitis  5. Gastric erosion, unspecified chronicity  Patient has a longstanding history of reflux disease.  He was previously taking Celebrex.  He still gets intermittent reflux symptoms despite on protonix 40 mg once dialy but symptoms are mild.    EGD 7/2022 showed normal esophagus, gastric erosions, normal duodenum. Pathology benign. This is Celebrex induced gastropathy, celebrex since discontinued.   Prior EGD was in 2017 by Dr. Ritchie, biopsies negative for any Cristina's esophagus.      Acid reflux measures  Continue daily PPI     6. Elevated LFTs (suspected DILI)  He has a combined hepatocellular and cholestatic pattern of elevated liver enzymes since about 3 to 4 years now on chart review.  His liver numbers gradually worsened until Jan 2022 in which they improved some and have been stable since. Last labs were in Aug 2022. ALT now normal. AST only mildly increased at 52.  Alk phos 140. He does not have any metabolic syndrome.  No history suggestive any chronic hepatitis.  US abd 10/2021 showed normal liver and spleen. Labs 10/2021 showed alpha-1 antitrypsin normal, NEO negative, anti-smooth muscle antibody normal, antimitochondrial antibodies normal, serum iron normal, ferritin elevated, INR normal, hepatitis B surface antigen negative, hepatitis B  surface antibody negative, hepatitis B core total antibody negative, hepatitis A total antibody positive. Patient is on Arava (leflunomide) for the last few years.  I suspect this could be drug-induced liver injury from Arava use.      He is immune to both hepatitis A and B, no vaccinations recommended  Consider reducing the dose of Arava  After discussion with Dr. Callahan, liver biopsy suggested, patient would like to defer for now  Plan to re-check liver enzymes again within 3 months (CBC, CMP, INR), orders placed               Follow Up:   Return for follow up after procedure to discuss results.      Argentina Lane PA-C  [facility]  9/15/2022  13:34 EDT    Dictated Utilizing Dragon Dictation: Part of this note may be an electronic transcription/translation of spoken language to printed text using the Dragon Dictation System.

## 2022-10-25 ENCOUNTER — OFFICE VISIT (OUTPATIENT)
Dept: PULMONOLOGY | Facility: CLINIC | Age: 67
End: 2022-10-25

## 2022-10-25 VITALS
RESPIRATION RATE: 16 BRPM | SYSTOLIC BLOOD PRESSURE: 122 MMHG | BODY MASS INDEX: 21.14 KG/M2 | OXYGEN SATURATION: 96 % | WEIGHT: 151 LBS | DIASTOLIC BLOOD PRESSURE: 84 MMHG | HEART RATE: 80 BPM | HEIGHT: 71 IN | TEMPERATURE: 97 F

## 2022-10-25 DIAGNOSIS — R06.02 SHORTNESS OF BREATH: Primary | ICD-10-CM

## 2022-10-25 DIAGNOSIS — Z14.8 ALPHA-1-ANTITRYPSIN DEFICIENCY CARRIER: ICD-10-CM

## 2022-10-25 DIAGNOSIS — R91.8 MULTIPLE NODULES OF LUNG: ICD-10-CM

## 2022-10-25 DIAGNOSIS — J30.89 OTHER ALLERGIC RHINITIS: ICD-10-CM

## 2022-10-25 DIAGNOSIS — Z23 FLU VACCINE NEED: ICD-10-CM

## 2022-10-25 DIAGNOSIS — J44.9 CHRONIC OBSTRUCTIVE PULMONARY DISEASE, UNSPECIFIED COPD TYPE: Primary | ICD-10-CM

## 2022-10-25 PROCEDURE — G0008 ADMIN INFLUENZA VIRUS VAC: HCPCS | Performed by: NURSE PRACTITIONER

## 2022-10-25 PROCEDURE — 99214 OFFICE O/P EST MOD 30 MIN: CPT | Performed by: NURSE PRACTITIONER

## 2022-10-25 PROCEDURE — 90662 IIV NO PRSV INCREASED AG IM: CPT | Performed by: NURSE PRACTITIONER

## 2022-10-25 RX ORDER — AZELASTINE 1 MG/ML
1 SPRAY, METERED NASAL 2 TIMES DAILY PRN
Qty: 1 EACH | Refills: 5 | Status: SHIPPED | OUTPATIENT
Start: 2022-10-25 | End: 2023-02-06 | Stop reason: SDUPTHER

## 2022-10-25 RX ORDER — TIOTROPIUM BROMIDE AND OLODATEROL 3.124; 2.736 UG/1; UG/1
2 SPRAY, METERED RESPIRATORY (INHALATION) DAILY
Qty: 4 G | Refills: 5 | Status: SHIPPED | OUTPATIENT
Start: 2022-10-25 | End: 2023-01-23 | Stop reason: SDUPTHER

## 2022-10-25 RX ORDER — ALBUTEROL SULFATE 90 UG/1
2 AEROSOL, METERED RESPIRATORY (INHALATION) EVERY 4 HOURS PRN
Qty: 18 G | Refills: 2 | Status: SHIPPED | OUTPATIENT
Start: 2022-10-25 | End: 2023-01-23 | Stop reason: SDUPTHER

## 2022-10-25 NOTE — PROGRESS NOTES
"Chief Complaint   Patient presents with   • Breathing Problem     followup         Subjective   Elliott Dunn is a 67 y.o. male.     History of Present Illness   Patient comes today for follow up of shortness of breath and COPD.     Symptoms have been stable since the last clinic visit. Patient reports no recent exacerbations.     Patient is using medications, as prescribed. He uses Stiolto daily. He does not use the rescue inhaler but carries it with him.     He uses Astelin daily and feel is helps.     He takes prednisone prn for arthritis flare ups.     Exercise tolerance has also remained stable.     Quit smoking 4-5 years ago.    He c/o poor blood flow in legs and states he has an appt with a specialist in [place].     He uses oxygen at night.    The following portions of the patient's history were reviewed and updated as appropriate: allergies, current medications, past family history, past medical history, past social history and past surgical history.    Review of Systems   Constitutional: Negative for chills.   HENT: Negative for sneezing.    Respiratory: Positive for shortness of breath and wheezing.    Psychiatric/Behavioral: Negative for sleep disturbance.       Objective   Visit Vitals  /84   Pulse 80   Temp 97 °F (36.1 °C)   Resp 16   Ht 180.3 cm (71\")   Wt 68.5 kg (151 lb)   SpO2 96%   BMI 21.06 kg/m²         Physical Exam  Vitals reviewed.   HENT:      Head: Atraumatic.      Mouth/Throat:      Mouth: Mucous membranes are moist.      Comments: Edentulous.  Eyes:      Extraocular Movements: Extraocular movements intact.   Cardiovascular:      Rate and Rhythm: Normal rate and regular rhythm.   Pulmonary:      Effort: Pulmonary effort is normal. No respiratory distress.      Comments: Somewhat decreased A/E without wheezing.  Musculoskeletal:      Cervical back: Neck supple.      Comments: Gait normal.   Skin:     General: Skin is warm.   Neurological:      Mental Status: He is alert and " oriented to person, place, and time.             Assessment & Plan   Diagnoses and all orders for this visit:    1. Chronic obstructive pulmonary disease, unspecified COPD type (HCC) (Primary)    2. Multiple nodules of lung    3. Other allergic rhinitis    4. Alpha-1-antitrypsin deficiency carrier    5. Flu vaccine need    Other orders  -     Fluzone High-Dose 65+yrs ([id number])  -     tiotropium bromide-olodaterol (Stiolto Respimat) 2.5-2.5 MCG/ACT aerosol solution inhaler; Inhale 2 puffs Daily.  Dispense: 4 g; Refill: 5  -     azelastine (ASTELIN) 0.1 % nasal spray; 1 spray into the nostril(s) as directed by provider 2 (Two) Times a Day As Needed for Rhinitis or Allergies. Use in each nostril as directed  Dispense: 1 each; Refill: 5  -     albuterol sulfate HFA (Ventolin HFA) 108 (90 Base) MCG/ACT inhaler; Inhale 2 puffs Every 4 (Four) Hours As Needed for Wheezing or Shortness of Air.  Dispense: 18 g; Refill: 2           Return in about 6 months (around 4/25/2023) for Recheck, For Dr. Dickson.    DISCUSSION (if any):  His PFT from August 2022 is consistent with mild obstruction.     No change to the current medications has been made. Overall, COPD symptoms are stable. He should continue using Stiolto daily and KATIE as needed.    He should continue using Astelin on a regular basis since he has benefited from the nasal spray.    He should continue using oxygen at night.    Weight stable and patient says he has good appetite.    Compliance with medications stressed.     Side effects of prescribed medications discussed with the patient.    The patient belongs to the risk group for which lung cancer screening has been recommended. He will be due annual LDCT screening in August 2023.    Study Result    Narrative & Impression   Examination: CT CHEST WO CONTRAST DIAGNOSTIC-     Date of Exam: 8/8/2022 11:52 AM     Indication: abnormal CT; R91.1-Solitary pulmonary nodule.     Comparison: 2/21/2019 and PET/CT 2/2/2022.      Technique: Non-contrast axial volumetric CT imaging of the chest was  performed. Automated exposure control and iterative reconstruction  methods were used.     Findings:  There are multiple stable nodular and spiculated areas of scarring in  the right upper lobe. No new lung nodules. There is severe emphysema.  There is mild diffuse peribronchial thickening in the lungs and multiple  areas of pleural thickening. There is a calcified granuloma in the left  lung base. No pneumothorax, pleural effusion or focal pneumonia.     The thyroid, trachea and esophagus appear within normal limits. There  are multiple calcified mediastinal granulomas. There are minimal  coronary artery calcifications. No pericardial effusion or mediastinal  lymphadenopathy.     There is a left shoulder prosthesis in place. There are no acute osseous  abnormalities or destructive bone lesions. There are no significant  thoracic degenerative changes. Subcutaneous fat and underlying  musculature appear within normal limits. There are no acute findings in  the upper abdomen. Patient appears status post cholecystectomy.     IMPRESSION:     1. No acute cardiopulmonary abnormality.  2. Stable nodular areas of scarring present in the right upper lobe  without new or enlarging nodule.  3. Severe emphysema.     This report was finalized on 8/8/2022 2:45 PM by Yehuda Hanna MD.          A flu vaccine has been ordered today.      Dictated utilizing Dragon dictation.    This document was electronically signed by MARK Corral October 25, 2022  10:31 EDT

## 2022-10-28 NOTE — PROGRESS NOTES
CHIEF COMPLAINT: ***      HISTORY OF PRESENT ILLNESS:  The patient is a 61 y.o. male, here for follow up on management of ***      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   • Back pain 6/17/2016   • Cancer      prostate   • Cervical spine disease 6/17/2016   • Derangement of anterior horn of medial meniscus    • Disorder of lumbar spine 6/17/2016   • Disorder of thoracic spine 6/17/2016   • Diverticulitis of colon    • Esophageal reflux    • Glaucoma    • Heart murmur    • High cholesterol    • Lateral meniscus derangement    • Left otitis media with spontaneous rupture of eardrum    • Locking of left knee    • MGUS (monoclonal gammopathy of unknown significance)      likely diagnosis   • Neuropathic arthritis    • Perforation of left tympanic membrane    • Skin cancer      skin cancer   • Spina bifida occulta 6/17/2016     Past Surgical History   Procedure Laterality Date   • Knee surgery Left    • Prostate surgery  2004   • Prostatectomy  06/30/2014   • Tympanoplasty w/ mastoidectomy     • Prostatectomy       Prostatectomy Radical   • Skin cancer excision  2017     both sides of body/squamous cell melanoma       Allergies   Allergen Reactions   • Pravastatin Other (See Comments)     Weakness / fatigue       Family History and Social History reviewed and changed as necessary      REVIEW OF SYSTEM:   Review of Systems   Constitutional: Negative for appetite change, chills, diaphoresis, fatigue, fever and unexpected weight change.   HENT:   Negative for mouth sores, sore throat and trouble swallowing.    Eyes: Negative for icterus.   Respiratory: Negative for cough, hemoptysis and shortness of breath.    Cardiovascular: Negative for chest pain, leg swelling and palpitations.   Gastrointestinal: Negative for abdominal distention, abdominal pain, blood in stool, constipation, diarrhea, nausea and vomiting.   Endocrine: Negative for hot flashes.  Chronic issue, previously referred to [facility] but has not yet gone   He would like to be referred again to have remaining tooth extracted, referral placed   No sx/sx infection on exam   Genitourinary: Negative for bladder incontinence, difficulty urinating, dysuria, frequency and hematuria.    Musculoskeletal: Negative for gait problem, neck pain and neck stiffness.   Skin: Negative for rash.   Neurological: Negative for dizziness, gait problem, headaches, light-headedness and numbness.   Hematological: Negative for adenopathy. Does not bruise/bleed easily.   Psychiatric/Behavioral: Negative for depression. The patient is not nervous/anxious.    All other systems reviewed and are negative.       PHYSICAL EXAM    Vitals:    03/09/17 1009   BP: 118/69   Pulse: 58   Resp: 15   Temp: 97.7 °F (36.5 °C)   TempSrc: Temporal Artery    Weight: 147 lb (66.7 kg)     Constitutional: Appears well-developed and well-nourished. No distress.   ECOG: {findings; ecog performance status:94843}  HENT:   Head: Normocephalic.   Mouth/Throat: Oropharynx is clear and moist.   Eyes: Conjunctivae are normal. Pupils are equal, round, and reactive to light. No scleral icterus.   Neck: Neck supple. No JVD present. No thyromegaly present.   Cardiovascular: Normal rate, regular rhythm and normal heart sounds.    Pulmonary/Chest: Breath sounds normal. No respiratory distress.   Abdominal: Soft. Exhibits no distension and no mass. There is no hepatosplenomegaly. There is no tenderness. There is no rebound and no guarding.   Musculoskeletal:Exhibits no edema, tenderness or deformity.   Neurological: Alert and oriented to person, place, and time. Exhibits normal muscle tone.   Skin: No ecchymosis, no petechiae and no rash noted. Not diaphoretic. No cyanosis. Nails show no clubbing.   Psychiatric: Normal mood and affect.   Vitals reviewed.      Hospital Outpatient Visit on 03/06/2017   Component Date Value Ref Range Status   • Glucose 03/06/2017 110  70 - 130 mg/dL Final   Lab on 02/09/2017   Component Date Value Ref Range Status   • WBC 02/09/2017 16.19* 4.80 - 10.80 10*3/mm3 Final   • RBC 02/09/2017 5.12  4.70 - 6.10 10*6/mm3  Final   • Hemoglobin 02/09/2017 16.7  14.0 - 18.0 g/dL Final   • Hematocrit 02/09/2017 49.0  42.0 - 52.0 % Final   • MCV 02/09/2017 95.7* 80.0 - 94.0 fL Final   • MCH 02/09/2017 32.6* 27.0 - 31.0 pg Final   • MCHC 02/09/2017 34.1  30.0 - 37.0 g/dL Final   • RDW 02/09/2017 13.4  11.5 - 14.5 % Final   • RDW-SD 02/09/2017 48.0  37.0 - 54.0 fl Final   • MPV 02/09/2017 10.4  6.0 - 12.0 fL Final   • Platelets 02/09/2017 327  130 - 400 10*3/mm3 Final   • Neutrophil % 02/09/2017 84.6* 37.0 - 80.0 % Final   • Lymphocyte % 02/09/2017 11.2  10.0 - 50.0 % Final   • Monocyte % 02/09/2017 2.7  0.0 - 12.0 % Final   • Eosinophil % 02/09/2017 0.1  0.0 - 7.0 % Final   • Basophil % 02/09/2017 0.4  0.0 - 2.5 % Final   • Immature Grans % 02/09/2017 1.0* 0.0 - 0.6 % Final   • Neutrophils, Absolute 02/09/2017 13.69* 2.00 - 6.90 10*3/mm3 Final   • Lymphocytes, Absolute 02/09/2017 1.81  0.60 - 3.40 10*3/mm3 Final   • Monocytes, Absolute 02/09/2017 0.44  0.00 - 0.90 10*3/mm3 Final   • Eosinophils, Absolute 02/09/2017 0.02  0.00 - 0.70 10*3/mm3 Final   • Basophils, Absolute 02/09/2017 0.07  0.00 - 0.20 10*3/mm3 Final   • Immature Grans, Absolute 02/09/2017 0.16* 0.00 - 0.06 10*3/mm3 Final   • nRBC 02/09/2017 0.0  0.0 - 0.0 /100 WBC Final       Assessment/Plan     1.  ***       Errors in dictation may reflect use of voice recognition software and not all errors in transcription may have been detected prior to signing.    Iban Abdi MD    03/09/2017

## 2022-11-14 NOTE — PRE-PROCEDURE INSTRUCTIONS
PAT phone history completed with pt for upcoming procedure on 11/16/22, with Dr Callahan.     PAT PASS GIVEN/REVIEWED WITH PT.  VERBALIZED UNDERSTANDING OF THE FOLLOWING:  DO NOT EAT, DRINK, SMOKE, USE SMOKELESS TOBACCO OR CHEW GUM AFTER MIDNIGHT THE NIGHT BEFORE SURGERY.  THIS ALSO INCLUDES HARD CANDIES AND MINTS.    DO NOT SHAVE THE AREA TO BE OPERATED ON AT LEAST 48 HOURS PRIOR TO THE PROCEDURE.  DO NOT WEAR MAKE UP OR NAIL POLISH.  DO NOT LEAVE IN ANY PIERCING OR WEAR JEWELRY THE DAY OF SURGERY.      DO NOT USE ADHESIVES IF YOU WEAR DENTURES.    DO NOT WEAR EYE CONTACTS; BRING IN YOUR GLASSES.    ONLY TAKE MEDICATION THE MORNING OF YOUR PROCEDURE IF INSTRUCTED BY YOUR SURGEON WITH ENOUGH WATER TO SWALLOW THE MEDICATION.  IF YOUR SURGEON DID NOT SPECIFY WHICH MEDICATIONS TO TAKE, YOU WILL NEED TO CALL THEIR OFFICE FOR FURTHER INSTRUCTIONS AND DO AS THEY INSTRUCT.    LEAVE ANYTHING YOU CONSIDER VALUABLE AT HOME.    YOU WILL NEED TO ARRANGE FOR SOMEONE TO DRIVE YOU HOME AFTER SURGERY.  IT IS RECOMMENDED THAT YOU DO NOT DRIVE, WORK, DRINK ALCOHOL OR MAKE MAJOR DECISIONS FOR AT LEAST 24 HOURS AFTER YOUR PROCEDURE IS COMPLETE.      THE DAY OF YOUR PROCEDURE, BRING IN THE FOLLOWING IF APPLICABLE:   PICTURE ID AND INSURANCE/MEDICARE OR MEDICAID CARDS/ANY CO-PAY THAT MAY BE DUE   COPY OF ADVANCED DIRECTIVE/LIVING WILL/POWER OR    CPAP/BIPAP/INHALERS   SKIN PREP SHEET   YOUR PREADMISSION TESTING PASS (IF NOT A PHONE HISTORY)

## 2022-11-16 ENCOUNTER — HOSPITAL ENCOUNTER (OUTPATIENT)
Facility: HOSPITAL | Age: 67
Setting detail: HOSPITAL OUTPATIENT SURGERY
Discharge: HOME | End: 2022-11-16
Attending: INTERNAL MEDICINE | Admitting: INTERNAL MEDICINE

## 2022-11-16 ENCOUNTER — ANESTHESIA (OUTPATIENT)
Dept: GASTROENTEROLOGY | Facility: HOSPITAL | Age: 67
End: 2022-11-16

## 2022-11-16 ENCOUNTER — ANESTHESIA EVENT (OUTPATIENT)
Dept: GASTROENTEROLOGY | Facility: HOSPITAL | Age: 67
End: 2022-11-16

## 2022-11-16 VITALS
TEMPERATURE: 97.6 F | SYSTOLIC BLOOD PRESSURE: 146 MMHG | HEIGHT: 72 IN | HEART RATE: 55 BPM | WEIGHT: 147 LBS | BODY MASS INDEX: 19.91 KG/M2 | DIASTOLIC BLOOD PRESSURE: 90 MMHG | OXYGEN SATURATION: 96 % | RESPIRATION RATE: 20 BRPM

## 2022-11-16 DIAGNOSIS — R93.5 ABNORMAL CT OF THE ABDOMEN: ICD-10-CM

## 2022-11-16 PROCEDURE — 25010000002 PROPOFOL 1000 MG/100ML EMULSION: Performed by: NURSE ANESTHETIST, CERTIFIED REGISTERED

## 2022-11-16 PROCEDURE — G0121 COLON CA SCRN NOT HI RSK IND: HCPCS | Performed by: INTERNAL MEDICINE

## 2022-11-16 RX ORDER — SODIUM CHLORIDE, SODIUM LACTATE, POTASSIUM CHLORIDE, CALCIUM CHLORIDE 600; 310; 30; 20 MG/100ML; MG/100ML; MG/100ML; MG/100ML
1000 INJECTION, SOLUTION INTRAVENOUS CONTINUOUS
Status: CANCELLED | OUTPATIENT
Start: 2022-11-16

## 2022-11-16 RX ORDER — SODIUM CHLORIDE 9 MG/ML
30 INJECTION, SOLUTION INTRAVENOUS CONTINUOUS PRN
Status: DISCONTINUED | OUTPATIENT
Start: 2022-11-16 | End: 2022-11-16 | Stop reason: HOSPADM

## 2022-11-16 RX ORDER — SODIUM CHLORIDE 0.9 % (FLUSH) 0.9 %
10 SYRINGE (ML) INJECTION AS NEEDED
Status: DISCONTINUED | OUTPATIENT
Start: 2022-11-16 | End: 2022-11-16 | Stop reason: HOSPADM

## 2022-11-16 RX ORDER — PROPOFOL 10 MG/ML
INJECTION, EMULSION INTRAVENOUS AS NEEDED
Status: DISCONTINUED | OUTPATIENT
Start: 2022-11-16 | End: 2022-11-16 | Stop reason: SURG

## 2022-11-16 RX ORDER — LIDOCAINE HYDROCHLORIDE 20 MG/ML
INJECTION, SOLUTION INTRAVENOUS AS NEEDED
Status: DISCONTINUED | OUTPATIENT
Start: 2022-11-16 | End: 2022-11-16 | Stop reason: SURG

## 2022-11-16 RX ORDER — SIMETHICONE 20 MG/.3ML
EMULSION ORAL AS NEEDED
Status: DISCONTINUED | OUTPATIENT
Start: 2022-11-16 | End: 2022-11-16 | Stop reason: HOSPADM

## 2022-11-16 RX ADMIN — PROPOFOL 100 MG: 10 INJECTION, EMULSION INTRAVENOUS at 11:45

## 2022-11-16 RX ADMIN — LIDOCAINE HYDROCHLORIDE 60 MG: 20 INJECTION, SOLUTION INTRAVENOUS at 11:45

## 2022-11-16 RX ADMIN — PROPOFOL 50 MG: 10 INJECTION, EMULSION INTRAVENOUS at 11:52

## 2022-11-16 RX ADMIN — SODIUM CHLORIDE 30 ML/HR: 9 INJECTION, SOLUTION INTRAVENOUS at 09:12

## 2022-11-16 NOTE — ANESTHESIA POSTPROCEDURE EVALUATION
Patient: Elliott Dunn    Procedure Summary     Date: 11/16/22 Room / Location: [facility]    Anesthesia Start: 1144 Anesthesia Stop: 1202    Procedure: COLONOSCOPY  (Anus) Diagnosis:       Abnormal CT of the abdomen      (Abnormal CT of the abdomen [R93.5])    Surgeons: Luis Callahan MD Provider: Devaughn Carbajal CRNA    Anesthesia Type: MAC ASA Status: 3          Anesthesia Type: MAC    Vitals  No vitals data found for the desired time range.          Post Anesthesia Care and Evaluation    Patient location during evaluation: bedside  Patient participation: complete - patient participated  Level of consciousness: awake  Pain score: 0  Pain management: adequate    Airway patency: patent  Anesthetic complications: No anesthetic complications  PONV Status: controlled  Cardiovascular status: acceptable and stable  Respiratory status: acceptable and room air  Hydration status: acceptable    Comments: Vital signs as noted in nursing documentation as per protocol

## 2022-11-16 NOTE — ANESTHESIA PREPROCEDURE EVALUATION
Anesthesia Evaluation     Patient summary reviewed and Nursing notes reviewed   NPO Solid Status: > 8 hours  NPO Liquid Status: > 8 hours           Airway   Mallampati: I  TM distance: >3 FB  Neck ROM: full  Possible difficult intubation  Dental    (+) edentulous    Pulmonary - normal exam    breath sounds clear to auscultation  (+) a smoker (used tobacco today) Former Abstained day of surgery, COPD, shortness of breath,   Cardiovascular - normal exam    Rhythm: regular  Rate: normal    (+) hypertension well controlled, valvular problems/murmurs murmur, dysrhythmias Tachycardia, hyperlipidemia,   (-) CAD      Neuro/Psych  (+) dizziness/light headedness, numbness,    GI/Hepatic/Renal/Endo    (+)  hiatal hernia, GERD well controlled,      Musculoskeletal     (+) back pain,   Abdominal    Substance History - negative use     OB/GYN          Other   arthritis,    history of cancer (prostate)                      Anesthesia Plan    ASA 3     MAC     intravenous induction     Anesthetic plan, risks, benefits, and alternatives have been provided, discussed and informed consent has been obtained with: patient.  Pre-procedure education provided

## 2022-11-16 NOTE — DISCHARGE INSTRUCTIONS
- Discharge patient to home (ambulatory).   - High fiber diet.   - Continue present medications.   - Cologuard Test this yr and every 3 years  - Return to my office in 8 weeks.   To assist you in voiding:  Drink plenty of fluids  Listen to running water while attempting to void.    If you are unable to urinate and you have an uncomfortable urge to void or it has been   6 hours since you were discharged, return to the Emergency Room Please follow all post op instructions and follow up appointment time from your physician's office included in your discharge packet.  .  Rest today  No pushing,pulling,tugging,heavy lifting, or strenuous activity   No major decision making,driving,or drinking alcoholic beverages for 24 hours due to the sedation you received  Always use good hand hygiene/washing technique  No driving on pain medication.

## 2022-11-16 NOTE — H&P
[facility]  HISTORY AND PHYSICAL    Patient Name: Elliott Dunn  : 1955  MRN: [id number]    Chief Complaint:   For screening colonoscopy    History Of Presenting Illness:      Last colon 2022 poor prep    Past Medical History:   Diagnosis Date   • Acquired absence of all teeth    • Allergic    • Anomalous coronary artery origin    • Arrhythmia    • Arthritis    • Arthritis of lumbar spine 2016   • Back pain 2016   • Cristina esophagus    • Cataract     REMOVED BILATERALL   • Cervical spine disease 2016   • CHF (congestive heart failure) (Formerly [facility])    • Chronic obstructive pulmonary disease (HCC)    • Colon polyps    • COVID-19 vaccine series completed    • Deafness in left ear    • Derangement of anterior horn of medial meniscus    • Difficulty swallowing    • Disorder of lumbar spine 2016   • Disorder of thoracic spine 2016   • Diverticulitis of colon    • DJD (degenerative joint disease)    • Elevated cholesterol    • Elevated liver enzymes    • Erectile dysfunction    • Esophageal reflux    • Essential hypertension     PT DENIES SAYS IT RUNS LOW   • Glaucoma    • Hard of hearing     DEAF IN LEFT EAR NO AIDS WORN   • Heart murmur    • Hiatal hernia    • High cholesterol    • History of radiation therapy 2020    salvage pelvic XRT   • Impaired functional mobility, balance, gait, and endurance    • Inflammatory polyarthropathy (HCC)     Per Dr. Haider. Negative NEO, normal ESR, RF, anti-ccp and did not improve with prednisone per notes.   • Inguinal hernia    • Insomnia    • Lateral meniscus derangement    • Left otitis media with spontaneous rupture of eardrum    • Locking of left knee    • Low back pain    • Meniere's disease    • MGUS (monoclonal gammopathy of unknown significance)     likely diagnosis   • Murmur    • Neuromuscular disorder (HCC)    • Neuropathic arthritis    • No natural teeth    • Perforation of left tympanic membrane    • Prostate  cancer (HCC)     previous CA in 2013 with prostatectomy   • Pulmonary emphysema (HCC)    • Recent shoulder injury     LEFT SHOULDER   • Rotator cuff tear arthropathy of left shoulder 03/12/2020    Added automatically from request for surgery [id number]   • Scoliosis    • Skin cancer of face     squamous cell basal   • Spina bifida occulta 06/17/2016   • Tobacco abuse 11/09/2017   • Visual impairment    • Wears glasses     READING GLASSES ONLY       Past Surgical History:   Procedure Laterality Date   • CARDIAC CATHETERIZATION      no stent   • COLONOSCOPY     • COLONOSCOPY N/A 01/04/2022    Procedure: COLONOSCOPY with polypectomy x1;  Surgeon: Luis Callahan MD;  Location: [facility] ENDOSCOPY;  Service: Gastroenterology;  Laterality: N/A;   • ENDOSCOPY N/A 04/10/2017    Procedure: ESOPHAGOGASTRODUODENOSCOPY WITH BIOPSY;  Surgeon: Alexander Ritchie MD;  Location: [facility] ENDOSCOPY;  Service:    • ENDOSCOPY N/A 7/20/2022    Procedure: ESOPHAGOGASTRODUODENOSCOPY WITH BIOPSY ;  Surgeon: Luis Callahan MD;  Location: [facility] ENDOSCOPY;  Service: Gastroenterology;  Laterality: N/A;   • EYE SURGERY     • HERNIA REPAIR     • INGUINAL HERNIA REPAIR Right    • INGUINAL HERNIA REPAIR     • KNEE SURGERY Left    • LYMPH NODE BIOPSY     • MULTIPLE TOOTH EXTRACTIONS     • PROSTATE SURGERY  2004   • PROSTATECTOMY  06/30/2014   • PROSTATECTOMY      Prostatectomy Radical   • SHOULDER ARTHROSCOPY Left 02/06/2020    Procedure: DIAGNOSTIC SHOULDER ARTHROSCOPY LEFT WITH LABRAL DEBRIDEMENT, SUBACROMIAL BURSECTOMY, ASSESSMENT OF LARGE FRAGMENTED RETRACTED IRREPARABLE ROTATOR CUFF TEARS;  Surgeon: Rony Pandey MD;  Location: [facility] OR;  Service: Orthopedics;  Laterality: Left;   • SKIN CANCER EXCISION  2017    both sides of body/squamous cell melanoma   • TOTAL SHOULDER ARTHROPLASTY W/ DISTAL CLAVICLE EXCISION Left 07/09/2020    Procedure: Left reverse total shoulder arthroplasty;  Surgeon: Rony Pandey MD;  Location:   [facility];  Service: Orthopedics;  Laterality: Left;   • TYMPANOPLASTY W/ MASTOIDECTOMY     • UMBILICAL HERNIA REPAIR         Social History     Socioeconomic History   • Marital status:    Tobacco Use   • Smoking status: Former     Packs/day: 1.00     Years: 51.00     Pack years: 51.00     Types: Cigarettes     Start date: 1963     Quit date: 2018     Years since quittin.8   • Smokeless tobacco: Never   Vaping Use   • Vaping Use: Never used   Substance and Sexual Activity   • Alcohol use: Never   • Drug use: Never   • Sexual activity: Defer       Family History   Problem Relation Age of Onset   • Diabetes Mother    • Hypertension Mother    • Obesity Mother    • Stroke Mother 65   • Arthritis Mother    • Hyperlipidemia Mother    • Vision loss Mother    • Cancer Father    • Cancer Sister    • Diabetes Brother    • Cancer Brother    • Heart attack Brother    • Alcohol abuse Brother    • Drug abuse Brother    • Hearing loss Brother    • Learning disabilities Brother    • Colon cancer Neg Hx        Prior to Admission Medications:  Medications Prior to Admission   Medication Sig Dispense Refill Last Dose   • albuterol sulfate HFA (Ventolin HFA) 108 (90 Base) MCG/ACT inhaler Inhale 2 puffs Every 4 (Four) Hours As Needed for Wheezing or Shortness of Air. 18 g 2 Past Week   • amitriptyline (ELAVIL) 25 MG tablet TAKE ONE TO THREE TABLET BY MOUTH EVERY NIGHT AT BEDTIME AS NEEDED FOR SLEEP (Patient taking differently: Take 25 mg by mouth Every Night. TAKE ONE TO THREE TABLET BY MOUTH EVERY NIGHT AT BEDTIME AS NEEDED FOR SLEEP) 270 tablet 2 11/15/2022   • aspirin 81 MG EC tablet Take 81 mg by mouth Daily.   2022   • atorvastatin (LIPITOR) 10 MG tablet Take 1 tablet by mouth Every Night. To lower cholesterol and risk of stroke (also to help with PAD) 90 tablet 3 11/15/2022   • azelastine (ASTELIN) 0.1 % nasal spray 1 spray into the nostril(s) as directed by provider 2 (Two) Times a Day As Needed for  Rhinitis or Allergies. Use in each nostril as directed 1 each 5 Past Week   • bisacodyl (Dulcolax) 5 MG EC tablet Follow instructions given at office 4 tablet 0 11/15/2022   • bisoprolol (ZEBeta) 5 MG tablet TAKE ONE TABLET BY MOUTH DAILY (Patient taking differently: Take 5 mg by mouth Daily.) 90 tablet 3 11/15/2022   • DULoxetine (CYMBALTA) 60 MG capsule TAKE ONE CAPSULE BY MOUTH DAILY (Patient taking differently: 60 mg Daily.) 90 capsule 1 11/15/2022   • folic acid (FOLVITE) 1 MG tablet Take 1 tablet by mouth Daily. 30 tablet 0 11/15/2022   • gabapentin (NEURONTIN) 300 MG capsule Take 1 capsule by mouth 3 (Three) Times a Day. (Patient taking differently: Take 300 mg by mouth Daily.) 90 capsule 5 11/15/2022   • isosorbide mononitrate (IMDUR) 30 MG 24 hr tablet TAKE ONE TABLET BY MOUTH DAILY (Patient taking differently: 30 mg Daily.) 90 tablet 1 11/15/2022   • leflunomide (ARAVA) 20 MG tablet Take 20 mg by mouth Daily.   11/15/2022   • methocarbamol (ROBAXIN) 750 MG tablet Take 1 tablet by mouth 3 (Three) Times a Day As Needed for Muscle Spasms. (Patient taking differently: Take 750 mg by mouth Daily.) 270 tablet 3 11/15/2022   • methotrexate 2.5 MG tablet Take 8 tablets by mouth 1 (One) Time Per Week. TAKES ON THURSDAY (Patient taking differently: Take 20 mg by mouth 1 (One) Time Per Week. TAKES ON Sunday ONLY TAKES 7 PILSS) 32 tablet 0 11/15/2022   • Multiple Vitamins-Minerals (MULTIVITAMIN WITH MINERALS) tablet tablet Take 1 tablet by mouth Daily.   11/15/2022   • pantoprazole (PROTONIX) 40 MG EC tablet TAKE ONE TABLET BY MOUTH DAILY (Patient taking differently: 40 mg Daily.) 90 tablet 0 Past Week   • polyethylene glycol (MIRALAX) 17 GM/SCOOP powder Follow directions given at office 238 g 0 11/15/2022   • predniSONE (DELTASONE) 10 MG tablet Take 10 mg by mouth Daily As Needed. As needed   Past Week   • tiotropium bromide-olodaterol (Stiolto Respimat) 2.5-2.5 MCG/ACT aerosol solution inhaler Inhale 2 puffs Daily.  4 g 5 Past Week   • vitamin B-12 (CYANOCOBALAMIN) 1000 MCG tablet TAKE ONE TABLET BY MOUTH DAILY (Patient taking differently: Take 1,000 mcg by mouth Daily.) 30 tablet 8 11/15/2022       Allergies:  Allergies   Allergen Reactions   • Cyclobenzaprine Unknown - Low Severity     Wide awake   • Plaquenil [Hydroxychloroquine Sulfate] Unknown - Low Severity     Black eyes   • Pravastatin Myalgia     Weakness / fatigue        Vitals: Temp:  [97 °F (36.1 °C)] 97 °F (36.1 °C)  Heart Rate:  [75] 75  Resp:  [16] 16  BP: (141)/(94) 141/94    Review Of Systems:  Constitutional:  Negative for chills, fever, and unexpected weight change.  Respiratory:  Negative for cough, chest tightness, shortness of breath, and wheezing.  Cardiovascular:  Negative for chest pain, palpitations, and leg swelling.  Gastrointestinal:  Negative for abdominal distention, abdominal pain, Nausea, vomiting.  Neurological:  Negative for Weakness, numbness, and headaches.     Physical Exam:    General Appearance:  Alert, cooperative, in no acute distress.   Lungs:   Clear to auscultation, respirations regular, even and                 unlabored.   Heart:  Regular rhythm and normal rate.   Abdomen:   Normal bowel sounds, no masses, no organomegaly. Soft, non-tender, non-distended   Neurologic: Alert and oriented x 3. Moves all four limbs equally       Plan: COLONOSCOPY CPT CODE: 69212 (N/A)     Luis Callahan MD  11/16/2022

## 2022-11-29 ENCOUNTER — TRANSCRIBE ORDERS (OUTPATIENT)
Dept: ADMINISTRATIVE | Facility: HOSPITAL | Age: 67
End: 2022-11-29

## 2022-11-29 DIAGNOSIS — I70.213 ATHEROSCLEROSIS OF NATIVE ARTERY OF BOTH LOWER EXTREMITIES WITH INTERMITTENT CLAUDICATION: Primary | ICD-10-CM

## 2022-12-01 ENCOUNTER — LAB (OUTPATIENT)
Dept: LAB | Facility: HOSPITAL | Age: 67
End: 2022-12-01

## 2022-12-01 ENCOUNTER — TRANSCRIBE ORDERS (OUTPATIENT)
Dept: LAB | Facility: HOSPITAL | Age: 67
End: 2022-12-01

## 2022-12-01 DIAGNOSIS — Z85.46 HISTORY OF PROSTATE CANCER: ICD-10-CM

## 2022-12-01 DIAGNOSIS — D47.2 IGG MONOCLONAL PROTEIN DISORDER: ICD-10-CM

## 2022-12-01 DIAGNOSIS — D47.2 MGUS (MONOCLONAL GAMMOPATHY OF UNKNOWN SIGNIFICANCE): ICD-10-CM

## 2022-12-01 DIAGNOSIS — K76.9 LIVER DISEASE, UNSPECIFIED: ICD-10-CM

## 2022-12-01 DIAGNOSIS — Z85.46 HISTORY OF PROSTATE CANCER: Primary | ICD-10-CM

## 2022-12-01 DIAGNOSIS — R79.89 ELEVATED LFTS: ICD-10-CM

## 2022-12-01 LAB
ALBUMIN SERPL-MCNC: 3.6 G/DL (ref 3.5–5.2)
ALBUMIN/GLOB SERPL: 0.9 G/DL
ALP SERPL-CCNC: 129 U/L (ref 39–117)
ALT SERPL W P-5'-P-CCNC: 31 U/L (ref 1–41)
ANION GAP SERPL CALCULATED.3IONS-SCNC: 8.6 MMOL/L (ref 5–15)
AST SERPL-CCNC: 37 U/L (ref 1–40)
BASOPHILS # BLD AUTO: 0.06 10*3/MM3 (ref 0–0.2)
BASOPHILS NFR BLD AUTO: 0.8 % (ref 0–1.5)
BILIRUB SERPL-MCNC: 0.2 MG/DL (ref 0–1.2)
BUN SERPL-MCNC: 22 MG/DL (ref 8–23)
BUN/CREAT SERPL: 29.3 (ref 7–25)
CALCIUM SPEC-SCNC: 9.1 MG/DL (ref 8.6–10.5)
CHLORIDE SERPL-SCNC: 105 MMOL/L (ref 98–107)
CO2 SERPL-SCNC: 24.4 MMOL/L (ref 22–29)
CREAT SERPL-MCNC: 0.75 MG/DL (ref 0.76–1.27)
DEPRECATED RDW RBC AUTO: 53.4 FL (ref 37–54)
EGFRCR SERPLBLD CKD-EPI 2021: 98.9 ML/MIN/1.73
EOSINOPHIL # BLD AUTO: 0.16 10*3/MM3 (ref 0–0.4)
EOSINOPHIL NFR BLD AUTO: 2.1 % (ref 0.3–6.2)
ERYTHROCYTE [DISTWIDTH] IN BLOOD BY AUTOMATED COUNT: 14.8 % (ref 12.3–15.4)
GLOBULIN UR ELPH-MCNC: 4 GM/DL
GLUCOSE SERPL-MCNC: 72 MG/DL (ref 65–99)
HCT VFR BLD AUTO: 42 % (ref 37.5–51)
HGB BLD-MCNC: 13.7 G/DL (ref 13–17.7)
IMM GRANULOCYTES # BLD AUTO: 0.02 10*3/MM3 (ref 0–0.05)
IMM GRANULOCYTES NFR BLD AUTO: 0.3 % (ref 0–0.5)
INR PPP: 0.94 (ref 0.9–1.1)
LYMPHOCYTES # BLD AUTO: 2.04 10*3/MM3 (ref 0.7–3.1)
LYMPHOCYTES NFR BLD AUTO: 26.3 % (ref 19.6–45.3)
MCH RBC QN AUTO: 31.6 PG (ref 26.6–33)
MCHC RBC AUTO-ENTMCNC: 32.6 G/DL (ref 31.5–35.7)
MCV RBC AUTO: 97 FL (ref 79–97)
MONOCYTES # BLD AUTO: 1.08 10*3/MM3 (ref 0.1–0.9)
MONOCYTES NFR BLD AUTO: 13.9 % (ref 5–12)
NEUTROPHILS NFR BLD AUTO: 4.41 10*3/MM3 (ref 1.7–7)
NEUTROPHILS NFR BLD AUTO: 56.6 % (ref 42.7–76)
NRBC BLD AUTO-RTO: 0 /100 WBC (ref 0–0.2)
PLATELET # BLD AUTO: 236 10*3/MM3 (ref 140–450)
PMV BLD AUTO: 10.9 FL (ref 6–12)
POTASSIUM SERPL-SCNC: 4.9 MMOL/L (ref 3.5–5.2)
PROT SERPL-MCNC: 7.6 G/DL (ref 6–8.5)
PROTHROMBIN TIME: 12.8 SECONDS (ref 12.5–14.5)
RBC # BLD AUTO: 4.33 10*6/MM3 (ref 4.14–5.8)
SODIUM SERPL-SCNC: 138 MMOL/L (ref 136–145)
WBC NRBC COR # BLD: 7.77 10*3/MM3 (ref 3.4–10.8)

## 2022-12-01 PROCEDURE — 83521 IG LIGHT CHAINS FREE EACH: CPT

## 2022-12-01 PROCEDURE — 86334 IMMUNOFIX E-PHORESIS SERUM: CPT

## 2022-12-01 PROCEDURE — 85610 PROTHROMBIN TIME: CPT

## 2022-12-01 PROCEDURE — 80053 COMPREHEN METABOLIC PANEL: CPT

## 2022-12-01 PROCEDURE — 36415 COLL VENOUS BLD VENIPUNCTURE: CPT

## 2022-12-01 PROCEDURE — 84165 PROTEIN E-PHORESIS SERUM: CPT

## 2022-12-01 PROCEDURE — 85025 COMPLETE CBC W/AUTO DIFF WBC: CPT

## 2022-12-01 PROCEDURE — 84153 ASSAY OF PSA TOTAL: CPT

## 2022-12-01 PROCEDURE — 82784 ASSAY IGA/IGD/IGG/IGM EACH: CPT

## 2022-12-02 LAB
ALBUMIN SERPL ELPH-MCNC: 3.8 G/DL (ref 2.9–4.4)
ALBUMIN/GLOB SERPL: 1.1 {RATIO} (ref 0.7–1.7)
ALPHA1 GLOB SERPL ELPH-MCNC: 0.2 G/DL (ref 0–0.4)
ALPHA2 GLOB SERPL ELPH-MCNC: 0.9 G/DL (ref 0.4–1)
B-GLOBULIN SERPL ELPH-MCNC: 0.8 G/DL (ref 0.7–1.3)
GAMMA GLOB SERPL ELPH-MCNC: 1.9 G/DL (ref 0.4–1.8)
GLOBULIN SER-MCNC: 3.8 G/DL (ref 2.2–3.9)
IGA SERPL-MCNC: 46 MG/DL (ref 61–437)
IGG SERPL-MCNC: 2396 MG/DL (ref 603–1613)
IGM SERPL-MCNC: 19 MG/DL (ref 20–172)
INTERPRETATION SERPL IEP-IMP: ABNORMAL
KAPPA LC FREE SER-MCNC: 121.7 MG/L (ref 3.3–19.4)
KAPPA LC FREE/LAMBDA FREE SER: 14.49 {RATIO} (ref 0.26–1.65)
LABORATORY COMMENT REPORT: ABNORMAL
LAMBDA LC FREE SERPL-MCNC: 8.4 MG/L (ref 5.7–26.3)
M PROTEIN SERPL ELPH-MCNC: 1.8 G/DL
PROT SERPL-MCNC: 7.6 G/DL (ref 6–8.5)
PSA SERPL-MCNC: <0.014 NG/ML (ref 0–4)

## 2022-12-08 ENCOUNTER — APPOINTMENT (OUTPATIENT)
Dept: GENERAL RADIOLOGY | Facility: HOSPITAL | Age: 67
End: 2022-12-08

## 2022-12-08 ENCOUNTER — HOSPITAL ENCOUNTER (EMERGENCY)
Facility: HOSPITAL | Age: 67
Discharge: HOME | End: 2022-12-08
Attending: EMERGENCY MEDICINE | Admitting: EMERGENCY MEDICINE

## 2022-12-08 VITALS
WEIGHT: 149 LBS | HEIGHT: 71 IN | RESPIRATION RATE: 16 BRPM | DIASTOLIC BLOOD PRESSURE: 77 MMHG | TEMPERATURE: 99.9 F | BODY MASS INDEX: 20.86 KG/M2 | OXYGEN SATURATION: 96 % | SYSTOLIC BLOOD PRESSURE: 129 MMHG | HEART RATE: 85 BPM

## 2022-12-08 DIAGNOSIS — J44.0 CHRONIC OBSTRUCTIVE PULMONARY DISEASE WITH ACUTE LOWER RESPIRATORY INFECTION: ICD-10-CM

## 2022-12-08 DIAGNOSIS — U07.1 COVID-19: Primary | ICD-10-CM

## 2022-12-08 LAB
ALBUMIN SERPL-MCNC: 3.6 G/DL (ref 3.5–5.2)
ALBUMIN/GLOB SERPL: 0.9 G/DL
ALP SERPL-CCNC: 133 U/L (ref 39–117)
ALT SERPL W P-5'-P-CCNC: 37 U/L (ref 1–41)
ANION GAP SERPL CALCULATED.3IONS-SCNC: 8.9 MMOL/L (ref 5–15)
AST SERPL-CCNC: 55 U/L (ref 1–40)
BASOPHILS # BLD AUTO: 0.03 10*3/MM3 (ref 0–0.2)
BASOPHILS NFR BLD AUTO: 0.5 % (ref 0–1.5)
BILIRUB SERPL-MCNC: 0.6 MG/DL (ref 0–1.2)
BUN SERPL-MCNC: 23 MG/DL (ref 8–23)
BUN/CREAT SERPL: 24.5 (ref 7–25)
CALCIUM SPEC-SCNC: 9 MG/DL (ref 8.6–10.5)
CHLORIDE SERPL-SCNC: 95 MMOL/L (ref 98–107)
CO2 SERPL-SCNC: 24.1 MMOL/L (ref 22–29)
CREAT SERPL-MCNC: 0.94 MG/DL (ref 0.76–1.27)
DEPRECATED RDW RBC AUTO: 48.1 FL (ref 37–54)
EGFRCR SERPLBLD CKD-EPI 2021: 88.9 ML/MIN/1.73
EOSINOPHIL # BLD AUTO: 0.01 10*3/MM3 (ref 0–0.4)
EOSINOPHIL NFR BLD AUTO: 0.2 % (ref 0.3–6.2)
ERYTHROCYTE [DISTWIDTH] IN BLOOD BY AUTOMATED COUNT: 14 % (ref 12.3–15.4)
FLUAV RNA RESP QL NAA+PROBE: NOT DETECTED
FLUBV RNA RESP QL NAA+PROBE: NOT DETECTED
GLOBULIN UR ELPH-MCNC: 4 GM/DL
GLUCOSE SERPL-MCNC: 90 MG/DL (ref 65–99)
HCT VFR BLD AUTO: 39.4 % (ref 37.5–51)
HGB BLD-MCNC: 13.2 G/DL (ref 13–17.7)
IMM GRANULOCYTES # BLD AUTO: 0.02 10*3/MM3 (ref 0–0.05)
IMM GRANULOCYTES NFR BLD AUTO: 0.3 % (ref 0–0.5)
LYMPHOCYTES # BLD AUTO: 0.94 10*3/MM3 (ref 0.7–3.1)
LYMPHOCYTES NFR BLD AUTO: 16.1 % (ref 19.6–45.3)
MCH RBC QN AUTO: 31.4 PG (ref 26.6–33)
MCHC RBC AUTO-ENTMCNC: 33.5 G/DL (ref 31.5–35.7)
MCV RBC AUTO: 93.8 FL (ref 79–97)
MONOCYTES # BLD AUTO: 0.75 10*3/MM3 (ref 0.1–0.9)
MONOCYTES NFR BLD AUTO: 12.9 % (ref 5–12)
NEUTROPHILS NFR BLD AUTO: 4.08 10*3/MM3 (ref 1.7–7)
NEUTROPHILS NFR BLD AUTO: 70 % (ref 42.7–76)
NRBC BLD AUTO-RTO: 0 /100 WBC (ref 0–0.2)
PLATELET # BLD AUTO: 167 10*3/MM3 (ref 140–450)
PMV BLD AUTO: 10.6 FL (ref 6–12)
POTASSIUM SERPL-SCNC: 4.4 MMOL/L (ref 3.5–5.2)
PROT SERPL-MCNC: 7.6 G/DL (ref 6–8.5)
RBC # BLD AUTO: 4.2 10*6/MM3 (ref 4.14–5.8)
SARS-COV-2 RNA RESP QL NAA+PROBE: DETECTED
SODIUM SERPL-SCNC: 128 MMOL/L (ref 136–145)
WBC NRBC COR # BLD: 5.83 10*3/MM3 (ref 3.4–10.8)

## 2022-12-08 PROCEDURE — 85025 COMPLETE CBC W/AUTO DIFF WBC: CPT | Performed by: PHYSICIAN ASSISTANT

## 2022-12-08 PROCEDURE — 99284 EMERGENCY DEPT VISIT MOD MDM: CPT

## 2022-12-08 PROCEDURE — 80053 COMPREHEN METABOLIC PANEL: CPT | Performed by: PHYSICIAN ASSISTANT

## 2022-12-08 PROCEDURE — 71045 X-RAY EXAM CHEST 1 VIEW: CPT

## 2022-12-08 PROCEDURE — 96374 THER/PROPH/DIAG INJ IV PUSH: CPT

## 2022-12-08 PROCEDURE — 87636 SARSCOV2 & INF A&B AMP PRB: CPT | Performed by: PHYSICIAN ASSISTANT

## 2022-12-08 PROCEDURE — 25010000002 DEXAMETHASONE SODIUM PHOSPHATE 10 MG/ML SOLUTION: Performed by: PHYSICIAN ASSISTANT

## 2022-12-08 RX ORDER — AZITHROMYCIN 250 MG/1
250 TABLET, FILM COATED ORAL DAILY
Qty: 6 TABLET | Refills: 0 | Status: SHIPPED | OUTPATIENT
Start: 2022-12-08 | End: 2023-01-20

## 2022-12-08 RX ORDER — ACETAMINOPHEN 325 MG/1
650 TABLET ORAL EVERY 6 HOURS PRN
Status: DISCONTINUED | OUTPATIENT
Start: 2022-12-08 | End: 2022-12-08 | Stop reason: HOSPADM

## 2022-12-08 RX ORDER — DEXAMETHASONE SODIUM PHOSPHATE 10 MG/ML
10 INJECTION, SOLUTION INTRAMUSCULAR; INTRAVENOUS ONCE
Status: COMPLETED | OUTPATIENT
Start: 2022-12-08 | End: 2022-12-08

## 2022-12-08 RX ORDER — PREDNISONE 50 MG/1
50 TABLET ORAL DAILY
Qty: 5 TABLET | Refills: 0 | Status: SHIPPED | OUTPATIENT
Start: 2022-12-09 | End: 2022-12-14

## 2022-12-08 RX ORDER — SODIUM CHLORIDE 0.9 % (FLUSH) 0.9 %
10 SYRINGE (ML) INJECTION AS NEEDED
Status: DISCONTINUED | OUTPATIENT
Start: 2022-12-08 | End: 2022-12-08 | Stop reason: HOSPADM

## 2022-12-08 RX ADMIN — ACETAMINOPHEN 650 MG: 325 TABLET, FILM COATED ORAL at 16:04

## 2022-12-08 RX ADMIN — SODIUM CHLORIDE 500 ML: 9 INJECTION, SOLUTION INTRAVENOUS at 16:04

## 2022-12-08 RX ADMIN — DEXAMETHASONE SODIUM PHOSPHATE 10 MG: 10 INJECTION INTRAMUSCULAR; INTRAVENOUS at 17:23

## 2022-12-08 NOTE — ED PROVIDER NOTES
Subjective   History of Present Illness  Patient is a 67-year-old male with a history of arrhythmia, arthritis, CHF, COPD, diverticulitis, elevated cholesterol, hypertension, neuromuscular disorder, prostate cancer, skin cancer, and tobacco use presenting to the ER for evaluation of fever.  Patient states he began having a fever and chills last night.  He denies any headache, vision loss or changes, cough, dizziness, hemoptysis, shortness of breath, chest pain, abdominal pain, emesis, diarrhea, leg pain or swelling, dysuria, or any other symptoms.  His wife tested positive for COVID and their grandkids have had influenza A recently.  He states he uses inhalers at home, does not currently smoke tobacco.  He has not had medication for his fever today        Review of Systems   Constitutional: Positive for chills and fever.   HENT: Negative.    Eyes: Negative.    Respiratory: Positive for cough. Negative for shortness of breath and stridor.    Cardiovascular: Negative.    Gastrointestinal: Negative.    Genitourinary: Negative.    Musculoskeletal: Negative.    Skin: Negative.    Allergic/Immunologic: Negative for immunocompromised state.   Neurological: Negative.    Psychiatric/Behavioral: Negative.        Past Medical History:   Diagnosis Date   • Acquired absence of all teeth    • Allergic    • Anomalous coronary artery origin    • Arrhythmia    • Arthritis    • Arthritis of lumbar spine 07/29/2016   • Back pain 06/17/2016   • Cristina esophagus    • Cataract     REMOVED BILATERALL   • Cervical spine disease 06/17/2016   • CHF (congestive heart failure) (HCC)    • Chronic obstructive pulmonary disease (HCC)    • Colon polyps    • COVID-19 vaccine series completed    • Deafness in left ear    • Derangement of anterior horn of medial meniscus    • Difficulty swallowing    • Disorder of lumbar spine 06/17/2016   • Disorder of thoracic spine 06/17/2016   • Diverticulitis of colon    • DJD (degenerative joint disease)    •  Elevated cholesterol    • Elevated liver enzymes    • Erectile dysfunction    • Esophageal reflux    • Essential hypertension     PT DENIES SAYS IT RUNS LOW   • Glaucoma    • Hard of hearing     DEAF IN LEFT EAR NO AIDS WORN   • Heart murmur    • Hiatal hernia    • High cholesterol    • History of radiation therapy 11/24/2020    salvage pelvic XRT   • Impaired functional mobility, balance, gait, and endurance    • Inflammatory polyarthropathy (HCC)     Per Dr. Haider. Negative NEO, normal ESR, RF, anti-ccp and did not improve with prednisone per notes.   • Inguinal hernia    • Insomnia    • Lateral meniscus derangement    • Left otitis media with spontaneous rupture of eardrum    • Locking of left knee    • Low back pain    • Meniere's disease    • MGUS (monoclonal gammopathy of unknown significance)     likely diagnosis   • Murmur    • Neuromuscular disorder (HCC)    • Neuropathic arthritis    • No natural teeth    • Perforation of left tympanic membrane    • Prostate cancer (HCC)     previous CA in 2013 with prostatectomy   • Pulmonary emphysema (HCC)    • Recent shoulder injury     LEFT SHOULDER   • Rotator cuff tear arthropathy of left shoulder 03/12/2020    Added automatically from request for surgery [id number]   • Scoliosis    • Skin cancer of face     squamous cell basal   • Spina bifida occulta 06/17/2016   • Tobacco abuse 11/09/2017   • Visual impairment    • Wears glasses     READING GLASSES ONLY       Allergies   Allergen Reactions   • Cyclobenzaprine Unknown - Low Severity     Wide awake   • Plaquenil [Hydroxychloroquine Sulfate] Unknown - Low Severity     Black eyes   • Pravastatin Myalgia     Weakness / fatigue       Past Surgical History:   Procedure Laterality Date   • CARDIAC CATHETERIZATION      no stent   • COLONOSCOPY     • COLONOSCOPY N/A 01/04/2022    Procedure: COLONOSCOPY with polypectomy x1;  Surgeon: Luis Callahan MD;  Location: [facility] ENDOSCOPY;  Service: Gastroenterology;   Laterality: N/A;   • COLONOSCOPY N/A 11/16/2022    Procedure: COLONOSCOPY ;  Surgeon: Luis Callahan MD;  Location: [facility] ENDOSCOPY;  Service: Gastroenterology;  Laterality: N/A;   • ENDOSCOPY N/A 04/10/2017    Procedure: ESOPHAGOGASTRODUODENOSCOPY WITH BIOPSY;  Surgeon: Alexander Ritchie MD;  Location: [facility] ENDOSCOPY;  Service:    • ENDOSCOPY N/A 7/20/2022    Procedure: ESOPHAGOGASTRODUODENOSCOPY WITH BIOPSY ;  Surgeon: Luis Callahan MD;  Location: [facility] ENDOSCOPY;  Service: Gastroenterology;  Laterality: N/A;   • EYE SURGERY     • HERNIA REPAIR     • INGUINAL HERNIA REPAIR Right    • INGUINAL HERNIA REPAIR     • KNEE SURGERY Left    • LYMPH NODE BIOPSY     • MULTIPLE TOOTH EXTRACTIONS     • PROSTATE SURGERY  2004   • PROSTATECTOMY  06/30/2014   • PROSTATECTOMY      Prostatectomy Radical   • SHOULDER ARTHROSCOPY Left 02/06/2020    Procedure: DIAGNOSTIC SHOULDER ARTHROSCOPY LEFT WITH LABRAL DEBRIDEMENT, SUBACROMIAL BURSECTOMY, ASSESSMENT OF LARGE FRAGMENTED RETRACTED IRREPARABLE ROTATOR CUFF TEARS;  Surgeon: Rony Pandey MD;  Location: [facility] OR;  Service: Orthopedics;  Laterality: Left;   • SKIN CANCER EXCISION  2017    both sides of body/squamous cell melanoma   • TOTAL SHOULDER ARTHROPLASTY W/ DISTAL CLAVICLE EXCISION Left 07/09/2020    Procedure: Left reverse total shoulder arthroplasty;  Surgeon: Rony Pandey MD;  Location: [facility] OR;  Service: Orthopedics;  Laterality: Left;   • TYMPANOPLASTY W/ MASTOIDECTOMY     • UMBILICAL HERNIA REPAIR         Family History   Problem Relation Age of Onset   • Diabetes Mother    • Hypertension Mother    • Obesity Mother    • Stroke Mother 65   • Arthritis Mother    • Hyperlipidemia Mother    • Vision loss Mother    • Cancer Father    • Cancer Sister    • Diabetes Brother    • Cancer Brother    • Heart attack Brother    • Alcohol abuse Brother    • Drug abuse Brother    • Hearing loss Brother    • Learning disabilities Brother    • Colon  "cancer Neg Hx        Social History     Socioeconomic History   • Marital status:    Tobacco Use   • Smoking status: Former     Packs/day: 1.00     Years: 51.00     Pack years: 51.00     Types: Cigarettes     Start date: 1963     Quit date: 2018     Years since quittin.9   • Smokeless tobacco: Never   Vaping Use   • Vaping Use: Never used   Substance and Sexual Activity   • Alcohol use: Never   • Drug use: Never   • Sexual activity: Defer           Objective   Physical Exam  Vitals and nursing note reviewed.     /77   Pulse 85   Temp 99.9 °F (37.7 °C) (Oral)   Resp 16   Ht 180.3 cm (71\")   Wt 67.6 kg (149 lb)   SpO2 96%   BMI 20.78 kg/m²     GEN: No acute distress, sitting upright in the stretcher.  He is awake and alert.  He does not septic or toxic.  He is answering questions appropriately.  Head: Normocephalic, atraumatic  Eyes: EOM intact  ENT: Mask in place per protocol  Chest: Nontender to palpation  Cardiovascular: Regular rate and rhythm  Lungs: Breathing even and nonlabored, clear to auscultation bilaterally  Abdomen: Soft, nontender, nondistended, no peritoneal signs, no guarding  Extremities: No edema, normal appearance, full range of motion  Neuro: GCS 15  Psych: Mood and affect are appropriate    Procedures           ED Course  ED Course as of 22 1850   Thu Dec 08, 2022   1621 WBC: 5.83 [LA]   1621 Hemoglobin: 13.2 [LA]   1621 Neutrophil Rel %: 70.0 [LA]   1654 COVID19(!!): Detected [LA]   1705 Reviewed x-ray with Dr. Yang.  Patient has bilateral haziness, given his COPD, will treat with steroids and azithromycin.  We will have him continue his antibiotics.  Discussed quarantine and follow-up with patient.  He verbalized understanding and was in agreement with this plan of care [LA]      ED Course User Index  [LA] Svetlana Padron PA-C                                           MDM  Number of Diagnoses or Management Options  Chronic obstructive pulmonary disease " with acute lower respiratory infection (HCC)  COVID-19  Diagnosis management comments: On arrival, patient has a temp of 101.1.  He is normotensive, saturating 93% on room air.  Differential could include COVID, influenza, illness, bronchitis, URI.  He denies any significant cough, denies shortness of breath or chest pain.  Will check rapid COVID and influenza, basic labs, chest x-ray.  Will give small bolus of IV fluids as well. Will give Tylenol for fever.    Patient's lab stable without significant leukocytosis.  He was positive for COVID-19 reviewed x-ray with Dr. Yang, there is some bilateral haziness.  Given the fact the patient does have history of COPD, will treat with Z-Jules, steroids and have him continue inhalers.  Discussed other symptomatic treatment, over-the-counter medications, follow-up and strict return precautions as well as quarantine.  He verbalized understanding was in agreement with this plan of care       Amount and/or Complexity of Data Reviewed  Clinical lab tests: reviewed and ordered  Tests in the radiology section of CPT®: ordered and reviewed  Discussion of test results with the performing providers: yes  Review and summarize past medical records: yes  Discuss the patient with other providers: yes    Risk of Complications, Morbidity, and/or Mortality  Presenting problems: moderate  Diagnostic procedures: moderate  Management options: low    Patient Progress  Patient progress: stable      Final diagnoses:   COVID-19   Chronic obstructive pulmonary disease with acute lower respiratory infection (HCC)       ED Disposition  ED Disposition     ED Disposition   Discharge    Condition   Stable    Comment   --             Ladonna Means MD  [address] [place] [id number] [phone]    Schedule an appointment as soon as possible for a visit            Medication List      New Prescriptions    azithromycin 250 MG tablet  Commonly known as: ZITHROMAX  Take 1 tablet by mouth  Daily. Take 2 tablets the first day, then 1 tablet daily for 4 days.        Changed    amitriptyline 25 MG tablet  Commonly known as: ELAVIL  TAKE ONE TO THREE TABLET BY MOUTH EVERY NIGHT AT BEDTIME AS NEEDED FOR SLEEP  What changed:   · how much to take  · how to take this  · when to take this     DULoxetine 60 MG capsule  Commonly known as: CYMBALTA  TAKE ONE CAPSULE BY MOUTH DAILY  What changed: how to take this     gabapentin 300 MG capsule  Commonly known as: NEURONTIN  Take 1 capsule by mouth 3 (Three) Times a Day.  What changed: when to take this     isosorbide mononitrate 30 MG 24 hr tablet  Commonly known as: IMDUR  TAKE ONE TABLET BY MOUTH DAILY  What changed: how to take this     methocarbamol 750 MG tablet  Commonly known as: ROBAXIN  Take 1 tablet by mouth 3 (Three) Times a Day As Needed for Muscle Spasms.  What changed: when to take this     methotrexate 2.5 MG tablet  Take 8 tablets by mouth 1 (One) Time Per Week. TAKES ON THURSDAY  What changed: additional instructions     pantoprazole 40 MG EC tablet  Commonly known as: PROTONIX  TAKE ONE TABLET BY MOUTH DAILY  What changed: how to take this     * predniSONE 10 MG tablet  Commonly known as: DELTASONE  What changed: Another medication with the same name was added. Make sure you understand how and when to take each.     * predniSONE 50 MG tablet  Commonly known as: DELTASONE  Take 1 tablet by mouth Daily for 5 days.  Start taking on: December 9, 2022  What changed: You were already taking a medication with the same name, and this prescription was added. Make sure you understand how and when to take each.         * This list has 2 medication(s) that are the same as other medications prescribed for you. Read the directions carefully, and ask your doctor or other care provider to review them with you.               Where to Get Your Medications      These medications were sent to [facility] [id number] - [place] - [address] - [phone]  PH - [phone] FX  [address] [address]    Phone: [phone]   · azithromycin 250 MG tablet  · predniSONE 50 MG tablet          Svetlana Padron PA-C  12/08/22 [id number]

## 2022-12-08 NOTE — DISCHARGE INSTRUCTIONS
You tested positive for COVID-19.  This could cause inflammation and flare of your COPD.  Take azithromycin to help any underlying bacterial etiology, take steroid burst to help with inflammation.  Continue your nebulizers and inhalers as directed.  May take over-the-counter medicine such as Tylenol, ibuprofen, Mucinex.  May take NyQuil or DayQuil as needed, this does contain Tylenol so do not take additional doses of Tylenol with this at the same time.  Try to follow-up with her primary care provider.  You will need to quarantine per CDC recommendations.  Return to the ER for any change, worsening of symptoms, or any additional concerns including but not limited to difficulty breathing, hemoptysis, dizziness, syncope, chest pain

## 2022-12-13 DIAGNOSIS — G89.29 CHRONIC JOINT PAIN: ICD-10-CM

## 2022-12-13 DIAGNOSIS — K21.00 GASTROESOPHAGEAL REFLUX DISEASE WITH ESOPHAGITIS: ICD-10-CM

## 2022-12-13 DIAGNOSIS — M25.50 CHRONIC JOINT PAIN: ICD-10-CM

## 2022-12-14 RX ORDER — PANTOPRAZOLE SODIUM 40 MG/1
TABLET, DELAYED RELEASE ORAL
Qty: 90 TABLET | Refills: 3 | Status: SHIPPED | OUTPATIENT
Start: 2022-12-14 | End: 2023-02-06 | Stop reason: SDUPTHER

## 2022-12-14 RX ORDER — DULOXETIN HYDROCHLORIDE 60 MG/1
CAPSULE, DELAYED RELEASE ORAL
Qty: 90 CAPSULE | Refills: 3 | Status: SHIPPED | OUTPATIENT
Start: 2022-12-14 | End: 2023-02-06 | Stop reason: SDUPTHER

## 2022-12-14 NOTE — TELEPHONE ENCOUNTER
Rx Refill Note  Requested Prescriptions     Pending Prescriptions Disp Refills   • DULoxetine (CYMBALTA) 60 MG capsule [Pharmacy Med Name: DULOXETINE HCL DR 60 MG CAPSULE] 90 capsule 1     Sig: TAKE ONE CAPSULE BY MOUTH DAILY   • pantoprazole (PROTONIX) 40 MG EC tablet [Pharmacy Med Name: PANTOPRAZOLE SOD DR 40 MG TAB] 90 tablet 0     Sig: TAKE ONE TABLET BY MOUTH DAILY      Last office visit with prescribing clinician: 8/3/2022   Last telemedicine visit with prescribing clinician: 2/6/2023   Next office visit with prescribing clinician: 2/6/2023                     {TIP  Is Refill Pharmacy     Rashel Hanna MA  12/14/22, 15:24 EST

## 2022-12-23 ENCOUNTER — APPOINTMENT (OUTPATIENT)
Dept: CT IMAGING | Facility: HOSPITAL | Age: 67
End: 2022-12-23

## 2023-01-02 DIAGNOSIS — R93.5 ABNORMAL CT OF THE ABDOMEN: ICD-10-CM

## 2023-01-03 RX ORDER — POLYETHYLENE GLYCOL 3350 17 G/17G
POWDER, FOR SOLUTION ORAL
Qty: 238 G | Refills: 0 | OUTPATIENT
Start: 2023-01-03

## 2023-01-03 RX ORDER — BISACODYL 5 MG
TABLET, DELAYED RELEASE (ENTERIC COATED) ORAL
Qty: 4 TABLET | Refills: 0 | OUTPATIENT
Start: 2023-01-03

## 2023-01-04 RX ORDER — CELECOXIB 100 MG/1
CAPSULE ORAL
Qty: 30 CAPSULE | Refills: 1 | OUTPATIENT
Start: 2023-01-04

## 2023-01-04 RX ORDER — ISOSORBIDE MONONITRATE 30 MG/1
TABLET, EXTENDED RELEASE ORAL
Qty: 90 TABLET | Refills: 1 | Status: SHIPPED | OUTPATIENT
Start: 2023-01-04 | End: 2023-02-06 | Stop reason: SDUPTHER

## 2023-01-18 ENCOUNTER — HOSPITAL ENCOUNTER (OUTPATIENT)
Dept: CT IMAGING | Facility: HOSPITAL | Age: 68
Discharge: HOME | End: 2023-01-18
Admitting: SURGERY
Payer: MEDICARE

## 2023-01-18 DIAGNOSIS — I70.213 ATHEROSCLEROSIS OF NATIVE ARTERY OF BOTH LOWER EXTREMITIES WITH INTERMITTENT CLAUDICATION: ICD-10-CM

## 2023-01-18 PROCEDURE — 75635 CT ANGIO ABDOMINAL ARTERIES: CPT

## 2023-01-18 PROCEDURE — 25010000002 IOPAMIDOL 61 % SOLUTION: Performed by: SURGERY

## 2023-01-18 RX ADMIN — IOPAMIDOL 100 ML: 612 INJECTION, SOLUTION INTRAVENOUS at 17:19

## 2023-01-20 ENCOUNTER — OFFICE VISIT (OUTPATIENT)
Dept: ONCOLOGY | Facility: CLINIC | Age: 68
End: 2023-01-20
Payer: MEDICARE

## 2023-01-20 VITALS
RESPIRATION RATE: 16 BRPM | DIASTOLIC BLOOD PRESSURE: 82 MMHG | HEART RATE: 62 BPM | HEIGHT: 71 IN | BODY MASS INDEX: 21 KG/M2 | OXYGEN SATURATION: 97 % | TEMPERATURE: 97.5 F | SYSTOLIC BLOOD PRESSURE: 151 MMHG | WEIGHT: 150 LBS

## 2023-01-20 DIAGNOSIS — D47.2 IGG MONOCLONAL PROTEIN DISORDER: Primary | ICD-10-CM

## 2023-01-20 PROCEDURE — 99213 OFFICE O/P EST LOW 20 MIN: CPT | Performed by: INTERNAL MEDICINE

## 2023-01-20 NOTE — PROGRESS NOTES
PROBLEM LIST:  Oncology/Hematology History Overview Note   1.  MGUS: Had been having mid back pains and was seen by neurology, was found to have 1200 mg of immunoglobulin G monoclonal protein in the serum with a normal IgA and IgM level this was in July 2016.  Workup August 2016 included a bone survey that was negative other than for degenerative joint disease with left eighth rib healed fracture that was seen on prior bone scan.  Normal creatinine, ionized calcium of 1.34, normal total protein to albumin ratio.  Normal sedimentation rate and C-reactive protein, serum monoclonal protein present at 1100 mg/dL of immunoglobulin G kappa with normal IgA and M levels.  Urine monoclonal protein was too scant to quantify.  Kappa to lambda ratio of 4.58 with elevation of At 42.85.  CBC was unremarkable with normal white count and platelet count and hemoglobin of 17 baseline.  Baseline PET/CT 9/1/2016 was negative.   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  This genetic alteration is found in approximately 15-18 percent of patients with plasma cell myeloma by FISH analysis and is associated with a favorable prognosis in the absence of poor prognostic markers.   b.)  3/6/2017 follow-up PET/CT was negative.    2.  Prostate cancer: Status post prostatectomy 2014.  He had chemical recurrence in 2020.  He just completed involved field radiotherapy to the pelvis in November 2020 with Dr. Billy Wilson.  He will be followed by Dr. Varela and Dr. Wilson.  If he has progressive disease then I would recommend initiating Xtandi along with his androgen deprivation therapy.    3.  Squamous cell cancer of the skin, followed by Dr. Izaguirre.           MGUS (monoclonal gammopathy of unknown significance)   7/1/2016 Initial Diagnosis    MGUS (monoclonal gammopathy of unknown significance)     8/21/2017 -  Other Event     Zach Del Rosario 76 y.o. presents today for   Chief Complaint   Patient presents with    Covid Testing    Nasal Congestion        HPI:  Zach Del Rosario states he was feeling bad a couple of days ago and wanted to make sure he doesn't have COVID because his wife has cancer. He said he has a lot of drainage and has allergies but has no complaints today. Family History   Problem Relation Age of Onset    Heart Attack Mother     Heart Disease Father     Diabetes Sister         Social History     Socioeconomic History    Marital status:      Spouse name: Not on file    Number of children: Not on file    Years of education: Not on file    Highest education level: Not on file   Occupational History    Not on file   Tobacco Use    Smoking status: Never Smoker    Smokeless tobacco: Never Used   Substance and Sexual Activity    Alcohol use: No    Drug use: No    Sexual activity: Not on file   Other Topics Concern    Not on file   Social History Narrative    Not on file     Social Determinants of Health     Financial Resource Strain:     Difficulty of Paying Living Expenses:    Food Insecurity:     Worried About Running Out of Food in the Last Year:     [address] in the Last Year:    Transportation Needs:     Lack of Transportation (Medical):      Lack of Transportation (Non-Medical):    Physical Activity:     Days of Exercise per Week:     Minutes of Exercise per Session:    Stress:     Feeling of Stress :    Social Connections:     Frequency of Communication with Friends and Family:     Frequency of Social Gatherings with Friends and Family:     Attends Worship Services:     Active Member of Clubs or Organizations:     Attends Club or Organization Meetings:     Marital Status:    Intimate Partner Violence:     Fear of Current or Ex-Partner:     Emotionally Abused:     Physically Abused:     Sexually Abused:         Past Surgical History:   Procedure Laterality Date    Myeloma panel: Urine immunoelectrophoresis monoclonal protein too small to quantify, serum immunoelectrophoresis M-spike stable at 1.3g/dl, ionized calcium 5.2, kappa to lambda ratio 4.10, beta-2 microglobulin 2.2, C-reactive protein less than 0.50, creatinine 1.3, sedimentation rate to.  CBC WBC 9600, hemoglobin 15.6, hematocrit 47.1%, platelet count 209,000.       3/12/2018 -  Other Event    Myeloma panel: Urine immunoelectrophoresis with 122 mg/24 hour protein, monoclonal kappa free light chains 21.2%, monoclonal IgG kappa 7.5%.  Sedimentation rate 5, immunoglobulin free light chains free kappa light chains 49.8, normal lambda light chains 12.2, kappa/lambda ratio 4.08.  Serum immunoelectrophoresis M spike 1.7g/dL, C-reactive protein 1.60, CMP unremarkable with creatinine 1.10, serum calcium 9.2, ionized calcium 5.1, beta-2 microglobulin 2.5, CBC normal with a WBC of 9.07, hemoglobin 15.9, hematocrit 47.5%, platelets 226,000.     Malignant neoplasm of prostate (HCC)   5/16/2014 Cancer Staged    Staging form: Prostate, AJCC V7  - Clinical stage from 5/16/2014: Stage IIA (T1c, N0, M0, PSA: 10 to 19, Velpen 7) - Signed by Luis Wilson MD on 9/16/2020 6/30/2014 Cancer Staged    Staging form: Prostate, AJCC V7  - Pathologic stage from 6/30/2014: Stage III (T3b, N0, cM0, PSA: 10 to 19, Guillermo 7) - Signed by Luis Wilson MD on 9/16/2020 9/20/2016 Initial Diagnosis    Malignant neoplasm of prostate (CMS/HCC)     10/7/2020 - 11/24/2020 Radiation    Radiation OncologyTreatment Course:  Elliott Dunn received 7000 cGy in 35 fractions to prostate bed via External Beam Radiation - EBRT.         REASON FOR VISIT: Follow-up of his MGUS    HISTORY OF PRESENT ILLNESS:   67 y.o.  male presents today for follow-up of his monoclonal gammopathy.  Since I last saw him he was diagnosed with a chemical recurrence of his prostate cancer.  He completed radiotherapy to the pelvis.  He is off all treatment for his  capsule 1    pramipexole (MIRAPEX) 1 MG tablet Take 1 mg by mouth nightly      TRUE METRIX BLOOD GLUCOSE TEST strip USE TO TEST BLOOD SUGAR THREE TIMES DAILY AND AS NEEDED 300 strip 3    furosemide (LASIX) 20 MG tablet Take 1 tablet by mouth See Admin Instructions Indications: takes on monday and friday Twice a week (Patient taking differently: Take 20 mg by mouth See Admin Instructions Indications: takes on monday and friday 40 mg three times a week) 180 tablet 3    Insulin Degludec (TRESIBA FLEXTOUCH) 100 UNIT/ML SOPN Inject 70 Units into the skin daily DX: E11.42 KX (Patient taking differently: Inject 70 Units into the skin daily 72 units) 25 pen 3    finasteride (PROSCAR) 5 MG tablet Take 1 tablet by mouth daily 90 tablet 3    carvedilol (COREG) 12.5 MG tablet Take 0.5 tablets by mouth 2 times daily 180 tablet 3    Ginger, Zingiber officinalis, (GINGER ROOT) 250 MG CAPS One po daily. 90 capsule 3    B-D UF III MINI PEN NEEDLES 31G X 5 MM MISC USE TO INJECT INSULIN EVERYDAY 100 each 2    citalopram (CELEXA) 40 MG tablet Take 1 tablet by mouth daily 90 tablet 3    tamsulosin (FLOMAX) 0.4 MG capsule Take 1 capsule by mouth daily 90 capsule 3    Insulin Pen Needle (ADVOCATE INSULIN PEN NEEDLES) 31G X 5 MM MISC USE TO INJECT INSULIN EVERY DAY DX: E11.42  each 3    Microlet Lancets MISC 1 each by Does not apply route 3 times daily DX: E11.42  each 3    Insulin Pen Needle 31G X 5 MM MISC USE TO INJECT INSULIN EVERY  each 3    Cholecalciferol (VITAMIN D3) 2000 units CAPS Take 2 capsules by mouth daily       loratadine (CLARITIN) 10 MG tablet TAKE ONE TABLET BY MOUTH EVERY DAY 30 tablet 3    therapeutic multivitamin-minerals (THERAGRAN-M) tablet Take 1 tablet by mouth daily. No current facility-administered medications for this visit. Review of Systems   HENT: Positive for postnasal drip. All other systems reviewed and are negative.        Pulse 66   Temp 97.5 °F (36.4 prostate cancer.  Otherwise clinically doing well.  He is followed by Dr. Varela for that.    Past medical history, social history and family history was reviewed and unchanged from prior visit.    Review of Systems:    Review of Systems - Oncology   A comprehensive 14 point review of systems was performed and was negative except as mentioned.      Medications:        Current Outpatient Medications:   •  albuterol sulfate HFA (Ventolin HFA) 108 (90 Base) MCG/ACT inhaler, Inhale 2 puffs Every 4 (Four) Hours As Needed for Wheezing or Shortness of Air., Disp: 18 g, Rfl: 2  •  amitriptyline (ELAVIL) 25 MG tablet, TAKE ONE TO THREE TABLET BY MOUTH EVERY NIGHT AT BEDTIME AS NEEDED FOR SLEEP (Patient taking differently: Take 25 mg by mouth Every Night. TAKE ONE TO THREE TABLET BY MOUTH EVERY NIGHT AT BEDTIME AS NEEDED FOR SLEEP), Disp: 270 tablet, Rfl: 2  •  aspirin 81 MG EC tablet, Take 81 mg by mouth Daily., Disp: , Rfl:   •  atorvastatin (LIPITOR) 10 MG tablet, Take 1 tablet by mouth Every Night. To lower cholesterol and risk of stroke (also to help with PAD), Disp: 90 tablet, Rfl: 3  •  azelastine (ASTELIN) 0.1 % nasal spray, 1 spray into the nostril(s) as directed by provider 2 (Two) Times a Day As Needed for Rhinitis or Allergies. Use in each nostril as directed, Disp: 1 each, Rfl: 5  •  bisoprolol (ZEBeta) 5 MG tablet, TAKE ONE TABLET BY MOUTH DAILY (Patient taking differently: Take 5 mg by mouth Daily.), Disp: 90 tablet, Rfl: 3  •  DULoxetine (CYMBALTA) 60 MG capsule, TAKE ONE CAPSULE BY MOUTH DAILY, Disp: 90 capsule, Rfl: 3  •  folic acid (FOLVITE) 1 MG tablet, Take 1 tablet by mouth Daily., Disp: 30 tablet, Rfl: 0  •  gabapentin (NEURONTIN) 300 MG capsule, Take 1 capsule by mouth 3 (Three) Times a Day. (Patient taking differently: Take 300 mg by mouth Daily.), Disp: 90 capsule, Rfl: 5  •  isosorbide mononitrate (IMDUR) 30 MG 24 hr tablet, TAKE ONE TABLET BY MOUTH DAILY, Disp: 90 tablet, Rfl: 1  •  leflunomide  "(ARAVA) 20 MG tablet, Take 20 mg by mouth Daily., Disp: , Rfl:   •  methocarbamol (ROBAXIN) 750 MG tablet, Take 1 tablet by mouth 3 (Three) Times a Day As Needed for Muscle Spasms. (Patient taking differently: Take 750 mg by mouth Daily.), Disp: 270 tablet, Rfl: 3  •  methotrexate 2.5 MG tablet, Take 8 tablets by mouth 1 (One) Time Per Week. TAKES ON THURSDAY (Patient taking differently: Take 20 mg by mouth 1 (One) Time Per Week. TAKES ON Sunday ONLY TAKES 7 PILSS), Disp: 32 tablet, Rfl: 0  •  Multiple Vitamins-Minerals (MULTIVITAMIN WITH MINERALS) tablet tablet, Take 1 tablet by mouth Daily., Disp: , Rfl:   •  pantoprazole (PROTONIX) 40 MG EC tablet, TAKE ONE TABLET BY MOUTH DAILY, Disp: 90 tablet, Rfl: 3  •  predniSONE (DELTASONE) 10 MG tablet, Take 10 mg by mouth Daily As Needed. As needed, Disp: , Rfl:   •  tiotropium bromide-olodaterol (Stiolto Respimat) 2.5-2.5 MCG/ACT aerosol solution inhaler, Inhale 2 puffs Daily., Disp: 4 g, Rfl: 5  •  vitamin B-12 (CYANOCOBALAMIN) 1000 MCG tablet, TAKE ONE TABLET BY MOUTH DAILY (Patient taking differently: Take 1,000 mcg by mouth Daily.), Disp: 30 tablet, Rfl: 8      ALLERGIES:    Allergies   Allergen Reactions   • Cyclobenzaprine Unknown - Low Severity     Wide awake   • Plaquenil [Hydroxychloroquine Sulfate] Unknown - Low Severity     Black eyes   • Pravastatin Myalgia     Weakness / fatigue         Physical Exam    VITAL SIGNS:  /82   Pulse 62   Temp 97.5 °F (36.4 °C) (Temporal)   Resp 16   Ht 180.3 cm (71\")   Wt 68 kg (150 lb)   SpO2 97%   BMI 20.92 kg/m²     Wt Readings from Last 3 Encounters:   01/20/23 68 kg (150 lb)   12/08/22 67.6 kg (149 lb)   11/14/22 66.7 kg (147 lb)       Body mass index is 20.92 kg/m². Body surface area is 1.87 meters squared.         Performance Status: 1    General: thin appearing, in no acute distress  HEENT: sclera anicteric, oropharynx clear, neck is supple  Lymphatics: no cervical, supraclavicular, or axillary " °C) (Temporal)   SpO2 98%      Physical Exam  Vitals reviewed. HENT:      Head: Normocephalic. Right Ear: Tympanic membrane normal.      Left Ear: Tympanic membrane normal.      Nose: Nose normal.      Mouth/Throat:      Pharynx: Posterior oropharyngeal erythema present. Comments: PND  Eyes:      Pupils: Pupils are equal, round, and reactive to light. Cardiovascular:      Rate and Rhythm: Normal rate and regular rhythm. Pulses: Normal pulses. Heart sounds: Normal heart sounds. Pulmonary:      Effort: Pulmonary effort is normal.      Breath sounds: Normal breath sounds. Musculoskeletal:      Cervical back: Normal range of motion. Skin:     Capillary Refill: Capillary refill takes less than 2 seconds. Neurological:      General: No focal deficit present. Mental Status: He is alert and oriented to person, place, and time. Psychiatric:         Mood and Affect: Mood normal.         Behavior: Behavior normal.          ASSESSMENT/PLAN    1. Close exposure to COVID-19 virus  Told pt his rapid test was negative and that we would send it off and have conclusive results hopefully by Tuesday. Pt is to use hand hygiene, practice social distancing and wear a mask until he has results. He is agreeable to poc. - POCT COVID-19, Antigen  - MS HANDLG&/OR CONVEY OF SPEC FOR TR OFFICE TO LAB    2. Seasonal allergies  Pt states he has allergies and has a lot of drainage. He said he'd like something to take daily to help his allergy symptoms. Assessed pt in his car due to COVID swab and didn't know what meds pt was on. He said he didn't take anything for allergies but had claritin listed. Sending pt in flonase to use along with claritin to reduce post nasal drip. - fluticasone (FLONASE) 50 MCG/ACT nasal spray; 1 spray by Each Nostril route daily  Dispense: 2 Bottle;  Refill: [address], APRN - CNP adenopathy  Cardiovascular: regular rate and rhythm, no murmurs, rubs or gallops  Lungs: clear to auscultation bilaterally  Abdomen: soft, nontender, nondistended.  No palpable organomegaly  Extremities: no lower extremity edema  Skin: no rashes, lesions, bruising, or petechiae  Msk:  Shows no weakness of the large muscle groups  Psych: Mood is stable        RECENT LABS:    Lab Results   Component Value Date    HGB 13.2 12/08/2022    HCT 39.4 12/08/2022    MCV 93.8 12/08/2022     12/08/2022    WBC 5.83 12/08/2022    NEUTROABS 4.08 12/08/2022    LYMPHSABS 0.94 12/08/2022    MONOSABS 0.75 12/08/2022    EOSABS 0.01 12/08/2022    BASOSABS 0.03 12/08/2022       Lab Results   Component Value Date    GLUCOSE 90 12/08/2022    BUN 23 12/08/2022    CREATININE 0.94 12/08/2022     (L) 12/08/2022    K 4.4 12/08/2022    CL 95 (L) 12/08/2022    CO2 24.1 12/08/2022    CALCIUM 9.0 12/08/2022    PROTEINTOT 7.6 12/08/2022    ALBUMIN 3.60 12/08/2022    BILITOT 0.6 12/08/2022    ALKPHOS 133 (H) 12/08/2022    AST 55 (H) 12/08/2022    ALT 37 12/08/2022       Lab Results   Component Value Date    MSPIKE 1.8 (H) 12/01/2022    MSPIKE 1.8 (H) 01/21/2022    MSPIKE 1.5 (H) 06/03/2021     Lab Results   Component Value Date    FREEKAPPAL 121.7 (H) 12/01/2022    FREEKAPPAL 155.9 (H) 01/21/2022    FREEKAPPAL 88.4 (H) 06/03/2021     Lab Results   Component Value Date    IGLFLC 8.4 12/01/2022    IGLFLC 7.8 01/21/2022    IGLFLC 7.0 06/03/2021         Xr Chest 2 View    Result Date: 8/17/2019  No radiographic evidence of acute cardiac or pulmonary disease. Stable chronic increased interstitial markings.      This report was finalized on 8/17/2019 8:12 AM by Obdulia Feliciano MD.          Assessment/Plan    1.  Monoclonal gammopathy of uncertain significance with the M spike of 1.8 g/dL.  His numbers have been relatively stable for a number of years.  Compared to last year no significant changes.  Repeat check in 1 year.    2.  Severe COPD  with a right upper lobe lesion.  Followed by Dr. Dickson.     3.  Chemical recurrence of his prostate cancer in  summer 2020.  Completed short course of ADT and radiation with Dr. Wilson.  Follows with Dr. Aevry Miller MD  [facility]         No orders of the defined types were placed in this encounter.      1/20/2023

## 2023-01-23 DIAGNOSIS — J44.9 CHRONIC OBSTRUCTIVE PULMONARY DISEASE, UNSPECIFIED COPD TYPE: Primary | ICD-10-CM

## 2023-01-23 RX ORDER — ALBUTEROL SULFATE 90 UG/1
2 AEROSOL, METERED RESPIRATORY (INHALATION) EVERY 4 HOURS PRN
Qty: 18 G | Refills: 2 | Status: SHIPPED | OUTPATIENT
Start: 2023-01-23

## 2023-01-23 RX ORDER — TIOTROPIUM BROMIDE AND OLODATEROL 3.124; 2.736 UG/1; UG/1
2 SPRAY, METERED RESPIRATORY (INHALATION) DAILY
Qty: 4 G | Refills: 5 | Status: SHIPPED | OUTPATIENT
Start: 2023-01-23 | End: 2023-02-06 | Stop reason: SDUPTHER

## 2023-02-06 ENCOUNTER — OFFICE VISIT (OUTPATIENT)
Dept: INTERNAL MEDICINE | Facility: CLINIC | Age: 68
End: 2023-02-06
Payer: MEDICARE

## 2023-02-06 VITALS
RESPIRATION RATE: 16 BRPM | WEIGHT: 149.6 LBS | TEMPERATURE: 97.8 F | BODY MASS INDEX: 20.94 KG/M2 | HEART RATE: 65 BPM | OXYGEN SATURATION: 99 % | HEIGHT: 71 IN | SYSTOLIC BLOOD PRESSURE: 124 MMHG | DIASTOLIC BLOOD PRESSURE: 78 MMHG

## 2023-02-06 DIAGNOSIS — I73.9 PAD (PERIPHERAL ARTERY DISEASE): ICD-10-CM

## 2023-02-06 DIAGNOSIS — I70.213 ATHEROSCLER OF NATIVE ARTERY OF BOTH LEGS WITH INTERMIT CLAUDICATION: ICD-10-CM

## 2023-02-06 DIAGNOSIS — G89.4 CHRONIC PAIN SYNDROME: Primary | ICD-10-CM

## 2023-02-06 DIAGNOSIS — I10 ESSENTIAL HYPERTENSION: Chronic | ICD-10-CM

## 2023-02-06 DIAGNOSIS — J44.9 CHRONIC OBSTRUCTIVE PULMONARY DISEASE, UNSPECIFIED COPD TYPE: ICD-10-CM

## 2023-02-06 DIAGNOSIS — M47.812 SPONDYLOSIS OF CERVICAL REGION WITHOUT MYELOPATHY OR RADICULOPATHY: ICD-10-CM

## 2023-02-06 DIAGNOSIS — G63 NEUROPATHY ASSOCIATED WITH MGUS: ICD-10-CM

## 2023-02-06 DIAGNOSIS — D47.2 NEUROPATHY ASSOCIATED WITH MGUS: ICD-10-CM

## 2023-02-06 DIAGNOSIS — C61 MALIGNANT NEOPLASM OF PROSTATE: ICD-10-CM

## 2023-02-06 DIAGNOSIS — E78.49 OTHER HYPERLIPIDEMIA: ICD-10-CM

## 2023-02-06 DIAGNOSIS — M06.4 INFLAMMATORY POLYARTHROPATHY: ICD-10-CM

## 2023-02-06 DIAGNOSIS — F51.01 PRIMARY INSOMNIA: ICD-10-CM

## 2023-02-06 DIAGNOSIS — M06.09 RHEUMATOID ARTHRITIS OF MULTIPLE SITES WITH NEGATIVE RHEUMATOID FACTOR: ICD-10-CM

## 2023-02-06 DIAGNOSIS — K21.00 GASTROESOPHAGEAL REFLUX DISEASE WITH ESOPHAGITIS, UNSPECIFIED WHETHER HEMORRHAGE: ICD-10-CM

## 2023-02-06 PROBLEM — C44.90 SKIN CANCER: Status: ACTIVE | Noted: 2023-02-06

## 2023-02-06 PROCEDURE — 99214 OFFICE O/P EST MOD 30 MIN: CPT | Performed by: FAMILY MEDICINE

## 2023-02-06 RX ORDER — BISOPROLOL FUMARATE 5 MG/1
5 TABLET, FILM COATED ORAL DAILY
Qty: 90 TABLET | Refills: 3 | Status: SHIPPED | OUTPATIENT
Start: 2023-02-06

## 2023-02-06 RX ORDER — ATORVASTATIN CALCIUM 10 MG/1
10 TABLET, FILM COATED ORAL NIGHTLY
Qty: 90 TABLET | Refills: 3 | Status: SHIPPED | OUTPATIENT
Start: 2023-02-06

## 2023-02-06 RX ORDER — ISOSORBIDE MONONITRATE 30 MG/1
30 TABLET, EXTENDED RELEASE ORAL DAILY
Qty: 90 TABLET | Refills: 3 | Status: SHIPPED | OUTPATIENT
Start: 2023-02-06

## 2023-02-06 RX ORDER — GABAPENTIN 300 MG/1
300 CAPSULE ORAL 3 TIMES DAILY
Qty: 270 CAPSULE | Refills: 1 | Status: SHIPPED | OUTPATIENT
Start: 2023-02-06

## 2023-02-06 RX ORDER — TIOTROPIUM BROMIDE AND OLODATEROL 3.124; 2.736 UG/1; UG/1
2 SPRAY, METERED RESPIRATORY (INHALATION) DAILY
Qty: 12 G | Refills: 3 | Status: SHIPPED | OUTPATIENT
Start: 2023-02-06

## 2023-02-06 RX ORDER — DULOXETIN HYDROCHLORIDE 60 MG/1
60 CAPSULE, DELAYED RELEASE ORAL DAILY
Qty: 90 CAPSULE | Refills: 3 | Status: SHIPPED | OUTPATIENT
Start: 2023-02-06

## 2023-02-06 RX ORDER — METHOCARBAMOL 750 MG/1
750 TABLET, FILM COATED ORAL 3 TIMES DAILY PRN
Qty: 270 TABLET | Refills: 3 | Status: SHIPPED | OUTPATIENT
Start: 2023-02-06

## 2023-02-06 RX ORDER — AMITRIPTYLINE HYDROCHLORIDE 50 MG/1
50 TABLET, FILM COATED ORAL NIGHTLY
Qty: 90 TABLET | Refills: 3 | Status: SHIPPED | OUTPATIENT
Start: 2023-02-06

## 2023-02-06 RX ORDER — PANTOPRAZOLE SODIUM 40 MG/1
40 TABLET, DELAYED RELEASE ORAL DAILY
Qty: 90 TABLET | Refills: 3 | Status: SHIPPED | OUTPATIENT
Start: 2023-02-06

## 2023-02-06 RX ORDER — FOLIC ACID 1 MG/1
1 TABLET ORAL DAILY
Qty: 90 TABLET | Refills: 3 | Status: SHIPPED | OUTPATIENT
Start: 2023-02-06

## 2023-02-06 RX ORDER — PREDNISONE 1 MG/1
5 TABLET ORAL DAILY
COMMUNITY
Start: 2023-01-09

## 2023-02-06 RX ORDER — AZELASTINE 1 MG/ML
1 SPRAY, METERED NASAL 2 TIMES DAILY PRN
Qty: 90 ML | Refills: 3 | Status: SHIPPED | OUTPATIENT
Start: 2023-02-06

## 2023-02-06 NOTE — PROGRESS NOTES
"Chief Complaint  Pain (Medication follow up)    Subjective        Elliott Dunn presents to [facility]  History of Present Illness  Overall doing well  Continues to be followed by Dr. andino urology, no issues  Cancer free  Needs meds refilled to mail order  Takes 50 mg of elavil, never needs 3 pills.       Objective   Vital Signs:  /78 (BP Location: Left arm, Patient Position: Sitting, Cuff Size: Adult)   Pulse 65   Temp 97.8 °F (36.6 °C) (Temporal)   Resp 16   Ht 180.3 cm (71\")   Wt 67.9 kg (149 lb 9.6 oz)   SpO2 99%   BMI 20.86 kg/m²   Estimated body mass index is 20.86 kg/m² as calculated from the following:    Height as of this encounter: 180.3 cm (71\").    Weight as of this encounter: 67.9 kg (149 lb 9.6 oz).       BMI is within normal parameters. No other follow-up for BMI required.      Physical Exam  Vitals and nursing note reviewed.   Constitutional:       General: He is not in acute distress.     Appearance: Normal appearance. He is well-developed, well-groomed and normal weight. He is not ill-appearing, toxic-appearing or diaphoretic.      Interventions: Face mask in place.   HENT:      Head: Normocephalic and atraumatic.      Right Ear: Hearing normal.      Left Ear: Hearing normal.   Eyes:      General: Lids are normal. No scleral icterus.        Right eye: No discharge.         Left eye: No discharge.      Extraocular Movements: Extraocular movements intact.   Cardiovascular:      Rate and Rhythm: Normal rate and regular rhythm.   Pulmonary:      Effort: Pulmonary effort is normal.      Breath sounds: Normal breath sounds.   Musculoskeletal:      Cervical back: Neck supple.   Skin:     Coloration: Skin is not jaundiced or pale.   Neurological:      General: No focal deficit present.      Mental Status: He is alert and oriented to person, place, and time.   Psychiatric:         Attention and Perception: Attention and perception normal.         Mood and " Affect: Mood and affect normal.         Speech: Speech normal.         Behavior: Behavior normal. Behavior is cooperative.         Thought Content: Thought content normal.         Cognition and Memory: Cognition and memory normal.         Judgment: Judgment normal.        Result Review :  The following data was reviewed by: Ladonna Means MD on 02/06/2023:  Via care everywhere reviewed last vascular note.  CT Angio Abdominal Aorta Bilateral Iliofem Runoff (01/18/2023 17:19)                 Assessment and Plan   Diagnoses and all orders for this visit:    1. Chronic pain syndrome (Primary)  -     amitriptyline (ELAVIL) 50 MG tablet; Take 1 tablet by mouth Every Night.  Dispense: 90 tablet; Refill: 3  -     DULoxetine (CYMBALTA) 60 MG capsule; Take 1 capsule by mouth Daily.  Dispense: 90 capsule; Refill: 3  -     gabapentin (NEURONTIN) 300 MG capsule; Take 1 capsule by mouth 3 (Three) Times a Day.  Dispense: 270 capsule; Refill: 1  -     methocarbamol (ROBAXIN) 750 MG tablet; Take 1 tablet by mouth 3 (Three) Times a Day As Needed for Muscle Spasms.  Dispense: 270 tablet; Refill: 3    2. Primary insomnia  -     amitriptyline (ELAVIL) 50 MG tablet; Take 1 tablet by mouth Every Night.  Dispense: 90 tablet; Refill: 3    3. Neuropathy associated with MGUS (HCC)  -     gabapentin (NEURONTIN) 300 MG capsule; Take 1 capsule by mouth 3 (Three) Times a Day.  Dispense: 270 capsule; Refill: 1    4. Inflammatory polyarthropathy (HCC)  -     amitriptyline (ELAVIL) 50 MG tablet; Take 1 tablet by mouth Every Night.  Dispense: 90 tablet; Refill: 3    5. Rheumatoid arthritis of multiple sites with negative rheumatoid factor (HCC)  Comments:  follow up with rheumatology    6. PAD (peripheral artery disease) (HCC)  -     atorvastatin (LIPITOR) 10 MG tablet; Take 1 tablet by mouth Every Night. To lower cholesterol and risk of stroke (also to help with PAD)  Dispense: 90 tablet; Refill: 3    7. Atheroscler of native artery of both  legs with intermit claudication (HCC)  Assessment & Plan:  Follow up with vascular as needed. Continue statin and Asprin. Mild atherosclerosis per CTA with runoff.      8. Other hyperlipidemia  -     atorvastatin (LIPITOR) 10 MG tablet; Take 1 tablet by mouth Every Night. To lower cholesterol and risk of stroke (also to help with PAD)  Dispense: 90 tablet; Refill: 3    9. Essential hypertension  -     bisoprolol (ZEBeta) 5 MG tablet; Take 1 tablet by mouth Daily.  Dispense: 90 tablet; Refill: 3  -     isosorbide mononitrate (IMDUR) 30 MG 24 hr tablet; Take 1 tablet by mouth Daily.  Dispense: 90 tablet; Refill: 3    10. Spondylosis of cervical region without myelopathy or radiculopathy  -     gabapentin (NEURONTIN) 300 MG capsule; Take 1 capsule by mouth 3 (Three) Times a Day.  Dispense: 270 capsule; Refill: 1    11. Gastroesophageal reflux disease with esophagitis, unspecified whether hemorrhage  -     pantoprazole (PROTONIX) 40 MG EC tablet; Take 1 tablet by mouth Daily.  Dispense: 90 tablet; Refill: 3    12. Chronic obstructive pulmonary disease, unspecified COPD type (HCC)  -     tiotropium bromide-olodaterol (Stiolto Respimat) 2.5-2.5 MCG/ACT aerosol solution inhaler; Inhale 2 puffs Daily.  Dispense: 12 g; Refill: 3    13. Malignant neoplasm of prostate (HCC)  Assessment & Plan:  Follow up with urology      Other orders  -     azelastine (ASTELIN) 0.1 % nasal spray; 1 spray into the nostril(s) as directed by provider 2 (Two) Times a Day As Needed for Rhinitis or Allergies. Use in each nostril as directed  Dispense: 90 mL; Refill: 3  -     folic acid (FOLVITE) 1 MG tablet; Take 1 tablet by mouth Daily.  Dispense: 90 tablet; Refill: 3           Follow Up   Return in about 6 months (around 8/6/2023) for Medicare Wellness.  Patient was given instructions and counseling regarding his condition or for health maintenance advice. Please see specific information pulled into the AVS if appropriate.

## 2023-02-06 NOTE — ASSESSMENT & PLAN NOTE
Follow up with vascular as needed. Continue statin and Asprin. Mild atherosclerosis per CTA with runoff.

## 2023-03-23 ENCOUNTER — HOSPITAL ENCOUNTER (EMERGENCY)
Facility: HOSPITAL | Age: 68
Discharge: HOME | End: 2023-03-23
Attending: HOSPITALIST
Payer: MEDICARE

## 2023-03-23 ENCOUNTER — APPOINTMENT (OUTPATIENT)
Dept: GENERAL RADIOLOGY | Facility: HOSPITAL | Age: 68
End: 2023-03-23
Payer: MEDICARE

## 2023-03-23 ENCOUNTER — APPOINTMENT (OUTPATIENT)
Dept: CT IMAGING | Facility: HOSPITAL | Age: 68
End: 2023-03-23
Payer: MEDICARE

## 2023-03-23 VITALS
OXYGEN SATURATION: 96 % | TEMPERATURE: 98.8 F | WEIGHT: 149 LBS | SYSTOLIC BLOOD PRESSURE: 106 MMHG | HEART RATE: 78 BPM | DIASTOLIC BLOOD PRESSURE: 78 MMHG | RESPIRATION RATE: 24 BRPM | BODY MASS INDEX: 20.86 KG/M2 | HEIGHT: 71 IN

## 2023-03-23 DIAGNOSIS — R42 VERTIGO: ICD-10-CM

## 2023-03-23 DIAGNOSIS — J18.9 PNEUMONIA OF RIGHT LOWER LOBE DUE TO INFECTIOUS ORGANISM: Primary | ICD-10-CM

## 2023-03-23 LAB
ALBUMIN SERPL-MCNC: 3.5 G/DL (ref 3.4–4.8)
ALBUMIN/GLOB SERPL: 0.8 {RATIO} (ref 0.8–2)
ALP SERPL-CCNC: 133 U/L (ref 25–100)
ALT SERPL-CCNC: 21 U/L (ref 4–36)
ANION GAP SERPL CALCULATED.3IONS-SCNC: 10 MMOL/L (ref 3–16)
AST SERPL-CCNC: 27 U/L (ref 8–33)
BASOPHILS # BLD: 0 K/UL (ref 0–0.1)
BASOPHILS NFR BLD: 0.3 %
BILIRUB SERPL-MCNC: 1.2 MG/DL (ref 0.3–1.2)
BUN SERPL-MCNC: 27 MG/DL (ref 6–20)
CALCIUM SERPL-MCNC: 8.7 MG/DL (ref 8.5–10.5)
CHLORIDE SERPL-SCNC: 95 MMOL/L (ref 98–107)
CO2 SERPL-SCNC: 22 MMOL/L (ref 20–30)
CREAT SERPL-MCNC: 1 MG/DL (ref 0.4–1.2)
EOSINOPHIL # BLD: 0.1 K/UL (ref 0–0.4)
EOSINOPHIL NFR BLD: 0.3 %
ERYTHROCYTE [DISTWIDTH] IN BLOOD BY AUTOMATED COUNT: 13.6 % (ref 11–16)
FLUAV AG NPH QL: NEGATIVE
FLUBV AG NPH QL: NEGATIVE
GFR SERPLBLD CREATININE-BSD FMLA CKD-EPI: >60 ML/MIN/{1.73_M2}
GLOBULIN SER CALC-MCNC: 4.3 G/DL
GLUCOSE SERPL-MCNC: 118 MG/DL (ref 74–106)
HCT VFR BLD AUTO: 38.5 % (ref 40–54)
HGB BLD-MCNC: 12.9 G/DL (ref 13–18)
IMM GRANULOCYTES # BLD: 0.1 K/UL
IMM GRANULOCYTES NFR BLD: 0.7 % (ref 0–5)
LYMPHOCYTES # BLD: 1.3 K/UL (ref 1.5–4)
LYMPHOCYTES NFR BLD: 8.4 %
MCH RBC QN AUTO: 31.5 PG (ref 27–32)
MCHC RBC AUTO-ENTMCNC: 33.5 G/DL (ref 31–35)
MCV RBC AUTO: 94.1 FL (ref 80–100)
MONOCYTES # BLD: 1.7 K/UL (ref 0.2–0.8)
MONOCYTES NFR BLD: 11.4 %
NEUTROPHILS # BLD: 11.8 K/UL (ref 2–7.5)
NEUTS SEG NFR BLD: 78.9 %
PLATELET # BLD AUTO: 202 K/UL (ref 150–400)
PMV BLD AUTO: 10 FL (ref 6–10)
POTASSIUM SERPL-SCNC: 4 MMOL/L (ref 3.4–5.1)
PROT SERPL-MCNC: 7.8 G/DL (ref 6.4–8.3)
RBC # BLD AUTO: 4.09 M/UL (ref 4.5–6)
SARS-COV-2 RDRP RESP QL NAA+PROBE: NOT DETECTED
SODIUM SERPL-SCNC: 127 MMOL/L (ref 136–145)
TROPONIN I SERPL-MCNC: <0.3 NG/ML
WBC # BLD AUTO: 14.9 K/UL (ref 4–11)

## 2023-03-23 PROCEDURE — 96374 THER/PROPH/DIAG INJ IV PUSH: CPT

## 2023-03-23 PROCEDURE — 2580000003 HC RX 258: Performed by: HOSPITALIST

## 2023-03-23 PROCEDURE — 6360000002 HC RX W HCPCS: Performed by: HOSPITALIST

## 2023-03-23 PROCEDURE — 80053 COMPREHEN METABOLIC PANEL: CPT

## 2023-03-23 PROCEDURE — 71045 X-RAY EXAM CHEST 1 VIEW: CPT

## 2023-03-23 PROCEDURE — 99285 EMERGENCY DEPT VISIT HI MDM: CPT

## 2023-03-23 PROCEDURE — 6370000000 HC RX 637 (ALT 250 FOR IP): Performed by: HOSPITALIST

## 2023-03-23 PROCEDURE — 87804 INFLUENZA ASSAY W/OPTIC: CPT

## 2023-03-23 PROCEDURE — 36415 COLL VENOUS BLD VENIPUNCTURE: CPT

## 2023-03-23 PROCEDURE — 87635 SARS-COV-2 COVID-19 AMP PRB: CPT

## 2023-03-23 PROCEDURE — 85025 COMPLETE CBC W/AUTO DIFF WBC: CPT

## 2023-03-23 PROCEDURE — 84484 ASSAY OF TROPONIN QUANT: CPT

## 2023-03-23 PROCEDURE — 93005 ELECTROCARDIOGRAM TRACING: CPT

## 2023-03-23 PROCEDURE — 72125 CT NECK SPINE W/O DYE: CPT

## 2023-03-23 RX ORDER — 0.9 % SODIUM CHLORIDE 0.9 %
1000 INTRAVENOUS SOLUTION INTRAVENOUS ONCE
Status: COMPLETED | OUTPATIENT
Start: 2023-03-23 | End: 2023-03-23

## 2023-03-23 RX ORDER — LEVOFLOXACIN 500 MG/1
500 TABLET, FILM COATED ORAL DAILY
Qty: 10 TABLET | Refills: 0 | Status: SHIPPED | OUTPATIENT
Start: 2023-03-23 | End: 2023-04-02

## 2023-03-23 RX ORDER — ISOSORBIDE MONONITRATE 30 MG/1
30 TABLET, EXTENDED RELEASE ORAL DAILY
COMMUNITY
Start: 2023-02-06

## 2023-03-23 RX ORDER — BISOPROLOL FUMARATE 5 MG/1
5 TABLET, FILM COATED ORAL DAILY
COMMUNITY
Start: 2023-02-06

## 2023-03-23 RX ORDER — MULTIPLE VITAMINS W/ MINERALS TAB 9MG-400MCG
1 TAB ORAL DAILY
COMMUNITY

## 2023-03-23 RX ORDER — MECLIZINE HYDROCHLORIDE 25 MG/1
25 TABLET ORAL 3 TIMES DAILY PRN
Qty: 15 TABLET | Refills: 0 | Status: SHIPPED | OUTPATIENT
Start: 2023-03-23

## 2023-03-23 RX ORDER — KETOROLAC TROMETHAMINE 30 MG/ML
15 INJECTION, SOLUTION INTRAMUSCULAR; INTRAVENOUS ONCE
Status: COMPLETED | OUTPATIENT
Start: 2023-03-23 | End: 2023-03-23

## 2023-03-23 RX ORDER — GUAIFENESIN 600 MG/1
1200 TABLET, EXTENDED RELEASE ORAL 2 TIMES DAILY
Qty: 28 TABLET | Refills: 0 | Status: SHIPPED | OUTPATIENT
Start: 2023-03-23 | End: 2023-03-30

## 2023-03-23 RX ORDER — FOLIC ACID 1 MG/1
1 TABLET ORAL DAILY
COMMUNITY
Start: 2023-02-06

## 2023-03-23 RX ORDER — ALBUTEROL SULFATE 90 UG/1
2 AEROSOL, METERED RESPIRATORY (INHALATION) EVERY 4 HOURS PRN
COMMUNITY
Start: 2023-01-23

## 2023-03-23 RX ORDER — DULOXETIN HYDROCHLORIDE 60 MG/1
60 CAPSULE, DELAYED RELEASE ORAL DAILY
COMMUNITY
Start: 2023-02-06

## 2023-03-23 RX ORDER — MECLIZINE HCL 12.5 MG/1
25 TABLET ORAL ONCE
Status: COMPLETED | OUTPATIENT
Start: 2023-03-23 | End: 2023-03-23

## 2023-03-23 RX ORDER — PANTOPRAZOLE SODIUM 40 MG/1
40 TABLET, DELAYED RELEASE ORAL DAILY
COMMUNITY
Start: 2023-02-06

## 2023-03-23 RX ORDER — PREDNISONE 1 MG/1
5 TABLET ORAL DAILY
COMMUNITY
Start: 2023-01-09

## 2023-03-23 RX ADMIN — KETOROLAC TROMETHAMINE 15 MG: 30 INJECTION, SOLUTION INTRAMUSCULAR at 13:52

## 2023-03-23 RX ADMIN — MECLIZINE 25 MG: 12.5 TABLET ORAL at 13:51

## 2023-03-23 RX ADMIN — SODIUM CHLORIDE 1000 ML: 9 INJECTION, SOLUTION INTRAVENOUS at 13:51

## 2023-03-23 ASSESSMENT — PAIN DESCRIPTION - DESCRIPTORS: DESCRIPTORS: ACHING

## 2023-03-23 ASSESSMENT — PAIN SCALES - GENERAL
PAINLEVEL_OUTOF10: 7
PAINLEVEL_OUTOF10: 8

## 2023-03-23 ASSESSMENT — PAIN - FUNCTIONAL ASSESSMENT: PAIN_FUNCTIONAL_ASSESSMENT: 0-10

## 2023-03-23 ASSESSMENT — PAIN DESCRIPTION - LOCATION: LOCATION: GENERALIZED

## 2023-03-23 NOTE — ED PROVIDER NOTES
Otherwise he is resting comfortably stretcher no acute distress nontoxic-appearing at this time. Final disposition be determined once his radiological diagnostic studies been performed reviewed. Rapid COVID screen was negative    Rapid influenza screen was negative    Blood work showed white count 14,900, hemoglobin is 12.9, hematocrit 38.5, platelet counts 367. Comprehensive metabolic panel was benign except for sodium 127 which is slightly low, chloride of 95 also slightly low, glucose of 118 BUN of 27. Alkaline phosphatase 133. Troponin was nondetectable less than 0.30. Portable chest radiograph read by radiology as mild airspace disease right base. CT scan of the cervical spine without contrast read by radiology as no acute fracture. Degenerative changes cervical spine. Consolidation or fibrosis of the lung apex. 9 millimeter nodule opacity right apex. Patient's radiological diagnostic studies were discussed with him and his family and they do state understanding. Patient family advised the findings on the CT scan and the radiograph. They are already aware of the nodule that he has on the right side he has had that for some time per patient's family. There is a concerning finding for possible airspace disease in the patient's right lung base. With him the concern for the pneumonia. Doing a curb 65 score on this patient he does have a score of 2 which places him in a 9.2% chance of 30-day mortality which is usually probable admission versus close outpatient management. I discussed with him about possible admission to the hospital but the patient and his family declined they states that they would prefer to be discharged and will follow-up close with her primary care provider. Advised we place him on Levaquin 500 mg 1 tablet daily for 10 days.   Advised him to go ahead and continue to use his rescue inhaler/albuterol every 4-6 hours 2 puffs to see if this helps with his cough and shortness 1 tablet by mouth 3 times daily as needed for Dizziness       Comment: Please note this report has been produced using speech recognition software and may contain errorsrelated to that system including errors in grammar, punctuation, and spelling, as well as words and phrases that may be inappropriate. If there are any questions or concerns please feel free to contact the dictating providerfor clarification.     Elliott Pepe DO  Attending Emergency Physician               Elliott Pepe DO  03/23/23 0982

## 2023-03-23 NOTE — ED TRIAGE NOTES
PT arrived to ER with complaints of hypertension x 2 days. dizziness x 3 day, fall yesterday. Body aches. Possible fever at home.

## 2023-05-15 DIAGNOSIS — R91.1 SOLITARY PULMONARY NODULE ON LUNG CT: ICD-10-CM

## 2023-05-15 DIAGNOSIS — J44.9 CHRONIC OBSTRUCTIVE PULMONARY DISEASE, UNSPECIFIED COPD TYPE: Primary | ICD-10-CM

## 2023-05-15 DIAGNOSIS — J43.9 PULMONARY EMPHYSEMA, UNSPECIFIED EMPHYSEMA TYPE: ICD-10-CM

## 2023-05-17 ENCOUNTER — OFFICE VISIT (OUTPATIENT)
Dept: GASTROENTEROLOGY | Facility: CLINIC | Age: 68
End: 2023-05-17
Payer: MEDICARE

## 2023-05-17 VITALS
HEART RATE: 63 BPM | DIASTOLIC BLOOD PRESSURE: 64 MMHG | SYSTOLIC BLOOD PRESSURE: 106 MMHG | WEIGHT: 151 LBS | BODY MASS INDEX: 21.06 KG/M2 | OXYGEN SATURATION: 95 %

## 2023-05-17 DIAGNOSIS — K59.04 CHRONIC IDIOPATHIC CONSTIPATION: ICD-10-CM

## 2023-05-17 DIAGNOSIS — Z12.12 ENCOUNTER FOR COLORECTAL CANCER SCREENING: Primary | ICD-10-CM

## 2023-05-17 DIAGNOSIS — R93.5 ABNORMAL CT OF THE ABDOMEN: ICD-10-CM

## 2023-05-17 DIAGNOSIS — K21.9 GASTROESOPHAGEAL REFLUX DISEASE WITHOUT ESOPHAGITIS: ICD-10-CM

## 2023-05-17 DIAGNOSIS — Z12.11 ENCOUNTER FOR COLORECTAL CANCER SCREENING: Primary | ICD-10-CM

## 2023-05-17 DIAGNOSIS — K57.30 DIVERTICULOSIS OF COLON: Chronic | ICD-10-CM

## 2023-05-17 NOTE — PROGRESS NOTES
Follow Up Note     Date: 2023   Patient Name: Elliott Dunn  MRN: [id number]  : 1955     Primary Care Provider: Ladonna Means MD     Chief Complaint   Patient presents with   • Results     Colonoscopy      History of present illness:   2023  Elliott Dunn is a 67 y.o. male who is here today for follow up regarding Results (Colonoscopy).     He had colonoscopy since last visit, would like to discuss results. He is having BMs daily now. Tried 2 different bowel preps for colonoscopy and prep still inadequate. He has no new GI complaints. Reflux controlled with current protonix 40 mg once daily.     Interval History:  2021  This patient deny any abdominal pain, change in bowel habit, hematochezia or melena.  Weight is stable except recently he lost about 5-10 pounds  after COVID infection. His CTAP done in Aug also showed sigmoid colon thickening suspiciously for colitis. Pt denies nausea vomiting or odynophagia or dysphagia. There is a history of acid reflux and also on cerebrex wit occasional reflux symptoms despite on PPI. There is no history of anemia. Prior history of EGD in 2017 Dr. Curiel and reported as having possible Cristina's and biopsies biopsies negative for Cristina's esophagus.  Colonoscopy done  by Dr Elizondo and revealed diverticulosis and possible radiation proctitis other details unknown. No family history of colon cancer or any GI malignancy.Dad and brother had clung CA.  Denies alcohol abuse or cigarette smoking (ex smoker).   He had prostate CA and had radiation and prostatectomy. He also has MGUS  He also has RA on medication.     Subjective     Past Medical History:   Diagnosis Date   • Acquired absence of all teeth    • Allergic    • Anomalous coronary artery origin    • Arrhythmia    • Arthritis    • Arthritis of lumbar spine 2016   • Back pain 2016   • Cristina esophagus    • Cataract     REMOVED BILATERALL   • Cervical spine disease 2016    • CHF (congestive heart failure)    • Chronic obstructive pulmonary disease    • Colon polyps    • COVID-19 vaccine series completed    • Deafness in left ear    • Derangement of anterior horn of medial meniscus    • Difficulty swallowing    • Disorder of lumbar spine 06/17/2016   • Disorder of thoracic spine 06/17/2016   • Diverticulitis of colon    • DJD (degenerative joint disease)    • Elevated cholesterol    • Elevated liver enzymes    • Erectile dysfunction    • Esophageal reflux    • Essential hypertension     PT DENIES SAYS IT RUNS LOW   • Glaucoma    • Hard of hearing     DEAF IN LEFT EAR NO AIDS WORN   • Heart murmur    • Hiatal hernia    • High cholesterol    • History of radiation therapy 11/24/2020    salvage pelvic XRT   • Impaired functional mobility, balance, gait, and endurance    • Inflammatory polyarthropathy     Per Dr. Haider. Negative NEO, normal ESR, RF, anti-ccp and did not improve with prednisone per notes.   • Inguinal hernia    • Insomnia    • Lateral meniscus derangement    • Left otitis media with spontaneous rupture of eardrum    • Locking of left knee    • Low back pain    • Meniere's disease    • MGUS (monoclonal gammopathy of unknown significance)     likely diagnosis   • Murmur    • Neuromuscular disorder    • Neuropathic arthritis    • No natural teeth    • Perforation of left tympanic membrane    • Prostate cancer     previous CA in 2013 with prostatectomy   • Pulmonary emphysema    • Recent shoulder injury     LEFT SHOULDER   • Rotator cuff tear arthropathy of left shoulder 03/12/2020    Added automatically from request for surgery [id number]   • Scoliosis    • Skin cancer of face     squamous cell basal   • Spina bifida occulta 06/17/2016   • Tobacco abuse 11/09/2017   • Visual impairment    • Wears glasses     READING GLASSES ONLY     Past Surgical History:   Procedure Laterality Date   • CARDIAC CATHETERIZATION      no stent   • COLONOSCOPY     • COLONOSCOPY N/A 01/04/2022     Procedure: COLONOSCOPY with polypectomy x1;  Surgeon: Luis Callahan MD;  Location: [facility] ENDOSCOPY;  Service: Gastroenterology;  Laterality: N/A;   • COLONOSCOPY N/A 11/16/2022    Procedure: COLONOSCOPY ;  Surgeon: Luis Callahan MD;  Location: [facility] ENDOSCOPY;  Service: Gastroenterology;  Laterality: N/A;   • ENDOSCOPY N/A 04/10/2017    Procedure: ESOPHAGOGASTRODUODENOSCOPY WITH BIOPSY;  Surgeon: Alexander Ritchie MD;  Location: [facility] ENDOSCOPY;  Service:    • ENDOSCOPY N/A 07/20/2022    Procedure: ESOPHAGOGASTRODUODENOSCOPY WITH BIOPSY ;  Surgeon: Luis Callahan MD;  Location: [facility] ENDOSCOPY;  Service: Gastroenterology;  Laterality: N/A;   • EYE SURGERY     • HERNIA REPAIR     • INGUINAL HERNIA REPAIR Right    • INGUINAL HERNIA REPAIR     • KNEE SURGERY Left    • LYMPH NODE BIOPSY     • MULTIPLE TOOTH EXTRACTIONS     • PROSTATE SURGERY  2004   • PROSTATECTOMY  06/30/2014   • PROSTATECTOMY      Prostatectomy Radical   • REFRACTIVE SURGERY Right 01/2023   • SHOULDER ARTHROSCOPY Left 02/06/2020    Procedure: DIAGNOSTIC SHOULDER ARTHROSCOPY LEFT WITH LABRAL DEBRIDEMENT, SUBACROMIAL BURSECTOMY, ASSESSMENT OF LARGE FRAGMENTED RETRACTED IRREPARABLE ROTATOR CUFF TEARS;  Surgeon: Rony Pandey MD;  Location: [facility] OR;  Service: Orthopedics;  Laterality: Left;   • SKIN CANCER EXCISION  2017    both sides of body/squamous cell melanoma   • TOTAL SHOULDER ARTHROPLASTY W/ DISTAL CLAVICLE EXCISION Left 07/09/2020    Procedure: Left reverse total shoulder arthroplasty;  Surgeon: Rony Pandey MD;  Location: [facility] OR;  Service: Orthopedics;  Laterality: Left;   • TYMPANOPLASTY W/ MASTOIDECTOMY     • UMBILICAL HERNIA REPAIR       Family History   Problem Relation Age of Onset   • Diabetes Mother    • Hypertension Mother    • Obesity Mother    • Stroke Mother 65   • Arthritis Mother    • Hyperlipidemia Mother    • Vision loss Mother    • Cancer Father    • Cancer Sister    • Diabetes  Brother    • Cancer Brother    • Heart attack Brother    • Alcohol abuse Brother    • Drug abuse Brother    • Hearing loss Brother    • Learning disabilities Brother    • Colon cancer Neg Hx      Social History     Socioeconomic History   • Marital status:    Tobacco Use   • Smoking status: Former     Packs/day: 1.00     Years: 51.00     Pack years: 51.00     Types: Cigarettes     Start date: 1963     Quit date: 2018     Years since quittin.3   • Smokeless tobacco: Never   Vaping Use   • Vaping Use: Never used   Substance and Sexual Activity   • Alcohol use: Never   • Drug use: Never   • Sexual activity: Defer     Current Outpatient Medications:   •  albuterol sulfate HFA (Ventolin HFA) 108 (90 Base) MCG/ACT inhaler, Inhale 2 puffs Every 4 (Four) Hours As Needed for Wheezing or Shortness of Air., Disp: 18 g, Rfl: 2  •  amitriptyline (ELAVIL) 50 MG tablet, Take 1 tablet by mouth Every Night., Disp: 90 tablet, Rfl: 3  •  aspirin 81 MG EC tablet, Take 1 tablet by mouth Daily., Disp: , Rfl:   •  atorvastatin (LIPITOR) 10 MG tablet, Take 1 tablet by mouth Every Night. To lower cholesterol and risk of stroke (also to help with PAD), Disp: 90 tablet, Rfl: 3  •  azelastine (ASTELIN) 0.1 % nasal spray, 1 spray into the nostril(s) as directed by provider 2 (Two) Times a Day As Needed for Rhinitis or Allergies. Use in each nostril as directed, Disp: 90 mL, Rfl: 3  •  bisoprolol (ZEBeta) 5 MG tablet, Take 1 tablet by mouth Daily., Disp: 90 tablet, Rfl: 3  •  DULoxetine (CYMBALTA) 60 MG capsule, Take 1 capsule by mouth Daily., Disp: 90 capsule, Rfl: 3  •  folic acid (FOLVITE) 1 MG tablet, Take 1 tablet by mouth Daily., Disp: 90 tablet, Rfl: 3  •  gabapentin (NEURONTIN) 300 MG capsule, Take 1 capsule by mouth 3 (Three) Times a Day., Disp: 270 capsule, Rfl: 1  •  isosorbide mononitrate (IMDUR) 30 MG 24 hr tablet, Take 1 tablet by mouth Daily., Disp: 90 tablet, Rfl: 3  •  leflunomide (ARAVA) 20 MG tablet, Take 1  tablet by mouth Daily., Disp: , Rfl:   •  methocarbamol (ROBAXIN) 750 MG tablet, Take 1 tablet by mouth 3 (Three) Times a Day As Needed for Muscle Spasms., Disp: 270 tablet, Rfl: 3  •  methotrexate 2.5 MG tablet, Take 8 tablets by mouth 1 (One) Time Per Week. TAKES ON THURSDAY (Patient taking differently: Take 8 tablets by mouth 1 (One) Time Per Week. TAKES ON Sunday ONLY TAKES 7 PILSS), Disp: 32 tablet, Rfl: 0  •  Multiple Vitamins-Minerals (MULTIVITAMIN WITH MINERALS) tablet tablet, Take 1 tablet by mouth Daily., Disp: , Rfl:   •  O2 (OXYGEN), Inhale 2 L 1 (One) Time. Nightly, Disp: , Rfl:   •  pantoprazole (PROTONIX) 40 MG EC tablet, Take 1 tablet by mouth Daily., Disp: 90 tablet, Rfl: 3  •  predniSONE (DELTASONE) 5 MG tablet, Take 1 tablet by mouth Daily., Disp: , Rfl:   •  tiotropium bromide-olodaterol (Stiolto Respimat) 2.5-2.5 MCG/ACT aerosol solution inhaler, Inhale 2 puffs Daily., Disp: 12 g, Rfl: 3  •  vitamin B-12 (CYANOCOBALAMIN) 1000 MCG tablet, TAKE ONE TABLET BY MOUTH DAILY (Patient taking differently: Take 1 tablet by mouth Daily.), Disp: 30 tablet, Rfl: 8    Allergies   Allergen Reactions   • Cyclobenzaprine Unknown - Low Severity     Wide awake   • Plaquenil [Hydroxychloroquine Sulfate] Unknown - Low Severity     Black eyes   • Pravastatin Myalgia     Weakness / fatigue     The following portions of the patient's history were reviewed and updated as appropriate: allergies, current medications, past family history, past medical history, past social history, past surgical history and problem list.    Objective     Physical Exam  Constitutional:       General: He is not in acute distress.     Appearance: Normal appearance. He is well-developed. He is not diaphoretic.   HENT:      Head: Normocephalic and atraumatic.      Right Ear: External ear normal.      Left Ear: External ear normal.      Nose: Nose normal.   Eyes:      General: No scleral icterus.        Right eye: No discharge.         Left eye:  No discharge.      Conjunctiva/sclera: Conjunctivae normal.   Neck:      Trachea: No tracheal deviation.   Pulmonary:      Effort: Pulmonary effort is normal. No respiratory distress.   Musculoskeletal:         General: Normal range of motion.      Cervical back: Normal range of motion.   Skin:     Coloration: Skin is not pale.      Findings: No erythema or rash.   Neurological:      Mental Status: He is alert and oriented to person, place, and time.      Coordination: Coordination normal.   Psychiatric:         Mood and Affect: Mood normal.         Behavior: Behavior normal.         Thought Content: Thought content normal.         Judgment: Judgment normal.       Vitals:    05/17/23 1408   BP: 106/64   Pulse: 63   SpO2: 95%   Weight: 68.5 kg (151 lb)     Results Review:   I reviewed the patient's new clinical results.    Admission on 12/08/2022, Discharged on 12/08/2022   Component Date Value Ref Range Status   • Glucose 12/08/2022 90  65 - 99 mg/dL Final   • BUN 12/08/2022 23  8 - 23 mg/dL Final   • Creatinine 12/08/2022 0.94  0.76 - 1.27 mg/dL Final   • Sodium 12/08/2022 128 (L)  136 - 145 mmol/L Final   • Potassium 12/08/2022 4.4  3.5 - 5.2 mmol/L Final   • Chloride 12/08/2022 95 (L)  98 - 107 mmol/L Final   • CO2 12/08/2022 24.1  22.0 - 29.0 mmol/L Final   • Calcium 12/08/2022 9.0  8.6 - 10.5 mg/dL Final   • Total Protein 12/08/2022 7.6  6.0 - 8.5 g/dL Final   • Albumin 12/08/2022 3.60  3.50 - 5.20 g/dL Final   • ALT (SGPT) 12/08/2022 37  1 - 41 U/L Final   • AST (SGOT) 12/08/2022 55 (H)  1 - 40 U/L Final   • Alkaline Phosphatase 12/08/2022 133 (H)  39 - 117 U/L Final   • Total Bilirubin 12/08/2022 0.6  0.0 - 1.2 mg/dL Final   • Globulin 12/08/2022 4.0  gm/dL Final   • A/G Ratio 12/08/2022 0.9  g/dL Final   • BUN/Creatinine Ratio 12/08/2022 24.5  7.0 - 25.0 Final   • Anion Gap 12/08/2022 8.9  5.0 - 15.0 mmol/L Final   • eGFR 12/08/2022 88.9  >60.0 mL/min/1.73 Final    National Kidney Foundation and American  Society of Nephrology (ASN) Task Force recommended calculation based on the Chronic Kidney Disease Epidemiology Collaboration (CKD-EPI) equation refit without adjustment for race.   • COVID19 12/08/2022 Detected (C)  Not Detected - Ref. Range Final   • Influenza A PCR 12/08/2022 Not Detected  Not Detected Final   • Influenza B PCR 12/08/2022 Not Detected  Not Detected Final   • WBC 12/08/2022 5.83  3.40 - 10.80 10*3/mm3 Final   • RBC 12/08/2022 4.20  4.14 - 5.80 10*6/mm3 Final   • Hemoglobin 12/08/2022 13.2  13.0 - 17.7 g/dL Final   • Hematocrit 12/08/2022 39.4  37.5 - 51.0 % Final   • MCV 12/08/2022 93.8  79.0 - 97.0 fL Final   • MCH 12/08/2022 31.4  26.6 - 33.0 pg Final   • MCHC 12/08/2022 33.5  31.5 - 35.7 g/dL Final   • RDW 12/08/2022 14.0  12.3 - 15.4 % Final   • RDW-SD 12/08/2022 48.1  37.0 - 54.0 fl Final   • MPV 12/08/2022 10.6  6.0 - 12.0 fL Final   • Platelets 12/08/2022 167  140 - 450 10*3/mm3 Final      CTAP 8/2021:  FINDINGS:   ABDOMEN: Bibasilar consolidation suspicious for pneumonia.  Liver is unremarkable. Gallbladder is present. Granulomas calcification  of the spleen. Both kidneys are unremarkable. Moderate aortoiliac plaque without aneurysm.  Mild fluid in the distal colon. Scattered wall prominence throughout the  colon particularly of the sigmoid. No suspect a very mild diffuse  colitis. There are diverticula but no focal changes of acute diverticulitis. No free air or adenopathy.  PELVIS: Normal appendix. Nondistended bladder. No free fluid or  adenopathy. Surgical clips related to prostatectomy with node dissection.  No evidence of osseous metastatic disease.  IMPRESSION:  1. Bilateral lower lobe consolidation consistent with pneumonia.  2. Scattered mild wall prominence of colon, particularly the sigmoid. A  very mild colitis is suspected, likely infectious.     NM PET/CT Skull Base to Mid Thigh  Result Date: 2/2/2022  Negative PET/CT. The lungs demonstrate extensive emphysematous change with  some areas of fibronodular density present, not demonstrating significant FDG hypermetabolism.       Ultrasound abdomen 10/19/2021:  FINDINGS: The visualized pancreas is unremarkable. The liver is  unremarkable. The gallbladder is unremarkable with no shadowing stones or wall thickening.  The common duct measures 3.50 mm. The right kidney measures 9.1 cm in length and is normal in echogenicity without hydronephrosis. The left kidney measures 10.2 cm in length and is normal in echogenicity without hydronephrosis. Spleen is unremarkable. The aorta is normal in caliber. The vena cava is unremarkable.  IMPRESSION:  Normal ultrasound of the abdomen.      Colonoscopy was completed by Dr. Callahan on 1/4/2022:  - The perianal and digital rectal examinations were normal.  - A 4 mm polyp was found in the sigmoid colon. The polyp was flat. The polyp was removed with a cold snare. Resection and retrieval were complete.  - Multiple small and large-mouthed diverticula were found in the sigmoid colon and descending colon.  - Extensive amounts of semi-liquid semi-solid solid stool was found in the entire colon, making visualization difficult. Lavage of the area was performed using a moderate amount of sterile water, resulting in incomplete clearance with continued poor visualization.  - The patchy increase mucosa vascular pattern in the distal rectum was developing angioectasis more in favor or radiation proctopathy.  Pathology showed colon polyp to be lymphoid aggregate.     EGD was completed by Dr. Callahan on 7/20/2022:  - The oropharynx was normal.  - The Z-line was variable and was found 40 cm from the incisors. Biopsies were taken with a cold forceps for histology, to rule out short segment Cristina's  - No gross lesions were noted in the entire esophagus. Biopsies were taken with a cold forceps for histology.  - A few dispersed small erosions with no stigmata of recent bleeding were found on the posterior wall of the  stomach, at the incisura, pre pyloric and in the gastric antrum. Biopsies were taken with a cold forceps for Helicobacter pylori testing.  - The duodenal bulb, first portion of the duodenum, second portion of the duodenum and third portion of the duodenum were normal.  Pathology DIAGNOSIS:   A.   GASTRIC ANTRUM, BIOPSY:   Mild reactive gastropathy   No H. pylori organisms identified on routine stains   Negative for intestinal metaplasia, dysplasia, or carcinoma  B.   GE JUNCTION:   Columnar mucosa with mild nonspecific chronic inflammation   Negative for intestinal phase, dysplasia, carcinoma   C.   ESOPHAGUS, BIOPSY, DISTAL:   Unremarkable squamous mucosa   Negative for dysplasia, carcinoma, or increased intraepithelial eosinophils    Colonoscopy attempted 11/16/2022 by Dr. Callahan  - Preparation of the colon was inadequate. An attempt was made to visualise any large colon lesions  - Diverticulosis in the sigmoid colon and in the descending colon.  - Stool in the entire examined colon.  No specimens collected.    Assessment / Plan      1. Encounter for colorectal cancer screening  2. Diverticulosis of colon  3. Chronic idiopathic constipation  4. Abnormal CT of the abdomen  Colonoscopy 1/4/2022 showed one small polyp in the sigmoid colon removed and benign, diverticulosis of the sigmoid and descending colon, prep was inadequate. Repeat colonoscopy 11/2022 prep still inadequate. Has diverticulosis of the sigmoid colon.      CT scan of the abdomen pelvis done in August 2021 revealed mild sigmoid wall thickening suspicious for colitis but thought to be related to radiation proctitis and signs of this was seen on colonoscopy 1/2022.      Cologuard for now since colonoscopy prep not adequate x2 per Dr. Callahan  If negative, may repeat cologuard in 3 years rather than colonoscopy  High fiber diet    - Cologuard - Stool, Per Rectum; Future    5. Gastroesophageal reflux disease without esophagitis  Patient has a  longstanding history of reflux disease.  He was previously taking Celebrex.  He still gets intermittent reflux symptoms despite on protonix 40 mg once dialy but symptoms are mild.     EGD 7/2022 showed normal esophagus, gastric erosions, normal duodenum. Pathology benign. This is Celebrex induced gastropathy, celebrex since discontinued.   Prior EGD was in 2017 by Dr. Ritchie, biopsies negative for any Cristina's esophagus.       Acid reflux measures  Continue daily PPI        Prior history  Elevated LFTs (suspected DILI)  He has a combined hepatocellular and cholestatic pattern of elevated liver enzymes since about 3 to 4 years now on chart review.  His liver numbers gradually worsened until Jan 2022 in which they improved some and have been stable since. Last labs were in Aug 2022. ALT now normal. AST only mildly increased at 52.  Alk phos 140. He does not have any metabolic syndrome.  No history suggestive any chronic hepatitis.  US abd 10/2021 showed normal liver and spleen. Labs 10/2021 showed alpha-1 antitrypsin normal, NEO negative, anti-smooth muscle antibody normal, antimitochondrial antibodies normal, serum iron normal, ferritin elevated, INR normal, hepatitis B surface antigen negative, hepatitis B surface antibody negative, hepatitis B core total antibody negative, hepatitis A total antibody positive. Patient is on Arava (leflunomide) for the last few years.  I suspect this could be drug-induced liver injury from Arava use. When it was discontinued liver enzymes normalized, he is not back on this medication.     Follow Up:   Return if symptoms worsen or fail to improve.    Argentina Lane PA-C  [facility]  5/19/2023  16:39 EDT    Dictated Utilizing Dragon Dictation: Part of this note may be an electronic transcription/translation of spoken language to printed text using the Dragon Dictation System.

## 2023-05-17 NOTE — PATIENT INSTRUCTIONS
Fiber Foods  It is recommended that you consume 25-45 grams daily.    Fresh & Dried Fruit  Serving Size Fiber (g)    Apples with skin  1 medium 5.0    Apricot  3 medium 1.0    Apricots, dried  4 pieces 2.9    Banana  1 medium 3.9    Blueberries  1 cup 4.2    Cantaloupe, cubes  1 cup 1.3    Figs, dried  2 medium 3.7    Grapefruit  1/2 medium 3.1    Orange, navel  1 medium 3.4    Peach  1 medium 2.0    Peaches, dried  3 pieces 3.2    Pear  1 medium 5.1    Plum  1 medium 1.1    Raisins  1.5 oz box 1.6    Raspberries  1 cup 6.4    Strawberries  1 cup 4.4      Grains, Beans, Nuts & Seeds  Serving Size Fiber (g)    Almonds  1 oz 4.2    Black beans, cooked  1 cup 13.9    Bran cereal  1 cup 19.9    Bread, whole wheat  1 slice 2.0    Brown rice, dry  1 cup 7.9    Cashews  1 oz 1.0    Flax seeds  3 Tbsp. 6.9    Garbanzo beans, cooked  1 cup 5.8    Kidney beans, cooked  1 cup 11.6    Lentils, red cooked  1 cup 13.6    Lima beans, cooked  1 cup 8.6    Oats, rolled dry  1 cup 12.0    Quinoa (seeds) dry  1/4 cup 6.2    Quinoa, cooked  1 cup 8.4    Pasta, whole wheat  1 cup 6.3    Peanuts  1 oz 2.3    Pistachio nuts  1 oz 3.1    Pumpkin seeds  1/4 cup 4.1    Soybeans, cooked  1 cup 8.6    Sunflower seeds  1/4 cup 3.0    Walnuts  1 oz 3.1            Vegetables  Serving Size Fiber (g)    Avocado (fruit)  1 medium 11.8    Beets, cooked  1 cup 2.8    Beet greens  1 cup 4.2    Bok satish, cooked  1 cup 2.8    Broccoli, cooked  1 cup 4.5    [place] sprouts, cooked  1 cup 3.6    Cabbage, cooked  1 cup 4.2    Carrot  1 medium 2.6    Carrot, cooked  1 cup 5.2    Cauliflower, cooked  1 cup 3.4    Gage slaw  1 cup 4.0    Erin greens, cooked  1 cup 2.6    Corn, sweet  1 cup 4.6    Green beans  1 cup 4.0    Celery  1 stalk 1.1    Kale, cooked  1 cup 7.2    Onions, raw  1 cup 2.9    Peas, cooked  1 cup 8.8    Peppers, sweet  1 cup 2.6    Pop corn, air-popped  3 cups 3.6    Potato, baked w/ skin  1 medium 4.8    Spinach, cooked  1 cup 4.3     Summer squash, cooked  1 cup 2.5    Sweet potato, cooked  1 medium 4.9    Swiss chard, cooked  1 cup 3.7    Tomato  1 medium 1.0    Winter squash, cooked  1 cup 6.2    Zucchini, cooked  1 cup 2.6

## 2023-05-19 PROBLEM — Z87.19 HISTORY OF BARRETT'S ESOPHAGUS: Status: RESOLVED | Noted: 2022-07-05 | Resolved: 2023-05-19

## 2023-05-22 DIAGNOSIS — J44.9 CHRONIC OBSTRUCTIVE PULMONARY DISEASE, UNSPECIFIED COPD TYPE: ICD-10-CM

## 2023-05-22 RX ORDER — ALBUTEROL SULFATE 90 UG/1
AEROSOL, METERED RESPIRATORY (INHALATION)
OUTPATIENT
Start: 2023-05-22

## 2023-05-24 ENCOUNTER — TELEPHONE (OUTPATIENT)
Dept: INTERNAL MEDICINE | Facility: CLINIC | Age: 68
End: 2023-05-24
Payer: MEDICARE

## 2023-05-24 ENCOUNTER — LAB (OUTPATIENT)
Dept: LAB | Facility: HOSPITAL | Age: 68
End: 2023-05-24
Payer: MEDICARE

## 2023-05-24 DIAGNOSIS — D47.2 MGUS (MONOCLONAL GAMMOPATHY OF UNKNOWN SIGNIFICANCE): ICD-10-CM

## 2023-05-24 DIAGNOSIS — E87.1 HYPONATREMIA: ICD-10-CM

## 2023-05-24 DIAGNOSIS — E87.1 HYPONATREMIA: Primary | ICD-10-CM

## 2023-05-24 LAB
ALBUMIN SERPL-MCNC: 3.9 G/DL (ref 3.5–5.2)
ALBUMIN/GLOB SERPL: 1.1 G/DL
ALP SERPL-CCNC: 120 U/L (ref 39–117)
ALT SERPL W P-5'-P-CCNC: 41 U/L (ref 1–41)
ANION GAP SERPL CALCULATED.3IONS-SCNC: 8.4 MMOL/L (ref 5–15)
AST SERPL-CCNC: 46 U/L (ref 1–40)
BILIRUB SERPL-MCNC: 0.4 MG/DL (ref 0–1.2)
BUN SERPL-MCNC: 25 MG/DL (ref 8–23)
BUN/CREAT SERPL: 25.5 (ref 7–25)
CALCIUM SPEC-SCNC: 9.8 MG/DL (ref 8.6–10.5)
CHLORIDE SERPL-SCNC: 105 MMOL/L (ref 98–107)
CO2 SERPL-SCNC: 23.6 MMOL/L (ref 22–29)
CREAT SERPL-MCNC: 0.98 MG/DL (ref 0.76–1.27)
EGFRCR SERPLBLD CKD-EPI 2021: 84.5 ML/MIN/1.73
GLOBULIN UR ELPH-MCNC: 3.7 GM/DL
GLUCOSE SERPL-MCNC: 86 MG/DL (ref 65–99)
POTASSIUM SERPL-SCNC: 4.6 MMOL/L (ref 3.5–5.2)
PROT SERPL-MCNC: 7.6 G/DL (ref 6–8.5)
SODIUM SERPL-SCNC: 137 MMOL/L (ref 136–145)

## 2023-05-24 PROCEDURE — 80053 COMPREHEN METABOLIC PANEL: CPT

## 2023-05-24 PROCEDURE — 36415 COLL VENOUS BLD VENIPUNCTURE: CPT

## 2023-05-24 PROCEDURE — 83930 ASSAY OF BLOOD OSMOLALITY: CPT

## 2023-05-24 PROCEDURE — 84165 PROTEIN E-PHORESIS SERUM: CPT

## 2023-05-24 NOTE — TELEPHONE ENCOUNTER
Received a consult note on patient which prompted me to review Mr. Dunn's chart. His sodium level has been abnormal last two checks. This warrants evaluation. He does not have to come in for an appointment yet but I would like him to go have some labs. Ordered for [facility], not downstairs in our building.     Does not have to fast for these. Will be due for cholesterol check when he comes in to see us in August. We will do the normal yearly labs at that time.

## 2023-05-25 LAB
ALBUMIN SERPL ELPH-MCNC: 3.9 G/DL (ref 2.9–4.4)
ALBUMIN/GLOB SERPL: 1 {RATIO} (ref 0.7–1.7)
ALPHA1 GLOB SERPL ELPH-MCNC: 0.2 G/DL (ref 0–0.4)
ALPHA2 GLOB SERPL ELPH-MCNC: 0.8 G/DL (ref 0.4–1)
B-GLOBULIN SERPL ELPH-MCNC: 0.8 G/DL (ref 0.7–1.3)
GAMMA GLOB SERPL ELPH-MCNC: 2.1 G/DL (ref 0.4–1.8)
GLOBULIN SER CALC-MCNC: 3.9 G/DL (ref 2.2–3.9)
LABORATORY COMMENT REPORT: ABNORMAL
M PROTEIN SERPL ELPH-MCNC: 1.9 G/DL
OSMOLALITY SERPL: 299 MOSM/KG (ref 280–301)
PROT PATTERN SERPL ELPH-IMP: ABNORMAL
PROT SERPL-MCNC: 7.8 G/DL (ref 6–8.5)

## 2023-05-30 ENCOUNTER — OFFICE VISIT (OUTPATIENT)
Dept: PULMONOLOGY | Facility: CLINIC | Age: 68
End: 2023-05-30

## 2023-05-30 VITALS
SYSTOLIC BLOOD PRESSURE: 120 MMHG | BODY MASS INDEX: 21.22 KG/M2 | HEIGHT: 71 IN | WEIGHT: 151.6 LBS | HEART RATE: 62 BPM | TEMPERATURE: 98.4 F | OXYGEN SATURATION: 95 % | DIASTOLIC BLOOD PRESSURE: 74 MMHG

## 2023-05-30 DIAGNOSIS — J43.9 PULMONARY EMPHYSEMA, UNSPECIFIED EMPHYSEMA TYPE: Primary | Chronic | ICD-10-CM

## 2023-05-30 DIAGNOSIS — R91.8 MULTIPLE PULMONARY NODULES: ICD-10-CM

## 2023-05-30 PROBLEM — J44.89: Status: RESOLVED | Noted: 2019-10-16 | Resolved: 2023-05-30

## 2023-05-30 PROBLEM — J44.9: Status: RESOLVED | Noted: 2019-10-16 | Resolved: 2023-05-30

## 2023-05-30 NOTE — PROGRESS NOTES
New Patient Pulmonary Office Visit      Patient Name: Elliott Dunn    Referring Physician: Ladonna Means MD    Chief Complaint:    Chief Complaint   Patient presents with   • COPD     New patient       History of Present Illness: Elliott Dunn is a 67 y.o. male who is here today to establish care with Pulmonary.  Patient has a past medical history significant for rheumatoid arthritis, peripheral chill disease, tobacco abuse, COPD, and pulmonary nodules.  Who presented to [facility] for evaluation of COPD and pulmonary nodules.  Patient states that he has had shortness of breath for a long time.  Everything is currently stable though he quit smoking about 10 years ago has a 50-pack-year smoking history.  He previously followed with Dr. Dickson but decided to transfer care here to [place].  He has been followed for multiple pulmonary nodules tickly on the right upper lobe for extended period time now, underwent a PET scan about a year ago which was PET negative.  Follow-up scans have all been stable.  No other acute complaints.    Review of Systems:   Review of Systems   Constitutional: Negative for activity change, appetite change, chills and diaphoresis.   HENT: Negative for congestion, postnasal drip, sinus pressure and voice change.    Eyes: Negative for blurred vision.   Respiratory: Negative for cough, shortness of breath and wheezing.    Cardiovascular: Negative for chest pain.   Gastrointestinal: Negative for abdominal pain.   Musculoskeletal: Negative for myalgias.   Skin: Negative for color change and dry skin.   Allergic/Immunologic: Negative for environmental allergies.   Neurological: Negative for weakness and confusion.   Hematological: Negative for adenopathy.   Psychiatric/Behavioral: Negative for sleep disturbance and depressed mood.       Past Medical History:   Past Medical History:   Diagnosis Date   • Acquired absence of all teeth    • Allergic    • Anomalous coronary artery  origin    • Arrhythmia    • Arthritis    • Arthritis of lumbar spine 07/29/2016   • Back pain 06/17/2016   • Cristina esophagus    • Cataract     REMOVED BILATERALL   • Cervical spine disease 06/17/2016   • CHF (congestive heart failure)    • Chronic obstructive pulmonary disease    • Colon polyps    • COVID-19 vaccine series completed    • Deafness in left ear    • Derangement of anterior horn of medial meniscus    • Difficulty swallowing    • Disorder of lumbar spine 06/17/2016   • Disorder of thoracic spine 06/17/2016   • Diverticulitis of colon    • DJD (degenerative joint disease)    • Elevated cholesterol    • Elevated liver enzymes    • Erectile dysfunction    • Esophageal reflux    • Essential hypertension     PT DENIES SAYS IT RUNS LOW   • Glaucoma    • Hard of hearing     DEAF IN LEFT EAR NO AIDS WORN   • Heart murmur    • Hiatal hernia    • High cholesterol    • History of radiation therapy 11/24/2020    salvage pelvic XRT   • Impaired functional mobility, balance, gait, and endurance    • Inflammatory polyarthropathy     Per Dr. Haider. Negative NEO, normal ESR, RF, anti-ccp and did not improve with prednisone per notes.   • Inguinal hernia    • Insomnia    • Lateral meniscus derangement    • Left otitis media with spontaneous rupture of eardrum    • Locking of left knee    • Low back pain    • Meniere's disease    • MGUS (monoclonal gammopathy of unknown significance)     likely diagnosis   • Murmur    • Neuromuscular disorder    • Neuropathic arthritis    • No natural teeth    • Perforation of left tympanic membrane    • Prostate cancer     previous CA in 2013 with prostatectomy   • Pulmonary emphysema    • Recent shoulder injury     LEFT SHOULDER   • Rotator cuff tear arthropathy of left shoulder 03/12/2020    Added automatically from request for surgery [id number]   • Scoliosis    • Skin cancer of face     squamous cell basal   • Spina bifida occulta 06/17/2016   • Tobacco abuse 11/09/2017   • Visual  impairment    • Wears glasses     READING GLASSES ONLY       Past Surgical History:   Past Surgical History:   Procedure Laterality Date   • CARDIAC CATHETERIZATION      no stent   • COLONOSCOPY     • COLONOSCOPY N/A 01/04/2022    Procedure: COLONOSCOPY with polypectomy x1;  Surgeon: Luis Callahan MD;  Location: [facility] ENDOSCOPY;  Service: Gastroenterology;  Laterality: N/A;   • COLONOSCOPY N/A 11/16/2022    Procedure: COLONOSCOPY ;  Surgeon: Luis Callahan MD;  Location: [facility] ENDOSCOPY;  Service: Gastroenterology;  Laterality: N/A;   • ENDOSCOPY N/A 04/10/2017    Procedure: ESOPHAGOGASTRODUODENOSCOPY WITH BIOPSY;  Surgeon: Alexander Ritchie MD;  Location: [facility] ENDOSCOPY;  Service:    • ENDOSCOPY N/A 07/20/2022    Procedure: ESOPHAGOGASTRODUODENOSCOPY WITH BIOPSY ;  Surgeon: Luis Callahan MD;  Location: [facility] ENDOSCOPY;  Service: Gastroenterology;  Laterality: N/A;   • EYE SURGERY     • HERNIA REPAIR     • INGUINAL HERNIA REPAIR Right    • INGUINAL HERNIA REPAIR     • KNEE SURGERY Left    • LYMPH NODE BIOPSY     • MULTIPLE TOOTH EXTRACTIONS     • PROSTATE SURGERY  2004   • PROSTATECTOMY  06/30/2014   • PROSTATECTOMY      Prostatectomy Radical   • REFRACTIVE SURGERY Right 01/2023   • SHOULDER ARTHROSCOPY Left 02/06/2020    Procedure: DIAGNOSTIC SHOULDER ARTHROSCOPY LEFT WITH LABRAL DEBRIDEMENT, SUBACROMIAL BURSECTOMY, ASSESSMENT OF LARGE FRAGMENTED RETRACTED IRREPARABLE ROTATOR CUFF TEARS;  Surgeon: Rony Pandey MD;  Location: [facility];  Service: Orthopedics;  Laterality: Left;   • SKIN CANCER EXCISION  2017    both sides of body/squamous cell melanoma   • SKIN CANCER EXCISION Left 07/28/2022    Facail skin excision   • TOTAL SHOULDER ARTHROPLASTY W/ DISTAL CLAVICLE EXCISION Left 07/09/2020    Procedure: Left reverse total shoulder arthroplasty;  Surgeon: Rony Pandey MD;  Location: [facility];  Service: Orthopedics;  Laterality: Left;   • TYMPANOPLASTY W/ MASTOIDECTOMY      • UMBILICAL HERNIA REPAIR         Family History:   Family History   Problem Relation Age of Onset   • Diabetes Mother    • Hypertension Mother    • Obesity Mother    • Stroke Mother 65   • Arthritis Mother    • Hyperlipidemia Mother    • Vision loss Mother    • Cancer Father    • Cancer Sister    • Diabetes Brother    • Cancer Brother    • Heart attack Brother    • Alcohol abuse Brother    • Drug abuse Brother    • Hearing loss Brother    • Learning disabilities Brother    • Colon cancer Neg Hx        Social History:   Social History     Socioeconomic History   • Marital status:    Tobacco Use   • Smoking status: Former     Packs/day: 1.00     Years: 51.00     Pack years: 51.00     Types: Cigarettes     Start date: 1963     Quit date: 2018     Years since quittin.3   • Smokeless tobacco: Never   Vaping Use   • Vaping Use: Never used   Substance and Sexual Activity   • Alcohol use: Never   • Drug use: Never   • Sexual activity: Defer       Medications:     Current Outpatient Medications:   •  albuterol sulfate HFA (Ventolin HFA) 108 (90 Base) MCG/ACT inhaler, Inhale 2 puffs Every 4 (Four) Hours As Needed for Wheezing or Shortness of Air., Disp: 18 g, Rfl: 2  •  amitriptyline (ELAVIL) 50 MG tablet, Take 1 tablet by mouth Every Night., Disp: 90 tablet, Rfl: 3  •  aspirin 81 MG EC tablet, Take 1 tablet by mouth Daily., Disp: , Rfl:   •  atorvastatin (LIPITOR) 10 MG tablet, Take 1 tablet by mouth Every Night. To lower cholesterol and risk of stroke (also to help with PAD), Disp: 90 tablet, Rfl: 3  •  azelastine (ASTELIN) 0.1 % nasal spray, 1 spray into the nostril(s) as directed by provider 2 (Two) Times a Day As Needed for Rhinitis or Allergies. Use in each nostril as directed, Disp: 90 mL, Rfl: 3  •  bisoprolol (ZEBeta) 5 MG tablet, Take 1 tablet by mouth Daily., Disp: 90 tablet, Rfl: 3  •  DULoxetine (CYMBALTA) 60 MG capsule, Take 1 capsule by mouth Daily., Disp: 90 capsule, Rfl: 3  •  folic  acid (FOLVITE) 1 MG tablet, Take 1 tablet by mouth Daily., Disp: 90 tablet, Rfl: 3  •  gabapentin (NEURONTIN) 300 MG capsule, Take 1 capsule by mouth 3 (Three) Times a Day., Disp: 270 capsule, Rfl: 1  •  isosorbide mononitrate (IMDUR) 30 MG 24 hr tablet, Take 1 tablet by mouth Daily., Disp: 90 tablet, Rfl: 3  •  leflunomide (ARAVA) 20 MG tablet, Take 1 tablet by mouth Daily., Disp: , Rfl:   •  methocarbamol (ROBAXIN) 750 MG tablet, Take 1 tablet by mouth 3 (Three) Times a Day As Needed for Muscle Spasms., Disp: 270 tablet, Rfl: 3  •  methotrexate 2.5 MG tablet, Take 8 tablets by mouth 1 (One) Time Per Week. TAKES ON THURSDAY (Patient taking differently: Take 8 tablets by mouth 1 (One) Time Per Week. TAKES ON Sunday ONLY TAKES 7 PILSS), Disp: 32 tablet, Rfl: 0  •  Multiple Vitamins-Minerals (MULTIVITAMIN WITH MINERALS) tablet tablet, Take 1 tablet by mouth Daily., Disp: , Rfl:   •  O2 (OXYGEN), Inhale 2 L 1 (One) Time. Nightly, Disp: , Rfl:   •  pantoprazole (PROTONIX) 40 MG EC tablet, Take 1 tablet by mouth Daily., Disp: 90 tablet, Rfl: 3  •  predniSONE (DELTASONE) 5 MG tablet, Take 1 tablet by mouth Daily., Disp: , Rfl:   •  tiotropium bromide-olodaterol (Stiolto Respimat) 2.5-2.5 MCG/ACT aerosol solution inhaler, Inhale 2 puffs Daily., Disp: 12 g, Rfl: 3  •  vitamin B-12 (CYANOCOBALAMIN) 1000 MCG tablet, TAKE ONE TABLET BY MOUTH DAILY (Patient taking differently: Take 1 tablet by mouth Daily.), Disp: 30 tablet, Rfl: 8    Allergies:   Allergies   Allergen Reactions   • Cyclobenzaprine Unknown - Low Severity     Wide awake   • Plaquenil [Hydroxychloroquine Sulfate] Unknown - Low Severity     Black eyes   • Pravastatin Myalgia     Weakness / fatigue       Physical Exam:  Vital Signs:   Vitals:    05/30/23 1527   BP: 120/74   BP Location: Left arm   Patient Position: Sitting   Cuff Size: Adult   Pulse: 62   Temp: 98.4 °F (36.9 °C)   SpO2: 95%  Comment: Room air at rest   Weight: 68.8 kg (151 lb 9.6 oz)   Height: 180.3  "cm (71\")       Physical Exam  Vitals and nursing note reviewed.   Constitutional:       General: He is not in acute distress.     Appearance: He is well-developed and normal weight. He is not ill-appearing or toxic-appearing.   Cardiovascular:      Rate and Rhythm: Normal rate and regular rhythm.      Pulses: Normal pulses.      Heart sounds: Normal heart sounds. No murmur heard.    No gallop.   Pulmonary:      Effort: Pulmonary effort is normal.      Breath sounds: Normal breath sounds. No wheezing, rhonchi or rales.   Musculoskeletal:      Right lower leg: No edema.      Left lower leg: No edema.   Neurological:      Mental Status: He is alert.         Immunization History   Administered Date(s) Administered   • COVID-19 (MODERNA) 1st,2nd,3rd Dose Monovalent 08/18/2021, 08/18/2021, 09/15/2021, 09/15/2021   • Flu Vaccine Intradermal Quad 18-64YR 10/06/2014, 10/24/2018   • Flu Vaccine Quad PF >36MO 10/24/2017, 10/30/2018, 09/21/2019   • Flu Vaccine Split Quad 11/04/2016, 10/24/2017   • FluLaval/Fluzone >6mos 10/24/2017, 09/21/2019   • FluMist 2-49yrs 11/16/2015   • Fluzone High-Dose 65+yrs 02/01/2022, 10/25/2022   • Fluzone Quad >6mos (Multi-dose) 11/04/2016, 10/30/2018   • Pneumococcal Conjugate 20-Valent (PCV20) 08/03/2022   • Pneumococcal Polysaccharide (PPSV23) 07/28/2021   • Tdap 01/30/2015   • flucelvax quad pfs =>4 YRS 10/24/2018       Results Review:   - I personally reviewed the pts imaging from CT scan of the chest from 8/8/2022 showed multiple areas of nodularity in the right upper lobe, these were previously PET negative and stable in size at the time.  There is also significant amount of emphysema on the CT scan.  - I personally reviewed the pts PFT from 8/5/2022 showed mild obstruction without restriction and a normal DLCO.  - I personally reviewed the pts chart with regards to evaluation by the patient's PCP.    Assessment / Plan:   Diagnoses and all orders for this visit:    1. Pulmonary emphysema, " unspecified emphysema type (Primary)  -With regards to the patient's COPD.  Currently stable, recommend he continue his Stiolto 2.5 mcg 2 puffs once daily.  In addition to albuterol on a as needed basis.  Plan for PFTs and 6-minute walk test with the next visit.    2. Multiple pulmonary nodules  -Patient has multiple pulm nodules in the right upper lobe, largest is roughly 2.5 cm.  This was checked with the PET scan little over a year ago, which point in time the maximum SUV was 1.4 considered to be a negative PET scan.  Follow-up scans have otherwise remained stable.  I will go ahead and order a repeat CT scan to be performed.  These nodules could be due to underlying scarring or granulomatous disease.  Patient also has rheumatoid arthritis or rheumatoid is also a viable option.  If there is any change in the size of the lesions particularly the largest being 2.5 cm I would recommend biopsy.  Patient does have fairly good lung function despite having significant emphysema.  Treatment options would be available to him.  He verbalized understanding of this.      Follow Up:   Return in about 3 months (around 8/30/2023) for CT Chest with next visit.     TAMMY Marino, DO  Pulmonary and Critical Care Medicine  Note Electronically Signed    Part of this note may be an electronic transcription/translation of spoken language to printed text using the Dragon Dictation System.

## 2023-08-07 ENCOUNTER — OFFICE VISIT (OUTPATIENT)
Dept: INTERNAL MEDICINE | Facility: CLINIC | Age: 68
End: 2023-08-07
Payer: MEDICARE

## 2023-08-07 VITALS
TEMPERATURE: 97.4 F | OXYGEN SATURATION: 97 % | WEIGHT: 148 LBS | HEART RATE: 65 BPM | BODY MASS INDEX: 20.72 KG/M2 | SYSTOLIC BLOOD PRESSURE: 132 MMHG | DIASTOLIC BLOOD PRESSURE: 80 MMHG | RESPIRATION RATE: 16 BRPM | HEIGHT: 71 IN

## 2023-08-07 DIAGNOSIS — G63 NEUROPATHY ASSOCIATED WITH MGUS: ICD-10-CM

## 2023-08-07 DIAGNOSIS — M06.09 RHEUMATOID ARTHRITIS OF MULTIPLE SITES WITH NEGATIVE RHEUMATOID FACTOR: ICD-10-CM

## 2023-08-07 DIAGNOSIS — G89.4 CHRONIC PAIN SYNDROME: ICD-10-CM

## 2023-08-07 DIAGNOSIS — J43.9 PULMONARY EMPHYSEMA, UNSPECIFIED EMPHYSEMA TYPE: Chronic | ICD-10-CM

## 2023-08-07 DIAGNOSIS — M47.812 SPONDYLOSIS OF CERVICAL REGION WITHOUT MYELOPATHY OR RADICULOPATHY: ICD-10-CM

## 2023-08-07 DIAGNOSIS — I73.9 PAD (PERIPHERAL ARTERY DISEASE): ICD-10-CM

## 2023-08-07 DIAGNOSIS — Z00.00 MEDICARE ANNUAL WELLNESS VISIT, SUBSEQUENT: Primary | ICD-10-CM

## 2023-08-07 DIAGNOSIS — D47.2 NEUROPATHY ASSOCIATED WITH MGUS: ICD-10-CM

## 2023-08-07 DIAGNOSIS — T23.079S: ICD-10-CM

## 2023-08-07 DIAGNOSIS — I70.213 ATHEROSCLER OF NATIVE ARTERY OF BOTH LEGS WITH INTERMIT CLAUDICATION: ICD-10-CM

## 2023-08-07 LAB
ALBUMIN SERPL-MCNC: 4 G/DL (ref 3.5–5.2)
ALBUMIN/GLOB SERPL: 1.2 G/DL
ALP SERPL-CCNC: 124 U/L (ref 39–117)
ALT SERPL-CCNC: 25 U/L (ref 1–41)
AST SERPL-CCNC: 30 U/L (ref 1–40)
BILIRUB SERPL-MCNC: 0.5 MG/DL (ref 0–1.2)
BUN SERPL-MCNC: 24 MG/DL (ref 8–23)
BUN/CREAT SERPL: 24.7 (ref 7–25)
CALCIUM SERPL-MCNC: 9.8 MG/DL (ref 8.6–10.5)
CHLORIDE SERPL-SCNC: 105 MMOL/L (ref 98–107)
CHOLEST SERPL-MCNC: 143 MG/DL (ref 0–200)
CO2 SERPL-SCNC: 25.2 MMOL/L (ref 22–29)
CREAT SERPL-MCNC: 0.97 MG/DL (ref 0.76–1.27)
EGFRCR SERPLBLD CKD-EPI 2021: 85 ML/MIN/1.73
ERYTHROCYTE [DISTWIDTH] IN BLOOD BY AUTOMATED COUNT: 13.3 % (ref 12.3–15.4)
FOLATE SERPL-MCNC: 14 NG/ML (ref 4.78–24.2)
GLOBULIN SER CALC-MCNC: 3.4 GM/DL
GLUCOSE SERPL-MCNC: 94 MG/DL (ref 65–99)
HCT VFR BLD AUTO: 42.7 % (ref 37.5–51)
HDLC SERPL-MCNC: 46 MG/DL (ref 40–60)
HGB BLD-MCNC: 14.4 G/DL (ref 13–17.7)
LDLC SERPL CALC-MCNC: 82 MG/DL (ref 0–100)
MCH RBC QN AUTO: 31.9 PG (ref 26.6–33)
MCHC RBC AUTO-ENTMCNC: 33.7 G/DL (ref 31.5–35.7)
MCV RBC AUTO: 94.7 FL (ref 79–97)
PLATELET # BLD AUTO: 215 10*3/MM3 (ref 140–450)
POTASSIUM SERPL-SCNC: 4.9 MMOL/L (ref 3.5–5.2)
PROT SERPL-MCNC: 7.4 G/DL (ref 6–8.5)
RBC # BLD AUTO: 4.51 10*6/MM3 (ref 4.14–5.8)
SODIUM SERPL-SCNC: 140 MMOL/L (ref 136–145)
TRIGL SERPL-MCNC: 76 MG/DL (ref 0–150)
VIT B12 SERPL-MCNC: 510 PG/ML (ref 211–946)
VLDLC SERPL CALC-MCNC: 15 MG/DL (ref 5–40)
WBC # BLD AUTO: 6.6 10*3/MM3 (ref 3.4–10.8)

## 2023-08-07 PROCEDURE — G0439 PPPS, SUBSEQ VISIT: HCPCS | Performed by: FAMILY MEDICINE

## 2023-08-07 PROCEDURE — 1160F RVW MEDS BY RX/DR IN RCRD: CPT | Performed by: FAMILY MEDICINE

## 2023-08-07 PROCEDURE — 96160 PT-FOCUSED HLTH RISK ASSMT: CPT | Performed by: FAMILY MEDICINE

## 2023-08-07 PROCEDURE — 99213 OFFICE O/P EST LOW 20 MIN: CPT | Performed by: FAMILY MEDICINE

## 2023-08-07 PROCEDURE — 3075F SYST BP GE 130 - 139MM HG: CPT | Performed by: FAMILY MEDICINE

## 2023-08-07 PROCEDURE — 1159F MED LIST DOCD IN RCRD: CPT | Performed by: FAMILY MEDICINE

## 2023-08-07 PROCEDURE — 1170F FXNL STATUS ASSESSED: CPT | Performed by: FAMILY MEDICINE

## 2023-08-07 PROCEDURE — 3079F DIAST BP 80-89 MM HG: CPT | Performed by: FAMILY MEDICINE

## 2023-08-07 PROCEDURE — 99397 PER PM REEVAL EST PAT 65+ YR: CPT | Performed by: FAMILY MEDICINE

## 2023-08-07 RX ORDER — GABAPENTIN 300 MG/1
300 CAPSULE ORAL 3 TIMES DAILY
Qty: 270 CAPSULE | Refills: 1 | Status: SHIPPED | OUTPATIENT
Start: 2023-08-07

## 2023-08-07 NOTE — PROGRESS NOTES
The ABCs of the Annual Wellness Visit  Subsequent Medicare Wellness Visit    Subjective    Elliott Dunn is a 68 y.o. male who presents for a Subsequent Medicare Wellness Visit.    The following portions of the patient's history were reviewed and   updated as appropriate: allergies, current medications, past family history, past medical history, past social history, past surgical history, and problem list.    Compared to one year ago, the patient feels his physical   health is the same.    Compared to one year ago, the patient feels his mental   health is the same.    Recent Hospitalizations:  He was not admitted to the hospital during the last year.       Current Medical Providers:  Patient Care Team:  Ladonna Means MD as PCP - General (Family Medicine)  Clemencia Rosales APRN as Referring Physician (Neurology)  Alexander Ritchie MD as Consulting Physician (General Surgery)  Luis Wilson MD as Consulting Physician (Radiation Oncology)  Yehuda Gatica MD as Consulting Physician (Ophthalmology)  Ayan العلي MD as Consulting Physician (Rheumatology)  Cisco Varela MD as Consulting Physician (Urology)  David Marino DO as Consulting Physician (Pulmonary Disease)    Outpatient Medications Prior to Visit   Medication Sig Dispense Refill    albuterol sulfate HFA (Ventolin HFA) 108 (90 Base) MCG/ACT inhaler Inhale 2 puffs Every 4 (Four) Hours As Needed for Wheezing or Shortness of Air. 18 g 2    amitriptyline (ELAVIL) 50 MG tablet Take 1 tablet by mouth Every Night. 90 tablet 3    aspirin 81 MG EC tablet Take 1 tablet by mouth Daily.      atorvastatin (LIPITOR) 10 MG tablet Take 1 tablet by mouth Every Night. To lower cholesterol and risk of stroke (also to help with PAD) 90 tablet 3    azelastine (ASTELIN) 0.1 % nasal spray 1 spray into the nostril(s) as directed by provider 2 (Two) Times a Day As Needed for Rhinitis or Allergies. Use in each nostril as directed 90 mL 3     bisoprolol (ZEBeta) 5 MG tablet Take 1 tablet by mouth Daily. 90 tablet 3    DULoxetine (CYMBALTA) 60 MG capsule Take 1 capsule by mouth Daily. 90 capsule 3    folic acid (FOLVITE) 1 MG tablet Take 1 tablet by mouth Daily. 90 tablet 3    isosorbide mononitrate (IMDUR) 30 MG 24 hr tablet Take 1 tablet by mouth Daily. 90 tablet 3    leflunomide (ARAVA) 20 MG tablet Take 1 tablet by mouth Daily.      methocarbamol (ROBAXIN) 750 MG tablet Take 1 tablet by mouth 3 (Three) Times a Day As Needed for Muscle Spasms. 270 tablet 3    methotrexate 2.5 MG tablet Take 8 tablets by mouth 1 (One) Time Per Week. TAKES ON THURSDAY (Patient taking differently: Take 8 tablets by mouth 1 (One) Time Per Week. TAKES ON Sunday ONLY TAKES 7 PILSS) 32 tablet 0    Multiple Vitamins-Minerals (MULTIVITAMIN WITH MINERALS) tablet tablet Take 1 tablet by mouth Daily.      O2 (OXYGEN) Inhale 2 L 1 (One) Time. Nightly      pantoprazole (PROTONIX) 40 MG EC tablet Take 1 tablet by mouth Daily. 90 tablet 3    predniSONE (DELTASONE) 5 MG tablet Take 1 tablet by mouth Daily.      tiotropium bromide-olodaterol (Stiolto Respimat) 2.5-2.5 MCG/ACT aerosol solution inhaler Inhale 2 puffs Daily. 12 g 3    vitamin B-12 (CYANOCOBALAMIN) 1000 MCG tablet TAKE ONE TABLET BY MOUTH DAILY (Patient taking differently: Take 1 tablet by mouth Daily.) 30 tablet 8    gabapentin (NEURONTIN) 300 MG capsule Take 1 capsule by mouth 3 (Three) Times a Day. 270 capsule 1     No facility-administered medications prior to visit.       No opioid medication identified on active medication list. I have reviewed chart for other potential  high risk medication/s and harmful drug interactions in the elderly.        Aspirin is on active medication list. Aspirin use is indicated based on review of current medical condition/s. Pros and cons of this therapy have been discussed today. Benefits of this medication outweigh potential harm.  Patient has been encouraged to continue taking this  medication.  .      Patient Active Problem List   Diagnosis    Cobalamin deficiency    Hyperreflexia of lower extremity    Abnormal gait    IgG monoclonal protein disorder    Spondylosis of cervical region without myelopathy or radiculopathy    Dextroscoliosis    MGUS (monoclonal gammopathy of unknown significance)    Allergic rhinitis    Other hyperlipidemia    Malignant neoplasm of prostate    Chronic joint pain    Primary insomnia    Left-sided tinnitus    Night sweats    Unstable angina    Essential hypertension    Anomalous coronary artery origin    Chronic obstructive pulmonary disease    Pharyngoesophageal dysphagia    Chronic pain syndrome    Inflammatory polyarthropathy    Former smoker    Bucket handle tear of medial meniscus of knee    Primary open angle glaucoma (POAG) of both eyes    Arteriosclerotic vascular disease    Bilateral pseudophakia    Corneal guttata    Excess skin of both eyelids    Posterior vitreous detachment of both eyes    Vitreous floaters    Neuropathy associated with MGUS    Rotator cuff arthropathy of left shoulder    Status post reverse arthroplasty of shoulder, left    Rheumatoid arthritis of multiple sites with negative rheumatoid factor    Encounter for screening for malignant neoplasm of colon    Radiation proctitis    Abnormal CT of the abdomen    Gastroesophageal reflux disease without esophagitis    Atheroscler of native artery of both legs with intermit claudication    PAD (peripheral artery disease)    Skin cancer     Advance Care Planning   Advance Care Planning     Advance Directive is not on file.  ACP discussion was held with the patient during this visit. Patient has an advance directive (not in EMR), copy requested.     Objective    Vitals:    08/07/23 0910   BP: 132/80   BP Location: Left arm   Patient Position: Sitting   Cuff Size: Adult   Pulse: 65   Resp: 16   Temp: 97.4 øF (36.3 øC)   TempSrc: Temporal   SpO2: 97%   Weight: 67.1 kg (148 lb)   Height: 180.3 cm  "(71\")   PainSc: 0-No pain     Estimated body mass index is 20.64 kg/mý as calculated from the following:    Height as of this encounter: 180.3 cm (71\").    Weight as of this encounter: 67.1 kg (148 lb).    BMI is within normal parameters. No other follow-up for BMI required.      Does the patient have evidence of cognitive impairment? No          HEALTH RISK ASSESSMENT    Smoking Status:  Social History     Tobacco Use   Smoking Status Former    Packs/day: 1.00    Years: 51.00    Pack years: 51.00    Types: Cigarettes    Start date: 1963    Quit date: 2018    Years since quittin.5   Smokeless Tobacco Never     Alcohol Consumption:  Social History     Substance and Sexual Activity   Alcohol Use Never     Fall Risk Screen:    NICOLAS Fall Risk Assessment was completed, and patient is at LOW risk for falls.Assessment completed on:2023    Depression Screenin/7/2023     9:09 AM   PHQ-2/PHQ-9 Depression Screening   Little Interest or Pleasure in Doing Things 0-->not at all   Feeling Down, Depressed or Hopeless 0-->not at all   PHQ-9: Brief Depression Severity Measure Score 0       Health Habits and Functional and Cognitive Screenin/7/2023     9:08 AM   Functional & Cognitive Status   Do you have difficulty preparing food and eating? No   Do you have difficulty bathing yourself, getting dressed or grooming yourself? No   Do you have difficulty using the toilet? No   Do you have difficulty moving around from place to place? No   Do you have trouble with steps or getting out of a bed or a chair? No   Current Diet Well Balanced Diet   Dental Exam Up to date   Eye Exam Up to date   Exercise (times per week) 5 times per week   Current Exercises Include Walking;Yard Work   Do you need help using the phone?  No   Are you deaf or do you have serious difficulty hearing?  Yes   Do you need help to go to places out of walking distance? No   Do you need help shopping? No   Do you need help preparing " meals?  No   Do you need help with housework?  No   Do you need help with laundry? No   Do you need help taking your medications? No   Do you need help managing money? No   Do you ever drive or ride in a car without wearing a seat belt? No   Have you felt unusual stress, anger or loneliness in the last month? No   Who do you live with? Spouse   If you need help, do you have trouble finding someone available to you? No   Have you been bothered in the last four weeks by sexual problems? No   Do you have difficulty concentrating, remembering or making decisions? No       Age-appropriate Screening Schedule:  Refer to the list below for future screening recommendations based on patient's age, sex and/or medical conditions. Orders for these recommended tests are listed in the plan section. The patient has been provided with a written plan.    Health Maintenance   Topic Date Due    ANNUAL WELLNESS VISIT  08/03/2023    LIPID PANEL  08/03/2023    ZOSTER VACCINE (2 of 2) 08/07/2023 (Originally 5/23/2017)    COVID-19 Vaccine (5 - Moderna risk series) 01/01/2024 (Originally 11/10/2021)    INFLUENZA VACCINE  10/01/2023    LUNG CANCER SCREENING  06/20/2024    TDAP/TD VACCINES (2 - Td or Tdap) 01/30/2025    COLORECTAL CANCER SCREENING  11/16/2032    HEPATITIS C SCREENING  Completed    Pneumococcal Vaccine 65+  Completed    AAA SCREEN (ONE-TIME)  Completed    Hepatitis B  Discontinued                  CMS Preventative Services Quick Reference  Risk Factors Identified During Encounter  Chronic Pain:  continue gabapentin for neuropathy helps per pt  The above risks/problems have been discussed with the patient.  Pertinent information has been shared with the patient in the After Visit Summary.  An After Visit Summary and PPPS were made available to the patient.    Follow Up:   Next Medicare Wellness visit to be scheduled in 1 year.       Additional E&M Note during same encounter follows:  Patient has multiple medical problems which  "are significant and separately identifiable that require additional work above and beyond the Medicare Wellness Visit.      Chief Complaint  Medicare Wellness-subsequent (AWV and preventive care)    Subjective        Burns on both wrists from working on car, hit radiator. Bandaged. Has silvadene but could use refill. Otherwise no complaints today. Feels meds are doing okay. Continues methotrexate from rheumatology and taking folic acid.    Elliott Dunn is also being seen today for follow up of chronic conditions.         Objective   Vital Signs:  /80 (BP Location: Left arm, Patient Position: Sitting, Cuff Size: Adult)   Pulse 65   Temp 97.4 øF (36.3 øC) (Temporal)   Resp 16   Ht 180.3 cm (71\")   Wt 67.1 kg (148 lb)   SpO2 97%   BMI 20.64 kg/mý     Physical Exam  Vitals and nursing note reviewed.   Constitutional:       General: He is not in acute distress.     Appearance: Normal appearance. He is well-developed and well-groomed. He is not ill-appearing, toxic-appearing or diaphoretic.   HENT:      Head: Normocephalic and atraumatic.      Right Ear: Hearing, tympanic membrane, ear canal and external ear normal.      Left Ear: Hearing, tympanic membrane, ear canal and external ear normal.      Nose: Nose normal.      Mouth/Throat:      Mouth: Mucous membranes are moist.   Eyes:      General: Lids are normal. No scleral icterus.        Right eye: No discharge.         Left eye: No discharge.      Extraocular Movements: Extraocular movements intact.   Cardiovascular:      Rate and Rhythm: Normal rate and regular rhythm.   Pulmonary:      Effort: Pulmonary effort is normal.      Breath sounds: Normal breath sounds.   Musculoskeletal:      Cervical back: Neck supple.   Skin:     General: Skin is warm.      Coloration: Skin is not jaundiced or pale.      Findings: Lesion (bandages on both wrists) present.   Neurological:      General: No focal deficit present.      Mental Status: He is alert and oriented to " person, place, and time.   Psychiatric:         Attention and Perception: Attention and perception normal.         Mood and Affect: Mood and affect normal.         Speech: Speech normal.         Behavior: Behavior normal. Behavior is cooperative.         Thought Content: Thought content normal.         Cognition and Memory: Cognition and memory normal.         Judgment: Judgment normal.                       Assessment and Plan   Diagnoses and all orders for this visit:    1. Medicare annual wellness visit, subsequent (Primary)  -     Lipid Panel  -     Comprehensive Metabolic Panel  -     CBC (No Diff)    2. Neuropathy associated with MGUS  -     Comprehensive Metabolic Panel  -     CBC (No Diff)  -     Vitamin B12 & Folate  -     gabapentin (NEURONTIN) 300 MG capsule; Take 1 capsule by mouth 3 (Three) Times a Day.  Dispense: 270 capsule; Refill: 1    3. PAD (peripheral artery disease)  -     Lipid Panel    4. Atheroscler of native artery of both legs with intermit claudication  -     Lipid Panel    5. Rheumatoid arthritis of multiple sites with negative rheumatoid factor  Comments:  stable, confirmed taking folic acid with mtx, follow up with Dr. Osman rheumatology  Orders:  -     Vitamin B12 & Folate    6. Pulmonary emphysema, unspecified emphysema type  Comments:  stable; follow up with pulmonary provider in September as scheduled  Orders:  -     Comprehensive Metabolic Panel  -     CBC (No Diff)    7. Chronic pain syndrome  -     gabapentin (NEURONTIN) 300 MG capsule; Take 1 capsule by mouth 3 (Three) Times a Day.  Dispense: 270 capsule; Refill: 1    8. Spondylosis of cervical region without myelopathy or radiculopathy  -     gabapentin (NEURONTIN) 300 MG capsule; Take 1 capsule by mouth 3 (Three) Times a Day.  Dispense: 270 capsule; Refill: 1    9. Burn of wrist, unspecified burn degree, unspecified laterality, sequela  -     silver sulfadiazine (Silvadene) 1 % cream; Apply 1 application  topically to the  appropriate area as directed 2 (Two) Times a Day.  Dispense: 400 g; Refill: 11    Counseling/anticipatory guidance/risk factor interventions for age provided. See AVS.         Follow Up   Return in about 6 months (around 2/7/2024) for Controlled Rx Follow Up, sooner if needed.  Patient was given instructions and counseling regarding his condition or for health maintenance advice. Please see specific information pulled into the AVS if appropriate.

## 2023-08-07 NOTE — PATIENT INSTRUCTIONS
Medicare Wellness  Personal Prevention Plan of Service     Date of Office Visit:    Encounter Provider:  Ladonna Means MD  Place of Service:  [facility]  Patient Name: Elliott Dunn  :  1955    As part of the Medicare Wellness portion of your visit today, we are providing you with this personalized preventive plan of services (PPPS). This plan is based upon recommendations of the [place] Preventive Services Task Force (USPSTF) and the Advisory Committee on Immunization Practices (ACIP).    This lists the preventive care services that should be considered, and provides dates of when you are due. Items listed as completed are up-to-date and do not require any further intervention.    Health Maintenance   Topic Date Due    ANNUAL WELLNESS VISIT  2023    LIPID PANEL  2023    ZOSTER VACCINE (2 of 2) 2023 (Originally 2017)    COVID-19 Vaccine (5 - Moderna risk series) 2024 (Originally 11/10/2021)    INFLUENZA VACCINE  10/01/2023    LUNG CANCER SCREENING  2024    TDAP/TD VACCINES (2 - Td or Tdap) 2025    COLORECTAL CANCER SCREENING  2032    HEPATITIS C SCREENING  Completed    Pneumococcal Vaccine 65+  Completed    AAA SCREEN (ONE-TIME)  Completed    Hepatitis B  Discontinued       No orders of the defined types were placed in this encounter.      Return in about 6 months (around 2024) for Controlled Rx Follow Up, sooner if needed.        Health Maintenance, Male  A healthy lifestyle and preventive care is important for your health and wellness. Ask your health care provider about what schedule of regular examinations is right for you.  What should I know about weight and diet?    Eat a Healthy Diet  Eat plenty of vegetables, fruits, whole grains, low-fat dairy products, and lean protein.  Do not eat a lot of foods high in solid fats, added sugars, or salt.     Maintain a Healthy Weight  Regular exercise can help you  achieve or maintain a healthy weight. You should:  Do at least 150 minutes of exercise each week. The exercise should increase your heart rate and make you sweat (moderate-intensity exercise).  Do strength-training exercises at least twice a week.     Watch Your Levels of Cholesterol and Blood Lipids  Have your blood tested for lipids and cholesterol every 5 years starting at 35 years of age. If you are at high risk for heart disease, you should start having your blood tested when you are 20 years old. You may need to have your cholesterol levels checked more often if:  Your lipid or cholesterol levels are high.  You are older than 50 years of age.  You are at high risk for heart disease.     What should I know about cancer screening?  Many types of cancers can be detected early and may often be prevented.  Lung Cancer  You should be screened every year for lung cancer if:  You are a current smoker who has smoked for at least 30 years.  You are a former smoker who has quit within the past 15 years.  Talk to your health care provider about your screening options, when you should start screening, and how often you should be screened.     Colorectal Cancer  Routine colorectal cancer screening usually begins at 50 years of age and should be repeated every 5-10 years until you are 75 years old. You may need to be screened more often if early forms of precancerous polyps or small growths are found. Your health care provider may recommend screening at an earlier age if you have risk factors for colon cancer.  Your health care provider may recommend using home test kits to check for hidden blood in the stool.  A small camera at the end of a tube can be used to examine your colon (sigmoidoscopy or colonoscopy). This checks for the earliest forms of colorectal cancer.     Prostate and Testicular Cancer  Depending on your age and overall health, your health care provider may do certain tests to screen for prostate and  testicular cancer.  Talk to your health care provider about any symptoms or concerns you have about testicular or prostate cancer.     Skin Cancer  Check your skin from head to toe regularly.  Tell your health care provider about any new moles or changes in moles, especially if:  There is a change in a mole's size, shape, or color.  You have a mole that is larger than a pencil eraser.  Always use sunscreen. Apply sunscreen liberally and repeat throughout the day.  Protect yourself by wearing long sleeves, pants, a wide-brimmed hat, and sunglasses when outside.     What should I know about heart disease, diabetes, and high blood pressure?  If you are 18-39 years of age, have your blood pressure checked every 3-5 years. If you are 40 years of age or older, have your blood pressure checked every year. You should have your blood pressure measured twice--once when you are at a hospital or clinic, and once when you are not at a hospital or clinic. Record the average of the two measurements. To check your blood pressure when you are not at a hospital or clinic, you can use:  An automated blood pressure machine at a pharmacy.  A home blood pressure monitor.  Talk to your health care provider about your target blood pressure.  If you are between 45-79 years old, ask your health care provider if you should take aspirin to prevent heart disease.  Have regular diabetes screenings by checking your fasting blood sugar level.  If you are at a normal weight and have a low risk for diabetes, have this test once every three years after the age of 45.  If you are overweight and have a high risk for diabetes, consider being tested at a younger age or more often.  A one-time screening for abdominal aortic aneurysm (AAA) by ultrasound is recommended for men aged 65-75 years who are current or former smokers.  What should I know about preventing infection?  Hepatitis B  If you have a higher risk for hepatitis B, you should be screened for  this virus. Talk with your health care provider to find out if you are at risk for hepatitis B infection.  Hepatitis C  Blood testing is recommended for:  Everyone born from 1945 through 1965.  Anyone with known risk factors for hepatitis C.     Sexually Transmitted Diseases (STDs)  You should be screened each year for STDs including gonorrhea and chlamydia if:  You are sexually active and are younger than 24 years of age.  You are older than 24 years of age and your health care provider tells you that you are at risk for this type of infection.  Your sexual activity has changed since you were last screened and you are at an increased risk for chlamydia or gonorrhea. Ask your health care provider if you are at risk.  Talk with your health care provider about whether you are at high risk of being infected with HIV. Your health care provider may recommend a prescription medicine to help prevent HIV infection.     What else can I do?    Schedule regular health, dental, and eye exams.  Stay current with your vaccines (immunizations).  Do not use any tobacco products, such as cigarettes, chewing tobacco, and e-cigarettes. If you need help quitting, ask your health care provider.  Limit alcohol intake to no more than 2 drinks per day. One drink equals 12 ounces of beer, 5 ounces of wine, or 1« ounces of hard liquor.  Do not use street drugs.  Do not share needles.  Ask your health care provider for help if you need support or information about quitting drugs.  Tell your health care provider if you often feel depressed.  Tell your health care provider if you have ever been abused or do not feel safe at home.      This information is not intended to replace advice given to you by your health care provider. Make sure you discuss any questions you have with your health care provider.  Document Released: 06/15/2009 Document Revised: 08/16/2017 Document Reviewed: 09/20/2016  [employer] Interactive Patient Education c 2018  [employer]

## 2023-09-08 ENCOUNTER — OFFICE VISIT (OUTPATIENT)
Dept: PULMONOLOGY | Facility: CLINIC | Age: 68
End: 2023-09-08
Payer: MEDICARE

## 2023-09-08 VITALS
SYSTOLIC BLOOD PRESSURE: 122 MMHG | RESPIRATION RATE: 16 BRPM | BODY MASS INDEX: 20.02 KG/M2 | OXYGEN SATURATION: 98 % | HEIGHT: 71 IN | TEMPERATURE: 97.5 F | WEIGHT: 143 LBS | HEART RATE: 60 BPM | DIASTOLIC BLOOD PRESSURE: 80 MMHG

## 2023-09-08 DIAGNOSIS — R91.8 MULTIPLE PULMONARY NODULES: ICD-10-CM

## 2023-09-08 DIAGNOSIS — J43.9 PULMONARY EMPHYSEMA, UNSPECIFIED EMPHYSEMA TYPE: Primary | ICD-10-CM

## 2023-09-08 NOTE — PROGRESS NOTES
"Follow Up Office Note       Patient Name: Elliott Dunn    Referring Physician: No ref. provider found    Chief Complaint:    Chief Complaint   Patient presents with    Pulmonary Emphysema     F/u       History of Present Illness: Elliott Dunn is a 68 y.o. male who is here today to follow-up care with Pulmonary.    Patient has a past medical history significant for rheumatoid arthritis, peripheral chill disease, tobacco abuse, COPD, and pulmonary nodules.  Patient currently doing very well denies any chest pain, nausea, fever, chills.  No new or acute complaints.    Review of Systems:   Review of Systems   Constitutional:  Negative for chills, fatigue and fever.   HENT:  Negative for congestion and voice change.    Eyes:  Negative for blurred vision.   Respiratory:  Negative for cough, shortness of breath and wheezing.    Cardiovascular:  Negative for chest pain.   Skin:  Negative for dry skin.   Hematological:  Negative for adenopathy.   Psychiatric/Behavioral:  Negative for agitation and depressed mood.      The following portions of the patient's history were reviewed and updated as appropriate: allergies, current medications, past family history, past medical history, past social history, past surgical history and problem list.    Physical Exam:  Vital Signs:   Vitals:    09/08/23 1145   BP: 122/80   BP Location: Left arm   Patient Position: Sitting   Cuff Size: Adult   Pulse: 60   Resp: 16   Temp: 97.5 °F (36.4 °C)   SpO2: 98%  Comment: room air at rest   Weight: 64.9 kg (143 lb)   Height: 180.3 cm (70.98\")       Physical Exam  Vitals and nursing note reviewed.   Constitutional:       General: He is not in acute distress.     Appearance: He is well-developed and normal weight. He is not ill-appearing or toxic-appearing.   Cardiovascular:      Rate and Rhythm: Normal rate and regular rhythm.      Pulses: Normal pulses.      Heart sounds: Normal heart sounds. No murmur heard.    No gallop.   Pulmonary:      " Effort: Pulmonary effort is normal.      Breath sounds: Normal breath sounds. No wheezing, rhonchi or rales.   Musculoskeletal:      Right lower leg: No edema.      Left lower leg: No edema.   Neurological:      Mental Status: He is alert.       Immunization History   Administered Date(s) Administered    ABRYSVO 09/06/2023    COVID-19 (MODERNA) 1st,2nd,3rd Dose Monovalent 08/18/2021, 08/18/2021, 09/15/2021, 09/15/2021    Flu Vaccine Intradermal Quad 18-64YR 10/06/2014, 10/24/2018    Flu Vaccine Quad PF >36MO 10/24/2017, 10/30/2018, 09/21/2019    Flu Vaccine Split Quad 11/04/2016, 10/24/2017    FluMist 2-49yrs 11/16/2015    Fluzone >6mos 10/24/2017, 09/21/2019    Fluzone High-Dose 65+yrs 02/01/2022, 10/25/2022    Fluzone Quad >6mos (Multi-dose) 11/04/2016, 10/30/2018    Pneumococcal Conjugate 20-Valent (PCV20) 08/03/2022    Pneumococcal Polysaccharide (PPSV23) 07/28/2021    Shingrix 09/06/2023    Tdap 01/30/2015    flucelvax quad pfs =>4 YRS 10/24/2018       Results Review:   -I personally viewed the patient's CT scan of the chest from 6/20/2023, which showed stable nodules bilaterally particularly in the right upper lobe.  Plan for repeat CT scan in 1 year.  - CT scan of the chest from 8/8/2022 showed multiple areas of nodularity in the right upper lobe, these were previously PET negative and stable in size at the time.  There is also significant amount of emphysema on the CT scan.  -I personally viewed the patient's pulmonary function testing from 9/8/2023 which shows moderate obstruction without restriction and normal DLCO  - PFT from 8/5/2022 showed mild obstruction without restriction and a normal DLCO.  -I personally viewed the patient's 6-minute walk test from 9/8/2023 which showed he was able to walk 274 m which was 45% of predicted started at 98% dropped down to 93%.  No oxygen required.    Assessment / Plan:   Diagnoses and all orders for this visit:    1. Pulmonary emphysema, unspecified emphysema type  (Primary)  -Patient has gold stage 3 class A COPD  -Continue the also 2 puffs once daily and albuterol every 4 hours as needed for medication management  -6-minute walk and latest PFTs as noted above  -Patient counseled on monitoring for COPD exacerbation and treatment plan  -Continue 30 minutes of cardiovascular exercise per day    2. Multiple pulmonary nodules  -With regards to the pulmonary nodules.  The right upper lobe nodule is currently stable at 6 months.  We will plan for repeat CT scan in 1 year.  I reviewed the CT scan of the chest with him on today's visit.      Follow Up:   Return in about 1 year (around 9/8/2024) for CT Chest with next visit.       TAMMY Marino, DO  Pulmonary and Critical Care Medicine  Note Electronically Signed    Part of this note may be an electronic transcription/translation of spoken language to printed text using the Dragon Dictation System.

## 2023-12-20 ENCOUNTER — LAB (OUTPATIENT)
Dept: LAB | Facility: HOSPITAL | Age: 68
End: 2023-12-20
Payer: MEDICARE

## 2023-12-20 ENCOUNTER — TRANSCRIBE ORDERS (OUTPATIENT)
Dept: LAB | Facility: HOSPITAL | Age: 68
End: 2023-12-20
Payer: MEDICARE

## 2023-12-20 DIAGNOSIS — Z85.46 H/O PROSTATE CANCER: ICD-10-CM

## 2023-12-20 DIAGNOSIS — Z85.46 H/O PROSTATE CANCER: Primary | ICD-10-CM

## 2023-12-20 DIAGNOSIS — D47.2 IGG MONOCLONAL PROTEIN DISORDER: ICD-10-CM

## 2023-12-20 LAB
ALBUMIN SERPL-MCNC: 4.3 G/DL (ref 3.5–5.2)
ALBUMIN/GLOB SERPL: 0.9 G/DL
ALP SERPL-CCNC: 98 U/L (ref 39–117)
ALT SERPL W P-5'-P-CCNC: 20 U/L (ref 1–41)
ANION GAP SERPL CALCULATED.3IONS-SCNC: 7.9 MMOL/L (ref 5–15)
AST SERPL-CCNC: 27 U/L (ref 1–40)
BASOPHILS # BLD AUTO: 0.04 10*3/MM3 (ref 0–0.2)
BASOPHILS NFR BLD AUTO: 0.6 % (ref 0–1.5)
BILIRUB SERPL-MCNC: 0.6 MG/DL (ref 0–1.2)
BUN SERPL-MCNC: 25 MG/DL (ref 8–23)
BUN/CREAT SERPL: 25.3 (ref 7–25)
CALCIUM SPEC-SCNC: 10 MG/DL (ref 8.6–10.5)
CHLORIDE SERPL-SCNC: 101 MMOL/L (ref 98–107)
CO2 SERPL-SCNC: 28.1 MMOL/L (ref 22–29)
CREAT SERPL-MCNC: 0.99 MG/DL (ref 0.76–1.27)
DEPRECATED RDW RBC AUTO: 45.4 FL (ref 37–54)
EGFRCR SERPLBLD CKD-EPI 2021: 83 ML/MIN/1.73
EOSINOPHIL # BLD AUTO: 0.1 10*3/MM3 (ref 0–0.4)
EOSINOPHIL NFR BLD AUTO: 1.5 % (ref 0.3–6.2)
ERYTHROCYTE [DISTWIDTH] IN BLOOD BY AUTOMATED COUNT: 13.2 % (ref 12.3–15.4)
GLOBULIN UR ELPH-MCNC: 4.6 GM/DL
GLUCOSE SERPL-MCNC: 83 MG/DL (ref 65–99)
HCT VFR BLD AUTO: 46.6 % (ref 37.5–51)
HGB BLD-MCNC: 16.1 G/DL (ref 13–17.7)
IMM GRANULOCYTES # BLD AUTO: 0.02 10*3/MM3 (ref 0–0.05)
IMM GRANULOCYTES NFR BLD AUTO: 0.3 % (ref 0–0.5)
LYMPHOCYTES # BLD AUTO: 2.73 10*3/MM3 (ref 0.7–3.1)
LYMPHOCYTES NFR BLD AUTO: 39.9 % (ref 19.6–45.3)
MCH RBC QN AUTO: 32.1 PG (ref 26.6–33)
MCHC RBC AUTO-ENTMCNC: 34.5 G/DL (ref 31.5–35.7)
MCV RBC AUTO: 92.8 FL (ref 79–97)
MONOCYTES # BLD AUTO: 0.62 10*3/MM3 (ref 0.1–0.9)
MONOCYTES NFR BLD AUTO: 9.1 % (ref 5–12)
NEUTROPHILS NFR BLD AUTO: 3.34 10*3/MM3 (ref 1.7–7)
NEUTROPHILS NFR BLD AUTO: 48.6 % (ref 42.7–76)
NRBC BLD AUTO-RTO: 0 /100 WBC (ref 0–0.2)
PLATELET # BLD AUTO: 232 10*3/MM3 (ref 140–450)
PMV BLD AUTO: 10.4 FL (ref 6–12)
POTASSIUM SERPL-SCNC: 4.6 MMOL/L (ref 3.5–5.2)
PROT SERPL-MCNC: 8.9 G/DL (ref 6–8.5)
PSA SERPL-MCNC: <0.014 NG/ML (ref 0–4)
RBC # BLD AUTO: 5.02 10*6/MM3 (ref 4.14–5.8)
SODIUM SERPL-SCNC: 137 MMOL/L (ref 136–145)
WBC NRBC COR # BLD AUTO: 6.85 10*3/MM3 (ref 3.4–10.8)

## 2023-12-20 PROCEDURE — 86334 IMMUNOFIX E-PHORESIS SERUM: CPT

## 2023-12-20 PROCEDURE — 83521 IG LIGHT CHAINS FREE EACH: CPT

## 2023-12-20 PROCEDURE — 80053 COMPREHEN METABOLIC PANEL: CPT

## 2023-12-20 PROCEDURE — 84153 ASSAY OF PSA TOTAL: CPT

## 2023-12-20 PROCEDURE — 84165 PROTEIN E-PHORESIS SERUM: CPT

## 2023-12-20 PROCEDURE — 82784 ASSAY IGA/IGD/IGG/IGM EACH: CPT

## 2023-12-20 PROCEDURE — 85025 COMPLETE CBC W/AUTO DIFF WBC: CPT

## 2023-12-20 PROCEDURE — 36415 COLL VENOUS BLD VENIPUNCTURE: CPT

## 2023-12-21 LAB
ALBUMIN SERPL ELPH-MCNC: 3.9 G/DL (ref 2.9–4.4)
ALBUMIN/GLOB SERPL: 1.1 {RATIO} (ref 0.7–1.7)
ALPHA1 GLOB SERPL ELPH-MCNC: 0.2 G/DL (ref 0–0.4)
ALPHA2 GLOB SERPL ELPH-MCNC: 0.8 G/DL (ref 0.4–1)
B-GLOBULIN SERPL ELPH-MCNC: 0.8 G/DL (ref 0.7–1.3)
GAMMA GLOB SERPL ELPH-MCNC: 2.1 G/DL (ref 0.4–1.8)
GLOBULIN SER-MCNC: 3.9 G/DL (ref 2.2–3.9)
IGA SERPL-MCNC: 47 MG/DL (ref 61–437)
IGG SERPL-MCNC: 2591 MG/DL (ref 603–1613)
IGM SERPL-MCNC: 29 MG/DL (ref 20–172)
INTERPRETATION SERPL IEP-IMP: ABNORMAL
KAPPA LC FREE SER-MCNC: 132 MG/L (ref 3.3–19.4)
KAPPA LC FREE/LAMBDA FREE SER: 16.5 {RATIO} (ref 0.26–1.65)
LABORATORY COMMENT REPORT: ABNORMAL
LAMBDA LC FREE SERPL-MCNC: 8 MG/L (ref 5.7–26.3)
M PROTEIN SERPL ELPH-MCNC: 1.9 G/DL
PROT SERPL-MCNC: 7.8 G/DL (ref 6–8.5)

## 2024-01-02 DIAGNOSIS — J44.9 CHRONIC OBSTRUCTIVE PULMONARY DISEASE, UNSPECIFIED COPD TYPE: ICD-10-CM

## 2024-01-02 RX ORDER — MECLIZINE HYDROCHLORIDE 25 MG/1
TABLET ORAL
Qty: 15 TABLET | Refills: 0 | OUTPATIENT
Start: 2024-01-02

## 2024-01-02 RX ORDER — GUAIFENESIN 600 MG/1
TABLET, EXTENDED RELEASE ORAL
Qty: 28 TABLET | Refills: 0 | OUTPATIENT
Start: 2024-01-02

## 2024-01-02 RX ORDER — ALBUTEROL SULFATE 90 UG/1
2 AEROSOL, METERED RESPIRATORY (INHALATION) EVERY 4 HOURS PRN
Qty: 18 G | Refills: 2 | OUTPATIENT
Start: 2024-01-02

## 2024-01-09 DIAGNOSIS — M47.812 SPONDYLOSIS OF CERVICAL REGION WITHOUT MYELOPATHY OR RADICULOPATHY: ICD-10-CM

## 2024-01-09 DIAGNOSIS — G63 NEUROPATHY ASSOCIATED WITH MGUS: ICD-10-CM

## 2024-01-09 DIAGNOSIS — E78.49 OTHER HYPERLIPIDEMIA: ICD-10-CM

## 2024-01-09 DIAGNOSIS — D47.2 NEUROPATHY ASSOCIATED WITH MGUS: ICD-10-CM

## 2024-01-09 DIAGNOSIS — I73.9 PAD (PERIPHERAL ARTERY DISEASE): ICD-10-CM

## 2024-01-09 DIAGNOSIS — G89.4 CHRONIC PAIN SYNDROME: ICD-10-CM

## 2024-01-09 DIAGNOSIS — J44.9 CHRONIC OBSTRUCTIVE PULMONARY DISEASE, UNSPECIFIED COPD TYPE: ICD-10-CM

## 2024-01-10 RX ORDER — GABAPENTIN 300 MG/1
300 CAPSULE ORAL 3 TIMES DAILY
Qty: 270 CAPSULE | Refills: 0 | Status: SHIPPED | OUTPATIENT
Start: 2024-01-10

## 2024-01-10 RX ORDER — ATORVASTATIN CALCIUM 10 MG/1
10 TABLET, FILM COATED ORAL NIGHTLY
Qty: 90 TABLET | Refills: 0 | Status: SHIPPED | OUTPATIENT
Start: 2024-01-10

## 2024-01-10 RX ORDER — TIOTROPIUM BROMIDE AND OLODATEROL 3.124; 2.736 UG/1; UG/1
SPRAY, METERED RESPIRATORY (INHALATION)
Qty: 12 G | Refills: 0 | Status: SHIPPED | OUTPATIENT
Start: 2024-01-10

## 2024-01-11 RX ORDER — MECLIZINE HYDROCHLORIDE 25 MG/1
TABLET ORAL
Qty: 15 TABLET | Refills: 0 | OUTPATIENT
Start: 2024-01-11

## 2024-01-22 RX ORDER — MECLIZINE HYDROCHLORIDE 25 MG/1
TABLET ORAL
Qty: 15 TABLET | Refills: 0 | OUTPATIENT
Start: 2024-01-22

## 2024-02-01 ENCOUNTER — OFFICE VISIT (OUTPATIENT)
Dept: ONCOLOGY | Facility: CLINIC | Age: 69
End: 2024-02-01
Payer: MEDICARE

## 2024-02-01 VITALS
WEIGHT: 148 LBS | HEART RATE: 60 BPM | BODY MASS INDEX: 20.72 KG/M2 | HEIGHT: 71 IN | TEMPERATURE: 97.3 F | DIASTOLIC BLOOD PRESSURE: 72 MMHG | RESPIRATION RATE: 16 BRPM | SYSTOLIC BLOOD PRESSURE: 163 MMHG

## 2024-02-01 DIAGNOSIS — D47.2 MGUS (MONOCLONAL GAMMOPATHY OF UNKNOWN SIGNIFICANCE): Primary | ICD-10-CM

## 2024-02-01 PROCEDURE — 3078F DIAST BP <80 MM HG: CPT | Performed by: NURSE PRACTITIONER

## 2024-02-01 PROCEDURE — 1159F MED LIST DOCD IN RCRD: CPT | Performed by: NURSE PRACTITIONER

## 2024-02-01 PROCEDURE — 1160F RVW MEDS BY RX/DR IN RCRD: CPT | Performed by: NURSE PRACTITIONER

## 2024-02-01 PROCEDURE — 1126F AMNT PAIN NOTED NONE PRSNT: CPT | Performed by: NURSE PRACTITIONER

## 2024-02-01 PROCEDURE — 3077F SYST BP >= 140 MM HG: CPT | Performed by: NURSE PRACTITIONER

## 2024-02-01 PROCEDURE — 99214 OFFICE O/P EST MOD 30 MIN: CPT | Performed by: NURSE PRACTITIONER

## 2024-02-01 RX ORDER — BISOPROLOL FUMARATE AND HYDROCHLOROTHIAZIDE 5; 6.25 MG/1; MG/1
TABLET ORAL
COMMUNITY

## 2024-02-01 NOTE — PROGRESS NOTES
PROBLEM LIST:  Oncology/Hematology History Overview Note   1.  MGUS: Had been having mid back pains and was seen by neurology, was found to have 1200 mg of immunoglobulin G monoclonal protein in the serum with a normal IgA and IgM level this was in July 2016.  Workup August 2016 included a bone survey that was negative other than for degenerative joint disease with left eighth rib healed fracture that was seen on prior bone scan.  Normal creatinine, ionized calcium of 1.34, normal total protein to albumin ratio.  Normal sedimentation rate and C-reactive protein, serum monoclonal protein present at 1100 mg/dL of immunoglobulin G kappa with normal IgA and M levels.  Urine monoclonal protein was too scant to quantify.  Kappa to lambda ratio of 4.58 with elevation of At 42.85.  CBC was unremarkable with normal white count and platelet count and hemoglobin of 17 baseline.  Baseline PET/CT 9/1/2016 was negative.   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  This genetic alteration is found in approximately 15-18 percent of patients with plasma cell myeloma by FISH analysis and is associated with a favorable prognosis in the absence of poor prognostic markers.   b.)  3/6/2017 follow-up PET/CT was negative.    2.  Prostate cancer: Status post prostatectomy 2014.  He had chemical recurrence in 2020.  He just completed involved field radiotherapy to the pelvis in November 2020 with Dr. Billy Wilson.  He will be followed by Dr. Varela and Dr. Wilson.  If he has progressive disease then I would recommend initiating Xtandi along with his androgen deprivation therapy.    3.  Squamous cell cancer of the skin, followed by Dr. Izaguirre.           MGUS (monoclonal gammopathy of unknown significance)   7/1/2016 Initial Diagnosis    MGUS (monoclonal gammopathy of unknown significance)     8/21/2017 -  Other Event     Myeloma panel: Urine immunoelectrophoresis monoclonal protein too small to quantify, serum immunoelectrophoresis M-spike stable at 1.3g/dl, ionized calcium 5.2, kappa to lambda ratio 4.10, beta-2 microglobulin 2.2, C-reactive protein less than 0.50, creatinine 1.3, sedimentation rate to.  CBC WBC 9600, hemoglobin 15.6, hematocrit 47.1%, platelet count 209,000.       3/12/2018 -  Other Event    Myeloma panel: Urine immunoelectrophoresis with 122 mg/24 hour protein, monoclonal kappa free light chains 21.2%, monoclonal IgG kappa 7.5%.  Sedimentation rate 5, immunoglobulin free light chains free kappa light chains 49.8, normal lambda light chains 12.2, kappa/lambda ratio 4.08.  Serum immunoelectrophoresis M spike 1.7g/dL, C-reactive protein 1.60, CMP unremarkable with creatinine 1.10, serum calcium 9.2, ionized calcium 5.1, beta-2 microglobulin 2.5, CBC normal with a WBC of 9.07, hemoglobin 15.9, hematocrit 47.5%, platelets 226,000.     Malignant neoplasm of prostate   5/16/2014 Cancer Staged    Staging form: Prostate, AJCC V7  - Clinical stage from 5/16/2014: Stage IIA (T1c, N0, M0, PSA: 10 to 19, [place] 7) - Signed by Luis Wilson MD on 9/16/2020 6/30/2014 Cancer Staged    Staging form: Prostate, AJCC V7  - Pathologic stage from 6/30/2014: Stage III (T3b, N0, cM0, PSA: 10 to 19, [place] 7) - Signed by Luis Wilson MD on 9/16/2020 9/20/2016 Initial Diagnosis    Malignant neoplasm of prostate (CMS/HCC)     10/7/2020 - 11/24/2020 Radiation    Radiation OncologyTreatment Course:  Elliott Dunn received 7000 cGy in 35 fractions to prostate bed via External Beam Radiation - EBRT.         REASON FOR VISIT: Follow-up of his MGUS    HISTORY OF PRESENT ILLNESS:   68 y.o.  male presents today for follow-up of his monoclonal gammopathy.  Overall, he has been doing fairly well.  His prostate cancer is doing well.  He did complete radiation with no residual side effects.  He continues to follow with Dr. Varela  for his prostate cancer.  Otherwise clinically he is doing well.          Past medical history, social history and family history was reviewed and unchanged from prior visit.    Review of Systems:    Review of Systems   Constitutional:  Positive for fatigue.   All other systems reviewed and are negative.     A comprehensive 14 point review of systems was performed and was negative except as mentioned.      Medications:        Current Outpatient Medications:     albuterol sulfate HFA (Ventolin HFA) 108 (90 Base) MCG/ACT inhaler, Inhale 2 puffs Every 4 (Four) Hours As Needed for Wheezing or Shortness of Air., Disp: 18 g, Rfl: 2    amitriptyline (ELAVIL) 50 MG tablet, Take 1 tablet by mouth Every Night., Disp: 90 tablet, Rfl: 3    aspirin 81 MG EC tablet, Take 1 tablet by mouth Daily., Disp: , Rfl:     atorvastatin (LIPITOR) 10 MG tablet, TAKE ONE TABLET BY MOUTH ONCE NIGHTLY, Disp: 90 tablet, Rfl: 0    azelastine (ASTELIN) 0.1 % nasal spray, 1 spray into the nostril(s) as directed by provider 2 (Two) Times a Day As Needed for Rhinitis or Allergies. Use in each nostril as directed, Disp: 90 mL, Rfl: 3    bisoprolol (ZEBeta) 5 MG tablet, Take 1 tablet by mouth Daily., Disp: 90 tablet, Rfl: 3    bisoprolol-hydrochlorothiazide (ZIAC) 5-6.25 MG per tablet, Take 1 tablet every day by oral route., Disp: , Rfl:     DULoxetine (CYMBALTA) 60 MG capsule, Take 1 capsule by mouth Daily., Disp: 90 capsule, Rfl: 3    folic acid (FOLVITE) 1 MG tablet, Take 1 tablet by mouth Daily., Disp: 90 tablet, Rfl: 3    gabapentin (NEURONTIN) 300 MG capsule, TAKE ONE CAPSULE BY MOUTH THREE TIMES A DAY, Disp: 270 capsule, Rfl: 0    isosorbide mononitrate (IMDUR) 30 MG 24 hr tablet, Take 1 tablet by mouth Daily., Disp: 90 tablet, Rfl: 3    leflunomide (ARAVA) 20 MG tablet, Take 1 tablet by mouth Daily., Disp: , Rfl:     methocarbamol (ROBAXIN) 750 MG tablet, Take 1 tablet by mouth 3 (Three) Times a Day As Needed for Muscle Spasms., Disp: 270  "tablet, Rfl: 3    methotrexate 2.5 MG tablet, Take 8 tablets by mouth 1 (One) Time Per Week. TAKES ON THURSDAY (Patient taking differently: Take 8 tablets by mouth 1 (One) Time Per Week. TAKES ON Sunday ONLY TAKES 7 PILSS), Disp: 32 tablet, Rfl: 0    Multiple Vitamins-Minerals (MULTIVITAMIN WITH MINERALS) tablet tablet, Take 1 tablet by mouth Daily., Disp: , Rfl:     O2 (OXYGEN), Inhale 2 L 1 (One) Time. Nightly, Disp: , Rfl:     pantoprazole (PROTONIX) 40 MG EC tablet, Take 1 tablet by mouth Daily., Disp: 90 tablet, Rfl: 3    predniSONE (DELTASONE) 5 MG tablet, Take 1 tablet by mouth Daily., Disp: , Rfl:     silver sulfadiazine (Silvadene) 1 % cream, Apply 1 application  topically to the appropriate area as directed 2 (Two) Times a Day., Disp: 400 g, Rfl: 11    tiotropium bromide-olodaterol (Stiolto Respimat) 2.5-2.5 MCG/ACT aerosol solution inhaler, INHALE 2 INHALATION(S) BY MOUTH DAILY, Disp: 12 g, Rfl: 0    vitamin B-12 (CYANOCOBALAMIN) 1000 MCG tablet, TAKE ONE TABLET BY MOUTH DAILY (Patient taking differently: Take 1 tablet by mouth Daily.), Disp: 30 tablet, Rfl: 8      ALLERGIES:    Allergies   Allergen Reactions    Cyclobenzaprine Unknown - Low Severity     Wide awake    Plaquenil [Hydroxychloroquine Sulfate] Unknown - Low Severity     Black eyes    Pravastatin Myalgia     Weakness / fatigue         Physical Exam    VITAL SIGNS:  /72   Pulse 60   Temp 97.3 °F (36.3 °C)   Resp 16   Ht 180.3 cm (71\")   Wt 67.1 kg (148 lb)   BMI 20.64 kg/m²     Wt Readings from Last 3 Encounters:   02/01/24 67.1 kg (148 lb)   09/08/23 64.9 kg (143 lb)   08/07/23 67.1 kg (148 lb)       Body mass index is 20.64 kg/m². Body surface area is 1.86 meters squared.         Performance Status: 1      General: Thin appearing male in no acute distress  Neuro: alert and oriented  HEENT: sclera anicteric, oropharynx clear  Lymphatics: no cervical, supraclavicular, or axillary adenopathy  Cardiovascular: regular rate and rhythm, " no murmurs  Lungs: clear to auscultation bilaterally  Abdomen: soft, nontender, nondistended.  No palpable organomegaly  Extremeties: no lower extremity edema  Skin: no rashes, lesions, bruising, or petechiae  Psych: mood and affect appropriate      RECENT LABS:    Lab Results   Component Value Date    HGB 16.1 12/20/2023    HCT 46.6 12/20/2023    MCV 92.8 12/20/2023     12/20/2023    WBC 6.85 12/20/2023    NEUTROABS 3.34 12/20/2023    LYMPHSABS 2.73 12/20/2023    MONOSABS 0.62 12/20/2023    EOSABS 0.10 12/20/2023    BASOSABS 0.04 12/20/2023       Lab Results   Component Value Date    GLUCOSE 83 12/20/2023    BUN 25 (H) 12/20/2023    CREATININE 0.99 12/20/2023     12/20/2023    K 4.6 12/20/2023     12/20/2023    CO2 28.1 12/20/2023    CALCIUM 10.0 12/20/2023    PROTEINTOT 8.9 (H) 12/20/2023    ALBUMIN 3.9 12/20/2023    ALBUMIN 4.3 12/20/2023    BILITOT 0.6 12/20/2023    ALKPHOS 98 12/20/2023    AST 27 12/20/2023    ALT 20 12/20/2023       Lab Results   Component Value Date    MSPIKE 1.9 (H) 12/20/2023    MSPIKE 1.9 (H) 05/24/2023    MSPIKE 1.8 (H) 12/01/2022     Lab Results   Component Value Date    FREEKAPPAL 132.0 (H) 12/20/2023    FREEKAPPAL 121.7 (H) 12/01/2022    FREEKAPPAL 155.9 (H) 01/21/2022     Lab Results   Component Value Date    IGLFLC 8.0 12/20/2023    IGLFLC 8.4 12/01/2022    IGLFLC 7.8 01/21/2022         Xr Chest 2 View    Result Date: 8/17/2019  No radiographic evidence of acute cardiac or pulmonary disease. Stable chronic increased interstitial markings.      This report was finalized on 8/17/2019 8:12 AM by Obdulia Feliciano MD.          Assessment/Plan    1.  Monoclonal gammopathy of uncertain significance with the M spike of 1.9 g/dL.  His numbers have been relatively stable for a number of years.  Compared to last year it is slightly higher.  We will plan to recheck in 6 months.  Otherwise, he has no worrisome symptoms of anemia, hypercalcemia, or elevated creatinine.    2.   Severe COPD with a right upper lobe lesion.  Followed by Dr. Dickson.     3.  Chemical recurrence of his prostate cancer in  summer 2020.  Completed short course of ADT and radiation with Dr. Wilson.  Follows with Dr. Varela yearly.    Total time of patient care including time prior to, face to face with patient, and following visit spent in reviewing records, lab results, imaging studies, discussion with patient, and documentation/charting was > 32 minutes        MARK Zhang  [facility] Hematology [facility] On[facility]        Return in (Approximately): 6 months    Orders Placed This Encounter   Procedures    Comprehensive Metabolic Panel    MARKO + PE    Immunoglobulin Free LT Chains Blood    CBC & Differential       2/1/2024

## 2024-02-09 ENCOUNTER — TELEPHONE (OUTPATIENT)
Dept: ONCOLOGY | Facility: CLINIC | Age: 69
End: 2024-02-09
Payer: MEDICARE

## 2024-02-09 ENCOUNTER — OFFICE VISIT (OUTPATIENT)
Dept: INTERNAL MEDICINE | Facility: CLINIC | Age: 69
End: 2024-02-09
Payer: MEDICARE

## 2024-02-09 VITALS
HEIGHT: 71 IN | DIASTOLIC BLOOD PRESSURE: 74 MMHG | TEMPERATURE: 97.5 F | WEIGHT: 148.8 LBS | BODY MASS INDEX: 20.83 KG/M2 | SYSTOLIC BLOOD PRESSURE: 124 MMHG | HEART RATE: 65 BPM | OXYGEN SATURATION: 100 % | RESPIRATION RATE: 16 BRPM

## 2024-02-09 DIAGNOSIS — G89.4 CHRONIC PAIN SYNDROME: ICD-10-CM

## 2024-02-09 DIAGNOSIS — G63 NEUROPATHY ASSOCIATED WITH MGUS: ICD-10-CM

## 2024-02-09 DIAGNOSIS — R79.89 ABNORMAL THYROID BLOOD TEST: ICD-10-CM

## 2024-02-09 DIAGNOSIS — I10 ESSENTIAL HYPERTENSION: Chronic | ICD-10-CM

## 2024-02-09 DIAGNOSIS — J44.9 CHRONIC OBSTRUCTIVE PULMONARY DISEASE, UNSPECIFIED COPD TYPE: ICD-10-CM

## 2024-02-09 DIAGNOSIS — Z79.631 LONG TERM METHOTREXATE USER: ICD-10-CM

## 2024-02-09 DIAGNOSIS — E78.49 OTHER HYPERLIPIDEMIA: ICD-10-CM

## 2024-02-09 DIAGNOSIS — D47.2 MGUS (MONOCLONAL GAMMOPATHY OF UNKNOWN SIGNIFICANCE): ICD-10-CM

## 2024-02-09 DIAGNOSIS — E55.9 VITAMIN D DEFICIENCY: ICD-10-CM

## 2024-02-09 DIAGNOSIS — I73.9 PAD (PERIPHERAL ARTERY DISEASE): ICD-10-CM

## 2024-02-09 DIAGNOSIS — M06.4 INFLAMMATORY POLYARTHROPATHY: ICD-10-CM

## 2024-02-09 DIAGNOSIS — M47.812 SPONDYLOSIS OF CERVICAL REGION WITHOUT MYELOPATHY OR RADICULOPATHY: ICD-10-CM

## 2024-02-09 DIAGNOSIS — H15.89 SCLERAL DISCOLORATION: Primary | ICD-10-CM

## 2024-02-09 DIAGNOSIS — D47.2 NEUROPATHY ASSOCIATED WITH MGUS: ICD-10-CM

## 2024-02-09 DIAGNOSIS — Z51.81 MEDICATION MONITORING ENCOUNTER: ICD-10-CM

## 2024-02-09 DIAGNOSIS — F51.01 PRIMARY INSOMNIA: ICD-10-CM

## 2024-02-09 DIAGNOSIS — M06.09 RHEUMATOID ARTHRITIS OF MULTIPLE SITES WITH NEGATIVE RHEUMATOID FACTOR: ICD-10-CM

## 2024-02-09 RX ORDER — FOLIC ACID 1 MG/1
1 TABLET ORAL DAILY
Qty: 90 TABLET | Refills: 3 | Status: SHIPPED | OUTPATIENT
Start: 2024-02-09

## 2024-02-09 RX ORDER — ATORVASTATIN CALCIUM 10 MG/1
10 TABLET, FILM COATED ORAL NIGHTLY
Qty: 90 TABLET | Refills: 1 | Status: SHIPPED | OUTPATIENT
Start: 2024-02-09

## 2024-02-09 RX ORDER — AMITRIPTYLINE HYDROCHLORIDE 50 MG/1
50 TABLET, FILM COATED ORAL NIGHTLY
Qty: 90 TABLET | Refills: 1 | Status: SHIPPED | OUTPATIENT
Start: 2024-02-09

## 2024-02-09 RX ORDER — TIOTROPIUM BROMIDE AND OLODATEROL 3.124; 2.736 UG/1; UG/1
2 SPRAY, METERED RESPIRATORY (INHALATION) DAILY
Qty: 12 G | Refills: 1 | Status: SHIPPED | OUTPATIENT
Start: 2024-02-09

## 2024-02-09 RX ORDER — GABAPENTIN 300 MG/1
300 CAPSULE ORAL 3 TIMES DAILY
Qty: 270 CAPSULE | Refills: 1 | Status: SHIPPED | OUTPATIENT
Start: 2024-02-09

## 2024-02-09 RX ORDER — METHOCARBAMOL 750 MG/1
750 TABLET, FILM COATED ORAL 3 TIMES DAILY PRN
Qty: 270 TABLET | Refills: 1 | Status: SHIPPED | OUTPATIENT
Start: 2024-02-09

## 2024-02-09 RX ORDER — ISOSORBIDE MONONITRATE 30 MG/1
30 TABLET, EXTENDED RELEASE ORAL DAILY
Qty: 90 TABLET | Refills: 1 | Status: SHIPPED | OUTPATIENT
Start: 2024-02-09

## 2024-02-09 RX ORDER — BISOPROLOL FUMARATE 5 MG/1
5 TABLET, FILM COATED ORAL DAILY
Qty: 90 TABLET | Refills: 1 | Status: SHIPPED | OUTPATIENT
Start: 2024-02-09

## 2024-02-09 RX ORDER — DULOXETIN HYDROCHLORIDE 60 MG/1
60 CAPSULE, DELAYED RELEASE ORAL DAILY
Qty: 90 CAPSULE | Refills: 1 | Status: SHIPPED | OUTPATIENT
Start: 2024-02-09

## 2024-02-09 NOTE — TELEPHONE ENCOUNTER
Radha returns call.  Radha states her primary care physician states that Iam cannot have labs redrawn until after August 12th,  Advised that Iam may have his labs collected at any time.  Radha states understood

## 2024-02-09 NOTE — PROGRESS NOTES
"Chief Complaint  Pain (Follow up on chronic pain medication )    Subjective        Elliott Dunn presents to [facility]  History of Present Illness  Has been off leflunomide x 6 months. Changed from Ahmed to Abbas. Ran out and hasn't been able to get refilled.  On folic acid with methotrexate.    Mgus showed slightly worsening numbers last time, has follow up this summer with labs to monitor.    Gabapentin continues to help. Discussed urine drug screening due to controlled nature at a future visit, though no red flags, very compliant patient.     Wife and granddaughter have both said eyes look a little yellow. He has not had abdominal pain nor nausea.      Objective   Vital Signs:  /74 (BP Location: Left arm, Patient Position: Sitting, Cuff Size: Adult)   Pulse 65   Temp 97.5 °F (36.4 °C) (Temporal)   Resp 16   Ht 180.3 cm (71\")   Wt 67.5 kg (148 lb 12.8 oz)   SpO2 100%   BMI 20.75 kg/m²   Estimated body mass index is 20.75 kg/m² as calculated from the following:    Height as of this encounter: 180.3 cm (71\").    Weight as of this encounter: 67.5 kg (148 lb 12.8 oz).       BMI is within normal parameters. No other follow-up for BMI required.      Physical Exam  Vitals and nursing note reviewed.   Constitutional:       General: He is not in acute distress.     Appearance: Normal appearance. He is well-developed and well-groomed. He is not ill-appearing, toxic-appearing or diaphoretic.   HENT:      Head: Normocephalic and atraumatic.      Right Ear: Hearing normal.      Left Ear: Hearing normal.   Eyes:      General: Lids are normal. Scleral icterus (questionable mild) present.         Right eye: No discharge.         Left eye: No discharge.      Extraocular Movements: Extraocular movements intact.   Cardiovascular:      Rate and Rhythm: Normal rate and regular rhythm.   Pulmonary:      Effort: Pulmonary effort is normal.      Breath sounds: Normal breath sounds. "   Musculoskeletal:      Cervical back: Neck supple.   Skin:     Coloration: Skin is not jaundiced or pale.   Neurological:      General: No focal deficit present.      Mental Status: He is alert and oriented to person, place, and time.   Psychiatric:         Attention and Perception: Attention and perception normal.         Mood and Affect: Mood and affect normal.         Speech: Speech normal.         Behavior: Behavior normal. Behavior is cooperative.         Thought Content: Thought content normal.         Cognition and Memory: Cognition and memory normal.         Judgment: Judgment normal.        Result Review :    The following data was reviewed by: Ladonna Means MD on 02/09/2024:  Progress Notes by Adrianna Esteban APRN (02/01/2024 11:30)   Progress Notes by David Marino DO (09/08/2023 14:45)   CT Chest Without Contrast Diagnostic (06/20/2023 07:38)     Reviewed labs from hematology. Currently scheduled for follow up with Dr. Thomas on 8/2/24 but the labs ordered to be completed prior to visit are not set to release until 8/12/24. Discussed with patient/wife. They will address.             Assessment and Plan     Diagnoses and all orders for this visit:    1. Scleral discoloration (Primary)  -     Hepatic Function Panel    2. Rheumatoid arthritis of multiple sites with negative rheumatoid factor  -     Folate  -     amitriptyline (ELAVIL) 50 MG tablet; Take 1 tablet by mouth Every Night. For sleep/chronic pain  Dispense: 90 tablet; Refill: 1  -     DULoxetine (CYMBALTA) 60 MG capsule; Take 1 capsule by mouth Daily. For mood and chronic pain  Dispense: 90 capsule; Refill: 1  -     methocarbamol (ROBAXIN) 750 MG tablet; Take 1 tablet by mouth 3 (Three) Times a Day As Needed for Muscle Spasms.  Dispense: 270 tablet; Refill: 1    3. Vitamin D deficiency  -     Vitamin D,25-Hydroxy    4. Abnormal thyroid blood test  -     TSH+Free T4    5. Medication monitoring encounter  -     Hepatic Function  Panel  -     TSH+Free T4  -     Vitamin D,25-Hydroxy  -     Folate    6. MGUS (monoclonal gammopathy of unknown significance)  -     Hepatic Function Panel  -     TSH+Free T4  -     Vitamin D,25-Hydroxy  -     Folate    7. Essential hypertension  -     TSH+Free T4  -     bisoprolol (ZEBeta) 5 MG tablet; Take 1 tablet by mouth Daily. For high blood pressure.  Dispense: 90 tablet; Refill: 1  -     isosorbide mononitrate (IMDUR) 30 MG 24 hr tablet; Take 1 tablet by mouth Daily. For high blood pressure.  Dispense: 90 tablet; Refill: 1    8. Long term methotrexate user  -     Folate  -     folic acid (FOLVITE) 1 MG tablet; Take 1 tablet by mouth Daily. Due to methotrexate use  Dispense: 90 tablet; Refill: 3    9. Neuropathy associated with MGUS  -     gabapentin (NEURONTIN) 300 MG capsule; Take 1 capsule by mouth 3 (Three) Times a Day.  Dispense: 270 capsule; Refill: 1    10. Chronic pain syndrome  -     gabapentin (NEURONTIN) 300 MG capsule; Take 1 capsule by mouth 3 (Three) Times a Day.  Dispense: 270 capsule; Refill: 1  -     amitriptyline (ELAVIL) 50 MG tablet; Take 1 tablet by mouth Every Night. For sleep/chronic pain  Dispense: 90 tablet; Refill: 1  -     DULoxetine (CYMBALTA) 60 MG capsule; Take 1 capsule by mouth Daily. For mood and chronic pain  Dispense: 90 capsule; Refill: 1  -     methocarbamol (ROBAXIN) 750 MG tablet; Take 1 tablet by mouth 3 (Three) Times a Day As Needed for Muscle Spasms.  Dispense: 270 tablet; Refill: 1    11. Spondylosis of cervical region without myelopathy or radiculopathy  -     gabapentin (NEURONTIN) 300 MG capsule; Take 1 capsule by mouth 3 (Three) Times a Day.  Dispense: 270 capsule; Refill: 1    12. PAD (peripheral artery disease)  -     atorvastatin (LIPITOR) 10 MG tablet; Take 1 tablet by mouth Every Night. To lower cholesterol and risk of stroke  Dispense: 90 tablet; Refill: 1    13. Other hyperlipidemia  -     atorvastatin (LIPITOR) 10 MG tablet; Take 1 tablet by mouth Every  Night. To lower cholesterol and risk of stroke  Dispense: 90 tablet; Refill: 1    14. Primary insomnia  -     amitriptyline (ELAVIL) 50 MG tablet; Take 1 tablet by mouth Every Night. For sleep/chronic pain  Dispense: 90 tablet; Refill: 1    15. Inflammatory polyarthropathy  -     amitriptyline (ELAVIL) 50 MG tablet; Take 1 tablet by mouth Every Night. For sleep/chronic pain  Dispense: 90 tablet; Refill: 1  -     folic acid (FOLVITE) 1 MG tablet; Take 1 tablet by mouth Daily. Due to methotrexate use  Dispense: 90 tablet; Refill: 3    16. Chronic obstructive pulmonary disease, unspecified COPD type  -     tiotropium bromide-olodaterol (Stiolto Respimat) 2.5-2.5 MCG/ACT aerosol solution inhaler; Inhale 2 puffs Daily.  Dispense: 12 g; Refill: 1             Follow Up     Return in about 6 months (around 8/12/2024) for Medicare Wellness, Controlled Rx Follow Up. Labs that day to follow up on lipids, etc. UDS due to controlled substance use.    Patient was given instructions and counseling regarding his condition or for health maintenance advice. Please see specific information pulled into the AVS if appropriate.

## 2024-02-09 NOTE — TELEPHONE ENCOUNTER
Caller: Radha Dunn    Relationship: Emergency Contact    Best call back number: [phone] CALL BACK ON MONDAY    Who are you requesting to speak with (clinical staff, provider,  specific staff member): SCHEDULING     What was the call regarding: PT IS SCHEDULED FOR APPT WITH DR VIDAL ON 8-2 AND TODAY HIS PCP TOLD HIM HE CAN'T GET ANY LABS TILL 8-12  REQUESTING A CALL BACK ON MONDAY TO VERIFY

## 2024-02-10 LAB
25(OH)D3+25(OH)D2 SERPL-MCNC: 23.2 NG/ML (ref 30–100)
ALBUMIN SERPL-MCNC: 4.5 G/DL (ref 3.5–5.2)
ALP SERPL-CCNC: 95 U/L (ref 39–117)
ALT SERPL-CCNC: 27 U/L (ref 1–41)
AST SERPL-CCNC: 32 U/L (ref 1–40)
BILIRUB DIRECT SERPL-MCNC: <0.2 MG/DL (ref 0–0.3)
BILIRUB SERPL-MCNC: 0.5 MG/DL (ref 0–1.2)
FOLATE SERPL-MCNC: 7.32 NG/ML (ref 4.78–24.2)
PROT SERPL-MCNC: 8.4 G/DL (ref 6–8.5)
T4 FREE SERPL-MCNC: 1.22 NG/DL (ref 0.93–1.7)
TSH SERPL DL<=0.005 MIU/L-ACNC: 1.1 UIU/ML (ref 0.27–4.2)

## 2024-02-26 ENCOUNTER — OFFICE VISIT (OUTPATIENT)
Dept: ORTHOPEDIC SURGERY | Facility: CLINIC | Age: 69
End: 2024-02-26
Payer: MEDICARE

## 2024-02-26 VITALS
DIASTOLIC BLOOD PRESSURE: 84 MMHG | SYSTOLIC BLOOD PRESSURE: 124 MMHG | WEIGHT: 147 LBS | HEIGHT: 71 IN | BODY MASS INDEX: 20.58 KG/M2

## 2024-02-26 DIAGNOSIS — M19.031 ARTHROPATHY OF RIGHT WRIST: Primary | ICD-10-CM

## 2024-02-26 DIAGNOSIS — M25.531 ARTHRALGIA OF WRIST, RIGHT: Primary | ICD-10-CM

## 2024-02-26 PROCEDURE — 99203 OFFICE O/P NEW LOW 30 MIN: CPT | Performed by: PHYSICIAN ASSISTANT

## 2024-02-26 PROCEDURE — 3079F DIAST BP 80-89 MM HG: CPT | Performed by: PHYSICIAN ASSISTANT

## 2024-02-26 PROCEDURE — 3074F SYST BP LT 130 MM HG: CPT | Performed by: PHYSICIAN ASSISTANT

## 2024-02-26 PROCEDURE — 20605 DRAIN/INJ JOINT/BURSA W/O US: CPT | Performed by: PHYSICIAN ASSISTANT

## 2024-02-26 PROCEDURE — 1160F RVW MEDS BY RX/DR IN RCRD: CPT | Performed by: PHYSICIAN ASSISTANT

## 2024-02-26 PROCEDURE — 1159F MED LIST DOCD IN RCRD: CPT | Performed by: PHYSICIAN ASSISTANT

## 2024-02-26 RX ORDER — TRIAMCINOLONE ACETONIDE 40 MG/ML
0.5 INJECTION, SUSPENSION INTRA-ARTICULAR; INTRAMUSCULAR
Status: COMPLETED | OUTPATIENT
Start: 2024-02-26 | End: 2024-02-26

## 2024-02-26 RX ORDER — LIDOCAINE HYDROCHLORIDE 20 MG/ML
0.5 INJECTION, SOLUTION INFILTRATION; PERINEURAL
Status: COMPLETED | OUTPATIENT
Start: 2024-02-26 | End: 2024-02-26

## 2024-02-26 RX ADMIN — LIDOCAINE HYDROCHLORIDE 0.5 ML: 20 INJECTION, SOLUTION INFILTRATION; PERINEURAL at 13:42

## 2024-02-26 RX ADMIN — TRIAMCINOLONE ACETONIDE 0.5 MG: 40 INJECTION, SUSPENSION INTRA-ARTICULAR; INTRAMUSCULAR at 13:42

## 2024-02-26 NOTE — PROGRESS NOTES
"Subjective   Patient ID: Elliott Dunn is a 68 y.o. left hand dominant male is being seen for orthopaedic evaluation today for right wrist   Pain of the Right Wrist (Reports pain for 3 months, no known injury. Increased pain after movement )         Pain  Associated symptoms include arthralgias (right wrist).   Patient presents with 3 months of wrist pain. NO injury or trauma.   At times there is \"occasional tingling to the right wrist area\"   Movement increases the pain.                                                    Past Medical History:   Diagnosis Date    Acquired absence of all teeth     Allergic     Anomalous coronary artery origin     Arrhythmia     Arthritis     Arthritis of lumbar spine 07/29/2016    Back pain 06/17/2016    Cristina esophagus     Cataract     REMOVED BILATERALL    Cervical spine disease 06/17/2016    CHF (congestive heart failure)     Chronic obstructive pulmonary disease     Colon polyps     COVID-19 vaccine series completed     Deafness in left ear     Derangement of anterior horn of medial meniscus     Difficulty swallowing     Disorder of lumbar spine 06/17/2016    Disorder of thoracic spine 06/17/2016    Diverticulitis of colon     DJD (degenerative joint disease)     Elevated cholesterol     Elevated liver enzymes     Erectile dysfunction     Esophageal reflux     Essential hypertension     PT DENIES SAYS IT RUNS LOW    Glaucoma     Hard of hearing     DEAF IN LEFT EAR NO AIDS WORN    Heart murmur     Hiatal hernia     High cholesterol     History of radiation therapy 11/24/2020    salvage pelvic XRT    Impaired functional mobility, balance, gait, and endurance     Inflammatory polyarthropathy     Per Dr. Haider. Negative NEO, normal ESR, RF, anti-ccp and did not improve with prednisone per notes.    Inguinal hernia     Insomnia     Lateral meniscus derangement     Left otitis media with spontaneous rupture of eardrum     Locking of left knee     Low back pain     Meniere's disease "     MGUS (monoclonal gammopathy of unknown significance)     likely diagnosis    Murmur     Neuromuscular disorder     Neuropathic arthritis     No natural teeth     Perforation of left tympanic membrane     Prostate cancer     previous CA in 2013 with prostatectomy    Pulmonary emphysema     Recent shoulder injury     LEFT SHOULDER    Rotator cuff tear arthropathy of left shoulder 03/12/2020    Added automatically from request for surgery [id number]    Scoliosis     Skin cancer of face     squamous cell basal    Spina bifida occulta 06/17/2016    Tobacco abuse 11/09/2017    Visual impairment     Wears glasses     READING GLASSES ONLY        Past Surgical History:   Procedure Laterality Date    CARDIAC CATHETERIZATION      no stent    COLONOSCOPY      COLONOSCOPY N/A 01/04/2022    Procedure: COLONOSCOPY with polypectomy x1;  Surgeon: Luis Callahan MD;  Location: [facility] ENDOSCOPY;  Service: Gastroenterology;  Laterality: N/A;    COLONOSCOPY N/A 11/16/2022    Procedure: COLONOSCOPY ;  Surgeon: Luis Callahan MD;  Location: [facility] ENDOSCOPY;  Service: Gastroenterology;  Laterality: N/A;    ENDOSCOPY N/A 04/10/2017    Procedure: ESOPHAGOGASTRODUODENOSCOPY WITH BIOPSY;  Surgeon: Alexander Ritchie MD;  Location: [facility] ENDOSCOPY;  Service:     ENDOSCOPY N/A 07/20/2022    Procedure: ESOPHAGOGASTRODUODENOSCOPY WITH BIOPSY ;  Surgeon: Luis Callahan MD;  Location: [facility] ENDOSCOPY;  Service: Gastroenterology;  Laterality: N/A;    EYE SURGERY      HERNIA REPAIR      INGUINAL HERNIA REPAIR Right     INGUINAL HERNIA REPAIR      KNEE SURGERY Left     LYMPH NODE BIOPSY      MULTIPLE TOOTH EXTRACTIONS      PROSTATE SURGERY  2004    PROSTATECTOMY  06/30/2014    PROSTATECTOMY      Prostatectomy Radical    REFRACTIVE SURGERY Right 01/2023    SHOULDER ARTHROSCOPY Left 02/06/2020    Procedure: DIAGNOSTIC SHOULDER ARTHROSCOPY LEFT WITH LABRAL DEBRIDEMENT, SUBACROMIAL BURSECTOMY, ASSESSMENT OF LARGE FRAGMENTED  "RETRACTED IRREPARABLE ROTATOR CUFF TEARS;  Surgeon: Rony Pandey MD;  Location: [facility] OR;  Service: Orthopedics;  Laterality: Left;    SKIN CANCER EXCISION  2017    both sides of body/squamous cell melanoma    SKIN CANCER EXCISION Left 2022    Facail skin excision    TOTAL SHOULDER ARTHROPLASTY W/ DISTAL CLAVICLE EXCISION Left 2020    Procedure: Left reverse total shoulder arthroplasty;  Surgeon: Rony Pandey MD;  Location: [facility] OR;  Service: Orthopedics;  Laterality: Left;    TYMPANOPLASTY W/ MASTOIDECTOMY      UMBILICAL HERNIA REPAIR         Family History   Problem Relation Age of Onset    Diabetes Mother     Hypertension Mother     Obesity Mother     Stroke Mother 65    Arthritis Mother     Hyperlipidemia Mother     Vision loss Mother     Cancer Father     Cancer Sister     Diabetes Brother     Cancer Brother     Heart attack Brother     Alcohol abuse Brother     Drug abuse Brother     Hearing loss Brother     Learning disabilities Brother     Colon cancer Neg Hx         Social History     Socioeconomic History    Marital status:    Tobacco Use    Smoking status: Former     Packs/day: 1.00     Years: 51.00     Additional pack years: 0.00     Total pack years: 51.00     Types: Cigarettes     Start date: 1963     Quit date: 2018     Years since quittin.1    Smokeless tobacco: Never   Vaping Use    Vaping Use: Never used   Substance and Sexual Activity    Alcohol use: Never    Drug use: Never    Sexual activity: Defer       Allergies   Allergen Reactions    Cyclobenzaprine Unknown - Low Severity     Wide awake    Plaquenil [Hydroxychloroquine Sulfate] Unknown - Low Severity     Black eyes    Pravastatin Myalgia     Weakness / fatigue       Review of Systems   Musculoskeletal:  Positive for arthralgias (right wrist).       Objective   /84 (BP Location: Left arm, Patient Position: Sitting, Cuff Size: Adult)   Ht 180.3 cm (71\")   Wt 66.7 kg (147 lb)   BMI 20.50 " kg/m²   Physical Exam  Vitals and nursing note reviewed.   Constitutional:       Appearance: Normal appearance.   Pulmonary:      Effort: Pulmonary effort is normal.   Musculoskeletal:      Right wrist: Tenderness (radiocarpal joint) present. No swelling, deformity, effusion, lacerations, snuff box tenderness or crepitus. Normal pulse.      Right hand: No deformity or tenderness. Normal sensation. There is no disruption of two-point discrimination. Normal capillary refill. Normal pulse.   Neurological:      Mental Status: He is alert and oriented to person, place, and time.       Ortho Exam    Neurologic Exam     Mental Status   Oriented to person, place, and time.      No erythema or warmth to right wrist  No DRUJ instability of right wrist.    Assessment & Plan   Independent Review of Radiographic Studies:    X-ray of the right wrist 3 view performed in the clinic independently reviewed for the evaluation of pain.  No comparison films available for review.  No acute fracture or dislocation.  There is evidence of radial ulna arthritic changes    Laboratory and Other Studies:  No new results reviewed today.     No concern at this time for septic joint.    Medium Joint Arthrocentesis: R radiocarpal  Date/Time: 2/26/2024 1:42 PM  Consent given by: patient  Site marked: site marked  Timeout: Immediately prior to procedure a time out was called to verify the correct patient, procedure, equipment, support staff and site/side marked as required   Supporting Documentation  Indications: pain   Procedure Details  Location: wrist - R radiocarpal  Preparation: Patient was prepped and draped in the usual sterile fashion  Needle size: 25 G  Approach: dorsal  Medications administered: 0.5 mg triamcinolone acetonide 40 MG/ML; 0.5 mL lidocaine 2%  Patient tolerance: patient tolerated the procedure well with no immediate complications        No abnormal joint fluid when aspirated before injection.  Diagnoses and all orders for this  visit:    1. Arthropathy of right wrist (Primary)    Other orders  -     Medium Joint Arthrocentesis: R radiocarpal       Orthopedic activities reviewed and patient expressed appreciation  Regular exercise as tolerated  Risk, benefits, and merits of treatment alternatives reviewed with the patient and questions answered  Ice, heat, and/or modalities as beneficial    Recommendations/Plan:  Exercise, medications, injections, other patient advice, and return appointment as noted.  Patient is encouraged to call or return for any issues or concerns.    Follow up in 3 months    Patient agreeable to call or return sooner for any concerns.    BMI is within normal parameters. No other follow-up for BMI required.

## 2024-04-04 ENCOUNTER — PATIENT MESSAGE (OUTPATIENT)
Dept: INTERNAL MEDICINE | Facility: CLINIC | Age: 69
End: 2024-04-04
Payer: MEDICARE

## 2024-04-05 RX ORDER — LEVOCETIRIZINE DIHYDROCHLORIDE 5 MG/1
5 TABLET, FILM COATED ORAL EVERY EVENING
Qty: 90 TABLET | Refills: 3 | Status: SHIPPED | OUTPATIENT
Start: 2024-04-05

## 2024-04-05 NOTE — TELEPHONE ENCOUNTER
From: Elliott Dunn  To: Ladonna Means  Sent: 4/4/2024 1:09 PM EDT  Subject: Allergy medicine     Could you please send me a prescription for an allergy medication to the [facility] pharmacy in [place] or do I need to be seen?

## 2024-06-11 ENCOUNTER — OFFICE VISIT (OUTPATIENT)
Dept: ORTHOPEDIC SURGERY | Facility: CLINIC | Age: 69
End: 2024-06-11
Payer: MEDICARE

## 2024-06-11 VITALS — WEIGHT: 142 LBS | TEMPERATURE: 98.2 F | HEIGHT: 71 IN | BODY MASS INDEX: 19.88 KG/M2

## 2024-06-11 DIAGNOSIS — M19.031 ARTHROPATHY OF RIGHT WRIST: Primary | ICD-10-CM

## 2024-06-11 DIAGNOSIS — M25.531 ARTHRALGIA OF WRIST, RIGHT: ICD-10-CM

## 2024-06-11 DIAGNOSIS — R91.8 LUNG NODULES: Primary | ICD-10-CM

## 2024-06-11 PROCEDURE — 99213 OFFICE O/P EST LOW 20 MIN: CPT | Performed by: PHYSICIAN ASSISTANT

## 2024-06-11 NOTE — PROGRESS NOTES
Subjective   Patient ID: Elliott Dunn is a 69 y.o. left hand dominant male is being seen for orthopaedic evaluation today for right wrist   Follow-up of the Right Wrist (Reports injection helped wrist pain a lot. Not having any issues )         History of Present Illness  Patient is following up for scheduled follow-up visit to reevaluate right wrist arthropathy status post radiocarpal cortisone injection.  He is pleased to report the injection provided complete relief.  He is having no pain to the wrist.  Denies numbness or tingling.  No redness or warmth to the wrist.                                                 Past Medical History:   Diagnosis Date    Acquired absence of all teeth     Allergic     Anomalous coronary artery origin     Arrhythmia     Arthritis     Arthritis of lumbar spine 07/29/2016    Back pain 06/17/2016    Cristina esophagus     Cataract     REMOVED BILATERALL    Cervical spine disease 06/17/2016    CHF (congestive heart failure)     Chronic obstructive pulmonary disease     Colon polyps     COVID-19 vaccine series completed     Deafness in left ear     Derangement of anterior horn of medial meniscus     Difficulty swallowing     Disorder of lumbar spine 06/17/2016    Disorder of thoracic spine 06/17/2016    Diverticulitis of colon     DJD (degenerative joint disease)     Elevated cholesterol     Elevated liver enzymes     Erectile dysfunction     Esophageal reflux     Essential hypertension     PT DENIES SAYS IT RUNS LOW    Glaucoma     Hard of hearing     DEAF IN LEFT EAR NO AIDS WORN    Heart murmur     Hiatal hernia     High cholesterol     History of radiation therapy 11/24/2020    salvage pelvic XRT    Impaired functional mobility, balance, gait, and endurance     Inflammatory polyarthropathy     Per Dr. Haider. Negative NEO, normal ESR, RF, anti-ccp and did not improve with prednisone per notes.    Inguinal hernia     Insomnia     Lateral meniscus derangement     Left otitis media  with spontaneous rupture of eardrum     Locking of left knee     Low back pain     Meniere's disease     MGUS (monoclonal gammopathy of unknown significance)     likely diagnosis    Murmur     Neuromuscular disorder     Neuropathic arthritis     No natural teeth     Perforation of left tympanic membrane     Prostate cancer     previous CA in 2013 with prostatectomy    Pulmonary emphysema     Recent shoulder injury     LEFT SHOULDER    Rotator cuff tear arthropathy of left shoulder 03/12/2020    Added automatically from request for surgery [id number]    Scoliosis     Skin cancer of face     squamous cell basal    Spina bifida occulta 06/17/2016    Tobacco abuse 11/09/2017    Visual impairment     Wears glasses     READING GLASSES ONLY        Past Surgical History:   Procedure Laterality Date    CARDIAC CATHETERIZATION      no stent    COLONOSCOPY      COLONOSCOPY N/A 01/04/2022    Procedure: COLONOSCOPY with polypectomy x1;  Surgeon: Luis Callahan MD;  Location: [facility] ENDOSCOPY;  Service: Gastroenterology;  Laterality: N/A;    COLONOSCOPY N/A 11/16/2022    Procedure: COLONOSCOPY ;  Surgeon: Luis Callahan MD;  Location: [facility] ENDOSCOPY;  Service: Gastroenterology;  Laterality: N/A;    ENDOSCOPY N/A 04/10/2017    Procedure: ESOPHAGOGASTRODUODENOSCOPY WITH BIOPSY;  Surgeon: Alexander Ritchie MD;  Location: [facility] ENDOSCOPY;  Service:     ENDOSCOPY N/A 07/20/2022    Procedure: ESOPHAGOGASTRODUODENOSCOPY WITH BIOPSY ;  Surgeon: Luis Callahan MD;  Location: [facility] ENDOSCOPY;  Service: Gastroenterology;  Laterality: N/A;    EYE SURGERY      HERNIA REPAIR      INGUINAL HERNIA REPAIR Right     INGUINAL HERNIA REPAIR      KNEE SURGERY Left     LYMPH NODE BIOPSY      MULTIPLE TOOTH EXTRACTIONS      PROSTATE SURGERY  2004    PROSTATECTOMY  06/30/2014    PROSTATECTOMY      Prostatectomy Radical    REFRACTIVE SURGERY Right 01/2023    SHOULDER ARTHROSCOPY Left 02/06/2020    Procedure: DIAGNOSTIC SHOULDER  "ARTHROSCOPY LEFT WITH LABRAL DEBRIDEMENT, SUBACROMIAL BURSECTOMY, ASSESSMENT OF LARGE FRAGMENTED RETRACTED IRREPARABLE ROTATOR CUFF TEARS;  Surgeon: Rony Pandey MD;  Location: [facility];  Service: Orthopedics;  Laterality: Left;    SKIN CANCER EXCISION  2017    both sides of body/squamous cell melanoma    SKIN CANCER EXCISION Left 2022    Facail skin excision    TOTAL SHOULDER ARTHROPLASTY W/ DISTAL CLAVICLE EXCISION Left 2020    Procedure: Left reverse total shoulder arthroplasty;  Surgeon: Rony Pandey MD;  Location: [facility] OR;  Service: Orthopedics;  Laterality: Left;    TYMPANOPLASTY W/ MASTOIDECTOMY      UMBILICAL HERNIA REPAIR         Family History   Problem Relation Age of Onset    Diabetes Mother     Hypertension Mother     Obesity Mother     Stroke Mother 65    Arthritis Mother     Hyperlipidemia Mother     Vision loss Mother     Cancer Father     Cancer Sister     Diabetes Brother     Cancer Brother     Heart attack Brother     Alcohol abuse Brother     Drug abuse Brother     Hearing loss Brother     Learning disabilities Brother     Colon cancer Neg Hx         Social History     Socioeconomic History    Marital status:    Tobacco Use    Smoking status: Former     Current packs/day: 0.00     Average packs/day: 1 pack/day for 55.0 years (55.0 ttl pk-yrs)     Types: Cigarettes     Start date: 1963     Quit date: 2018     Years since quittin.4    Smokeless tobacco: Never   Vaping Use    Vaping status: Never Used   Substance and Sexual Activity    Alcohol use: Never    Drug use: Never    Sexual activity: Defer       Allergies   Allergen Reactions    Cyclobenzaprine Unknown - Low Severity     Wide awake    Plaquenil [Hydroxychloroquine Sulfate] Unknown - Low Severity     Black eyes    Pravastatin Myalgia     Weakness / fatigue       Review of Systems  See HPI    Objective   Temp 98.2 °F (36.8 °C)   Ht 180.3 cm (71\")   Wt 64.4 kg (142 lb)   BMI 19.80 kg/m² "   Physical Exam  Vitals and nursing note reviewed.   Constitutional:       Appearance: Normal appearance.   Pulmonary:      Effort: Pulmonary effort is normal.   Musculoskeletal:      Right wrist: No swelling, effusion, lacerations, tenderness, bony tenderness or snuff box tenderness. Normal range of motion. Normal pulse.      Right hand: Normal sensation. There is no disruption of two-point discrimination. Normal capillary refill.   Neurological:      Mental Status: He is alert and oriented to person, place, and time.       Ortho Exam    Neurologic Exam     Mental Status   Oriented to person, place, and time.            Assessment & Plan   Independent Review of Radiographic Studies:    No new imaging done today.  Laboratory and Other Studies:  No new results reviewed today.       Procedures  [x] No procedures were performed in office today.    There are no diagnoses linked to this encounter.   Orthopedic activities reviewed and patient expressed appreciation  Regular exercise as tolerated  Risk, benefits, and merits of treatment alternatives reviewed with the patient and questions answered    Recommendations/Plan:  Exercise, medications, injections, other patient advice, and return appointment as noted.  Patient is encouraged to call or return for any issues or concerns.    Call the office if you would like to repeat a cortisone injection.  We discussed that because he is pain-free today I would not recommend a repeat cortisone injection.  Patient agreeable to call or return sooner for any concerns.  Advance Care Planning   ACP discussion was held with the patient during this visit. Patient does not have an advance directive, information provided.       BMI is within normal parameters. No other follow-up for BMI required.

## 2024-06-27 ENCOUNTER — HOSPITAL ENCOUNTER (OUTPATIENT)
Dept: CT IMAGING | Facility: HOSPITAL | Age: 69
Discharge: HOME | End: 2024-06-27
Admitting: INTERNAL MEDICINE
Payer: MEDICARE

## 2024-06-27 DIAGNOSIS — R91.8 LUNG NODULES: ICD-10-CM

## 2024-06-27 PROCEDURE — 71250 CT THORAX DX C-: CPT

## 2024-07-09 ENCOUNTER — LAB (OUTPATIENT)
Dept: LAB | Facility: HOSPITAL | Age: 69
End: 2024-07-09
Payer: MEDICARE

## 2024-07-09 DIAGNOSIS — D47.2 MGUS (MONOCLONAL GAMMOPATHY OF UNKNOWN SIGNIFICANCE): ICD-10-CM

## 2024-07-09 LAB
ALBUMIN SERPL-MCNC: 4.1 G/DL (ref 3.5–5.2)
ALBUMIN/GLOB SERPL: 1.2 G/DL
ALP SERPL-CCNC: 89 U/L (ref 39–117)
ALT SERPL W P-5'-P-CCNC: 22 U/L (ref 1–41)
ANION GAP SERPL CALCULATED.3IONS-SCNC: 10.2 MMOL/L (ref 5–15)
AST SERPL-CCNC: 31 U/L (ref 1–40)
BASOPHILS # BLD AUTO: 0.04 10*3/MM3 (ref 0–0.2)
BASOPHILS NFR BLD AUTO: 0.7 % (ref 0–1.5)
BILIRUB SERPL-MCNC: 0.5 MG/DL (ref 0–1.2)
BUN SERPL-MCNC: 21 MG/DL (ref 8–23)
BUN/CREAT SERPL: 20.6 (ref 7–25)
CALCIUM SPEC-SCNC: 9.5 MG/DL (ref 8.6–10.5)
CHLORIDE SERPL-SCNC: 102 MMOL/L (ref 98–107)
CO2 SERPL-SCNC: 24.8 MMOL/L (ref 22–29)
CREAT SERPL-MCNC: 1.02 MG/DL (ref 0.76–1.27)
DEPRECATED RDW RBC AUTO: 45.1 FL (ref 37–54)
EGFRCR SERPLBLD CKD-EPI 2021: 79.6 ML/MIN/1.73
EOSINOPHIL # BLD AUTO: 0.12 10*3/MM3 (ref 0–0.4)
EOSINOPHIL NFR BLD AUTO: 2 % (ref 0.3–6.2)
ERYTHROCYTE [DISTWIDTH] IN BLOOD BY AUTOMATED COUNT: 13.1 % (ref 12.3–15.4)
GLOBULIN UR ELPH-MCNC: 3.5 GM/DL
GLUCOSE SERPL-MCNC: 76 MG/DL (ref 65–99)
HCT VFR BLD AUTO: 44.1 % (ref 37.5–51)
HGB BLD-MCNC: 15.3 G/DL (ref 13–17.7)
IMM GRANULOCYTES # BLD AUTO: 0.02 10*3/MM3 (ref 0–0.05)
IMM GRANULOCYTES NFR BLD AUTO: 0.3 % (ref 0–0.5)
LYMPHOCYTES # BLD AUTO: 2.54 10*3/MM3 (ref 0.7–3.1)
LYMPHOCYTES NFR BLD AUTO: 41.9 % (ref 19.6–45.3)
MCH RBC QN AUTO: 32.3 PG (ref 26.6–33)
MCHC RBC AUTO-ENTMCNC: 34.7 G/DL (ref 31.5–35.7)
MCV RBC AUTO: 93 FL (ref 79–97)
MONOCYTES # BLD AUTO: 0.58 10*3/MM3 (ref 0.1–0.9)
MONOCYTES NFR BLD AUTO: 9.6 % (ref 5–12)
NEUTROPHILS NFR BLD AUTO: 2.76 10*3/MM3 (ref 1.7–7)
NEUTROPHILS NFR BLD AUTO: 45.5 % (ref 42.7–76)
NRBC BLD AUTO-RTO: 0 /100 WBC (ref 0–0.2)
PLATELET # BLD AUTO: 203 10*3/MM3 (ref 140–450)
PMV BLD AUTO: 10.4 FL (ref 6–12)
POTASSIUM SERPL-SCNC: 4.3 MMOL/L (ref 3.5–5.2)
PROT SERPL-MCNC: 7.6 G/DL (ref 6–8.5)
RBC # BLD AUTO: 4.74 10*6/MM3 (ref 4.14–5.8)
SODIUM SERPL-SCNC: 137 MMOL/L (ref 136–145)
WBC NRBC COR # BLD AUTO: 6.06 10*3/MM3 (ref 3.4–10.8)

## 2024-07-09 PROCEDURE — 36415 COLL VENOUS BLD VENIPUNCTURE: CPT

## 2024-07-09 PROCEDURE — 83521 IG LIGHT CHAINS FREE EACH: CPT

## 2024-07-09 PROCEDURE — 85025 COMPLETE CBC W/AUTO DIFF WBC: CPT

## 2024-07-09 PROCEDURE — 86334 IMMUNOFIX E-PHORESIS SERUM: CPT

## 2024-07-09 PROCEDURE — 84165 PROTEIN E-PHORESIS SERUM: CPT

## 2024-07-09 PROCEDURE — 80053 COMPREHEN METABOLIC PANEL: CPT

## 2024-07-09 PROCEDURE — 82784 ASSAY IGA/IGD/IGG/IGM EACH: CPT

## 2024-07-11 LAB
ALBUMIN SERPL ELPH-MCNC: 3.6 G/DL (ref 2.9–4.4)
ALBUMIN/GLOB SERPL: 1 {RATIO} (ref 0.7–1.7)
ALPHA1 GLOB SERPL ELPH-MCNC: 0.2 G/DL (ref 0–0.4)
ALPHA2 GLOB SERPL ELPH-MCNC: 0.8 G/DL (ref 0.4–1)
B-GLOBULIN SERPL ELPH-MCNC: 0.8 G/DL (ref 0.7–1.3)
GAMMA GLOB SERPL ELPH-MCNC: 2 G/DL (ref 0.4–1.8)
GLOBULIN SER-MCNC: 3.8 G/DL (ref 2.2–3.9)
IGA SERPL-MCNC: 38 MG/DL (ref 61–437)
IGG SERPL-MCNC: 2521 MG/DL (ref 603–1613)
IGM SERPL-MCNC: 18 MG/DL (ref 20–172)
INTERPRETATION SERPL IEP-IMP: ABNORMAL
KAPPA LC FREE SER-MCNC: 126.1 MG/L (ref 3.3–19.4)
KAPPA LC FREE/LAMBDA FREE SER: 16.17 {RATIO} (ref 0.26–1.65)
LABORATORY COMMENT REPORT: ABNORMAL
LAMBDA LC FREE SERPL-MCNC: 7.8 MG/L (ref 5.7–26.3)
M PROTEIN SERPL ELPH-MCNC: 1.8 G/DL
PROT SERPL-MCNC: 7.4 G/DL (ref 6–8.5)

## 2024-07-23 DIAGNOSIS — Z87.891 PERSONAL HISTORY OF SMOKING: Primary | ICD-10-CM

## 2024-07-31 ENCOUNTER — PATIENT MESSAGE (OUTPATIENT)
Dept: INTERNAL MEDICINE | Facility: CLINIC | Age: 69
End: 2024-07-31
Payer: MEDICARE

## 2024-07-31 DIAGNOSIS — M54.2 CHRONIC NECK PAIN: Primary | ICD-10-CM

## 2024-07-31 DIAGNOSIS — G89.29 CHRONIC NECK PAIN: Primary | ICD-10-CM

## 2024-07-31 NOTE — TELEPHONE ENCOUNTER
From: Elliott Dunn  To: Ladonna Means  Sent: 7/31/2024 2:58 PM EDT  Subject: Neck Pain    I have an appointment on August 9 @ 8:45 and I was wondering could you consider referring me to Dr. Osmany Heller for a pain block in my neck and the opportunity to discuss alternatives for my arthritic pain. The medication my rheumatologist gives me has become ineffective.

## 2024-08-01 RX ORDER — LEVOCETIRIZINE DIHYDROCHLORIDE 5 MG/1
5 TABLET, FILM COATED ORAL EVERY EVENING
Qty: 90 TABLET | Refills: 3 | OUTPATIENT
Start: 2024-08-01

## 2024-08-02 ENCOUNTER — OFFICE VISIT (OUTPATIENT)
Dept: ONCOLOGY | Facility: CLINIC | Age: 69
End: 2024-08-02
Payer: MEDICARE

## 2024-08-02 VITALS
RESPIRATION RATE: 16 BRPM | WEIGHT: 141.6 LBS | DIASTOLIC BLOOD PRESSURE: 62 MMHG | TEMPERATURE: 97.5 F | BODY MASS INDEX: 19.82 KG/M2 | SYSTOLIC BLOOD PRESSURE: 129 MMHG | HEART RATE: 58 BPM | HEIGHT: 71 IN | OXYGEN SATURATION: 98 %

## 2024-08-02 DIAGNOSIS — D47.2 MGUS (MONOCLONAL GAMMOPATHY OF UNKNOWN SIGNIFICANCE): Primary | ICD-10-CM

## 2024-08-02 PROCEDURE — 99214 OFFICE O/P EST MOD 30 MIN: CPT | Performed by: NURSE PRACTITIONER

## 2024-08-02 PROCEDURE — 1160F RVW MEDS BY RX/DR IN RCRD: CPT | Performed by: NURSE PRACTITIONER

## 2024-08-02 PROCEDURE — 1125F AMNT PAIN NOTED PAIN PRSNT: CPT | Performed by: NURSE PRACTITIONER

## 2024-08-02 PROCEDURE — 3078F DIAST BP <80 MM HG: CPT | Performed by: NURSE PRACTITIONER

## 2024-08-02 PROCEDURE — 1159F MED LIST DOCD IN RCRD: CPT | Performed by: NURSE PRACTITIONER

## 2024-08-02 PROCEDURE — 3074F SYST BP LT 130 MM HG: CPT | Performed by: NURSE PRACTITIONER

## 2024-08-02 NOTE — PROGRESS NOTES
PROBLEM LIST:  Oncology/Hematology History Overview Note   1.  MGUS: Had been having mid back pains and was seen by neurology, was found to have 1200 mg of immunoglobulin G monoclonal protein in the serum with a normal IgA and IgM level this was in July 2016.  Workup August 2016 included a bone survey that was negative other than for degenerative joint disease with left eighth rib healed fracture that was seen on prior bone scan.  Normal creatinine, ionized calcium of 1.34, normal total protein to albumin ratio.  Normal sedimentation rate and C-reactive protein, serum monoclonal protein present at 1100 mg/dL of immunoglobulin G kappa with normal IgA and M levels.  Urine monoclonal protein was too scant to quantify.  Kappa to lambda ratio of 4.58 with elevation of At 42.85.  CBC was unremarkable with normal white count and platelet count and hemoglobin of 17 baseline.  Baseline PET/CT 9/1/2016 was negative.   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  This genetic alteration is found in approximately 15-18 percent of patients with plasma cell myeloma by FISH analysis and is associated with a favorable prognosis in the absence of poor prognostic markers.   b.)  3/6/2017 follow-up PET/CT was negative.    2.  Prostate cancer: Status post prostatectomy 2014.  He had chemical recurrence in 2020.  He just completed involved field radiotherapy to the pelvis in November 2020 with Dr. Billy Wilson.  He will be followed by Dr. Varela and Dr. Wilson.  If he has progressive disease then I would recommend initiating Xtandi along with his androgen deprivation therapy.    3.  Squamous cell cancer of the skin, followed by Dr. Izaguirre.           MGUS (monoclonal gammopathy of unknown significance)   7/1/2016 Initial Diagnosis    MGUS (monoclonal gammopathy of unknown significance)     8/21/2017 -  Other Event     Myeloma panel: Urine immunoelectrophoresis monoclonal protein too small to quantify, serum immunoelectrophoresis M-spike stable at 1.3g/dl, ionized calcium 5.2, kappa to lambda ratio 4.10, beta-2 microglobulin 2.2, C-reactive protein less than 0.50, creatinine 1.3, sedimentation rate to.  CBC WBC 9600, hemoglobin 15.6, hematocrit 47.1%, platelet count 209,000.       3/12/2018 -  Other Event    Myeloma panel: Urine immunoelectrophoresis with 122 mg/24 hour protein, monoclonal kappa free light chains 21.2%, monoclonal IgG kappa 7.5%.  Sedimentation rate 5, immunoglobulin free light chains free kappa light chains 49.8, normal lambda light chains 12.2, kappa/lambda ratio 4.08.  Serum immunoelectrophoresis M spike 1.7g/dL, C-reactive protein 1.60, CMP unremarkable with creatinine 1.10, serum calcium 9.2, ionized calcium 5.1, beta-2 microglobulin 2.5, CBC normal with a WBC of 9.07, hemoglobin 15.9, hematocrit 47.5%, platelets 226,000.     Malignant neoplasm of prostate   5/16/2014 Cancer Staged    Staging form: Prostate, AJCC V7  - Clinical stage from 5/16/2014: Stage IIA (T1c, N0, M0, PSA: 10 to 19, [place] 7) - Signed by Luis Wilson MD on 9/16/2020 6/30/2014 Cancer Staged    Staging form: Prostate, AJCC V7  - Pathologic stage from 6/30/2014: Stage III (T3b, N0, cM0, PSA: 10 to 19, [place] 7) - Signed by Luis Wilson MD on 9/16/2020 9/20/2016 Initial Diagnosis    Malignant neoplasm of prostate (CMS/HCC)     10/7/2020 - 11/24/2020 Radiation    Radiation OncologyTreatment Course:  Elliott Dunn received 7000 cGy in 35 fractions to prostate bed via External Beam Radiation - EBRT.         REASON FOR VISIT: Follow-up of his MGUS    HISTORY OF PRESENT ILLNESS:   69 y.o.  male presents today for follow-up of his monoclonal gammopathy.  Overall he continues to do well.  Prostate cancer continues to do well.  Continues to follow with Dr. Varela.  He is having some more trouble with his RA as well as his  osteoarthritis.  He sees rheumatology.  He is considering Biologics.  Planning to see Dr. Heller for neck pain.  Otherwise no changes noted.      Past medical history, social history and family history was reviewed and unchanged from prior visit.    Review of Systems:    Review of Systems   Constitutional:  Positive for fatigue.   Musculoskeletal:  Positive for arthralgias, back pain and myalgias.   All other systems reviewed and are negative.     A comprehensive 14 point review of systems was performed and was negative except as mentioned.      Medications:        Current Outpatient Medications:     albuterol sulfate HFA (Ventolin HFA) 108 (90 Base) MCG/ACT inhaler, Inhale 2 puffs Every 4 (Four) Hours As Needed for Wheezing or Shortness of Air., Disp: 18 g, Rfl: 2    amitriptyline (ELAVIL) 50 MG tablet, Take 1 tablet by mouth Every Night. For sleep/chronic pain, Disp: 90 tablet, Rfl: 1    aspirin 81 MG EC tablet, Take 1 tablet by mouth Daily., Disp: , Rfl:     atorvastatin (LIPITOR) 10 MG tablet, Take 1 tablet by mouth Every Night. To lower cholesterol and risk of stroke, Disp: 90 tablet, Rfl: 1    azelastine (ASTELIN) 0.1 % nasal spray, 1 spray into the nostril(s) as directed by provider 2 (Two) Times a Day As Needed for Rhinitis or Allergies. Use in each nostril as directed, Disp: 90 mL, Rfl: 3    bisoprolol (ZEBeta) 5 MG tablet, Take 1 tablet by mouth Daily. For high blood pressure., Disp: 90 tablet, Rfl: 1    bisoprolol-hydrochlorothiazide (ZIAC) 5-6.25 MG per tablet, Take 1 tablet every day by oral route., Disp: , Rfl:     DULoxetine (CYMBALTA) 60 MG capsule, Take 1 capsule by mouth Daily. For mood and chronic pain, Disp: 90 capsule, Rfl: 1    folic acid (FOLVITE) 1 MG tablet, Take 1 tablet by mouth Daily. Due to methotrexate use, Disp: 90 tablet, Rfl: 3    gabapentin (NEURONTIN) 300 MG capsule, Take 1 capsule by mouth 3 (Three) Times a Day., Disp: 270 capsule, Rfl: 1    isosorbide mononitrate (IMDUR) 30 MG  "24 hr tablet, Take 1 tablet by mouth Daily. For high blood pressure., Disp: 90 tablet, Rfl: 1    leflunomide (ARAVA) 20 MG tablet, Take 1 tablet by mouth Daily., Disp: , Rfl:     levocetirizine (XYZAL) 5 MG tablet, Take 1 tablet by mouth Every Evening., Disp: 90 tablet, Rfl: 3    methocarbamol (ROBAXIN) 750 MG tablet, Take 1 tablet by mouth 3 (Three) Times a Day As Needed for Muscle Spasms., Disp: 270 tablet, Rfl: 1    methotrexate 2.5 MG tablet, Take 8 tablets by mouth 1 (One) Time Per Week. TAKES ON THURSDAY (Patient taking differently: Take 8 tablets by mouth 1 (One) Time Per Week. TAKES ON Sunday ONLY TAKES 7 PILSS), Disp: 32 tablet, Rfl: 0    Multiple Vitamins-Minerals (MULTIVITAMIN WITH MINERALS) tablet tablet, Take 1 tablet by mouth Daily., Disp: , Rfl:     O2 (OXYGEN), Inhale 2 L 1 (One) Time. Nightly, Disp: , Rfl:     pantoprazole (PROTONIX) 40 MG EC tablet, Take 1 tablet by mouth Daily., Disp: 90 tablet, Rfl: 3    predniSONE (DELTASONE) 5 MG tablet, Take 1 tablet by mouth Daily., Disp: , Rfl:     silver sulfadiazine (Silvadene) 1 % cream, Apply 1 application  topically to the appropriate area as directed 2 (Two) Times a Day., Disp: 400 g, Rfl: 11    tiotropium bromide-olodaterol (Stiolto Respimat) 2.5-2.5 MCG/ACT aerosol solution inhaler, Inhale 2 puffs Daily., Disp: 12 g, Rfl: 1    vitamin B-12 (CYANOCOBALAMIN) 1000 MCG tablet, TAKE ONE TABLET BY MOUTH DAILY (Patient taking differently: Take 1 tablet by mouth Daily.), Disp: 30 tablet, Rfl: 8      ALLERGIES:    Allergies   Allergen Reactions    Cyclobenzaprine Unknown - Low Severity     Wide awake    Plaquenil [Hydroxychloroquine Sulfate] Unknown - Low Severity     Black eyes    Pravastatin Myalgia     Weakness / fatigue         Physical Exam    VITAL SIGNS:  /62   Pulse 58   Temp 97.5 °F (36.4 °C)   Resp 16   Ht 180.3 cm (70.98\")   Wt 64.2 kg (141 lb 9.6 oz)   SpO2 98%   BMI 19.76 kg/m²     Wt Readings from Last 3 Encounters:   08/02/24 64.2 " kg (141 lb 9.6 oz)   06/11/24 64.4 kg (142 lb)   02/26/24 66.7 kg (147 lb)       Body mass index is 19.76 kg/m². Body surface area is 1.82 meters squared.         Performance Status: 1      General: Thin appearing male in no acute distress  Neuro: alert and oriented  HEENT: sclera anicteric, oropharynx clear  Lymphatics: no cervical, supraclavicular, or axillary adenopathy  Cardiovascular: regular rate and rhythm, no murmurs  Lungs: clear to auscultation bilaterally  Abdomen: soft, nontender, nondistended.  No palpable organomegaly  Extremeties: no lower extremity edema  Skin: no rashes, lesions, bruising, or petechiae  Psych: mood and affect appropriate        RECENT LABS:    Lab Results   Component Value Date    HGB 15.3 07/09/2024    HCT 44.1 07/09/2024    MCV 93.0 07/09/2024     07/09/2024    WBC 6.06 07/09/2024    NEUTROABS 2.76 07/09/2024    LYMPHSABS 2.54 07/09/2024    MONOSABS 0.58 07/09/2024    EOSABS 0.12 07/09/2024    BASOSABS 0.04 07/09/2024       Lab Results   Component Value Date    GLUCOSE 76 07/09/2024    BUN 21 07/09/2024    CREATININE 1.02 07/09/2024     07/09/2024    K 4.3 07/09/2024     07/09/2024    CO2 24.8 07/09/2024    CALCIUM 9.5 07/09/2024    PROTEINTOT 7.6 07/09/2024    ALBUMIN 4.1 07/09/2024    ALBUMIN 3.6 07/09/2024    BILITOT 0.5 07/09/2024    ALKPHOS 89 07/09/2024    AST 31 07/09/2024    ALT 22 07/09/2024       Lab Results   Component Value Date    MSPIKE 1.8 (H) 07/09/2024    MSPIKE 1.9 (H) 12/20/2023    MSPIKE 1.9 (H) 05/24/2023     Lab Results   Component Value Date    FREEKAPPAL 126.1 (H) 07/09/2024    FREEKAPPAL 132.0 (H) 12/20/2023    FREEKAPPAL 121.7 (H) 12/01/2022     Lab Results   Component Value Date    IGLFLC 7.8 07/09/2024    IGLFLC 8.0 12/20/2023    IGLFLC 8.4 12/01/2022         Xr Chest 2 View    Result Date: 8/17/2019  No radiographic evidence of acute cardiac or pulmonary disease. Stable chronic increased interstitial markings.      This report was  finalized on 8/17/2019 8:12 AM by Obdulia Feliciano MD.          Assessment/Plan    1.  Monoclonal gammopathy of uncertain significance with the M spike of 1.9 g/dL.  His numbers have remained relatively stable.  We will continue to monitor.  It is slightly lower than last year at 1.8.  We will plan to check in 6 months.  Otherwise he continues to have no worrisome symptoms of anemia, hypercalcemia, or elevated creatinine.    2.  Severe COPD with a right upper lobe lesion.  Followed by Dr. Dickson.     3.  Chemical recurrence of his prostate cancer in  summer 2020.  Completed short course of ADT and radiation with Dr. Wilson.  Follows with Dr. Varela yearly.    4.  RA.  Continue to follow with rheumatology.            Adrianna Esteban, APROLIVIA  [facility]             No orders of the defined types were placed in this encounter.      8/2/2024

## 2024-08-09 ENCOUNTER — OFFICE VISIT (OUTPATIENT)
Dept: INTERNAL MEDICINE | Facility: CLINIC | Age: 69
End: 2024-08-09
Payer: MEDICARE

## 2024-08-09 VITALS
WEIGHT: 145 LBS | HEIGHT: 71 IN | SYSTOLIC BLOOD PRESSURE: 128 MMHG | OXYGEN SATURATION: 98 % | BODY MASS INDEX: 20.3 KG/M2 | TEMPERATURE: 97 F | DIASTOLIC BLOOD PRESSURE: 82 MMHG | RESPIRATION RATE: 16 BRPM | HEART RATE: 52 BPM

## 2024-08-09 DIAGNOSIS — R41.3 MEMORY DEFICITS: ICD-10-CM

## 2024-08-09 DIAGNOSIS — K21.9 GASTROESOPHAGEAL REFLUX DISEASE WITHOUT ESOPHAGITIS: ICD-10-CM

## 2024-08-09 DIAGNOSIS — Z51.81 MEDICATION MONITORING ENCOUNTER: ICD-10-CM

## 2024-08-09 DIAGNOSIS — J43.9 PULMONARY EMPHYSEMA, UNSPECIFIED EMPHYSEMA TYPE: Chronic | ICD-10-CM

## 2024-08-09 DIAGNOSIS — Z23 NEED FOR TDAP VACCINATION: ICD-10-CM

## 2024-08-09 DIAGNOSIS — D47.2 MGUS (MONOCLONAL GAMMOPATHY OF UNKNOWN SIGNIFICANCE): ICD-10-CM

## 2024-08-09 DIAGNOSIS — E55.9 VITAMIN D DEFICIENCY: ICD-10-CM

## 2024-08-09 DIAGNOSIS — G89.29 CHRONIC NECK PAIN: ICD-10-CM

## 2024-08-09 DIAGNOSIS — Z00.00 MEDICARE ANNUAL WELLNESS VISIT, SUBSEQUENT: Primary | ICD-10-CM

## 2024-08-09 DIAGNOSIS — Z79.52 LONG TERM (CURRENT) USE OF SYSTEMIC STEROIDS: ICD-10-CM

## 2024-08-09 DIAGNOSIS — R79.9 ABNORMAL FINDING OF BLOOD CHEMISTRY, UNSPECIFIED: ICD-10-CM

## 2024-08-09 DIAGNOSIS — M54.2 CHRONIC NECK PAIN: ICD-10-CM

## 2024-08-09 DIAGNOSIS — I73.9 PAD (PERIPHERAL ARTERY DISEASE): ICD-10-CM

## 2024-08-09 DIAGNOSIS — J30.9 ALLERGIC RHINITIS, UNSPECIFIED SEASONALITY, UNSPECIFIED TRIGGER: ICD-10-CM

## 2024-08-09 DIAGNOSIS — M35.3 POLYMYALGIA RHEUMATICA: ICD-10-CM

## 2024-08-09 PROBLEM — M06.09 RHEUMATOID ARTHRITIS OF MULTIPLE SITES WITH NEGATIVE RHEUMATOID FACTOR: Status: RESOLVED | Noted: 2021-07-28 | Resolved: 2024-08-09

## 2024-08-09 PROBLEM — C44.90 SKIN CANCER: Chronic | Status: ACTIVE | Noted: 2023-02-06

## 2024-08-09 PROBLEM — Z12.11 ENCOUNTER FOR SCREENING FOR MALIGNANT NEOPLASM OF COLON: Status: RESOLVED | Noted: 2021-11-17 | Resolved: 2024-08-09

## 2024-08-09 RX ORDER — AZELASTINE HYDROCHLORIDE, FLUTICASONE PROPIONATE 137; 50 UG/1; UG/1
1 SPRAY, METERED NASAL 2 TIMES DAILY
Qty: 23 G | Refills: 11 | Status: SHIPPED | OUTPATIENT
Start: 2024-08-09

## 2024-08-09 NOTE — PATIENT INSTRUCTIONS
Medicare Wellness  Personal Prevention Plan of Service     Date of Office Visit:    Encounter Provider:  Ladonna Means MD  Place of Service:  [facility]  Patient Name: Elliott Dunn  :  1955    As part of the Medicare Wellness portion of your visit today, we are providing you with this personalized preventive plan of services (PPPS). This plan is based upon recommendations of the [place] Preventive Services Task Force (USPSTF) and the Advisory Committee on Immunization Practices (ACIP).    This lists the preventive care services that should be considered, and provides dates of when you are due. Items listed as completed are up-to-date and do not require any further intervention.    Health Maintenance   Topic Date Due   • ANNUAL WELLNESS VISIT  2024   • LIPID PANEL  2024   • INFLUENZA VACCINE  2024   • COVID-19 Vaccine ( season) 2024 (Originally 2/3/2024)   • TDAP/TD VACCINES (2 - Td or Tdap) 2025   • LUNG CANCER SCREENING  2025   • COLORECTAL CANCER SCREENING  2032   • HEPATITIS C SCREENING  Completed   • Pneumococcal Vaccine 65+  Completed   • AAA SCREEN (ONE-TIME)  Completed   • ZOSTER VACCINE  Addressed       No orders of the defined types were placed in this encounter.      No follow-ups on file.        Health Maintenance After Age 65    After age 65, you are at a higher risk for certain long-term diseases and infections as well as injuries from falls. Falls are a major cause of broken bones and head injuries in people who are older than age 65. Getting regular preventive care can help to keep you healthy and well. Preventive care includes getting regular testing and making lifestyle changes as recommended by your health care provider. Talk with your health care provider about:  Which screenings and tests you should have. A screening is a test that checks for a disease when you have no symptoms.  A diet and  exercise plan that is right for you.    What should I know about screenings and tests to prevent falls?  Screening and testing are the best ways to find a health problem early. Early diagnosis and treatment give you the best chance of managing medical conditions that are common after age 65. Certain conditions and lifestyle choices may make you more likely to have a fall. Your health care provider may recommend:  Regular vision checks. Poor vision and conditions such as cataracts can make you more likely to have a fall. If you wear glasses, make sure to get your prescription updated if your vision changes.  Medicine review. Work with your health care provider to regularly review all of the medicines you are taking, including over-the-counter medicines. Ask your health care provider about any side effects that may make you more likely to have a fall. Tell your health care provider if any medicines that you take make you feel dizzy or sleepy.  Strength and balance checks. Your health care provider may recommend certain tests to check your strength and balance while standing, walking, or changing positions.  Foot health exam. Foot pain and numbness, as well as not wearing proper footwear, can make you more likely to have a fall.  Screenings, including:  Osteoporosis screening. Osteoporosis is a condition that causes the bones to get weaker and break more easily.  Blood pressure screening. Blood pressure changes and medicines to control blood pressure can make you feel dizzy.  Depression screening. You may be more likely to have a fall if you have a fear of falling, feel depressed, or feel unable to do activities that you used to do.  Alcohol use screening. Using too much alcohol can affect your balance and may make you more likely to have a fall.    Follow these instructions at home:  Lifestyle  Do not drink alcohol if:  Your health care provider tells you not to drink.  If you drink alcohol:  Limit how much you have  to:  0-1 drink a day for women.  0-2 drinks a day for men.  Know how much alcohol is in your drink. In the [place] one drink equals one 12 oz bottle of beer (355 mL), one 5 oz glass of wine (148 mL), or one 1½ oz glass of hard liquor (44 mL).  Do not use any products that contain nicotine or tobacco. These products include cigarettes, chewing tobacco, and vaping devices, such as e-cigarettes. If you need help quitting, ask your health care provider.    Activity    Follow a regular exercise program to stay fit. This will help you maintain your balance. Ask your health care provider what types of exercise are appropriate for you.  If you need a cane or walker, use it as recommended by your health care provider.  Wear supportive shoes that have nonskid soles.  Safety    Remove any tripping hazards, such as rugs, cords, and clutter.  Install safety equipment such as grab bars in bathrooms and safety rails on stairs.  Keep rooms and walkways well-lit.    General instructions  Talk with your health care provider about your risks for falling. Tell your health care provider if:  You fall. Be sure to tell your health care provider about all falls, even ones that seem minor.  You feel dizzy, tiredness (fatigue), or off-balance.  Take over-the-counter and prescription medicines only as told by your health care provider. These include supplements.  Eat a healthy diet and maintain a healthy weight. A healthy diet includes low-fat dairy products, low-fat (lean) meats, and fiber from whole grains, beans, and lots of fruits and vegetables.  Stay current with your vaccines.  Schedule regular health, dental, and eye exams.    Summary  Having a healthy lifestyle and getting preventive care can help to protect your health and wellness after age 65.  Screening and testing are the best way to find a health problem early and help you avoid having a fall. Early diagnosis and treatment give you the best chance for managing medical conditions  that are more common for people who are older than age 65.  Falls are a major cause of broken bones and head injuries in people who are older than age 65. Take precautions to prevent a fall at home.  Work with your health care provider to learn what changes you can make to improve your health and wellness and to prevent falls.    This information is not intended to replace advice given to you by your health care provider. Make sure you discuss any questions you have with your health care provider.  Document Revised: 05/09/2022 Document Reviewed: 05/09/2022  [employer] Patient Education © 2023 [employer]  Updated 2/29/24 tc   Chronic Obstructive Pulmonary Disease    Chronic obstructive pulmonary disease (COPD) is a long-term (chronic) condition that affects the lungs. COPD is a general term that can be used to describe many different lung problems that cause lung inflammation and limit airflow, including chronic bronchitis and emphysema.  If you have COPD, your lung function will probably never return to normal. In most cases, it gets worse over time. However, there are steps you can take to slow the progression of the disease and improve your quality of life.  What are the causes?  This condition may be caused by:  Smoking. This is the most common cause.  Certain genes passed down through families.  What increases the risk?  The following factors may make you more likely to develop this condition:  Being exposed to secondhand smoke from cigarettes, pipes, or cigars.  Being exposed to chemicals and other irritants, such as fumes and dust in the work environment.  Having chronic lung conditions or infections.  What are the signs or symptoms?  Symptoms of this condition include:  Shortness of breath, especially during physical activity.  Chronic cough with a large amount of thick mucus. Sometimes, the cough may not have any mucus (dry cough).  Wheezing and rapid breathing.  Gray or bluish discoloration (cyanosis) of  the skin, especially in the fingers, toes, or lips.  Feeling tired (fatigue).  Weight loss.  Chest tightness.  Frequent infections.  Episodes when breathing symptoms become much worse (exacerbations).  At the later stages of this disease, you may have swelling in the ankles, feet, or legs.  How is this diagnosed?  This condition is diagnosed based on:  Your medical history.  A physical exam.  You may also have tests, including:  Lung (pulmonary) function tests. This may include a spirometry test, which measures your ability to exhale properly.  Chest X-ray.  CT scan.  Blood tests.  How is this treated?  This condition may be treated with:  Medicines. These may include inhaled rescue medicines to treat acute exacerbations as well as medicines that you take long-term (maintenance medicines) to prevent flare-ups of COPD.  Bronchodilators help treat COPD by dilating the airways to allow increased airflow and make your breathing more comfortable.  Steroids can reduce airway inflammation and help prevent exacerbations.  Smoking cessation. If you smoke, your health care provider may ask you to quit, and may also recommend therapy or replacement products to help you quit.  Pulmonary rehabilitation. This may involve working with a team of health care providers and specialists, such as respiratory, occupational, and physical therapists.  Exercise and physical activity. These are beneficial for nearly all people with COPD.  Nutrition therapy to gain weight, if you are underweight.  Oxygen. Supplemental oxygen therapy is only helpful if you have a low oxygen level in your blood (hypoxemia).  Lung surgery or transplant.  Palliative care. This is to help people with COPD feel comfortable when treatment is no longer working.  Follow these instructions at home:  Medicines  Take over-the-counter and prescription medicines only as told by your health care provider. This includes inhaled medicines and pills.  Talk to your health care  provider before taking any cough or allergy medicines. You may need to avoid certain medicines that dry out your airways.  Lifestyle  If you smoke, the most important thing that you can do is to stop smoking. Continuing to smoke will cause the disease to progress faster.  Do not use any products that contain nicotine or tobacco. These products include cigarettes, chewing tobacco, and vaping devices, such as e-cigarettes. If you need help quitting, ask your health care provider.  Avoid exposure to things that irritate your lungs, such as smoke, chemicals, and fumes.  Stay active, but balance activity with periods of rest. Exercise and physical activity will help you maintain your ability to do things you want to do.  Learn and use relaxation techniques to manage stress and to control your breathing.  Get the right amount of sleep and get quality sleep. Most adults need 7 or more hours per night.  Eat healthy foods. Eating smaller, more frequent meals and resting before meals may help you maintain your strength.  Controlled breathing  Learn and use controlled breathing techniques as directed by your health care provider. Controlled breathing techniques include:  Pursed lip breathing. Start by breathing in (inhaling) through your nose for 1 second. Then, purse your lips as if you were going to whistle and breathe out (exhale) through the pursed lips for 2 seconds.  Diaphragmatic breathing. Start by putting one hand on your abdomen just above your waist. Inhale slowly through your nose. The hand on your abdomen should move out. Then purse your lips and exhale slowly. You should be able to feel the hand on your abdomen moving in as you exhale.    Controlled coughing  Learn and use controlled coughing to clear mucus from your lungs. Controlled coughing is a series of short, progressive coughs. The steps of controlled coughing are:  Lean your head slightly forward.  Breathe in deeply using diaphragmatic breathing.  Try to  hold your breath for 3 seconds.  Keep your mouth slightly open while coughing twice.  Spit any mucus out into a tissue.  Rest and repeat the steps once or twice as needed.  General instructions  Make sure you receive all the vaccines that your health care provider recommends, especially the pneumococcal and influenza vaccines. Preventing infection and hospitalization is very important when you have COPD.  Drink enough fluid to keep your urine pale yellow, unless you have a medical condition that requires fluid restriction.  Use oxygen therapy and pulmonary rehabilitation if told by your health care provider. If you require home oxygen therapy, ask your health care provider whether you should purchase a pulse oximeter to measure your oxygen level at home.  Work with your health care provider to develop a COPD action plan. This will help you know what steps to take if your condition gets worse.  Keep other chronic health conditions under control as told by your health care provider.  Avoid extreme temperature and humidity changes.  Avoid contact with people who have an illness that spreads from person to person (is contagious), such as viral infections or pneumonia.  Keep all follow-up visits. This is important.  Contact a health care provider if:  You are coughing up more mucus than usual.  There is a change in the color or thickness of your mucus.  Your breathing is more labored than usual.  Your breathing is faster than usual.  You have difficulty sleeping.  You need to use your rescue medicines or inhalers more often than expected.  You have trouble doing routine activities such as getting dressed or walking around the house.  Get help right away if:  You have shortness of breath while you are resting.  You have shortness of breath that prevents you from:  Being able to talk.  Performing your usual physical activities.  You have chest pain lasting longer than 5 minutes.  Your skin color is more blue (cyanotic)  than usual.  You measure low oxygen saturations for longer than 5 minutes with a pulse oximeter.  You have a fever.  You feel too tired to breathe normally.  These symptoms may represent a serious problem that is an emergency. Do not wait to see if the symptoms will go away. Get medical help right away. Call your local emergency services (911 in the [place] Do not drive yourself to the hospital.  Summary  Chronic obstructive pulmonary disease (COPD) is a long-term (chronic) condition that affects the lungs.  Your lung function will probably never return to normal. In most cases, it gets worse over time. However, there are steps you can take to slow the progression of the disease and improve your quality of life.  Treatment for COPD may include taking medicines, quitting smoking, pulmonary rehabilitation, and changes to diet and exercise. As the disease progresses, you may need oxygen therapy, a lung transplant, or palliative care.  To help manage your condition, do not smoke, avoid exposure to things that irritate your lungs, stay up to date on all vaccines, and follow your health care provider's instructions for taking medicines.  This information is not intended to replace advice given to you by your health care provider. Make sure you discuss any questions you have with your health care provider.  Document Revised: 10/25/2021 Document Reviewed: 10/26/2021  [employer] Patient Education © 2024 [employer]

## 2024-08-09 NOTE — PROGRESS NOTES
Subjective   The ABCs of the Annual Wellness Visit  Medicare Wellness Visit      Elliott Dunn is a 69 y.o. patient who presents for a Medicare Wellness Visit.    The following portions of the patient's history were reviewed and   updated as appropriate: allergies, current medications, past family history, past medical history, past social history, past surgical history, and problem list.    Compared to one year ago, the patient's physical   health is the same.  Compared to one year ago, the patient's mental   health is the same.    Recent Hospitalizations:  He was not admitted to the hospital during the last year.     Current Medical Providers:  Patient Care Team:  Ladonna Means MD as PCP - General (Family Medicine)  Clemencia Rosales APRN as Referring Physician (Neurology)  Alexander Ritchie MD as Consulting Physician (General Surgery)  Luis Wilson MD as Consulting Physician (Radiation Oncology)  Yehuda Gatica MD as Consulting Physician (Ophthalmology)  Ayan العلي MD as Consulting Physician (Rheumatology)  Cisco Varela MD as Consulting Physician (Urology)  David Marino DO as Consulting Physician (Pulmonary Disease)  Yehuda Castañeda PA-C as Physician Assistant (Orthopedic Surgery)  Carlos Osman MD as Consulting Physician (Rheumatology)    Outpatient Medications Prior to Visit   Medication Sig Dispense Refill    albuterol sulfate HFA (Ventolin HFA) 108 (90 Base) MCG/ACT inhaler Inhale 2 puffs Every 4 (Four) Hours As Needed for Wheezing or Shortness of Air. 18 g 2    amitriptyline (ELAVIL) 50 MG tablet Take 1 tablet by mouth Every Night. For sleep/chronic pain 90 tablet 1    aspirin 81 MG EC tablet Take 1 tablet by mouth Daily.      atorvastatin (LIPITOR) 10 MG tablet Take 1 tablet by mouth Every Night. To lower cholesterol and risk of stroke 90 tablet 1    bisoprolol (ZEBeta) 5 MG tablet Take 1 tablet by mouth Daily. For high blood pressure. 90 tablet 1     bisoprolol-hydrochlorothiazide (ZIAC) 5-6.25 MG per tablet Take 1 tablet every day by oral route.      DULoxetine (CYMBALTA) 60 MG capsule Take 1 capsule by mouth Daily. For mood and chronic pain 90 capsule 1    folic acid (FOLVITE) 1 MG tablet Take 1 tablet by mouth Daily. Due to methotrexate use 90 tablet 3    isosorbide mononitrate (IMDUR) 30 MG 24 hr tablet Take 1 tablet by mouth Daily. For high blood pressure. 90 tablet 1    leflunomide (ARAVA) 20 MG tablet Take 1 tablet by mouth Daily.      levocetirizine (XYZAL) 5 MG tablet Take 1 tablet by mouth Every Evening. 90 tablet 3    methocarbamol (ROBAXIN) 750 MG tablet Take 1 tablet by mouth 3 (Three) Times a Day As Needed for Muscle Spasms. 270 tablet 1    methotrexate 2.5 MG tablet Take 8 tablets by mouth 1 (One) Time Per Week. TAKES ON THURSDAY (Patient taking differently: Take 8 tablets by mouth 1 (One) Time Per Week. TAKES ON Sunday ONLY TAKES 7 PILSS) 32 tablet 0    Multiple Vitamins-Minerals (MULTIVITAMIN WITH MINERALS) tablet tablet Take 1 tablet by mouth Daily.      O2 (OXYGEN) Inhale 2 L 1 (One) Time. Nightly      pantoprazole (PROTONIX) 40 MG EC tablet Take 1 tablet by mouth Daily. 90 tablet 3    predniSONE (DELTASONE) 5 MG tablet Take 1 tablet by mouth Daily.      silver sulfadiazine (Silvadene) 1 % cream Apply 1 application  topically to the appropriate area as directed 2 (Two) Times a Day. 400 g 11    tiotropium bromide-olodaterol (Stiolto Respimat) 2.5-2.5 MCG/ACT aerosol solution inhaler Inhale 2 puffs Daily. 12 g 1    vitamin B-12 (CYANOCOBALAMIN) 1000 MCG tablet TAKE ONE TABLET BY MOUTH DAILY (Patient taking differently: Take 1 tablet by mouth Daily.) 30 tablet 8    azelastine (ASTELIN) 0.1 % nasal spray 1 spray into the nostril(s) as directed by provider 2 (Two) Times a Day As Needed for Rhinitis or Allergies. Use in each nostril as directed 90 mL 3    gabapentin (NEURONTIN) 300 MG capsule Take 1 capsule by mouth 3 (Three) Times a Day. 270  capsule 1     No facility-administered medications prior to visit.     No opioid medication identified on active medication list. I have reviewed chart for other potential  high risk medication/s and harmful drug interactions in the elderly.      Aspirin is on active medication list. Aspirin use is indicated based on review of current medical condition/s. Pros and cons of this therapy have been discussed today. Benefits of this medication outweigh potential harm.  Patient has been encouraged to continue taking this medication.  .      Patient Active Problem List   Diagnosis    Cobalamin deficiency    Hyperreflexia of lower extremity    Abnormal gait    IgG monoclonal protein disorder    Spondylosis of cervical region without myelopathy or radiculopathy    Dextroscoliosis    MGUS (monoclonal gammopathy of unknown significance)    Allergic rhinitis    Malignant neoplasm of prostate    Chronic joint pain    Primary insomnia    Left-sided tinnitus    Night sweats    Unstable angina    Essential hypertension    Anomalous coronary artery origin    Chronic obstructive pulmonary disease    Pharyngoesophageal dysphagia    Chronic pain syndrome    Inflammatory polyarthropathy    Former smoker    Bucket handle tear of medial meniscus of knee    Primary open angle glaucoma (POAG) of both eyes    Arteriosclerotic vascular disease    Bilateral pseudophakia    Corneal guttata    Excess skin of both eyelids    Posterior vitreous detachment of both eyes    Vitreous floaters    Neuropathy associated with MGUS    Rotator cuff arthropathy of left shoulder    Status post reverse arthroplasty of shoulder, left    Radiation proctitis    Abnormal CT of the abdomen    Gastroesophageal reflux disease without esophagitis    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  ACP discussion was held with the patient during this visit. Patient does not have an advance directive, information provided.            Objective   Vitals:    24 0858   BP: 128/82   BP Location: Left arm   Patient Position: Sitting   Cuff Size: Adult   Pulse: 52   Resp: 16   Temp: 97 °F (36.1 °C)   TempSrc: Temporal   SpO2: 98%   Weight: 65.8 kg (145 lb)   Height: 180.3 cm (71\")   PainSc:   6   PainLoc: Neck       Estimated body mass index is 20.22 kg/m² as calculated from the following:    Height as of this encounter: 180.3 cm (71\").    Weight as of this encounter: 65.8 kg (145 lb).    BMI is within normal parameters. No other follow-up for BMI required.       Does the patient have evidence of cognitive impairment? No                                                                                                Health  Risk Assessment    Smoking Status:  Social History     Tobacco Use   Smoking Status Former    Current packs/day: 0.00    Average packs/day: 1 pack/day for 55.0 years (55.0 ttl pk-yrs)    Types: Cigarettes    Start date: 1963    Quit date: 2018    Years since quittin.5   Smokeless Tobacco Never     Alcohol Consumption:  Social History     Substance and Sexual Activity   Alcohol Use Never       Fall Risk Screen  STEADI Fall Risk Assessment was completed, and patient is at LOW risk for falls.Assessment completed on:2024    Depression Screenin/9/2024     8:57 AM   PHQ-2/PHQ-9 Depression Screening   Little Interest or Pleasure in Doing Things 0-->not at all   Feeling Down, Depressed or Hopeless 0-->not at all   PHQ-9: Brief Depression Severity Measure Score 0     Health Habits and Functional and Cognitive Screenin/9/2024     8:54 AM   Functional & Cognitive Status   Do you have difficulty preparing food and eating? No   Do you have difficulty bathing yourself, getting dressed or grooming yourself? No   Do you have difficulty " using the toilet? No   Do you have difficulty moving around from place to place? No   Do you have trouble with steps or getting out of a bed or a chair? No   Current Diet Well Balanced Diet   Dental Exam Not up to date   Eye Exam Not up to date   Exercise (times per week) 7 times per week   Current Exercises Include Walking;Yard Work;Other   Do you need help using the phone?  No   Are you deaf or do you have serious difficulty hearing?  Yes   Do you need help to go to places out of walking distance? No   Do you need help shopping? No   Do you need help preparing meals?  No   Do you need help with housework?  No   Do you need help with laundry? No   Do you need help taking your medications? No   Do you need help managing money? No   Do you ever drive or ride in a car without wearing a seat belt? No   Have you felt unusual stress, anger or loneliness in the last month? No   Who do you live with? Spouse   If you need help, do you have trouble finding someone available to you? No   Have you been bothered in the last four weeks by sexual problems? No   Do you have difficulty concentrating, remembering or making decisions? Yes           Age-appropriate Screening Schedule:  Refer to the list below for future screening recommendations based on patient's age, sex and/or medical conditions. Orders for these recommended tests are listed in the plan section. The patient has been provided with a written plan.    Health Maintenance List  Health Maintenance   Topic Date Due    LIPID PANEL  08/07/2024    INFLUENZA VACCINE  08/01/2024    COVID-19 Vaccine (6 - 2023-24 season) 08/09/2024 (Originally 2/3/2024)    TDAP/TD VACCINES (2 - Td or Tdap) 01/30/2025    LUNG CANCER SCREENING  06/27/2025    ANNUAL WELLNESS VISIT  08/09/2025    COLORECTAL CANCER SCREENING  11/16/2032    HEPATITIS C SCREENING  Completed    Pneumococcal Vaccine 65+  Completed    AAA SCREEN (ONE-TIME)  Completed    ZOSTER VACCINE  Addressed                                                                                                                                                 CMS Preventative Services Quick Reference  Risk Factors Identified During Encounter  Chronic Pain: Pain Management Referral Ordered  Hearing Problem:  patient has hearing aids. Encouraged use. Discussed correlation between hearing loss and memory issues.  Immunizations Discussed/Encouraged: Tdap and Influenza    The above risks/problems have been discussed with the patient.  Pertinent information has been shared with the patient in the After Visit Summary.  An After Visit Summary and PPPS were made available to the patient.    Follow Up:   Next Medicare Wellness visit to be scheduled in 1 year.         Additional E&M Note during same encounter follows:  Patient has additional, significant, and separately identifiable condition(s)/problem(s) that require work above and beyond the Medicare Wellness Visit     Chief Complaint  Medicare Wellness-subsequent (AWV and preventive care ), Neck Pain, and Memory Loss    Subjective   Hearing trouble  Has hearing aids but doesn't wear  Deaf left side    Memory trouble per wife  Stopped gabapentin previously as it wasn't helping  Wife would like memory evaluation with neurology    Referral previously placed to see Dr. Mayo per wife's request  She goes to him as well  Patient has chronic neck pain    Goes to rheumatology and followed for polymyalgia rheumatica  On long term steroid.    Nasal drainage worse when he lays down  Using Flonase every morning no other nasal sprays  Oral antihistamine didn't help    Followed by pulmonary for COPD  Former smoker  Lung cancer screening up to date    Followed by Dr. Varela due to history of prostate cancer  He does PSA checks per patient/wife    Followed by heme/onc due to MGUS and had labs last month    Plans to get flu shot and covid shot at pharmacy  Prevnar 20 up to date  Tdap coming up due, pertussis outbreak.      Iam  "is also being seen today for an annual adult preventative physical exam.  and Iam is also being seen today for additional medical problem/s.                Objective   Vital Signs:  /82 (BP Location: Left arm, Patient Position: Sitting, Cuff Size: Adult)   Pulse 52   Temp 97 °F (36.1 °C) (Temporal)   Resp 16   Ht 180.3 cm (71\")   Wt 65.8 kg (145 lb)   SpO2 98%   BMI 20.22 kg/m²   Physical Exam  Vitals and nursing note reviewed.   Constitutional:       General: He is not in acute distress.     Appearance: Normal appearance. He is well-developed and well-groomed. He is not ill-appearing, toxic-appearing or diaphoretic.   HENT:      Head: Normocephalic and atraumatic.      Right Ear: Ear canal and external ear normal. Decreased hearing noted. Tympanic membrane is perforated.      Left Ear: Ear canal and external ear normal. Decreased hearing noted. Tympanic membrane is scarred.   Eyes:      General: Lids are normal. No scleral icterus.        Right eye: No discharge.         Left eye: No discharge.      Extraocular Movements: Extraocular movements intact.   Cardiovascular:      Rate and Rhythm: Normal rate and regular rhythm.   Pulmonary:      Effort: Pulmonary effort is normal.      Breath sounds: Normal breath sounds.   Musculoskeletal:      Cervical back: Neck supple.   Skin:     Coloration: Skin is not jaundiced or pale.   Neurological:      General: No focal deficit present.      Mental Status: He is alert and oriented to person, place, and time.   Psychiatric:         Attention and Perception: Attention and perception normal.         Mood and Affect: Mood and affect normal.         Speech: Speech normal.         Behavior: Behavior normal. Behavior is cooperative.         Thought Content: Thought content normal.         Cognition and Memory: Cognition and memory normal.         Judgment: Judgment normal.         The following data was reviewed by: Ladonna Means MD on 08/09/2024:  Progress " Notes by Adrianna Esteban APRN (08/02/2024 11:00) (heme/onc)  Progress Notes by Yehuda Castañeda PA-C (06/11/2024 10:45)  (orthopedics)  PROGRESS NOTES - SCAN by [employer], [employer] (06/06/2024 00:00)  (rheumatology note, rheumatoid arthritis not likely, diagnosis of polymyalgia rheumatica)    Comprehensive Metabolic Panel (07/09/2024 14:35) within normal limits, glucose 76  CBC & Differential (07/09/2024 14:35)  within normal limits   TSH+Free T4 (02/09/2024 09:21)  within normal limits     Lab Results   Component Value Date    UOWAJMPC04 510 08/07/2023      Lab Results   Component Value Date    FOLATE 7.32 02/09/2024      Cologuard - Stool, Per Rectum (06/06/2023 16:00)  negative  CT Chest Low Dose Follow Up Without Contrast (06/27/2024 08:01)  Lung RADS cat 2, Dr. Marino has ordered next year's test.        Assessment and Plan       Diagnoses and all orders for this visit:    1. Medicare annual wellness visit, subsequent (Primary)    2. Polymyalgia rheumatica  Assessment & Plan:  On steroids, follow up with rheumatology. Previously treated for rheumatoid arthritis but most recent rheumatology note states no evidence of rheumatoid arthritis.      3. Pulmonary emphysema, unspecified emphysema type  Assessment & Plan:  Stable. Former smoker. Follow up with pulmonary        4. MGUS (monoclonal gammopathy of unknown significance)  Assessment & Plan:  Follow up with hematology/oncology.  Labs reviewed.      5. PAD (peripheral artery disease)  -     Lipid Panel    6. Vitamin D deficiency  -     Vitamin D,25-Hydroxy    7. Long term (current) use of systemic steroids  Assessment & Plan:  From rheumatology for polymyalgia rheumatica.    Orders:  -     Hemoglobin A1c    8. Gastroesophageal reflux disease without esophagitis  Overview:  UPPER GI ENDOSCOPY (07/20/2022 11:28)     Orders:  -     Magnesium  -     Vitamin B12 & Folate    9. Medication monitoring encounter  -     Lipid Panel  -     Hemoglobin A1c  -      Magnesium  -     Vitamin B12 & Folate  -     Vitamin D,25-Hydroxy  -     Vitamin B6  -     Vitamin B1, Whole Blood    10. Memory deficits  -     Ambulatory Referral to Neurology  -     Vitamin B6  -     Vitamin B1, Whole Blood    11. Chronic neck pain  Comments:  referral to Dr. Mayo was placed on 7/31/24    12. Allergic rhinitis, unspecified seasonality, unspecified trigger  Assessment & Plan:  Trial of Dymista.  If not covered by insurance I can change.  Flonase alone was not helping.    Orders:  -     Azelastine-Fluticasone 137-50 MCG/ACT suspension; 1 spray into the nostril(s) as directed by provider 2 (Two) Times a Day.  Dispense: 23 g; Refill: 11    13. Need for Tdap vaccination  -     Tdap (ADACEL) 5-2-15.5 LF-MCG/0.5 injection; Inject 0.5 mL into the appropriate muscle as directed by prescriber 1 (One) Time for 1 dose.  Dispense: 0.5 mL; Refill: 0    14. Abnormal finding of blood chemistry, unspecified  -     Lipid Panel  -     Hemoglobin A1c          Orders Placed This Encounter   Procedures    Lipid Panel     Order Specific Question:   [facility] Has the patient fasted?     Answer:   Yes     Order Specific Question:   Release to patient     Answer:   Routine Release [[id number]]    Hemoglobin A1c     Order Specific Question:   Release to patient     Answer:   Routine Release [[id number]]    Magnesium     Order Specific Question:   Release to patient     Answer:   Routine Release [[id number]]    Vitamin B12 & Folate     Order Specific Question:   Release to patient     Answer:   Routine Release [[id number]]    Vitamin D,25-Hydroxy     Order Specific Question:   Release to patient     Answer:   Routine Release [[id number]]    Vitamin B6     Order Specific Question:   Release to patient     Answer:   Routine Release [[id number]]    Vitamin B1, Whole Blood     Order Specific Question:   Release to patient     Answer:   Routine Release [[id number]]    Ambulatory Referral to Neurology     Referral Priority:    Routine     Referral Type:   Consultation     Referral Reason:   Specialty Services Required     Referred to Provider:   Sandi Carvalho MD     Requested Specialty:   Neurology     Number of Visits Requested:   1     New Medications Ordered This Visit   Medications    Azelastine-Fluticasone 137-50 MCG/ACT suspension     Si spray into the nostril(s) as directed by provider 2 (Two) Times a Day.     Dispense:  23 g     Refill:  11    Tdap (ADACEL) 5-2-15.5 LF-MCG/0.5 injection     Sig: Inject 0.5 mL into the appropriate muscle as directed by prescriber 1 (One) Time for 1 dose.     Dispense:  0.5 mL     Refill:  0        I spent 42 minutes caring for Elliott on this date of service. This time includes time spent by me in the following activities:preparing for the visit, reviewing tests, obtaining and/or reviewing a separately obtained history, performing a medically appropriate examination and/or evaluation , counseling and educating the patient/family/caregiver, ordering medications, tests, or procedures, and documenting information in the medical record    I spent 32 minutes on the separately reported service of [id number]. This time is not included in the time used to support the E/M service also reported today.     Follow Up   Return in about 1 year (around 2025) for Medicare Wellness.  Patient was given instructions and counseling regarding his condition or for health maintenance advice. Please see specific information pulled into the AVS if appropriate.

## 2024-08-10 LAB
25(OH)D3+25(OH)D2 SERPL-MCNC: 22.1 NG/ML (ref 30–100)
CHOLEST SERPL-MCNC: 160 MG/DL (ref 100–199)
FOLATE SERPL-MCNC: 8.7 NG/ML
HBA1C MFR BLD: 5.6 % (ref 4.8–5.6)
HDLC SERPL-MCNC: 43 MG/DL
LDLC SERPL CALC-MCNC: 100 MG/DL (ref 0–99)
MAGNESIUM SERPL-MCNC: 2.2 MG/DL (ref 1.6–2.3)
PYRIDOXAL PHOS SERPL-MCNC: NORMAL UG/L
TRIGL SERPL-MCNC: 92 MG/DL (ref 0–149)
VIT B1 BLD-SCNC: NORMAL NMOL/L
VIT B12 SERPL-MCNC: 453 PG/ML (ref 232–1245)
VLDLC SERPL CALC-MCNC: 17 MG/DL (ref 5–40)

## 2024-08-10 NOTE — ASSESSMENT & PLAN NOTE
On steroids, follow up with rheumatology. Previously treated for rheumatoid arthritis but most recent rheumatology note states no evidence of rheumatoid arthritis.

## 2024-08-12 DIAGNOSIS — I73.9 PAD (PERIPHERAL ARTERY DISEASE): ICD-10-CM

## 2024-08-12 DIAGNOSIS — E55.9 VITAMIN D DEFICIENCY: Primary | ICD-10-CM

## 2024-08-12 DIAGNOSIS — E78.49 OTHER HYPERLIPIDEMIA: ICD-10-CM

## 2024-08-12 RX ORDER — ATORVASTATIN CALCIUM 10 MG/1
10 TABLET, FILM COATED ORAL NIGHTLY
Qty: 90 TABLET | Refills: 3 | Status: SHIPPED | OUTPATIENT
Start: 2024-08-12

## 2024-08-12 RX ORDER — ACETAMINOPHEN 160 MG
2000 TABLET,DISINTEGRATING ORAL DAILY
Qty: 90 CAPSULE | Refills: 3 | Status: SHIPPED | OUTPATIENT
Start: 2024-08-12

## 2024-08-16 LAB
25(OH)D3+25(OH)D2 SERPL-MCNC: 22.1 NG/ML (ref 30–100)
CHOLEST SERPL-MCNC: 160 MG/DL (ref 100–199)
FOLATE SERPL-MCNC: 8.7 NG/ML
HBA1C MFR BLD: 5.6 % (ref 4.8–5.6)
HDLC SERPL-MCNC: 43 MG/DL
LDLC SERPL CALC-MCNC: 100 MG/DL (ref 0–99)
MAGNESIUM SERPL-MCNC: 2.2 MG/DL (ref 1.6–2.3)
PYRIDOXAL PHOS SERPL-MCNC: 7.6 UG/L (ref 3.4–65.2)
TRIGL SERPL-MCNC: 92 MG/DL (ref 0–149)
VIT B1 BLD-SCNC: 99.4 NMOL/L (ref 66.5–200)
VIT B12 SERPL-MCNC: 453 PG/ML (ref 232–1245)
VLDLC SERPL CALC-MCNC: 17 MG/DL (ref 5–40)

## 2024-09-16 PROBLEM — J43.8 OTHER EMPHYSEMA: Status: ACTIVE | Noted: 2017-02-17

## 2024-10-15 ENCOUNTER — APPOINTMENT (OUTPATIENT)
Dept: GENERAL RADIOLOGY | Facility: HOSPITAL | Age: 69
End: 2024-10-15
Attending: HOSPITALIST
Payer: MEDICARE

## 2024-10-15 ENCOUNTER — HOSPITAL ENCOUNTER (EMERGENCY)
Facility: HOSPITAL | Age: 69
Discharge: HOME | End: 2024-10-15
Attending: HOSPITALIST
Payer: MEDICARE

## 2024-10-15 VITALS
BODY MASS INDEX: 20.3 KG/M2 | TEMPERATURE: 98 F | DIASTOLIC BLOOD PRESSURE: 79 MMHG | SYSTOLIC BLOOD PRESSURE: 134 MMHG | RESPIRATION RATE: 18 BRPM | WEIGHT: 145 LBS | HEIGHT: 71 IN | OXYGEN SATURATION: 100 % | HEART RATE: 63 BPM

## 2024-10-15 DIAGNOSIS — J44.1 ACUTE EXACERBATION OF CHRONIC OBSTRUCTIVE PULMONARY DISEASE (COPD) (HCC): ICD-10-CM

## 2024-10-15 DIAGNOSIS — R07.89 CHEST WALL PAIN: ICD-10-CM

## 2024-10-15 DIAGNOSIS — J18.9 PNEUMONIA OF RIGHT UPPER LOBE DUE TO INFECTIOUS ORGANISM: Primary | ICD-10-CM

## 2024-10-15 PROCEDURE — 71045 X-RAY EXAM CHEST 1 VIEW: CPT

## 2024-10-15 PROCEDURE — 99284 EMERGENCY DEPT VISIT MOD MDM: CPT

## 2024-10-15 RX ORDER — PREDNISONE 20 MG/1
20 TABLET ORAL DAILY
Qty: 5 TABLET | Refills: 0 | Status: SHIPPED | OUTPATIENT
Start: 2024-10-15 | End: 2024-10-20

## 2024-10-15 RX ORDER — LEVOFLOXACIN 500 MG/1
500 TABLET, FILM COATED ORAL DAILY
Qty: 10 TABLET | Refills: 0 | Status: SHIPPED | OUTPATIENT
Start: 2024-10-15 | End: 2024-10-25

## 2024-10-15 RX ORDER — GUAIFENESIN 600 MG/1
1200 TABLET, EXTENDED RELEASE ORAL 2 TIMES DAILY
Qty: 28 TABLET | Refills: 0 | Status: SHIPPED | OUTPATIENT
Start: 2024-10-15 | End: 2024-10-22

## 2024-10-15 ASSESSMENT — LIFESTYLE VARIABLES
HOW MANY STANDARD DRINKS CONTAINING ALCOHOL DO YOU HAVE ON A TYPICAL DAY: PATIENT DOES NOT DRINK
HOW OFTEN DO YOU HAVE A DRINK CONTAINING ALCOHOL: NEVER

## 2024-10-15 ASSESSMENT — PAIN SCALES - GENERAL: PAINLEVEL_OUTOF10: 5

## 2024-10-15 ASSESSMENT — PAIN DESCRIPTION - LOCATION: LOCATION: CHEST

## 2024-10-15 ASSESSMENT — PAIN - FUNCTIONAL ASSESSMENT: PAIN_FUNCTIONAL_ASSESSMENT: 0-10

## 2024-10-15 ASSESSMENT — PAIN DESCRIPTION - FREQUENCY: FREQUENCY: CONTINUOUS

## 2024-10-15 ASSESSMENT — PAIN DESCRIPTION - ORIENTATION: ORIENTATION: LEFT;UPPER

## 2024-10-15 ASSESSMENT — PAIN DESCRIPTION - PAIN TYPE: TYPE: ACUTE PAIN

## 2024-10-15 NOTE — ED PROVIDER NOTES
vomiting or diarrhea.  Denies any abdominal pain.  Denies any body aches.  Denies fevers chills.  Past medical history is pertinent for arthritis, basal cell carcinoma, prostate cancer, squamous cell carcinoma, glaucoma, hyperlipidemia, monoclonal gammopathy of unknown significance, neuropathy and sciatic pain    After initial evaluation examination I did have conversation with the patient and family about upcoming plan, treatment and possible disposition which they are agreeable to the times dictation.  Patient and I are both in agreement that this is not cardiac in nature.  Advised we need to perform portable chest radiograph make sure there is no acute cardiopulmonary abnormality.  Patient with incentive spirometry provided to him with instructions to use.  Will also have twelve-lead EKG performed.  Otherwise patient's final disposition be determined once his radiological and diagnostic studies been performed reviewed.    Portable chest radiograph read by radiology as question of right upper lobe nodule interstitial airspace disease.  Chronic emphysematous changes.    Patient's radiological findings were discussed with him and his family and they do state understanding.  Advised that the chest radiograph is concerning for possible pneumonia.  I do believe the discomfort that he is having left side is more musculoskeletal.  The pneumonia seems more situated to the right side.  We would still also treat like COPD exacerbation.  Will place him on Levaquin 500 mg a day for the next 10 days.  Will increase his prednisone from 5 mg daily to 20 mg a day for 5 days and then back to his normal regular dosing.  Will also place him on Mucinex twice a day for the next 5 to 7 days.  Advised that he continue with his inhaled medications at home continue with his oxygen is needed at night.  Advised he does need to follow-up with his regular family physician within the next 1 to 2 days for evaluation.  He was also given

## 2024-10-15 NOTE — ED NOTES
Incentive spirometry demonstrated at this time by patient, patient able to reach [phone] at this time.

## 2024-10-17 NOTE — PROGRESS NOTES
Neuro Office Visit      Encounter Date: 10/18/2024   Patient Name: Elliott Dunn  : 1955   MRN: [id number]   PCP: Dr Means  Chief Complaint:    Chief Complaint   Patient presents with    Memory Loss       History of Present Illness: Elliott Dunn is a 69 y.o. male who is here today in Neurology for  memory loss    History of Present Illness  The patient is a 69-year-old male who presents for evaluation of memory issues. He is accompanied by his wife.      Memory loss  MMSE 28/30  He has been experiencing memory problems for the past 2 to 3 years, as reported by his wife. He often forgets simple tasks such as checking the car oil or remembering appointments. Despite these challenges, he maintains that he can pay attention and does not feel distracted. He recognizes all his family members and most visitors. He can operate his cell phone and TV remote without difficulty and enjoys playing games like [employer] on his phone. He has never been lost while driving.    His hearing is impaired, with only 20% functionality, and he uses a e[employer]ach[employer] system hearing aid. However, he finds it uncomfortable     He is currently under significant stress due to the recent loss of his son and the ongoing pneumonia.    His sleep is disturbed due to scoliosis, neuropathy in one hand, and severe arthritis in all joints. He reports no nightmares or hallucinations.    His appetite is good, but he has lost weight, dropping from 145 to 139 pounds. He has not experienced any falls recently.             PMH: htn, CAD, PAD, tinnitus, vit b12 def, pseudophakia,GERD, dysphagia,IgG mnoclonal protein disorder, prostate cancer, MGUS, skin cancer, polymyalgia rheumatica, polyarthropathy, chronic pain syndrome, spondylosis of cervical region, copd, insomnia,    FH:sister w memory loss  SH: former tob use, -etoh,       Past Medical History:   Past Medical History:   Diagnosis Date    Acquired absence of all teeth     Allergic     Anomalous  coronary artery origin     Arrhythmia     Arthritis     Arthritis of lumbar spine 07/29/2016    Back pain 06/17/2016    Cristina esophagus     Cataract     REMOVED BILATERALL    Cervical spine disease 06/17/2016    CHF (congestive heart failure)     Chronic obstructive pulmonary disease     Colon polyps     COVID-19 vaccine series completed     Deafness in left ear     Derangement of anterior horn of medial meniscus     Difficulty swallowing     Disorder of lumbar spine 06/17/2016    Disorder of thoracic spine 06/17/2016    Diverticulitis of colon     DJD (degenerative joint disease)     Elevated cholesterol     Elevated liver enzymes     Erectile dysfunction     Esophageal reflux     Essential hypertension     PT DENIES SAYS IT RUNS LOW    Glaucoma     Hard of hearing     DEAF IN LEFT EAR NO AIDS WORN    Heart murmur     Hiatal hernia     High cholesterol     History of radiation therapy 11/24/2020    salvage pelvic XRT    Impaired functional mobility, balance, gait, and endurance     Inflammatory polyarthropathy     Per Dr. Haider. Negative NEO, normal ESR, RF, anti-ccp and did not improve with prednisone per notes.    Inguinal hernia     Insomnia     Lateral meniscus derangement     Left otitis media with spontaneous rupture of eardrum     Locking of left knee     Low back pain     Meniere's disease     MGUS (monoclonal gammopathy of unknown significance)     likely diagnosis    Murmur     Neuromuscular disorder     Neuropathic arthritis     No natural teeth     Perforation of left tympanic membrane     Prostate cancer     previous CA in 2013 with prostatectomy    Pulmonary emphysema     Recent shoulder injury     LEFT SHOULDER    Rotator cuff tear arthropathy of left shoulder 03/12/2020    Added automatically from request for surgery [id number]    Scoliosis     Skin cancer of face     squamous cell basal    Spina bifida occulta 06/17/2016    Tobacco abuse 11/09/2017    Visual impairment     Wears glasses     READING  GLASSES ONLY       Past Surgical History:   Past Surgical History:   Procedure Laterality Date    CARDIAC CATHETERIZATION      no stent    COLONOSCOPY      COLONOSCOPY N/A 01/04/2022    Procedure: COLONOSCOPY with polypectomy x1;  Surgeon: Luis Callahan MD;  Location: [facility] ENDOSCOPY;  Service: Gastroenterology;  Laterality: N/A;    COLONOSCOPY N/A 11/16/2022    Procedure: COLONOSCOPY ;  Surgeon: Luis Callahan MD;  Location: [facility] ENDOSCOPY;  Service: Gastroenterology;  Laterality: N/A;    ENDOSCOPY N/A 04/10/2017    Procedure: ESOPHAGOGASTRODUODENOSCOPY WITH BIOPSY;  Surgeon: Alexander Ritchie MD;  Location: [facility] ENDOSCOPY;  Service:     ENDOSCOPY N/A 07/20/2022    Procedure: ESOPHAGOGASTRODUODENOSCOPY WITH BIOPSY ;  Surgeon: Luis Callahan MD;  Location: [facility] ENDOSCOPY;  Service: Gastroenterology;  Laterality: N/A;    EYE SURGERY      HERNIA REPAIR      INGUINAL HERNIA REPAIR Right     INGUINAL HERNIA REPAIR      KNEE SURGERY Left     LYMPH NODE BIOPSY      MULTIPLE TOOTH EXTRACTIONS      PROSTATE SURGERY  2004    PROSTATECTOMY  06/30/2014    PROSTATECTOMY      Prostatectomy Radical    REFRACTIVE SURGERY Right 01/2023    SHOULDER ARTHROSCOPY Left 02/06/2020    Procedure: DIAGNOSTIC SHOULDER ARTHROSCOPY LEFT WITH LABRAL DEBRIDEMENT, SUBACROMIAL BURSECTOMY, ASSESSMENT OF LARGE FRAGMENTED RETRACTED IRREPARABLE ROTATOR CUFF TEARS;  Surgeon: Rony Pandey MD;  Location: [facility];  Service: Orthopedics;  Laterality: Left;    SKIN CANCER EXCISION  2017    both sides of body/squamous cell melanoma    SKIN CANCER EXCISION Left 07/28/2022    Facail skin excision    TOTAL SHOULDER ARTHROPLASTY W/ DISTAL CLAVICLE EXCISION Left 07/09/2020    Procedure: Left reverse total shoulder arthroplasty;  Surgeon: Rony Pandey MD;  Location: [facility];  Service: Orthopedics;  Laterality: Left;    TYMPANOPLASTY W/ MASTOIDECTOMY      UMBILICAL HERNIA REPAIR         Family History:   Family  History   Problem Relation Age of Onset    Diabetes Mother     Hypertension Mother     Obesity Mother     Stroke Mother 65    Arthritis Mother     Hyperlipidemia Mother     Vision loss Mother     Cancer Father     Cancer Sister     Diabetes Brother     Cancer Brother     Heart attack Brother     Alcohol abuse Brother     Drug abuse Brother     Hearing loss Brother     Learning disabilities Brother     Colon cancer Neg Hx        Social History:   Social History     Socioeconomic History    Marital status:    Tobacco Use    Smoking status: Former     Current packs/day: 0.00     Average packs/day: 1 pack/day for 55.0 years (55.0 ttl pk-yrs)     Types: Cigarettes     Start date: 1963     Quit date: 2018     Years since quittin.7    Smokeless tobacco: Never   Vaping Use    Vaping status: Never Used   Substance and Sexual Activity    Alcohol use: Never    Drug use: Never    Sexual activity: Defer       Medications:     Current Outpatient Medications:     albuterol sulfate HFA (Ventolin HFA) 108 (90 Base) MCG/ACT inhaler, Inhale 2 puffs Every 4 (Four) Hours As Needed for Wheezing or Shortness of Air., Disp: 18 g, Rfl: 2    amitriptyline (ELAVIL) 50 MG tablet, Take 1 tablet by mouth Every Night. For sleep/chronic pain, Disp: 90 tablet, Rfl: 1    aspirin 81 MG EC tablet, Take 1 tablet by mouth Daily., Disp: , Rfl:     atorvastatin (LIPITOR) 10 MG tablet, Take 1 tablet by mouth Every Night. To lower cholesterol and risk of stroke, Disp: 90 tablet, Rfl: 3    Azelastine-Fluticasone 137-50 MCG/ACT suspension, 1 spray into the nostril(s) as directed by provider 2 (Two) Times a Day., Disp: 23 g, Rfl: 11    bisoprolol (ZEBeta) 5 MG tablet, Take 1 tablet by mouth Daily. For high blood pressure., Disp: 90 tablet, Rfl: 1    Cholecalciferol (Vitamin D3) 50 MCG (2000 UT) capsule, Take 1 capsule by mouth Daily. For low vitamin D., Disp: 90 capsule, Rfl: 3    DULoxetine (CYMBALTA) 60 MG capsule, Take 1 capsule by mouth  Daily. For mood and chronic pain, Disp: 90 capsule, Rfl: 1    folic acid (FOLVITE) 1 MG tablet, Take 1 tablet by mouth Daily. Due to methotrexate use, Disp: 90 tablet, Rfl: 3    guaiFENesin (MUCINEX) 600 MG 12 hr tablet, Take 2 tablets by mouth 2 (Two) Times a Day., Disp: , Rfl:     isosorbide mononitrate (IMDUR) 30 MG 24 hr tablet, Take 1 tablet by mouth Daily. For high blood pressure., Disp: 90 tablet, Rfl: 1    leflunomide (ARAVA) 20 MG tablet, Take 1 tablet by mouth Daily., Disp: , Rfl:     levocetirizine (XYZAL) 5 MG tablet, Take 1 tablet by mouth Every Evening., Disp: 90 tablet, Rfl: 3    levoFLOXacin (LEVAQUIN) 500 MG tablet, Take 1 tablet by mouth Daily., Disp: , Rfl:     methotrexate 2.5 MG tablet, Take 8 tablets by mouth 1 (One) Time Per Week. TAKES ON THURSDAY (Patient taking differently: Take 8 tablets by mouth 1 (One) Time Per Week. TAKES ON Sunday ONLY TAKES 7 PILSS), Disp: 32 tablet, Rfl: 0    Multiple Vitamins-Minerals (MULTIVITAMIN WITH MINERALS) tablet tablet, Take 1 tablet by mouth Daily., Disp: , Rfl:     O2 (OXYGEN), Inhale 2 L 1 (One) Time. Nightly, Disp: , Rfl:     pantoprazole (PROTONIX) 40 MG EC tablet, Take 1 tablet by mouth Daily., Disp: 90 tablet, Rfl: 3    predniSONE (DELTASONE) 20 MG tablet, Take 1 tablet by mouth., Disp: , Rfl:     tiotropium bromide-olodaterol (Stiolto Respimat) 2.5-2.5 MCG/ACT aerosol solution inhaler, Inhale 2 puffs Daily., Disp: 12 g, Rfl: 1    vitamin B-12 (CYANOCOBALAMIN) 1000 MCG tablet, TAKE ONE TABLET BY MOUTH DAILY (Patient taking differently: Take 1 tablet by mouth Daily.), Disp: 30 tablet, Rfl: 8    Allergies:   Allergies   Allergen Reactions    Cyclobenzaprine Unknown - Low Severity     Wide awake    Plaquenil [Hydroxychloroquine Sulfate] Unknown - Low Severity     Black eyes    Pravastatin Myalgia     Weakness / fatigue       PHQ-9 Total Score:     STEADI Fall Risk Assessment was completed, and patient is at LOW risk for falls.Assessment completed  on:10/18/2024    Objective     Physical Exam:   Physical Exam  Eyes:      Extraocular Movements: No nystagmus.   Neurological:      Motor: Motor strength is normal.     Coordination: Coordination is intact.      Deep Tendon Reflexes:      Reflex Scores:       Bicep reflexes are 2+ on the right side and 2+ on the left side.       Brachioradialis reflexes are 2+ on the right side and 2+ on the left side.       Patellar reflexes are 2+ on the right side and 2+ on the left side.       Achilles reflexes are 2+ on the right side and 2+ on the left side.  Psychiatric:         Speech: Speech normal.         Neurological Exam  Mental Status  Awake, alert and oriented to person, place and time. Recent and remote memory are intact. Speech is normal. Follows complex commands. Attention and concentration are normal. Fund of knowledge is appropriate for level of education.    Cranial Nerves  CN III, IV, VI: No nystagmus. Normal saccades. Normal smooth pursuit.   Right pupil: Round.   Left pupil: Round.  CN V: Facial sensation is normal.  CN VII: Full and symmetric facial movement.    Motor  Normal muscle bulk throughout. No fasciculations present. Normal muscle tone. No abnormal involuntary movements. Strength is 5/5 throughout all four extremities.    Sensory  Sensation is intact to light touch, pinprick, vibration and proprioception in all four extremities.    Reflexes                                            Right                      Left  Brachioradialis                    2+                         2+  Biceps                                 2+                         2+  Patellar                                2+                         2+  Achilles                                2+                         2+    Coordination    Finger-to-nose, rapid alternating movements and heel-to-shin normal bilaterally without dysmetria.    Gait  Normal casual, toe, heel and tandem gait.     Physical Exam        Vital Signs:   Vitals:  "   10/18/24 1016   BP: 130/60   Pulse: 72   SpO2: 98%   Weight: 63.2 kg (139 lb 6.4 oz)   Height: 180.3 cm (70.98\")     Body mass index is 19.45 kg/m².         Assessment / Plan      Assessment/Plan:   Diagnoses and all orders for this visit:    1. Memory loss (Primary)  Comments:  [place] next year. MMSE reassuring. Hearing loss likely plays a role    2. Grief       Assessment & Plan  1. Early memory loss.  He exhibits signs of early memory loss but does not meet the criteria for dementia, scoring 28 out of 30 on the memory test. His hearing impairment and emotional stress, particularly following the loss of his son, are likely contributing factors. Medication is not deemed necessary at this stage as it may not be beneficial and could have side effects. He is advised to continue engaging in activities that keep his brain active, such as playing games on his phone. If symptoms worsen, medication may be considered in the future.    Follow-up  Return in 1 year for follow-up.        Patient Education:       Reviewed medications, potential side effects and signs and symptoms to report. Discussed risk versus benefits of treatment plan with patient and/or family-including medications, labs and radiology that may be ordered. Addressed questions and concerns during visit. Patient and/or family verbalized understanding and agree with plan. Instructed to call the office with any questions and report to ER with any life-threatening symptoms.     Follow Up:   Return in about 1 year (around 10/18/2025) for Recheck.    During this visit the following were done:  Labs Reviewed [x]    Labs Ordered []    Radiology Reports Reviewed []    Radiology Ordered []    PCP Records Reviewed [x]    Referring Provider Records Reviewed []    ER Records Reviewed []    Hospital Records Reviewed []    History Obtained From Family [x]    Radiology Images Reviewed []    Other Reviewed [x]    Records Requested []      Patient or patient representative " verbalized consent for the use of Ambient Listening during the visit with  Annetta Eason DNP, MARK for chart documentation. 10/18/2024  16:21 EDT      Annetta Eason DNP, APRN

## 2024-10-18 ENCOUNTER — OFFICE VISIT (OUTPATIENT)
Dept: NEUROLOGY | Facility: CLINIC | Age: 69
End: 2024-10-18
Payer: MEDICARE

## 2024-10-18 VITALS
DIASTOLIC BLOOD PRESSURE: 60 MMHG | HEIGHT: 71 IN | SYSTOLIC BLOOD PRESSURE: 130 MMHG | HEART RATE: 72 BPM | WEIGHT: 139.4 LBS | BODY MASS INDEX: 19.52 KG/M2 | OXYGEN SATURATION: 98 %

## 2024-10-18 DIAGNOSIS — R41.3 MEMORY LOSS: Primary | ICD-10-CM

## 2024-10-18 DIAGNOSIS — F43.21 GRIEF: ICD-10-CM

## 2024-10-18 PROCEDURE — 99214 OFFICE O/P EST MOD 30 MIN: CPT | Performed by: NURSE PRACTITIONER

## 2024-10-18 PROCEDURE — 3075F SYST BP GE 130 - 139MM HG: CPT | Performed by: NURSE PRACTITIONER

## 2024-10-18 PROCEDURE — 1160F RVW MEDS BY RX/DR IN RCRD: CPT | Performed by: NURSE PRACTITIONER

## 2024-10-18 PROCEDURE — 3078F DIAST BP <80 MM HG: CPT | Performed by: NURSE PRACTITIONER

## 2024-10-18 PROCEDURE — 1159F MED LIST DOCD IN RCRD: CPT | Performed by: NURSE PRACTITIONER

## 2024-10-18 RX ORDER — PREDNISONE 20 MG/1
20 TABLET ORAL
COMMUNITY
Start: 2024-10-15

## 2024-10-18 RX ORDER — LEVOFLOXACIN 500 MG/1
500 TABLET, FILM COATED ORAL DAILY
COMMUNITY
Start: 2024-10-15

## 2024-10-18 RX ORDER — GUAIFENESIN 600 MG/1
1200 TABLET, EXTENDED RELEASE ORAL 2 TIMES DAILY
COMMUNITY
Start: 2024-10-15 | End: 2024-10-22

## 2024-10-18 NOTE — LETTER
2024     Ladonna Means MD  [address]  [facility] [address]    Patient: Elliott Dunn   YOB: 1955   Date of Visit: 10/18/2024     Dear Ladonna Means MD:       Thank you for referring Elliott Dunn to me for evaluation. Below are the relevant portions of my assessment and plan of care.    If you have questions, please do not hesitate to call me. I look forward to following Elliott along with you.         Sincerely,        Annetta Eason DNP, MARK        CC: No Recipients    Annetta Eason DNP, MARK  10/18/24 1214  Signed     Neuro Office Visit      Encounter Date: 10/18/2024   Patient Name: Elliott Dunn  : 1955   MRN: [id number]   PCP: Dr Means  Chief Complaint:    Chief Complaint   Patient presents with   • Memory Loss       History of Present Illness: Elliott Dunn is a 69 y.o. male who is here today in Neurology for  memory loss    History of Present Illness  The patient is a 69-year-old male who presents for evaluation of memory issues. He is accompanied by his wife.      Memory loss  MMSE 28/30  He has been experiencing memory problems for the past 2 to 3 years, as reported by his wife. He often forgets simple tasks such as checking the car oil or remembering appointments. Despite these challenges, he maintains that he can pay attention and does not feel distracted. He recognizes all his family members and most visitors. He can operate his cell phone and TV remote without difficulty and enjoys playing games like iZoca on his phone. He has never been lost while driving.    His hearing is impaired, with only 20% functionality, and he uses a mymxlog system hearing aid. However, he finds it uncomfortable     He is currently under significant stress due to the recent loss of his son and the ongoing pneumonia.    His sleep is disturbed due to scoliosis, neuropathy in one hand, and severe arthritis in all joints. He reports no nightmares  or hallucinations.    His appetite is good, but he has lost weight, dropping from 145 to 139 pounds. He has not experienced any falls recently.             PMH: htn, CAD, PAD, tinnitus, vit b12 def, pseudophakia,GERD, dysphagia,IgG mnoclonal protein disorder, prostate cancer, MGUS, skin cancer, polymyalgia rheumatica, polyarthropathy, chronic pain syndrome, spondylosis of cervical region, copd, insomnia,    FH:sister w memory loss  SH: former tob use, -etoh,       Past Medical History:   Past Medical History:   Diagnosis Date   • Acquired absence of all teeth    • Allergic    • Anomalous coronary artery origin    • Arrhythmia    • Arthritis    • Arthritis of lumbar spine 07/29/2016   • Back pain 06/17/2016   • Cristina esophagus    • Cataract     REMOVED BILATERALL   • Cervical spine disease 06/17/2016   • CHF (congestive heart failure)    • Chronic obstructive pulmonary disease    • Colon polyps    • COVID-19 vaccine series completed    • Deafness in left ear    • Derangement of anterior horn of medial meniscus    • Difficulty swallowing    • Disorder of lumbar spine 06/17/2016   • Disorder of thoracic spine 06/17/2016   • Diverticulitis of colon    • DJD (degenerative joint disease)    • Elevated cholesterol    • Elevated liver enzymes    • Erectile dysfunction    • Esophageal reflux    • Essential hypertension     PT DENIES SAYS IT RUNS LOW   • Glaucoma    • Hard of hearing     DEAF IN LEFT EAR NO AIDS WORN   • Heart murmur    • Hiatal hernia    • High cholesterol    • History of radiation therapy 11/24/2020    salvage pelvic XRT   • Impaired functional mobility, balance, gait, and endurance    • Inflammatory polyarthropathy     Per Dr. Haider. Negative NEO, normal ESR, RF, anti-ccp and did not improve with prednisone per notes.   • Inguinal hernia    • Insomnia    • Lateral meniscus derangement    • Left otitis media with spontaneous rupture of eardrum    • Locking of left knee    • Low back pain    • Meniere's  disease    • MGUS (monoclonal gammopathy of unknown significance)     likely diagnosis   • Murmur    • Neuromuscular disorder    • Neuropathic arthritis    • No natural teeth    • Perforation of left tympanic membrane    • Prostate cancer     previous CA in 2013 with prostatectomy   • Pulmonary emphysema    • Recent shoulder injury     LEFT SHOULDER   • Rotator cuff tear arthropathy of left shoulder 03/12/2020    Added automatically from request for surgery [id number]   • Scoliosis    • Skin cancer of face     squamous cell basal   • Spina bifida occulta 06/17/2016   • Tobacco abuse 11/09/2017   • Visual impairment    • Wears glasses     READING GLASSES ONLY       Past Surgical History:   Past Surgical History:   Procedure Laterality Date   • CARDIAC CATHETERIZATION      no stent   • COLONOSCOPY     • COLONOSCOPY N/A 01/04/2022    Procedure: COLONOSCOPY with polypectomy x1;  Surgeon: Luis Callahan MD;  Location: [facility] ENDOSCOPY;  Service: Gastroenterology;  Laterality: N/A;   • COLONOSCOPY N/A 11/16/2022    Procedure: COLONOSCOPY ;  Surgeon: Luis Callahan MD;  Location: [facility] ENDOSCOPY;  Service: Gastroenterology;  Laterality: N/A;   • ENDOSCOPY N/A 04/10/2017    Procedure: ESOPHAGOGASTRODUODENOSCOPY WITH BIOPSY;  Surgeon: Alexander Ritchie MD;  Location: [facility] ENDOSCOPY;  Service:    • ENDOSCOPY N/A 07/20/2022    Procedure: ESOPHAGOGASTRODUODENOSCOPY WITH BIOPSY ;  Surgeon: Luis Callahan MD;  Location: [facility] ENDOSCOPY;  Service: Gastroenterology;  Laterality: N/A;   • EYE SURGERY     • HERNIA REPAIR     • INGUINAL HERNIA REPAIR Right    • INGUINAL HERNIA REPAIR     • KNEE SURGERY Left    • LYMPH NODE BIOPSY     • MULTIPLE TOOTH EXTRACTIONS     • PROSTATE SURGERY  2004   • PROSTATECTOMY  06/30/2014   • PROSTATECTOMY      Prostatectomy Radical   • REFRACTIVE SURGERY Right 01/2023   • SHOULDER ARTHROSCOPY Left 02/06/2020    Procedure: DIAGNOSTIC SHOULDER ARTHROSCOPY LEFT WITH LABRAL  DEBRIDEMENT, SUBACROMIAL BURSECTOMY, ASSESSMENT OF LARGE FRAGMENTED RETRACTED IRREPARABLE ROTATOR CUFF TEARS;  Surgeon: Rony Pandey MD;  Location: [facility];  Service: Orthopedics;  Laterality: Left;   • SKIN CANCER EXCISION  2017    both sides of body/squamous cell melanoma   • SKIN CANCER EXCISION Left 2022    Facail skin excision   • TOTAL SHOULDER ARTHROPLASTY W/ DISTAL CLAVICLE EXCISION Left 2020    Procedure: Left reverse total shoulder arthroplasty;  Surgeon: Rony Pandey MD;  Location: [facility] OR;  Service: Orthopedics;  Laterality: Left;   • TYMPANOPLASTY W/ MASTOIDECTOMY     • UMBILICAL HERNIA REPAIR         Family History:   Family History   Problem Relation Age of Onset   • Diabetes Mother    • Hypertension Mother    • Obesity Mother    • Stroke Mother 65   • Arthritis Mother    • Hyperlipidemia Mother    • Vision loss Mother    • Cancer Father    • Cancer Sister    • Diabetes Brother    • Cancer Brother    • Heart attack Brother    • Alcohol abuse Brother    • Drug abuse Brother    • Hearing loss Brother    • Learning disabilities Brother    • Colon cancer Neg Hx        Social History:   Social History     Socioeconomic History   • Marital status:    Tobacco Use   • Smoking status: Former     Current packs/day: 0.00     Average packs/day: 1 pack/day for 55.0 years (55.0 ttl pk-yrs)     Types: Cigarettes     Start date: 1963     Quit date: 2018     Years since quittin.7   • Smokeless tobacco: Never   Vaping Use   • Vaping status: Never Used   Substance and Sexual Activity   • Alcohol use: Never   • Drug use: Never   • Sexual activity: Defer       Medications:     Current Outpatient Medications:   •  albuterol sulfate HFA (Ventolin HFA) 108 (90 Base) MCG/ACT inhaler, Inhale 2 puffs Every 4 (Four) Hours As Needed for Wheezing or Shortness of Air., Disp: 18 g, Rfl: 2  •  amitriptyline (ELAVIL) 50 MG tablet, Take 1 tablet by mouth Every Night. For sleep/chronic  pain, Disp: 90 tablet, Rfl: 1  •  aspirin 81 MG EC tablet, Take 1 tablet by mouth Daily., Disp: , Rfl:   •  atorvastatin (LIPITOR) 10 MG tablet, Take 1 tablet by mouth Every Night. To lower cholesterol and risk of stroke, Disp: 90 tablet, Rfl: 3  •  Azelastine-Fluticasone 137-50 MCG/ACT suspension, 1 spray into the nostril(s) as directed by provider 2 (Two) Times a Day., Disp: 23 g, Rfl: 11  •  bisoprolol (ZEBeta) 5 MG tablet, Take 1 tablet by mouth Daily. For high blood pressure., Disp: 90 tablet, Rfl: 1  •  Cholecalciferol (Vitamin D3) 50 MCG (2000 UT) capsule, Take 1 capsule by mouth Daily. For low vitamin D., Disp: 90 capsule, Rfl: 3  •  DULoxetine (CYMBALTA) 60 MG capsule, Take 1 capsule by mouth Daily. For mood and chronic pain, Disp: 90 capsule, Rfl: 1  •  folic acid (FOLVITE) 1 MG tablet, Take 1 tablet by mouth Daily. Due to methotrexate use, Disp: 90 tablet, Rfl: 3  •  guaiFENesin (MUCINEX) 600 MG 12 hr tablet, Take 2 tablets by mouth 2 (Two) Times a Day., Disp: , Rfl:   •  isosorbide mononitrate (IMDUR) 30 MG 24 hr tablet, Take 1 tablet by mouth Daily. For high blood pressure., Disp: 90 tablet, Rfl: 1  •  leflunomide (ARAVA) 20 MG tablet, Take 1 tablet by mouth Daily., Disp: , Rfl:   •  levocetirizine (XYZAL) 5 MG tablet, Take 1 tablet by mouth Every Evening., Disp: 90 tablet, Rfl: 3  •  levoFLOXacin (LEVAQUIN) 500 MG tablet, Take 1 tablet by mouth Daily., Disp: , Rfl:   •  methotrexate 2.5 MG tablet, Take 8 tablets by mouth 1 (One) Time Per Week. TAKES ON THURSDAY (Patient taking differently: Take 8 tablets by mouth 1 (One) Time Per Week. TAKES ON Sunday ONLY TAKES 7 PILSS), Disp: 32 tablet, Rfl: 0  •  Multiple Vitamins-Minerals (MULTIVITAMIN WITH MINERALS) tablet tablet, Take 1 tablet by mouth Daily., Disp: , Rfl:   •  O2 (OXYGEN), Inhale 2 L 1 (One) Time. Nightly, Disp: , Rfl:   •  pantoprazole (PROTONIX) 40 MG EC tablet, Take 1 tablet by mouth Daily., Disp: 90 tablet, Rfl: 3  •  predniSONE (DELTASONE)  20 MG tablet, Take 1 tablet by mouth., Disp: , Rfl:   •  tiotropium bromide-olodaterol (Stiolto Respimat) 2.5-2.5 MCG/ACT aerosol solution inhaler, Inhale 2 puffs Daily., Disp: 12 g, Rfl: 1  •  vitamin B-12 (CYANOCOBALAMIN) 1000 MCG tablet, TAKE ONE TABLET BY MOUTH DAILY (Patient taking differently: Take 1 tablet by mouth Daily.), Disp: 30 tablet, Rfl: 8    Allergies:   Allergies   Allergen Reactions   • Cyclobenzaprine Unknown - Low Severity     Wide awake   • Plaquenil [Hydroxychloroquine Sulfate] Unknown - Low Severity     Black eyes   • Pravastatin Myalgia     Weakness / fatigue       PHQ-9 Total Score:     [facility] and patient is at LOW risk for falls.Assessment completed on:10/18/2024    Objective     Physical Exam:   Physical Exam  Eyes:      Extraocular Movements: No nystagmus.   Neurological:      Motor: Motor strength is normal.     Coordination: Coordination is intact.      Deep Tendon Reflexes:      Reflex Scores:       Bicep reflexes are 2+ on the right side and 2+ on the left side.       Brachioradialis reflexes are 2+ on the right side and 2+ on the left side.       Patellar reflexes are 2+ on the right side and 2+ on the left side.       Achilles reflexes are 2+ on the right side and 2+ on the left side.  Psychiatric:         Speech: Speech normal.         Neurological Exam  Mental Status  Awake, alert and oriented to person, place and time. Recent and remote memory are intact. Speech is normal. Follows complex commands. Attention and concentration are normal. Fund of knowledge is appropriate for level of education.    Cranial Nerves  CN III, IV, VI: No nystagmus. Normal saccades. Normal smooth pursuit.   Right pupil: Round.   Left pupil: Round.  CN V: Facial sensation is normal.  CN VII: Full and symmetric facial movement.    Motor  Normal muscle bulk throughout. No fasciculations present. Normal muscle tone. No abnormal involuntary movements. Strength is 5/5  "throughout all four extremities.    Sensory  Sensation is intact to light touch, pinprick, vibration and proprioception in all four extremities.    Reflexes                                            Right                      Left  Brachioradialis                    2+                         2+  Biceps                                 2+                         2+  Patellar                                2+                         2+  Achilles                                2+                         2+    Coordination    Finger-to-nose, rapid alternating movements and heel-to-shin normal bilaterally without dysmetria.    Gait  Normal casual, toe, heel and tandem gait.     Physical Exam        Vital Signs:   Vitals:    10/18/24 1016   BP: 130/60   Pulse: 72   SpO2: 98%   Weight: 63.2 kg (139 lb 6.4 oz)   Height: 180.3 cm (70.98\")     Body mass index is 19.45 kg/m².         Assessment / Plan      Assessment/Plan:   Diagnoses and all orders for this visit:    1. Memory loss (Primary)  Comments:  [place] next year. MMSE reassuring. Hearing loss likely plays a role    2. Grief       Assessment & Plan  1. Early memory loss.  He exhibits signs of early memory loss but does not meet the criteria for dementia, scoring 28 out of 30 on the memory test. His hearing impairment and emotional stress, particularly following the loss of his son, are likely contributing factors. Medication is not deemed necessary at this stage as it may not be beneficial and could have side effects. He is advised to continue engaging in activities that keep his brain active, such as playing games on his phone. If symptoms worsen, medication may be considered in the future.    Follow-up  Return in 1 year for follow-up.        Patient Education:       Reviewed medications, potential side effects and signs and symptoms to report. Discussed risk versus benefits of treatment plan with patient and/or family-including medications, labs and radiology that may " be ordered. Addressed questions and concerns during visit. Patient and/or family verbalized understanding and agree with plan. Instructed to call the office with any questions and report to ER with any life-threatening symptoms.     Follow Up:   Return in about 1 year (around 10/18/2025) for Recheck.    During this visit the following were done:  Labs Reviewed [x]    Labs Ordered []    Radiology Reports Reviewed []    Radiology Ordered []    PCP Records Reviewed [x]    Referring Provider Records Reviewed []    ER Records Reviewed []    Hospital Records Reviewed []    History Obtained From Family [x]    Radiology Images Reviewed []    Other Reviewed [x]    Records Requested []      Patient or patient representative verbalized consent for the use of Ambient Listening during the visit with  Annetta Eason DNP, APRN for chart documentation. 10/18/2024  16:21 EDT      Annetta Eason DNP, APRN

## 2024-11-08 NOTE — ANESTHESIA POSTPROCEDURE EVALUATION
Patient: Elliott Dunn    Procedure Summary     Date: 07/20/22 Room / Location: [facility] ENDOSCOPY 2 / [facility] ENDOSCOPY    Anesthesia Start: 1128 Anesthesia Stop: 1144    Procedure: ESOPHAGOGASTRODUODENOSCOPY WITH BIOPSY  (N/A ) Diagnosis:       Colon cancer screening      Abnormal CT of the abdomen      Gastroesophageal reflux disease without esophagitis      History of Cristina's esophagus      (Colon cancer screening [Z12.11])      (Abnormal CT of the abdomen [R93.5])      (Gastroesophageal reflux disease without esophagitis [K21.9])      (History of Cristina's esophagus [Z87.19])    Surgeons: Luis Callahan MD Provider: Brown Fitch CRNA    Anesthesia Type: MAC ASA Status: 3          Anesthesia Type: MAC    Vitals  No vitals data found for the desired time range.          Post Anesthesia Care and Evaluation    Patient location during evaluation: PHASE II  Patient participation: complete - patient participated  Level of consciousness: awake and alert  Pain score: 0  Pain management: satisfactory to patient    Airway patency: patent  Anesthetic complications: No anesthetic complications  PONV Status: none  Cardiovascular status: acceptable and stable  Respiratory status: acceptable  Hydration status: acceptable    Comments: Vitals signs as noted in nursing documentation as per protocol.      
Detail Level: Detailed
Size Of Lesion: 14mm x 10mm

## 2025-01-28 ENCOUNTER — LAB (OUTPATIENT)
Dept: LAB | Facility: HOSPITAL | Age: 70
End: 2025-01-28
Payer: MEDICARE

## 2025-01-28 DIAGNOSIS — D47.2 MGUS (MONOCLONAL GAMMOPATHY OF UNKNOWN SIGNIFICANCE): ICD-10-CM

## 2025-01-28 LAB
ALBUMIN SERPL-MCNC: 4.3 G/DL (ref 3.5–5.2)
ALBUMIN/GLOB SERPL: 1 G/DL
ALP SERPL-CCNC: 111 U/L (ref 39–117)
ALT SERPL W P-5'-P-CCNC: 24 U/L (ref 1–41)
ANION GAP SERPL CALCULATED.3IONS-SCNC: 9.8 MMOL/L (ref 5–15)
AST SERPL-CCNC: 30 U/L (ref 1–40)
BASOPHILS # BLD AUTO: 0.05 10*3/MM3 (ref 0–0.2)
BASOPHILS NFR BLD AUTO: 0.7 % (ref 0–1.5)
BILIRUB SERPL-MCNC: 0.3 MG/DL (ref 0–1.2)
BUN SERPL-MCNC: 14 MG/DL (ref 8–23)
BUN/CREAT SERPL: 13.1 (ref 7–25)
CALCIUM SPEC-SCNC: 10.1 MG/DL (ref 8.6–10.5)
CHLORIDE SERPL-SCNC: 101 MMOL/L (ref 98–107)
CO2 SERPL-SCNC: 25.2 MMOL/L (ref 22–29)
CREAT SERPL-MCNC: 1.07 MG/DL (ref 0.76–1.27)
DEPRECATED RDW RBC AUTO: 43.4 FL (ref 37–54)
EGFRCR SERPLBLD CKD-EPI 2021: 75.1 ML/MIN/1.73
EOSINOPHIL # BLD AUTO: 0.11 10*3/MM3 (ref 0–0.4)
EOSINOPHIL NFR BLD AUTO: 1.5 % (ref 0.3–6.2)
ERYTHROCYTE [DISTWIDTH] IN BLOOD BY AUTOMATED COUNT: 12.9 % (ref 12.3–15.4)
GLOBULIN UR ELPH-MCNC: 4.1 GM/DL
GLUCOSE SERPL-MCNC: 92 MG/DL (ref 65–99)
HCT VFR BLD AUTO: 47.6 % (ref 37.5–51)
HGB BLD-MCNC: 16.6 G/DL (ref 13–17.7)
IMM GRANULOCYTES # BLD AUTO: 0.01 10*3/MM3 (ref 0–0.05)
IMM GRANULOCYTES NFR BLD AUTO: 0.1 % (ref 0–0.5)
LYMPHOCYTES # BLD AUTO: 2.62 10*3/MM3 (ref 0.7–3.1)
LYMPHOCYTES NFR BLD AUTO: 34.9 % (ref 19.6–45.3)
MCH RBC QN AUTO: 32 PG (ref 26.6–33)
MCHC RBC AUTO-ENTMCNC: 34.9 G/DL (ref 31.5–35.7)
MCV RBC AUTO: 91.7 FL (ref 79–97)
MONOCYTES # BLD AUTO: 0.61 10*3/MM3 (ref 0.1–0.9)
MONOCYTES NFR BLD AUTO: 8.1 % (ref 5–12)
NEUTROPHILS NFR BLD AUTO: 4.1 10*3/MM3 (ref 1.7–7)
NEUTROPHILS NFR BLD AUTO: 54.7 % (ref 42.7–76)
NRBC BLD AUTO-RTO: 0 /100 WBC (ref 0–0.2)
PLATELET # BLD AUTO: 293 10*3/MM3 (ref 140–450)
PMV BLD AUTO: 10 FL (ref 6–12)
POTASSIUM SERPL-SCNC: 5 MMOL/L (ref 3.5–5.2)
PROT SERPL-MCNC: 8.4 G/DL (ref 6–8.5)
RBC # BLD AUTO: 5.19 10*6/MM3 (ref 4.14–5.8)
SODIUM SERPL-SCNC: 136 MMOL/L (ref 136–145)
WBC NRBC COR # BLD AUTO: 7.5 10*3/MM3 (ref 3.4–10.8)

## 2025-01-28 PROCEDURE — 80053 COMPREHEN METABOLIC PANEL: CPT

## 2025-01-28 PROCEDURE — 84165 PROTEIN E-PHORESIS SERUM: CPT

## 2025-01-28 PROCEDURE — 85025 COMPLETE CBC W/AUTO DIFF WBC: CPT

## 2025-01-28 PROCEDURE — 82784 ASSAY IGA/IGD/IGG/IGM EACH: CPT

## 2025-01-28 PROCEDURE — 36415 COLL VENOUS BLD VENIPUNCTURE: CPT

## 2025-01-28 PROCEDURE — 86334 IMMUNOFIX E-PHORESIS SERUM: CPT

## 2025-01-30 LAB
ALBUMIN SERPL ELPH-MCNC: 3.6 G/DL (ref 2.9–4.4)
ALBUMIN/GLOB SERPL: 1 {RATIO} (ref 0.7–1.7)
ALPHA1 GLOB SERPL ELPH-MCNC: 0.2 G/DL (ref 0–0.4)
ALPHA2 GLOB SERPL ELPH-MCNC: 0.9 G/DL (ref 0.4–1)
B-GLOBULIN SERPL ELPH-MCNC: 0.9 G/DL (ref 0.7–1.3)
GAMMA GLOB SERPL ELPH-MCNC: 2 G/DL (ref 0.4–1.8)
GLOBULIN SER-MCNC: 4 G/DL (ref 2.2–3.9)
IGA SERPL-MCNC: 40 MG/DL (ref 61–437)
IGG SERPL-MCNC: 2698 MG/DL (ref 603–1613)
IGM SERPL-MCNC: 20 MG/DL (ref 20–172)
INTERPRETATION SERPL IEP-IMP: ABNORMAL
LABORATORY COMMENT REPORT: ABNORMAL
M PROTEIN SERPL ELPH-MCNC: 1.8 G/DL
PROT SERPL-MCNC: 7.6 G/DL (ref 6–8.5)

## 2025-02-03 ENCOUNTER — OFFICE VISIT (OUTPATIENT)
Dept: ONCOLOGY | Facility: CLINIC | Age: 70
End: 2025-02-03
Payer: MEDICARE

## 2025-02-03 VITALS
HEART RATE: 85 BPM | SYSTOLIC BLOOD PRESSURE: 159 MMHG | TEMPERATURE: 97.5 F | DIASTOLIC BLOOD PRESSURE: 74 MMHG | BODY MASS INDEX: 19.53 KG/M2 | RESPIRATION RATE: 16 BRPM | HEIGHT: 71 IN | OXYGEN SATURATION: 96 % | WEIGHT: 139.5 LBS

## 2025-02-03 DIAGNOSIS — D47.2 MGUS (MONOCLONAL GAMMOPATHY OF UNKNOWN SIGNIFICANCE): Primary | ICD-10-CM

## 2025-02-03 PROCEDURE — 3078F DIAST BP <80 MM HG: CPT | Performed by: NURSE PRACTITIONER

## 2025-02-03 PROCEDURE — 3077F SYST BP >= 140 MM HG: CPT | Performed by: NURSE PRACTITIONER

## 2025-02-03 PROCEDURE — 1126F AMNT PAIN NOTED NONE PRSNT: CPT | Performed by: NURSE PRACTITIONER

## 2025-02-03 PROCEDURE — 1159F MED LIST DOCD IN RCRD: CPT | Performed by: NURSE PRACTITIONER

## 2025-02-03 PROCEDURE — 1160F RVW MEDS BY RX/DR IN RCRD: CPT | Performed by: NURSE PRACTITIONER

## 2025-02-03 PROCEDURE — 99214 OFFICE O/P EST MOD 30 MIN: CPT | Performed by: NURSE PRACTITIONER

## 2025-02-03 NOTE — PROGRESS NOTES
PROBLEM LIST:  Oncology/Hematology History Overview Note   1.  MGUS: Had been having mid back pains and was seen by neurology, was found to have 1200 mg of immunoglobulin G monoclonal protein in the serum with a normal IgA and IgM level this was in July 2016.  Workup August 2016 included a bone survey that was negative other than for degenerative joint disease with left eighth rib healed fracture that was seen on prior bone scan.  Normal creatinine, ionized calcium of 1.34, normal total protein to albumin ratio.  Normal sedimentation rate and C-reactive protein, serum monoclonal protein present at 1100 mg/dL of immunoglobulin G kappa with normal IgA and M levels.  Urine monoclonal protein was too scant to quantify.  Kappa to lambda ratio of 4.58 with elevation of At 42.85.  CBC was unremarkable with normal white count and platelet count and hemoglobin of 17 baseline.  Baseline PET/CT 9/1/2016 was negative.   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  This genetic alteration is found in approximately 15-18 percent of patients with plasma cell myeloma by FISH analysis and is associated with a favorable prognosis in the absence of poor prognostic markers.   b.)  3/6/2017 follow-up PET/CT was negative.    2.  Prostate cancer: Status post prostatectomy 2014.  He had chemical recurrence in 2020.  He just completed involved field radiotherapy to the pelvis in November 2020 with Dr. Billy Wilson.  He will be followed by Dr. Varela and Dr. Wilson.  If he has progressive disease then I would recommend initiating Xtandi along with his androgen deprivation therapy.    3.  Squamous cell cancer of the skin, followed by Dr. Izaguirre.           MGUS (monoclonal gammopathy of unknown significance)   7/1/2016 Initial Diagnosis    MGUS (monoclonal gammopathy of unknown significance)     8/21/2017 -  Other Event     Myeloma panel: Urine immunoelectrophoresis monoclonal protein too small to quantify, serum immunoelectrophoresis M-spike stable at 1.3g/dl, ionized calcium 5.2, kappa to lambda ratio 4.10, beta-2 microglobulin 2.2, C-reactive protein less than 0.50, creatinine 1.3, sedimentation rate to.  CBC WBC 9600, hemoglobin 15.6, hematocrit 47.1%, platelet count 209,000.       3/12/2018 -  Other Event    Myeloma panel: Urine immunoelectrophoresis with 122 mg/24 hour protein, monoclonal kappa free light chains 21.2%, monoclonal IgG kappa 7.5%.  Sedimentation rate 5, immunoglobulin free light chains free kappa light chains 49.8, normal lambda light chains 12.2, kappa/lambda ratio 4.08.  Serum immunoelectrophoresis M spike 1.7g/dL, C-reactive protein 1.60, CMP unremarkable with creatinine 1.10, serum calcium 9.2, ionized calcium 5.1, beta-2 microglobulin 2.5, CBC normal with a WBC of 9.07, hemoglobin 15.9, hematocrit 47.5%, platelets 226,000.     Malignant neoplasm of prostate   5/16/2014 Cancer Staged    Staging form: Prostate, AJCC V7  - Clinical stage from 5/16/2014: Stage IIA (T1c, N0, M0, PSA: 10 to 19, Guillermo 7) - Signed by Luis Wilson MD on 9/16/2020 6/30/2014 Cancer Staged    Staging form: Prostate, AJCC V7  - Pathologic stage from 6/30/2014: Stage III (T3b, N0, cM0, PSA: 10 to 19, Christmas 7) - Signed by Luis Wilson MD on 9/16/2020 9/20/2016 Initial Diagnosis    Malignant neoplasm of prostate (CMS/HCC)     10/7/2020 - 11/24/2020 Radiation    Radiation OncologyTreatment Course:  Elliott Dunn received 7000 cGy in 35 fractions to prostate bed via External Beam Radiation - EBRT.         REASON FOR VISIT: Follow-up of his MGUS    HISTORY OF PRESENT ILLNESS:   69 y.o.  male presents today for follow-up of his monoclonal gammopathy.  Overall continues to do fairly well.  Prostate cancer is doing well.  Continues to follow with Dr. Varela.  Continues to see rheumatology for his RA as well as osteoarthritis.   He does take daily prednisone.  Continues to consider Biologics currently.  Sees Dr. Heller for neck pain.  He does see neurology for some memory challenges.  He does a few exercises as recommended by them.  Otherwise no new changes.    Past medical history, social history and family history was reviewed and unchanged from prior visit.    Review of Systems:    Review of Systems   Constitutional:  Positive for fatigue.   Musculoskeletal:  Positive for arthralgias, back pain and myalgias.   All other systems reviewed and are negative.     A comprehensive 14 point review of systems was performed and was negative except as mentioned.      Medications:        Current Outpatient Medications:     albuterol sulfate HFA (Ventolin HFA) 108 (90 Base) MCG/ACT inhaler, Inhale 2 puffs Every 4 (Four) Hours As Needed for Wheezing or Shortness of Air., Disp: 18 g, Rfl: 2    amitriptyline (ELAVIL) 50 MG tablet, Take 1 tablet by mouth Every Night. For sleep/chronic pain, Disp: 90 tablet, Rfl: 1    aspirin 81 MG EC tablet, Take 1 tablet by mouth Daily., Disp: , Rfl:     atorvastatin (LIPITOR) 10 MG tablet, Take 1 tablet by mouth Every Night. To lower cholesterol and risk of stroke, Disp: 90 tablet, Rfl: 3    Azelastine-Fluticasone 137-50 MCG/ACT suspension, 1 spray into the nostril(s) as directed by provider 2 (Two) Times a Day., Disp: 23 g, Rfl: 11    bisoprolol (ZEBeta) 5 MG tablet, Take 1 tablet by mouth Daily. For high blood pressure., Disp: 90 tablet, Rfl: 1    Cholecalciferol (Vitamin D3) 50 MCG (2000 UT) capsule, Take 1 capsule by mouth Daily. For low vitamin D., Disp: 90 capsule, Rfl: 3    DULoxetine (CYMBALTA) 60 MG capsule, Take 1 capsule by mouth Daily. For mood and chronic pain, Disp: 90 capsule, Rfl: 1    folic acid (FOLVITE) 1 MG tablet, Take 1 tablet by mouth Daily. Due to methotrexate use, Disp: 90 tablet, Rfl: 3    isosorbide mononitrate (IMDUR) 30 MG 24 hr tablet, Take 1 tablet by mouth Daily. For high blood  "pressure., Disp: 90 tablet, Rfl: 1    leflunomide (ARAVA) 20 MG tablet, Take 1 tablet by mouth Daily., Disp: , Rfl:     levocetirizine (XYZAL) 5 MG tablet, Take 1 tablet by mouth Every Evening., Disp: 90 tablet, Rfl: 3    levoFLOXacin (LEVAQUIN) 500 MG tablet, Take 1 tablet by mouth Daily., Disp: , Rfl:     methotrexate 2.5 MG tablet, Take 8 tablets by mouth 1 (One) Time Per Week. TAKES ON THURSDAY (Patient taking differently: Take 8 tablets by mouth 1 (One) Time Per Week. TAKES ON Sunday ONLY TAKES 7 PILSS), Disp: 32 tablet, Rfl: 0    Multiple Vitamins-Minerals (MULTIVITAMIN WITH MINERALS) tablet tablet, Take 1 tablet by mouth Daily., Disp: , Rfl:     O2 (OXYGEN), Inhale 2 L 1 (One) Time. Nightly, Disp: , Rfl:     pantoprazole (PROTONIX) 40 MG EC tablet, Take 1 tablet by mouth Daily., Disp: 90 tablet, Rfl: 3    predniSONE (DELTASONE) 20 MG tablet, Take 1 tablet by mouth., Disp: , Rfl:     tiotropium bromide-olodaterol (Stiolto Respimat) 2.5-2.5 MCG/ACT aerosol solution inhaler, Inhale 2 puffs Daily., Disp: 12 g, Rfl: 1    vitamin B-12 (CYANOCOBALAMIN) 1000 MCG tablet, TAKE ONE TABLET BY MOUTH DAILY (Patient taking differently: Take 1 tablet by mouth Daily.), Disp: 30 tablet, Rfl: 8      ALLERGIES:    Allergies   Allergen Reactions    Cyclobenzaprine Unknown - Low Severity     Wide awake    Plaquenil [Hydroxychloroquine Sulfate] Unknown - Low Severity     Black eyes    Pravastatin Myalgia     Weakness / fatigue         Physical Exam    VITAL SIGNS:  /74   Pulse 85   Temp 97.5 °F (36.4 °C)   Resp 16   Ht 180.3 cm (70.98\")   Wt 63.3 kg (139 lb 8 oz)   SpO2 96%   BMI 19.47 kg/m²     Wt Readings from Last 3 Encounters:   02/03/25 63.3 kg (139 lb 8 oz)   10/18/24 63.2 kg (139 lb 6.4 oz)   08/09/24 65.8 kg (145 lb)       Body mass index is 19.47 kg/m². Body surface area is 1.81 meters squared.         Performance Status: 1      General: Thin appearing male in no acute distress  Neuro: alert and " oriented  HEENT: sclera anicteric, oropharynx clear  Lymphatics: no cervical, supraclavicular, or axillary adenopathy  Cardiovascular: regular rate and rhythm, no murmurs  Lungs: clear to auscultation bilaterally  Abdomen: soft, nontender, nondistended.  No palpable organomegaly  Extremeties: no lower extremity edema  Skin: no rashes, lesions, bruising, or petechiae  Psych: mood and affect appropriate          RECENT LABS:    Lab Results   Component Value Date    HGB 16.6 01/28/2025    HCT 47.6 01/28/2025    MCV 91.7 01/28/2025     01/28/2025    WBC 7.50 01/28/2025    NEUTROABS 4.10 01/28/2025    LYMPHSABS 2.62 01/28/2025    MONOSABS 0.61 01/28/2025    EOSABS 0.11 01/28/2025    BASOSABS 0.05 01/28/2025       Lab Results   Component Value Date    GLUCOSE 92 01/28/2025    BUN 14 01/28/2025    CREATININE 1.07 01/28/2025     01/28/2025    K 5.0 01/28/2025     01/28/2025    CO2 25.2 01/28/2025    CALCIUM 10.1 01/28/2025    PROTEINTOT 8.4 01/28/2025    ALBUMIN 3.6 01/28/2025    ALBUMIN 4.3 01/28/2025    BILITOT 0.3 01/28/2025    ALKPHOS 111 01/28/2025    AST 30 01/28/2025    ALT 24 01/28/2025       Lab Results   Component Value Date    MSPIKE 1.8 (H) 01/28/2025    MSPIKE 1.8 (H) 07/09/2024    MSPIKE 1.9 (H) 12/20/2023     Lab Results   Component Value Date    FREEKAPPAL 126.1 (H) 07/09/2024    FREEKAPPAL 132.0 (H) 12/20/2023    FREEKAPPAL 121.7 (H) 12/01/2022     Lab Results   Component Value Date    IGLFLC 7.8 07/09/2024    IGLFLC 8.0 12/20/2023    IGLFLC 8.4 12/01/2022         Xr Chest 2 View    Result Date: 8/17/2019  No radiographic evidence of acute cardiac or pulmonary disease. Stable chronic increased interstitial markings.      This report was finalized on 8/17/2019 8:12 AM by Obdulia Feliciano MD.          Assessment/Plan    1.  Monoclonal gammopathy of uncertain significance with the M spike of 1.9 g/dL.  His numbers have remained relatively stable.  Labs from 1/28/2025 showed M spike stable at  1.8.  IgG stable at 2698.  For now, we will continue to monitor.  Will plan to recheck in 6 months.  He continues to have no worrisome symptoms of anemia, hypercalcemia, or elevated creatinine.    2.  Severe COPD with a right upper lobe lesion.  Continue follow-up with Dr. Dickson.     3.  Chemical recurrence of his prostate cancer in  summer 2020.  Completed short course of ADT and radiation with Dr. Wilson.  Follows with Dr. Varela yearly.    4.  RA.  Continue to follow with rheumatology.      Follow-up in 6 months      Adrianna Esteban APROLIVIA  [facility]        Return in (Approximately): 6 months    Orders Placed This Encounter   Procedures    Comprehensive Metabolic Panel    Immunofixation (MARKO), Protein Electrophoresis (PE), and Quantitative Free Kappa and Lambda Light Chains (FLC), Serum    CBC & Differential       2/3/2025

## 2025-03-08 DIAGNOSIS — G89.4 CHRONIC PAIN SYNDROME: ICD-10-CM

## 2025-03-08 DIAGNOSIS — I10 ESSENTIAL HYPERTENSION: Chronic | ICD-10-CM

## 2025-03-08 DIAGNOSIS — M06.09 RHEUMATOID ARTHRITIS OF MULTIPLE SITES WITH NEGATIVE RHEUMATOID FACTOR: ICD-10-CM

## 2025-03-08 RX ORDER — DULOXETIN HYDROCHLORIDE 60 MG/1
60 CAPSULE, DELAYED RELEASE ORAL DAILY
Qty: 90 CAPSULE | Refills: 1 | Status: SHIPPED | OUTPATIENT
Start: 2025-03-08

## 2025-03-08 RX ORDER — BISOPROLOL FUMARATE 5 MG/1
5 TABLET, FILM COATED ORAL DAILY
Qty: 90 TABLET | Refills: 1 | Status: SHIPPED | OUTPATIENT
Start: 2025-03-08

## 2025-03-08 RX ORDER — ISOSORBIDE MONONITRATE 30 MG/1
30 TABLET, EXTENDED RELEASE ORAL DAILY
Qty: 90 TABLET | Refills: 1 | Status: SHIPPED | OUTPATIENT
Start: 2025-03-08

## 2025-05-01 RX ORDER — LEVOCETIRIZINE DIHYDROCHLORIDE 5 MG/1
5 TABLET, FILM COATED ORAL EVERY EVENING
Qty: 90 TABLET | Refills: 3 | Status: SHIPPED | OUTPATIENT
Start: 2025-05-01

## 2025-06-03 ENCOUNTER — PATIENT MESSAGE (OUTPATIENT)
Dept: INTERNAL MEDICINE | Facility: CLINIC | Age: 70
End: 2025-06-03
Payer: MEDICARE

## 2025-07-23 ENCOUNTER — HOSPITAL ENCOUNTER (OUTPATIENT)
Dept: CT IMAGING | Facility: HOSPITAL | Age: 70
Discharge: HOME | End: 2025-07-23
Admitting: INTERNAL MEDICINE
Payer: MEDICARE

## 2025-07-23 DIAGNOSIS — Z87.891 PERSONAL HISTORY OF SMOKING: ICD-10-CM

## 2025-07-23 PROCEDURE — 71271 CT THORAX LUNG CANCER SCR C-: CPT

## 2025-08-04 DIAGNOSIS — I73.9 PAD (PERIPHERAL ARTERY DISEASE): ICD-10-CM

## 2025-08-04 DIAGNOSIS — E78.49 OTHER HYPERLIPIDEMIA: ICD-10-CM

## 2025-08-04 RX ORDER — ATORVASTATIN CALCIUM 10 MG/1
TABLET, FILM COATED ORAL
Qty: 90 TABLET | Refills: 3 | Status: SHIPPED | OUTPATIENT
Start: 2025-08-04 | End: 2025-08-11

## 2025-08-11 ENCOUNTER — LAB (OUTPATIENT)
Dept: LAB | Facility: HOSPITAL | Age: 70
End: 2025-08-11
Payer: MEDICARE

## 2025-08-11 ENCOUNTER — OFFICE VISIT (OUTPATIENT)
Dept: INTERNAL MEDICINE | Facility: CLINIC | Age: 70
End: 2025-08-11
Payer: MEDICARE

## 2025-08-11 VITALS
BODY MASS INDEX: 18.96 KG/M2 | OXYGEN SATURATION: 99 % | SYSTOLIC BLOOD PRESSURE: 116 MMHG | WEIGHT: 135.4 LBS | HEIGHT: 71 IN | HEART RATE: 52 BPM | DIASTOLIC BLOOD PRESSURE: 74 MMHG | TEMPERATURE: 96.8 F

## 2025-08-11 DIAGNOSIS — R79.9 ABNORMAL BLOOD CHEMISTRY: ICD-10-CM

## 2025-08-11 DIAGNOSIS — M06.09 RHEUMATOID ARTHRITIS OF MULTIPLE SITES WITH NEGATIVE RHEUMATOID FACTOR: ICD-10-CM

## 2025-08-11 DIAGNOSIS — J30.9 ALLERGIC RHINITIS, UNSPECIFIED SEASONALITY, UNSPECIFIED TRIGGER: ICD-10-CM

## 2025-08-11 DIAGNOSIS — Z00.01 ENCOUNTER FOR MEDICARE ANNUAL EXAMINATION WITH ABNORMAL FINDINGS: ICD-10-CM

## 2025-08-11 DIAGNOSIS — E53.8 COBALAMIN DEFICIENCY: ICD-10-CM

## 2025-08-11 DIAGNOSIS — I73.9 PAD (PERIPHERAL ARTERY DISEASE): ICD-10-CM

## 2025-08-11 DIAGNOSIS — Z51.81 MEDICATION MONITORING ENCOUNTER: ICD-10-CM

## 2025-08-11 DIAGNOSIS — I10 ESSENTIAL HYPERTENSION: Chronic | ICD-10-CM

## 2025-08-11 DIAGNOSIS — D47.2 MGUS (MONOCLONAL GAMMOPATHY OF UNKNOWN SIGNIFICANCE): ICD-10-CM

## 2025-08-11 DIAGNOSIS — E55.9 VITAMIN D DEFICIENCY: ICD-10-CM

## 2025-08-11 DIAGNOSIS — E78.49 OTHER HYPERLIPIDEMIA: ICD-10-CM

## 2025-08-11 DIAGNOSIS — Z00.00 MEDICARE ANNUAL WELLNESS VISIT, SUBSEQUENT: Primary | ICD-10-CM

## 2025-08-11 DIAGNOSIS — G89.4 CHRONIC PAIN SYNDROME: ICD-10-CM

## 2025-08-11 LAB
25(OH)D3 SERPL-MCNC: 21.7 NG/ML (ref 30–100)
ALBUMIN SERPL-MCNC: 4.1 G/DL (ref 3.5–5.2)
ALBUMIN/GLOB SERPL: 1 G/DL
ALP SERPL-CCNC: 72 U/L (ref 39–117)
ALT SERPL W P-5'-P-CCNC: 15 U/L (ref 1–41)
ANION GAP SERPL CALCULATED.3IONS-SCNC: 10 MMOL/L (ref 5–15)
AST SERPL-CCNC: 25 U/L (ref 1–40)
BASOPHILS # BLD AUTO: 0.03 10*3/MM3 (ref 0–0.2)
BASOPHILS NFR BLD AUTO: 0.4 % (ref 0–1.5)
BILIRUB SERPL-MCNC: 0.3 MG/DL (ref 0–1.2)
BUN SERPL-MCNC: 25 MG/DL (ref 8–23)
BUN/CREAT SERPL: 25.5 (ref 7–25)
CALCIUM SPEC-SCNC: 9.8 MG/DL (ref 8.6–10.5)
CHLORIDE SERPL-SCNC: 103 MMOL/L (ref 98–107)
CHOLEST SERPL-MCNC: 176 MG/DL (ref 0–200)
CO2 SERPL-SCNC: 23 MMOL/L (ref 22–29)
CREAT SERPL-MCNC: 0.98 MG/DL (ref 0.76–1.27)
DEPRECATED RDW RBC AUTO: 46.7 FL (ref 37–54)
EGFRCR SERPLBLD CKD-EPI 2021: 83 ML/MIN/1.73
EOSINOPHIL # BLD AUTO: 0.07 10*3/MM3 (ref 0–0.4)
EOSINOPHIL NFR BLD AUTO: 1 % (ref 0.3–6.2)
ERYTHROCYTE [DISTWIDTH] IN BLOOD BY AUTOMATED COUNT: 13.3 % (ref 12.3–15.4)
FOLATE SERPL-MCNC: 8.75 NG/ML (ref 4.78–24.2)
GLOBULIN UR ELPH-MCNC: 4.2 GM/DL
GLUCOSE SERPL-MCNC: 93 MG/DL (ref 65–99)
HCT VFR BLD AUTO: 44.1 % (ref 37.5–51)
HDLC SERPL-MCNC: 54 MG/DL (ref 40–60)
HGB BLD-MCNC: 15 G/DL (ref 13–17.7)
IMM GRANULOCYTES # BLD AUTO: 0.03 10*3/MM3 (ref 0–0.05)
IMM GRANULOCYTES NFR BLD AUTO: 0.4 % (ref 0–0.5)
LDLC SERPL CALC-MCNC: 109 MG/DL (ref 0–100)
LDLC/HDLC SERPL: 2 {RATIO}
LYMPHOCYTES # BLD AUTO: 1.95 10*3/MM3 (ref 0.7–3.1)
LYMPHOCYTES NFR BLD AUTO: 28.6 % (ref 19.6–45.3)
MCH RBC QN AUTO: 32 PG (ref 26.6–33)
MCHC RBC AUTO-ENTMCNC: 34 G/DL (ref 31.5–35.7)
MCV RBC AUTO: 94 FL (ref 79–97)
MONOCYTES # BLD AUTO: 0.5 10*3/MM3 (ref 0.1–0.9)
MONOCYTES NFR BLD AUTO: 7.3 % (ref 5–12)
NEUTROPHILS NFR BLD AUTO: 4.25 10*3/MM3 (ref 1.7–7)
NEUTROPHILS NFR BLD AUTO: 62.3 % (ref 42.7–76)
NRBC BLD AUTO-RTO: 0 /100 WBC (ref 0–0.2)
PLATELET # BLD AUTO: 262 10*3/MM3 (ref 140–450)
PMV BLD AUTO: 10 FL (ref 6–12)
POTASSIUM SERPL-SCNC: 4.2 MMOL/L (ref 3.5–5.2)
PROT SERPL-MCNC: 8.3 G/DL (ref 6–8.5)
RBC # BLD AUTO: 4.69 10*6/MM3 (ref 4.14–5.8)
SODIUM SERPL-SCNC: 136 MMOL/L (ref 136–145)
TRIGL SERPL-MCNC: 70 MG/DL (ref 0–150)
VIT B12 BLD-MCNC: 320 PG/ML (ref 211–946)
VLDLC SERPL-MCNC: 13 MG/DL (ref 5–40)
WBC NRBC COR # BLD AUTO: 6.83 10*3/MM3 (ref 3.4–10.8)

## 2025-08-11 PROCEDURE — G2211 COMPLEX E/M VISIT ADD ON: HCPCS | Performed by: FAMILY MEDICINE

## 2025-08-11 PROCEDURE — 36415 COLL VENOUS BLD VENIPUNCTURE: CPT

## 2025-08-11 PROCEDURE — 96160 PT-FOCUSED HLTH RISK ASSMT: CPT | Performed by: FAMILY MEDICINE

## 2025-08-11 PROCEDURE — 83521 IG LIGHT CHAINS FREE EACH: CPT

## 2025-08-11 PROCEDURE — 99213 OFFICE O/P EST LOW 20 MIN: CPT | Performed by: FAMILY MEDICINE

## 2025-08-11 PROCEDURE — 80061 LIPID PANEL: CPT | Performed by: FAMILY MEDICINE

## 2025-08-11 PROCEDURE — 3078F DIAST BP <80 MM HG: CPT | Performed by: FAMILY MEDICINE

## 2025-08-11 PROCEDURE — 82746 ASSAY OF FOLIC ACID SERUM: CPT | Performed by: FAMILY MEDICINE

## 2025-08-11 PROCEDURE — G0439 PPPS, SUBSEQ VISIT: HCPCS | Performed by: FAMILY MEDICINE

## 2025-08-11 PROCEDURE — 1170F FXNL STATUS ASSESSED: CPT | Performed by: FAMILY MEDICINE

## 2025-08-11 PROCEDURE — 82607 VITAMIN B-12: CPT | Performed by: FAMILY MEDICINE

## 2025-08-11 PROCEDURE — 82306 VITAMIN D 25 HYDROXY: CPT | Performed by: FAMILY MEDICINE

## 2025-08-11 PROCEDURE — 82784 ASSAY IGA/IGD/IGG/IGM EACH: CPT

## 2025-08-11 PROCEDURE — 1160F RVW MEDS BY RX/DR IN RCRD: CPT | Performed by: FAMILY MEDICINE

## 2025-08-11 PROCEDURE — 99397 PER PM REEVAL EST PAT 65+ YR: CPT | Performed by: FAMILY MEDICINE

## 2025-08-11 PROCEDURE — 85025 COMPLETE CBC W/AUTO DIFF WBC: CPT

## 2025-08-11 PROCEDURE — 84165 PROTEIN E-PHORESIS SERUM: CPT

## 2025-08-11 PROCEDURE — 1126F AMNT PAIN NOTED NONE PRSNT: CPT | Performed by: FAMILY MEDICINE

## 2025-08-11 PROCEDURE — 3074F SYST BP LT 130 MM HG: CPT | Performed by: FAMILY MEDICINE

## 2025-08-11 PROCEDURE — 80053 COMPREHEN METABOLIC PANEL: CPT

## 2025-08-11 PROCEDURE — 86334 IMMUNOFIX E-PHORESIS SERUM: CPT

## 2025-08-11 PROCEDURE — 1159F MED LIST DOCD IN RCRD: CPT | Performed by: FAMILY MEDICINE

## 2025-08-11 RX ORDER — AZELASTINE HYDROCHLORIDE, FLUTICASONE PROPIONATE 137; 50 UG/1; UG/1
1 SPRAY, METERED NASAL 2 TIMES DAILY
Qty: 69 G | Refills: 3 | Status: SHIPPED | OUTPATIENT
Start: 2025-08-11 | End: 2025-08-14 | Stop reason: CLARIF

## 2025-08-11 RX ORDER — ATORVASTATIN CALCIUM 10 MG/1
10 TABLET, FILM COATED ORAL NIGHTLY
Qty: 90 TABLET | Refills: 3 | Status: SHIPPED | OUTPATIENT
Start: 2025-08-11

## 2025-08-11 RX ORDER — DULOXETIN HYDROCHLORIDE 60 MG/1
60 CAPSULE, DELAYED RELEASE ORAL DAILY
Qty: 90 CAPSULE | Refills: 3 | Status: SHIPPED | OUTPATIENT
Start: 2025-08-11

## 2025-08-11 RX ORDER — BISOPROLOL FUMARATE 5 MG/1
5 TABLET, FILM COATED ORAL DAILY
Qty: 90 TABLET | Refills: 3 | Status: SHIPPED | OUTPATIENT
Start: 2025-08-11

## 2025-08-11 RX ORDER — LEVOCETIRIZINE DIHYDROCHLORIDE 5 MG/1
5 TABLET, FILM COATED ORAL EVERY EVENING
Qty: 90 TABLET | Refills: 3 | Status: SHIPPED | OUTPATIENT
Start: 2025-08-11

## 2025-08-11 RX ORDER — ISOSORBIDE MONONITRATE 30 MG/1
30 TABLET, EXTENDED RELEASE ORAL DAILY
Qty: 90 TABLET | Refills: 3 | Status: SHIPPED | OUTPATIENT
Start: 2025-08-11

## 2025-08-11 RX ORDER — TESTOSTERONE 20.25 MG/1.25G
GEL TOPICAL
COMMUNITY
Start: 2025-05-05

## 2025-08-12 ENCOUNTER — PRIOR AUTHORIZATION (OUTPATIENT)
Dept: INTERNAL MEDICINE | Facility: CLINIC | Age: 70
End: 2025-08-12
Payer: MEDICARE

## 2025-08-13 LAB
ALBUMIN SERPL ELPH-MCNC: 4 G/DL (ref 2.9–4.4)
ALBUMIN/GLOB SERPL: 1.1 {RATIO} (ref 0.7–1.7)
ALPHA1 GLOB SERPL ELPH-MCNC: 0.2 G/DL (ref 0–0.4)
ALPHA2 GLOB SERPL ELPH-MCNC: 0.9 G/DL (ref 0.4–1)
B-GLOBULIN SERPL ELPH-MCNC: 0.8 G/DL (ref 0.7–1.3)
GAMMA GLOB SERPL ELPH-MCNC: 2.1 G/DL (ref 0.4–1.8)
GLOBULIN SER-MCNC: 3.9 G/DL (ref 2.2–3.9)
IGA SERPL-MCNC: 36 MG/DL (ref 61–437)
IGG SERPL-MCNC: 2338 MG/DL (ref 603–1613)
IGM SERPL-MCNC: 17 MG/DL (ref 20–172)
INTERPRETATION SERPL IEP-IMP: ABNORMAL
KAPPA LC FREE SER-MCNC: 140.1 MG/L (ref 3.3–19.4)
KAPPA LC FREE/LAMBDA FREE SER: 17.51 {RATIO} (ref 0.26–1.65)
LABORATORY COMMENT REPORT: ABNORMAL
LAMBDA LC FREE SERPL-MCNC: 8 MG/L (ref 5.7–26.3)
M PROTEIN SERPL ELPH-MCNC: 1.8 G/DL
PROT SERPL-MCNC: 7.9 G/DL (ref 6–8.5)

## 2025-08-14 RX ORDER — IPRATROPIUM BROMIDE 21 UG/1
2 SPRAY, METERED NASAL EVERY 12 HOURS
Qty: 30 ML | Refills: 12 | Status: SHIPPED | OUTPATIENT
Start: 2025-08-14

## 2025-08-14 RX ORDER — FLUTICASONE PROPIONATE 50 MCG
2 SPRAY, SUSPENSION (ML) NASAL DAILY
Qty: 16 G | Refills: 11 | Status: SHIPPED | OUTPATIENT
Start: 2025-08-14

## 2025-08-29 ENCOUNTER — OFFICE VISIT (OUTPATIENT)
Dept: ONCOLOGY | Facility: CLINIC | Age: 70
End: 2025-08-29
Payer: MEDICARE

## 2025-08-29 VITALS
OXYGEN SATURATION: 97 % | HEART RATE: 49 BPM | HEIGHT: 71 IN | SYSTOLIC BLOOD PRESSURE: 157 MMHG | TEMPERATURE: 97.7 F | BODY MASS INDEX: 18.9 KG/M2 | WEIGHT: 135 LBS | DIASTOLIC BLOOD PRESSURE: 73 MMHG | RESPIRATION RATE: 16 BRPM

## 2025-08-29 DIAGNOSIS — D47.2 MGUS (MONOCLONAL GAMMOPATHY OF UNKNOWN SIGNIFICANCE): Primary | ICD-10-CM

## 2025-08-29 PROCEDURE — 99213 OFFICE O/P EST LOW 20 MIN: CPT | Performed by: INTERNAL MEDICINE

## 2025-08-29 PROCEDURE — 3078F DIAST BP <80 MM HG: CPT | Performed by: INTERNAL MEDICINE

## 2025-08-29 PROCEDURE — 3077F SYST BP >= 140 MM HG: CPT | Performed by: INTERNAL MEDICINE

## 2025-08-29 PROCEDURE — 1126F AMNT PAIN NOTED NONE PRSNT: CPT | Performed by: INTERNAL MEDICINE

## (undated) DEVICE — SINGLE-USE POLYPECTOMY SNARE: Brand: CAPTIVATOR II

## (undated) DEVICE — CONMED SCOPE SAVER BITE BLOCK, 20X27 MM: Brand: SCOPE SAVER

## (undated) DEVICE — GLV SURG TRIUMPH ORTHO W/ALOE PF LTX 8 STRL

## (undated) DEVICE — SUCTION CANISTER, 1500CC, RIGID: Brand: DEROYAL

## (undated) DEVICE — ENDOSCOPY PORT CONNECTOR FOR OLYMPUS® SCOPES: Brand: ERBE

## (undated) DEVICE — CUFF SCD HEMOFORCE SEQ CALF STD MD

## (undated) DEVICE — GLV SURG SENSICARE W/ALOE PF LF 8 STRL

## (undated) DEVICE — HYBRID TUBING/CAP SET FOR OLYMPUS® SCOPES: Brand: ERBE

## (undated) DEVICE — Device

## (undated) DEVICE — HANDPIECE SET WITH HIGH FLOW TIP AND SUCTION TUBE: Brand: INTERPULSE

## (undated) DEVICE — ANTIBACTERIAL VIOLET BRAIDED (POLYGLACTIN 910), SYNTHETIC ABSORBABLE SUTURE: Brand: COATED VICRYL

## (undated) DEVICE — PK HIP GEN 20

## (undated) DEVICE — JELLY,LUBE,STERILE,FLIP TOP,TUBE,2-OZ: Brand: MEDLINE

## (undated) DEVICE — GLV SURG TRIUMPH MICRO PF LTX 7.5 STRL

## (undated) DEVICE — KT POSTN SCHLEIN

## (undated) DEVICE — LUBE JELLY PK/2.75GM STRL BX/144

## (undated) DEVICE — SUT VIC 2/0 CT1 27IN J259H

## (undated) DEVICE — VLV SXN AIR/H2O ORCAPOD3 1P/U STRL

## (undated) DEVICE — ENDOGATOR AUXILIARY WATER JET CONNECTOR: Brand: ENDOGATOR

## (undated) DEVICE — SYR LUERLOK 30CC

## (undated) DEVICE — 4-PORT MANIFOLD: Brand: NEPTUNE 2

## (undated) DEVICE — DRSNG GZ PETROLTM XEROFORM CURAD 1X8IN STRL

## (undated) DEVICE — FRCP BIOP COLD ENDOJAW ALLGTR W/NDL 2.8X2300MM BLU

## (undated) DEVICE — GLV SURG SENSICARE PI LF PF 7.5

## (undated) DEVICE — PIN STNMAN 3.2MM 9IN
Type: IMPLANTABLE DEVICE | Site: SHOULDER | Status: NON-FUNCTIONAL
Removed: 2020-07-09

## (undated) DEVICE — FRCP BX RADJAW4 NDL 2.8 240 STD OG

## (undated) DEVICE — MEDI-VAC NON-CONDUCTIVE SUCTION TUBING: Brand: CARDINAL HEALTH

## (undated) DEVICE — DRSNG SURESITE123 6X8IN

## (undated) DEVICE — QUICK CATCH IN-LINE SUCTION POLYP TRAP IS USED FOR SUCTION RETRIEVAL OF ENDOSCOPICALLY REMOVED POLYPS.

## (undated) DEVICE — 3M™ TEGADERM™ CHG DRESSING 25/CARTON 4 CARTONS/CASE 1659: Brand: TEGADERM™

## (undated) DEVICE — VIOLET BRAIDED (POLYGLACTIN 910), SYNTHETIC ABSORBABLE SUTURE: Brand: COATED VICRYL

## (undated) DEVICE — TBG INFLOW FMS DUO W/O 1WY VLV

## (undated) DEVICE — FLEXIBLE YANKAUER,MEDIUM TIP, NO VACUUM CONTROL: Brand: ARGYLE

## (undated) DEVICE — 1000 S-DRAPE TOWEL DRAPE 10/BX: Brand: STERI-DRAPE™

## (undated) DEVICE — TP SXN YANKR BULB STRL

## (undated) DEVICE — PAD GRND REM POLYHESIVE A/ DISP

## (undated) DEVICE — 2108 SERIES SAGITTAL BLADE, NO OFFSET  (24.8 X 1.24 X 80.1MM)

## (undated) DEVICE — BLD CLIP UNIV SURG GRY

## (undated) DEVICE — SYR LUER SLPTP 50ML